# Patient Record
Sex: FEMALE | Race: WHITE | NOT HISPANIC OR LATINO | Employment: OTHER | URBAN - METROPOLITAN AREA
[De-identification: names, ages, dates, MRNs, and addresses within clinical notes are randomized per-mention and may not be internally consistent; named-entity substitution may affect disease eponyms.]

---

## 2017-01-03 ENCOUNTER — LAB (OUTPATIENT)
Dept: LAB | Facility: HOSPITAL | Age: 57
End: 2017-01-03

## 2017-01-03 ENCOUNTER — OFFICE VISIT (OUTPATIENT)
Dept: ONCOLOGY | Facility: CLINIC | Age: 57
End: 2017-01-03

## 2017-01-03 VITALS
SYSTOLIC BLOOD PRESSURE: 167 MMHG | HEIGHT: 60 IN | RESPIRATION RATE: 18 BRPM | TEMPERATURE: 98.9 F | BODY MASS INDEX: 47.71 KG/M2 | DIASTOLIC BLOOD PRESSURE: 57 MMHG | WEIGHT: 243 LBS | HEART RATE: 74 BPM

## 2017-01-03 DIAGNOSIS — C50.511 BREAST CANCER OF LOWER-OUTER QUADRANT OF RIGHT FEMALE BREAST (HCC): ICD-10-CM

## 2017-01-03 DIAGNOSIS — C50.511 BREAST CANCER OF LOWER-OUTER QUADRANT OF RIGHT FEMALE BREAST (HCC): Primary | ICD-10-CM

## 2017-01-03 LAB
ALBUMIN SERPL-MCNC: 4.3 G/DL (ref 3.2–4.8)
ALBUMIN/GLOB SERPL: 1.7 G/DL (ref 1.5–2.5)
ALP SERPL-CCNC: 101 U/L (ref 25–100)
ALT SERPL W P-5'-P-CCNC: 33 U/L (ref 7–40)
ANION GAP SERPL CALCULATED.3IONS-SCNC: 9 MMOL/L (ref 3–11)
AST SERPL-CCNC: 20 U/L (ref 0–33)
BILIRUB SERPL-MCNC: 0.4 MG/DL (ref 0.3–1.2)
BUN BLD-MCNC: 16 MG/DL (ref 9–23)
BUN/CREAT SERPL: 26.7 (ref 7–25)
CALCIUM SPEC-SCNC: 9.5 MG/DL (ref 8.7–10.4)
CHLORIDE SERPL-SCNC: 101 MMOL/L (ref 99–109)
CO2 SERPL-SCNC: 30 MMOL/L (ref 20–31)
CREAT BLD-MCNC: 0.6 MG/DL (ref 0.6–1.3)
ERYTHROCYTE [DISTWIDTH] IN BLOOD BY AUTOMATED COUNT: 14 % (ref 11.3–14.5)
GFR SERPL CREATININE-BSD FRML MDRD: 103 ML/MIN/1.73
GLOBULIN UR ELPH-MCNC: 2.6 GM/DL
GLUCOSE BLD-MCNC: 190 MG/DL (ref 70–100)
HCT VFR BLD AUTO: 39.2 % (ref 34.5–44)
HGB BLD-MCNC: 12.7 G/DL (ref 11.5–15.5)
LYMPHOCYTES # BLD AUTO: 1.1 10*3/MM3 (ref 0.6–4.8)
LYMPHOCYTES NFR BLD AUTO: 17.8 % (ref 24–44)
MCH RBC QN AUTO: 27.6 PG (ref 27–31)
MCHC RBC AUTO-ENTMCNC: 32.5 G/DL (ref 32–36)
MCV RBC AUTO: 84.7 FL (ref 80–99)
MONOCYTES # BLD AUTO: 0.2 10*3/MM3 (ref 0–1)
MONOCYTES NFR BLD AUTO: 3.4 % (ref 0–12)
NEUTROPHILS # BLD AUTO: 4.9 10*3/MM3 (ref 1.5–8.3)
NEUTROPHILS NFR BLD AUTO: 78.8 % (ref 41–71)
PLATELET # BLD AUTO: 308 10*3/MM3 (ref 150–450)
PMV BLD AUTO: 6.5 FL (ref 6–12)
POTASSIUM BLD-SCNC: 4.3 MMOL/L (ref 3.5–5.5)
PROT SERPL-MCNC: 6.9 G/DL (ref 5.7–8.2)
RBC # BLD AUTO: 4.62 10*6/MM3 (ref 3.89–5.14)
SODIUM BLD-SCNC: 140 MMOL/L (ref 132–146)
WBC NRBC COR # BLD: 6.2 10*3/MM3 (ref 3.5–10.8)

## 2017-01-03 PROCEDURE — 85025 COMPLETE CBC W/AUTO DIFF WBC: CPT

## 2017-01-03 PROCEDURE — 36415 COLL VENOUS BLD VENIPUNCTURE: CPT

## 2017-01-03 PROCEDURE — 80053 COMPREHEN METABOLIC PANEL: CPT | Performed by: INTERNAL MEDICINE

## 2017-01-03 PROCEDURE — 99214 OFFICE O/P EST MOD 30 MIN: CPT | Performed by: NURSE PRACTITIONER

## 2017-01-03 NOTE — MR AVS SNAPSHOT
Dana Rincon   1/3/2017 10:45 AM   Office Visit    Dept Phone:  [phone]   Encounter #:  [id number]    Provider:  Idalia Driscoll MD   Department:  [facility] GROUP HEMATOLOGY  AND ONCOLOGY                Your Full Care Plan              Your Updated Medication List          This list is accurate as of: 1/3/17 11:18 AM.  Always use your most recent med list.                AMBIEN 5 MG tablet   Generic drug:  zolpidem       anastrozole 1 MG tablet   Commonly known as:  ARIMIDEX   Take 1 tablet by mouth daily.       DULERA 100-5 MCG/ACT inhaler   Generic drug:  mometasone-formoterol       fexofenadine 30 MG tablet   Commonly known as:  ALLEGRA       montelukast 10 MG tablet   Commonly known as:  SINGULAIR   TAKE 1 TABLET BY MOUTH EVERY DAY AS DIRECTED       olmesartan-hydrochlorothiazide 40-12.5 MG per tablet   Commonly known as:  BENICAR HCT   TAKE 1 TABLET BY MOUTH DAILY               Instructions     None    Patient Instructions History      [employer] Signup     [facility] [employer] allows you to send messages to your doctor, view your test results, renew your prescriptions, schedule appointments, and more. To sign up, go to [email or web address] and click on the Sign Up Now link in the New User? box. Enter your [employer] Activation Code exactly as it appears below along with the last four digits of your Social Security Number and your Date of Birth () to complete the sign-up process. If you do not sign up before the expiration date, you must request a new code.    [employer] Activation Code: [email or web address]  Expires: 2017 11:18 AM    If you have questions, you can email [email or web address] or call [phone] to talk to our [employer] staff. Remember, [employer] is NOT to be used for urgent needs. For medical emergencies, dial 911.               Other Info from Your Visit           Your appointments     Date & Time Provider Appointment Department    Mar 20,  "2017  1:30 PM EDT [facility] M[facility]M 3 Mammo franck diag dig nga rt [facility] Breast [place] [address]    Same day results for Mammography and Ultrasound may extend the patient's time in the department from 1 - 3 or more hours. Bring outside films/reports to your appoint.  Area of concern must be marked on films. If patinet is breastfeeding, vie instructions. Do not apply any powders, perfumes, deodorants, lotions, oils, or body sprays prior to your appointment on the day of the exam. Arrive 20 min prior to your scheduled appointment time.    May 02, 2017  1:30 PM EDT Ever Becker MD FOLLOW UP [facility] HEMATOLOGY  AND ONCOLOGY    Jun 20, 2017  2:15 PM EDT Noreen Smith MD Follow Up [facility] INTERNAL MEDICINE AND ENDOCRINOLOGY [facility]    Arrive 15 minutes prior to appointment.        [facility] [address]  [place]  [address] [place] [address] [address]  [place] [id number]  [phone] [facility] HEMATOLOGY  AND ONCOLOGY  [address], [place] [address]  [facility] [address] [phone] [facility] INTERNAL MEDICINE AND ENDOCRINOLOGY [facility]  [address] [facility] [place] [address]  [facility] [address] [phone]              Vital Signs     Blood Pressure Pulse Temperature Respirations Height Weight    167/57 74 98.9 °F (37.2 °C) (Temporal Artery ) 18 60\" (152.4 cm) 243 lb (110 kg)    Last Menstrual Period Body Mass Index Smoking Status             (Approximate) 47.46 kg/m2 Never Smoker         Problems and Diagnoses Noted     Breast cancer of lower-outer quadrant of right female breast        "

## 2017-01-03 NOTE — PROGRESS NOTES
DATE OF VISIT: 1/3/2017    REASON FOR VISIT: Follow-up for invasive ductal carcinoma of the right breast,  [id number], estrogen receptor positive, HER-2/greg negative, low risk disease based  on Oncotype.      HISTORY OF PRESENT ILLNESS: The patient is a very pleasant 55-year-old female  who was in her usual state of health until her most recent mammogram done  03/2016. She had an abnormality in the right breast 10 o'clock position. The  patient had an ultrasound guided biopsy 03/10/2016 that revealed invasive  ductal carcinoma. The patient elected to proceed with lumpectomy on 05/03/2016  with Dr. Sales. Final pathology revealed 1.4 cm mass, tumor at the 7  o'clock position, ER positive more than 95%, VT positive more than 85%,  HER-2/greg 0+. The patient had clear surgical margins, Kimmie score 8 out of 9,  and 1 negative sentinel lymph node. The patient had no postoperative  complications. The patient had Oncotype testing. Her score came back at 16,  low risk disease group. Risk of recurrence 10% with tamoxifen alone. The  patient was started on Arimidex 1 mg p.o. daily 06/03/2016. The patient is  here today in scheduled follow-up visit.      SUBJECTIVE: The patient has been doing fairly well. She denied any headaches,  denied any night sweats, no diarrhea, no fever or chills. She is able to tolerate her Arimidex without any serious side effects. She has some mild hot flashes, but they are tolerable at this point. She had a fall in September with contusions to the chest wall and left breast. She had a mammogram done early in October that showed no evidence of recurrent disease.      REVIEW OF SYSTEMS: All the other 9 systems are reviewed by me and are negative  except what is mentioned in HPI.      PAST MEDICAL HISTORY/SOCIAL HISTORY/FAMILY HISTORY: Unchanged from my prior  documentation done 05/16/2016.      Current Outpatient Prescriptions:   •  anastrozole (ARIMIDEX) 1 MG chemo tablet, Take 1 tablet by mouth  "daily., Disp: 90 tablet, Rfl: 1  •  fexofenadine (ALLEGRA) 30 MG tablet, Take 30 mg by mouth daily., Disp: , Rfl:   •  mometasone-formoterol (DULERA) 100-5 MCG/ACT inhaler, Inhale daily., Disp: , Rfl:   •  montelukast (SINGULAIR) 10 MG tablet, TAKE 1 TABLET BY MOUTH EVERY DAY AS DIRECTED, Disp: 90 tablet, Rfl: 0  •  olmesartan-hydrochlorothiazide (BENICAR HCT) 40-12.5 MG per tablet, TAKE 1 TABLET BY MOUTH DAILY, Disp: 90 tablet, Rfl: 0  •  zolpidem (AMBIEN) 5 MG tablet, Take 5 mg by mouth at night as needed for sleep., Disp: , Rfl:     PHYSICAL EXAMINATION:   Visit Vitals   • /57   • Pulse 74   • Temp 98.9 °F (37.2 °C) (Temporal Artery )   • Resp 18   • Ht 60\" (152.4 cm)   • Wt 243 lb (110 kg)   • LMP  (Approximate)   • BMI 47.46 kg/m2     ECOG1  GENERAL: Age appropriate. No acute distress.   HEENT: Head atraumatic, normocephalic.   NECK: Supple. No JVD. No lymphadenopathy.   LUNGS: Clear to auscultation bilaterally. No wheezing. No rhonchi.   HEART: Regular rate and rhythm. S1, S2, no murmurs.   ABDOMEN: Soft, nontender, nondistended. Bowel sounds positive. No  hepatosplenomegaly.   EXTREMITIES: No clubbing, cyanosis, or edema.   SKIN: No rashes. No purpura.   NEUROLOGIC: Awake and oriented x3. Strength 5 out of 5 in all muscle groups.     Lab on 01/03/2017   Component Date Value Ref Range Status   • WBC 01/03/2017 6.20  3.50 - 10.80 10*3/mm3 Final   • RBC 01/03/2017 4.62  3.89 - 5.14 10*6/mm3 Final   • Hemoglobin 01/03/2017 12.7  11.5 - 15.5 g/dL Final   • Hematocrit 01/03/2017 39.2  34.5 - 44.0 % Final   • RDW 01/03/2017 14.0  11.3 - 14.5 % Final   • MCV 01/03/2017 84.7  80.0 - 99.0 fL Final   • MCH 01/03/2017 27.6  27.0 - 31.0 pg Final   • MCHC 01/03/2017 32.5  32.0 - 36.0 g/dL Final   • MPV 01/03/2017 6.5  6.0 - 12.0 fL Final   • Platelets 01/03/2017 308  150 - 450 10*3/mm3 Final   • Neutrophil % 01/03/2017 78.8* 41.0 - 71.0 % Final   • Lymphocyte % 01/03/2017 17.8* 24.0 - 44.0 % Final   • Monocyte % " "01/03/2017 3.4  0.0 - 12.0 % Final   • Neutrophils, Absolute 01/03/2017 4.90  1.50 - 8.30 10*3/mm3 Final   • Lymphocytes, Absolute 01/03/2017 1.10  0.60 - 4.80 10*3/mm3 Final   • Monocytes, Absolute 01/03/2017 0.20  0.00 - 1.00 10*3/mm3 Final      DUAL-ENERGY X-RAY ABSORPTIOMETRY (DXA)      INDICATION- 55-year-old female patient recently diagnosed with breast  cancer.      COMPARISON- None.       PROCEDURE- A DXA scan was performed using a Hologic densitometer.      The lumbar spine was evaluated as well as the left and right total  hip.       The T-score compares the patient's bone mineral density with the peak  bone mass of young normal patients. According to criteria established  by the World Health Organization, patients with T-scores between 1.0  and 2.5 standard deviations BELOW the mean are osteopenic (low bone  mass). Patients with T-scores EQUAL TO OR GREATER than 2.5 standard  deviations below the mean are osteoporotic.      The Z-score compares the patient bone mineral density with age and sex  matched peers. According to the International Society for Clinical  Densitometry's 2007 consensus conference- In women prior to menopause  and men less than age 50, Z-scores, not T-scores are preferred. A  Z-score of -2.0 or lower is defined as \"below the expected range for  age\" and a Z-score above -2.0 is \"within the expected range for age.\"   The WHO diagnostic criteria may be applied in women in the menopausal  transition. Osteoporosis cannot be diagnosed in men under age 50 on the  basis of BMD alone.      TECHNICAL QUALITY- The study is of good technical quality.      RESULTS-      Lumbar Spine- The BMD measured in the L1-L4 region is 1.041 g/cm2. The  average T-score is -0.1. The Z-score is 1.1.      Total Hip- The BMD measured at the left total proximal femur is 0.946  g/cm2. The T-score is 0.0. The Z-score is 0.7.      Femoral Neck- The BMD measured at the left femoral neck is 0.648 g/cm2.  The T-score is " -1.8. The Z-score is -0.7.      Total Hip- The BMD measured at the right total proximal femur is 0.929  g/cm2. The T-score is -0.1. The Z-score is 0.6.      Femoral Neck- The BMD measured at the right femoral neck is 0.708  g/cm2. The T-score is -1.3. The Z-score is -0.2.      IMPRESSION- The patient is considered osteopenic according to World  Health Organization criteria.    ASSESSMENT: The patient is a very pleasant 55-year-old female with stage I  invasive ductal carcinoma of the right breast.      PROBLEM LIST:   1. Stage I invasive ductal carcinoma of the right breast, [id number], tumor at  the 7 o'clock position, tumor size 1.4 cm, estrogen receptor and progesterone  receptor strongly positive, HER-2/greg negative zero by IHC. Low risk group  Oncotype testing, score of 16, 10% risk of recurrence with hormone treatment  alone.  2. Diagnosed on ultrasound guided biopsy 03/10/2016.   3. Status post lumpectomy with clear margins done by Dr. Sales  05/03/2016.   4. Started Arimidex 1 mg p.o. daily 06/03/2016.   5. Completed breast adjuvant radiation by Dr. Thompson.   6. Osteopenia.     PLAN:   1. I reviewed the mammogram results with the patient. I told her there is no evidence of recurrent disease. She will keep follow up imaging appointment for 03/20/2017.   2. I reviewed the blood work results. I reassured her her blood counts are normal. We will follow up on her CMP results and notify her of significant findings.   3. The patient will continue Arimidex 1 mg daily. She does not needs refills on this prescription today. We will plan to complete 5 years of Arimidex, which will be completed 06/03/2021.   4. The patient will continue calcium and vitamin D supplement daily. She will need repeat DEXA in 2 years, which will be due 06/07/2018.        Waleska Uribe, APRN  1/3/2017

## 2017-01-05 RX ORDER — MONTELUKAST SODIUM 10 MG/1
TABLET ORAL
Qty: 90 TABLET | Refills: 0 | Status: SHIPPED | OUTPATIENT
Start: 2017-01-05 | End: 2017-04-03 | Stop reason: SDUPTHER

## 2017-02-06 RX ORDER — OLMESARTAN MEDOXOMIL AND HYDROCHLOROTHIAZIDE 40/12.5 40; 12.5 MG/1; MG/1
TABLET ORAL
Qty: 90 TABLET | Refills: 0 | Status: SHIPPED | OUTPATIENT
Start: 2017-02-06 | End: 2017-05-15 | Stop reason: SDUPTHER

## 2017-03-20 ENCOUNTER — HOSPITAL ENCOUNTER (OUTPATIENT)
Dept: ULTRASOUND IMAGING | Facility: HOSPITAL | Age: 57
Discharge: HOME | End: 2017-03-20

## 2017-03-20 ENCOUNTER — TRANSCRIBE ORDERS (OUTPATIENT)
Dept: MAMMOGRAPHY | Facility: HOSPITAL | Age: 57
End: 2017-03-20

## 2017-03-20 ENCOUNTER — HOSPITAL ENCOUNTER (OUTPATIENT)
Dept: MAMMOGRAPHY | Facility: HOSPITAL | Age: 57
Discharge: HOME | End: 2017-03-20
Attending: SURGERY | Admitting: SURGERY

## 2017-03-20 DIAGNOSIS — Z85.3 HISTORY OF BREAST CANCER: ICD-10-CM

## 2017-03-20 DIAGNOSIS — R92.8 ABNORMAL MAMMOGRAM: Primary | ICD-10-CM

## 2017-03-20 PROCEDURE — G0204 DX MAMMO INCL CAD BI: HCPCS

## 2017-03-20 PROCEDURE — 76642 ULTRASOUND BREAST LIMITED: CPT

## 2017-03-20 PROCEDURE — 77062 BREAST TOMOSYNTHESIS BI: CPT | Performed by: RADIOLOGY

## 2017-03-20 PROCEDURE — 77066 DX MAMMO INCL CAD BI: CPT | Performed by: RADIOLOGY

## 2017-03-20 PROCEDURE — G0279 TOMOSYNTHESIS, MAMMO: HCPCS

## 2017-03-20 PROCEDURE — 76642 ULTRASOUND BREAST LIMITED: CPT | Performed by: RADIOLOGY

## 2017-04-03 RX ORDER — MONTELUKAST SODIUM 10 MG/1
TABLET ORAL
Qty: 90 TABLET | Refills: 1 | Status: SHIPPED | OUTPATIENT
Start: 2017-04-03 | End: 2017-08-31 | Stop reason: SDUPTHER

## 2017-05-02 ENCOUNTER — OFFICE VISIT (OUTPATIENT)
Dept: ONCOLOGY | Facility: CLINIC | Age: 57
End: 2017-05-02

## 2017-05-02 ENCOUNTER — LAB (OUTPATIENT)
Dept: LAB | Facility: HOSPITAL | Age: 57
End: 2017-05-02

## 2017-05-02 VITALS
TEMPERATURE: 98 F | HEIGHT: 60 IN | HEART RATE: 63 BPM | WEIGHT: 246 LBS | DIASTOLIC BLOOD PRESSURE: 62 MMHG | RESPIRATION RATE: 18 BRPM | SYSTOLIC BLOOD PRESSURE: 152 MMHG | BODY MASS INDEX: 48.29 KG/M2

## 2017-05-02 DIAGNOSIS — C50.511 BREAST CANCER OF LOWER-OUTER QUADRANT OF RIGHT FEMALE BREAST (HCC): Primary | ICD-10-CM

## 2017-05-02 DIAGNOSIS — C50.511 BREAST CANCER OF LOWER-OUTER QUADRANT OF RIGHT FEMALE BREAST (HCC): ICD-10-CM

## 2017-05-02 LAB
ALBUMIN SERPL-MCNC: 4.3 G/DL (ref 3.2–4.8)
ALBUMIN/GLOB SERPL: 1.4 G/DL (ref 1.5–2.5)
ALP SERPL-CCNC: 102 U/L (ref 25–100)
ALT SERPL W P-5'-P-CCNC: 26 U/L (ref 7–40)
ANION GAP SERPL CALCULATED.3IONS-SCNC: 10 MMOL/L (ref 3–11)
AST SERPL-CCNC: 19 U/L (ref 0–33)
BILIRUB SERPL-MCNC: 0.4 MG/DL (ref 0.3–1.2)
BUN BLD-MCNC: 14 MG/DL (ref 9–23)
BUN/CREAT SERPL: 20 (ref 7–25)
CALCIUM SPEC-SCNC: 10.2 MG/DL (ref 8.7–10.4)
CHLORIDE SERPL-SCNC: 103 MMOL/L (ref 99–109)
CO2 SERPL-SCNC: 28 MMOL/L (ref 20–31)
CREAT BLD-MCNC: 0.7 MG/DL (ref 0.6–1.3)
ERYTHROCYTE [DISTWIDTH] IN BLOOD BY AUTOMATED COUNT: 13.7 % (ref 11.3–14.5)
GFR SERPL CREATININE-BSD FRML MDRD: 87 ML/MIN/1.73
GLOBULIN UR ELPH-MCNC: 3 GM/DL
GLUCOSE BLD-MCNC: 122 MG/DL (ref 70–100)
HCT VFR BLD AUTO: 39.7 % (ref 34.5–44)
HGB BLD-MCNC: 12.8 G/DL (ref 11.5–15.5)
LYMPHOCYTES # BLD AUTO: 1.4 10*3/MM3 (ref 0.6–4.8)
LYMPHOCYTES NFR BLD AUTO: 17.2 % (ref 24–44)
MCH RBC QN AUTO: 27.2 PG (ref 27–31)
MCHC RBC AUTO-ENTMCNC: 32.3 G/DL (ref 32–36)
MCV RBC AUTO: 84.1 FL (ref 80–99)
MONOCYTES # BLD AUTO: 0.1 10*3/MM3 (ref 0–1)
MONOCYTES NFR BLD AUTO: 1 % (ref 0–12)
NEUTROPHILS # BLD AUTO: 6.8 10*3/MM3 (ref 1.5–8.3)
NEUTROPHILS NFR BLD AUTO: 81.8 % (ref 41–71)
PLATELET # BLD AUTO: 294 10*3/MM3 (ref 150–450)
PMV BLD AUTO: 6.6 FL (ref 6–12)
POTASSIUM BLD-SCNC: 4.3 MMOL/L (ref 3.5–5.5)
PROT SERPL-MCNC: 7.3 G/DL (ref 5.7–8.2)
RBC # BLD AUTO: 4.71 10*6/MM3 (ref 3.89–5.14)
SODIUM BLD-SCNC: 141 MMOL/L (ref 132–146)
WBC NRBC COR # BLD: 8.3 10*3/MM3 (ref 3.5–10.8)

## 2017-05-02 PROCEDURE — 36415 COLL VENOUS BLD VENIPUNCTURE: CPT

## 2017-05-02 PROCEDURE — 85025 COMPLETE CBC W/AUTO DIFF WBC: CPT

## 2017-05-02 PROCEDURE — 99214 OFFICE O/P EST MOD 30 MIN: CPT | Performed by: NURSE PRACTITIONER

## 2017-05-02 PROCEDURE — 80053 COMPREHEN METABOLIC PANEL: CPT | Performed by: NURSE PRACTITIONER

## 2017-05-15 RX ORDER — OLMESARTAN MEDOXOMIL AND HYDROCHLOROTHIAZIDE 40/12.5 40; 12.5 MG/1; MG/1
TABLET ORAL
Qty: 90 TABLET | Refills: 0 | Status: SHIPPED | OUTPATIENT
Start: 2017-05-15 | End: 2017-08-18 | Stop reason: SDUPTHER

## 2017-05-15 RX ORDER — ANASTROZOLE 1 MG/1
TABLET ORAL
Qty: 30 TABLET | Refills: 5 | Status: SHIPPED | OUTPATIENT
Start: 2017-05-15 | End: 2017-11-20 | Stop reason: SDUPTHER

## 2017-08-14 RX ORDER — OLMESARTAN MEDOXOMIL AND HYDROCHLOROTHIAZIDE 40/12.5 40; 12.5 MG/1; MG/1
TABLET ORAL
Qty: 90 TABLET | Refills: 0 | OUTPATIENT
Start: 2017-08-14

## 2017-08-16 RX ORDER — OLMESARTAN MEDOXOMIL AND HYDROCHLOROTHIAZIDE 40/12.5 40; 12.5 MG/1; MG/1
TABLET ORAL
Qty: 90 TABLET | Refills: 0 | Status: CANCELLED | OUTPATIENT
Start: 2017-08-16

## 2017-08-16 NOTE — TELEPHONE ENCOUNTER
PT CALLED TO SEE IF WE CAN CALL IN A REFILL IT WAS DENIED ALREADY.  SHE HAS AN APPT 1/4/2018 WE RESCHEDULED THIS APPT 3 TIMES ALREADY.  PT USES [facility] [address]   SHE WANTED A #120. THIS WILL KEEP HER HELD OVER TIL HER APPT

## 2017-08-18 ENCOUNTER — OFFICE VISIT (OUTPATIENT)
Dept: INTERNAL MEDICINE | Facility: CLINIC | Age: 57
End: 2017-08-18

## 2017-08-18 VITALS
BODY MASS INDEX: 48.63 KG/M2 | HEART RATE: 73 BPM | DIASTOLIC BLOOD PRESSURE: 70 MMHG | SYSTOLIC BLOOD PRESSURE: 120 MMHG | OXYGEN SATURATION: 98 % | WEIGHT: 249 LBS

## 2017-08-18 DIAGNOSIS — IMO0001 UNCONTROLLED TYPE 2 DIABETES MELLITUS WITHOUT COMPLICATION, WITHOUT LONG-TERM CURRENT USE OF INSULIN: Primary | ICD-10-CM

## 2017-08-18 DIAGNOSIS — J45.909 UNCOMPLICATED ASTHMA, UNSPECIFIED ASTHMA SEVERITY: ICD-10-CM

## 2017-08-18 DIAGNOSIS — Z01.89 ROUTINE LAB DRAW: ICD-10-CM

## 2017-08-18 DIAGNOSIS — I10 ESSENTIAL HYPERTENSION: ICD-10-CM

## 2017-08-18 LAB — HBA1C MFR BLD: 7.1 %

## 2017-08-18 PROCEDURE — 99214 OFFICE O/P EST MOD 30 MIN: CPT | Performed by: INTERNAL MEDICINE

## 2017-08-18 PROCEDURE — 83036 HEMOGLOBIN GLYCOSYLATED A1C: CPT | Performed by: INTERNAL MEDICINE

## 2017-08-18 RX ORDER — METFORMIN HYDROCHLORIDE 750 MG/1
750 TABLET, EXTENDED RELEASE ORAL
Qty: 30 TABLET | Refills: 5 | Status: SHIPPED | OUTPATIENT
Start: 2017-08-18 | End: 2018-02-08 | Stop reason: SDUPTHER

## 2017-08-18 RX ORDER — OLMESARTAN MEDOXOMIL AND HYDROCHLOROTHIAZIDE 40/12.5 40; 12.5 MG/1; MG/1
1 TABLET ORAL DAILY
Qty: 90 TABLET | Refills: 1 | Status: SHIPPED | OUTPATIENT
Start: 2017-08-18 | End: 2018-02-08 | Stop reason: SDUPTHER

## 2017-08-18 RX ORDER — MELATONIN 10 MG
CAPSULE ORAL
COMMUNITY

## 2017-08-18 NOTE — PROGRESS NOTES
Asthma and Hypertension  and diabetes    Subjective   Dana Rincon is a 56 y.o. female is here today for follow-up.    History of Present Illness   She is struggling with her weight.  She does note that she has diabetes on both sides of her family.  Thinks her breast cancer issues are making it difficult.  Here for f/u on BP and asthma, stable, denies, ha or dizziness.      Current Outpatient Prescriptions:   •  anastrozole (ARIMIDEX) 1 MG tablet, TAKE 1 TABLET BY MOUTH DAILY, Disp: 30 tablet, Rfl: 5  •  fexofenadine (ALLEGRA) 30 MG tablet, Take 30 mg by mouth daily., Disp: , Rfl:   •  Melatonin 10 MG capsule, Take  by mouth., Disp: , Rfl:   •  mometasone-formoterol (DULERA) 100-5 MCG/ACT inhaler, Inhale 2 puffs Daily., Disp: 3 inhaler, Rfl: 1  •  montelukast (SINGULAIR) 10 MG tablet, TAKE 1 TABLET BY MOUTH EVERY DAY AS DIRECTED, Disp: 90 tablet, Rfl: 1  •  olmesartan-hydrochlorothiazide (BENICAR HCT) 40-12.5 MG per tablet, Take 1 tablet by mouth Daily., Disp: 90 tablet, Rfl: 1  •  metFORMIN ER (GLUCOPHAGE XR) 750 MG 24 hr tablet, Take 1 tablet by mouth Daily With Breakfast., Disp: 30 tablet, Rfl: 5      The following portions of the patient's history were reviewed and updated as appropriate: allergies, current medications, past family history, past medical history, past social history, past surgical history and problem list.    Review of Systems   Constitutional: Positive for fatigue and unexpected weight change. Negative for chills and fever.   HENT: Negative for ear discharge, ear pain, sinus pressure and sore throat.    Respiratory: Negative for cough, chest tightness and shortness of breath.    Cardiovascular: Negative for chest pain, palpitations and leg swelling.   Gastrointestinal: Negative for diarrhea, nausea and vomiting.   Musculoskeletal: Negative for arthralgias, back pain and myalgias.   Neurological: Negative for dizziness, syncope and headaches.   Psychiatric/Behavioral: Negative for confusion and  sleep disturbance.       Objective   /70  Pulse 73  Wt 249 lb (113 kg)  LMP  (Approximate)  SpO2 98%  BMI 48.63 kg/m2  Physical Exam   Constitutional: She is oriented to person, place, and time. She appears well-developed and well-nourished.   HENT:   Head: Normocephalic and atraumatic.   Right Ear: External ear normal.   Left Ear: External ear normal.   Mouth/Throat: No oropharyngeal exudate.   Eyes: Conjunctivae are normal. Pupils are equal, round, and reactive to light.   Neck: Neck supple. No thyromegaly present.   Cardiovascular: Normal rate, regular rhythm and intact distal pulses.    Pulmonary/Chest: Effort normal and breath sounds normal.   Abdominal: Soft. Bowel sounds are normal. She exhibits no distension. There is no tenderness.   Musculoskeletal: She exhibits no edema.   Neurological: She is alert and oriented to person, place, and time. No cranial nerve deficit.   Skin: Skin is warm and dry.   Psychiatric: She has a normal mood and affect. Judgment normal.   Nursing note and vitals reviewed.        Results for orders placed or performed in visit on 05/02/17   Comprehensive Metabolic Panel   Result Value Ref Range    Glucose 122 (H) 70 - 100 mg/dL    BUN 14 9 - 23 mg/dL    Creatinine 0.70 0.60 - 1.30 mg/dL    Sodium 141 132 - 146 mmol/L    Potassium 4.3 3.5 - 5.5 mmol/L    Chloride 103 99 - 109 mmol/L    CO2 28.0 20.0 - 31.0 mmol/L    Calcium 10.2 8.7 - 10.4 mg/dL    Total Protein 7.3 5.7 - 8.2 g/dL    Albumin 4.30 3.20 - 4.80 g/dL    ALT (SGPT) 26 7 - 40 U/L    AST (SGOT) 19 0 - 33 U/L    Alkaline Phosphatase 102 (H) 25 - 100 U/L    Total Bilirubin 0.4 0.3 - 1.2 mg/dL    eGFR Non African Amer 87 >60 mL/min/1.73    Globulin 3.0 gm/dL    A/G Ratio 1.4 (L) 1.5 - 2.5 g/dL    BUN/Creatinine Ratio 20.0 7.0 - 25.0    Anion Gap 10.0 3.0 - 11.0 mmol/L   CBC Auto Differential   Result Value Ref Range    WBC 8.30 3.50 - 10.80 10*3/mm3    RBC 4.71 3.89 - 5.14 10*6/mm3    Hemoglobin 12.8 11.5 - 15.5 g/dL     Hematocrit 39.7 34.5 - 44.0 %    RDW 13.7 11.3 - 14.5 %    MCV 84.1 80.0 - 99.0 fL    MCH 27.2 27.0 - 31.0 pg    MCHC 32.3 32.0 - 36.0 g/dL    MPV 6.6 6.0 - 12.0 fL    Platelets 294 150 - 450 10*3/mm3    Neutrophil % 81.8 (H) 41.0 - 71.0 %    Lymphocyte % 17.2 (L) 24.0 - 44.0 %    Monocyte % 1.0 0.0 - 12.0 %    Neutrophils, Absolute 6.80 1.50 - 8.30 10*3/mm3    Lymphocytes, Absolute 1.40 0.60 - 4.80 10*3/mm3    Monocytes, Absolute 0.10 0.00 - 1.00 10*3/mm3             Assessment/Plan   Diagnoses and all orders for this visit:    Uncontrolled type 2 diabetes mellitus without complication, without long-term current use of insulin  Comments:  A1C is higher, start metformin, and if no response with weight, add/ switch to Victoza.  Orders:  -     metFORMIN ER (GLUCOPHAGE XR) 750 MG 24 hr tablet; Take 1 tablet by mouth Daily With Breakfast.    Essential hypertension  Comments:  stable, continue current regimen  Orders:  -     olmesartan-hydrochlorothiazide (BENICAR HCT) 40-12.5 MG per tablet; Take 1 tablet by mouth Daily.    Uncomplicated asthma, unspecified asthma severity  -     mometasone-formoterol (DULERA) 100-5 MCG/ACT inhaler; Inhale 2 puffs Daily.    Routine lab draw  -     Comprehensive Metabolic Panel; Future  -     Lipid Panel; Future  -     TSH; Future  -     Vitamin D 25 Hydroxy; Future    Other orders  -     Melatonin 10 MG capsule; Take  by mouth.      check labs with her draw when she goes to see Dr. Driscoll.           Return for Annual physical, Next scheduled follow up.

## 2017-08-31 RX ORDER — MONTELUKAST SODIUM 10 MG/1
TABLET ORAL
Qty: 90 TABLET | Refills: 0 | Status: SHIPPED | OUTPATIENT
Start: 2017-08-31 | End: 2017-12-12 | Stop reason: SDUPTHER

## 2017-09-20 ENCOUNTER — TRANSCRIBE ORDERS (OUTPATIENT)
Dept: MAMMOGRAPHY | Facility: HOSPITAL | Age: 57
End: 2017-09-20

## 2017-09-20 ENCOUNTER — HOSPITAL ENCOUNTER (OUTPATIENT)
Dept: MAMMOGRAPHY | Facility: HOSPITAL | Age: 57
Discharge: HOME | End: 2017-09-20
Attending: SURGERY | Admitting: SURGERY

## 2017-09-20 DIAGNOSIS — R92.8 ABNORMAL MAMMOGRAM: ICD-10-CM

## 2017-09-20 DIAGNOSIS — R92.8 ABNORMAL MAMMOGRAM: Primary | ICD-10-CM

## 2017-09-20 PROCEDURE — G0279 TOMOSYNTHESIS, MAMMO: HCPCS

## 2017-09-20 PROCEDURE — 77066 DX MAMMO INCL CAD BI: CPT | Performed by: RADIOLOGY

## 2017-09-20 PROCEDURE — G0204 DX MAMMO INCL CAD BI: HCPCS

## 2017-09-20 PROCEDURE — 77062 BREAST TOMOSYNTHESIS BI: CPT | Performed by: RADIOLOGY

## 2017-11-08 ENCOUNTER — OFFICE VISIT (OUTPATIENT)
Dept: ORTHOPEDIC SURGERY | Facility: CLINIC | Age: 57
End: 2017-11-08

## 2017-11-08 VITALS
DIASTOLIC BLOOD PRESSURE: 73 MMHG | HEART RATE: 67 BPM | SYSTOLIC BLOOD PRESSURE: 180 MMHG | HEIGHT: 61 IN | BODY MASS INDEX: 46.44 KG/M2 | WEIGHT: 246 LBS

## 2017-11-08 DIAGNOSIS — M25.561 ACUTE PAIN OF RIGHT KNEE: Primary | ICD-10-CM

## 2017-11-08 DIAGNOSIS — M76.899 QUADRICEPS TENDINITIS: ICD-10-CM

## 2017-11-08 PROCEDURE — 99204 OFFICE O/P NEW MOD 45 MIN: CPT | Performed by: ORTHOPAEDIC SURGERY

## 2017-11-08 NOTE — PROGRESS NOTES
[facility] [place] [facility] [place]    Subjective     Chief Complaint   Patient presents with   • Right Knee - Pain        HPI      Dana Rincon is a 57 y.o. female.  Patient has history of right knee pain pain is coming off one half weeks.  Is worse with walking and driving and better with rest.  She takes naproxen.  Pain is 7 out of 10.  She has some tenderness and pain in the quadriceps area in addition to the medial joint line.  It is severe at times.        Past Medical History:   Diagnosis Date   • Asthma    • Breast cancer 2016    right   • Breast injury     PT STATES SHE FELL AND BRUSIED HER LEFT BREAST   • Dry eyes    • Emphysema/COPD    • H/O colonoscopy 10/2015   • HBP (high blood pressure)    • Hot flashes    • Hx of radiation therapy 06/2016    right breast   • Itchy eyes    • Sinus problem    • Wears glasses       Past Surgical History:   Procedure Laterality Date   • BREAST BIOPSY Right 09/08/2015    stereo bx   • BREAST BIOPSY Right 03/10/2016    US bx   • BREAST LUMPECTOMY Right 05/03/2016    taking arimidex.   • DILATATION AND CURETTAGE  2005   • OTHER SURGICAL HISTORY  2016    lumpectomy   • TONSILLECTOMY  1965      Family History   Problem Relation Age of Onset   • Colon cancer Mother    • Diabetes Father    • Heart disease Father    • Breast cancer Paternal Aunt      pt states 50's   • Colon cancer Paternal Uncle    • Breast cancer Other 30   • Breast cancer Other 30   • BRCA 1/2 Neg Hx    • Endometrial cancer Neg Hx    • Ovarian cancer Neg Hx      Social History     Social History   • Marital status:      Spouse name: N/A   • Number of children: N/A   • Years of education: N/A     Occupational History   • Not on file.     Social History Main Topics   • Smoking status: Never Smoker   • Smokeless tobacco: Never Used   • Alcohol use Yes      Comment: 2-3/week for 20 years   • Drug use: No   • Sexual activity: Defer     Other Topics Concern   • Not on file     Social History Narrative  "     Current Outpatient Prescriptions on File Prior to Visit   Medication Sig Dispense Refill   • anastrozole (ARIMIDEX) 1 MG tablet TAKE 1 TABLET BY MOUTH DAILY 30 tablet 5   • fexofenadine (ALLEGRA) 30 MG tablet Take 30 mg by mouth daily.     • Melatonin 10 MG capsule Take  by mouth.     • metFORMIN ER (GLUCOPHAGE XR) 750 MG 24 hr tablet Take 1 tablet by mouth Daily With Breakfast. 30 tablet 5   • mometasone-formoterol (DULERA) 100-5 MCG/ACT inhaler Inhale 2 puffs Daily. 3 inhaler 1   • montelukast (SINGULAIR) 10 MG tablet TAKE 1 TABLET BY MOUTH EVERY DAY AS DIRECTED 90 tablet 0   • naproxen (NAPROSYN) 500 MG tablet Take 1 tablet by mouth 2 (Two) Times a Day As Needed for Mild Pain  for up to 5 days. 10 tablet 0   • olmesartan-hydrochlorothiazide (BENICAR HCT) 40-12.5 MG per tablet Take 1 tablet by mouth Daily. 90 tablet 1     No current facility-administered medications on file prior to visit.       Allergies   Allergen Reactions   • Meperidine Nausea And Vomiting        The following portions of the patient's history were reviewed and updated as appropriate: allergies, current medications, past family history, past medical history, past social history, past surgical history and problem list.    Review of Systems   Constitutional: Negative.    HENT: Negative.    Eyes: Positive for discharge and itching.   Respiratory: Positive for apnea.    Cardiovascular: Negative.    Gastrointestinal: Negative.    Endocrine: Negative.    Genitourinary: Negative.    Musculoskeletal: Positive for arthralgias and gait problem.   Skin: Negative.    Allergic/Immunologic: Negative.    Hematological: Negative.    Psychiatric/Behavioral: Negative.         Objective      Physical Exam  /73  Pulse 67  Ht 60.75\" (154.3 cm)  Wt 246 lb (112 kg)  BMI 46.86 kg/m2    Body mass index is 46.86 kg/(m^2).        GENERAL APPEARANCE: awake, alert & oriented x 3, in no acute distress and well developed, well nourished  PSYCH: normal mood " andaffect  LUNGS:  breathing nonlabored, no wheezing  EYES: sclera anicteric, pupils equal  CARDIOVASCULAR: palpable pulses dorsalis pedis, palpable posterior tibial bilaterally. Capillary refill less than 2 seconds  INTEGUMENTARY: skin intact, no clubbing, cyanosis  NEUROLOGIC:  Normal gait and balance            Ortho Exam  Peripheral Vascular:    Upper Extremity:   Inspection:  Left--no cyanotic nail beds Right--no cyanotic nail beds   Bilateral:  Pink nail beds with brisk capillary refill   Palpation:  Bilateral radial pulse normal    Musculoskeletal:  Global Assessment:  Overall assessment of Lower Extremity Muscle Strength and Tone:    Right quadriceps--5/5  Right hamstrings--5/5  Right tibialis anterior--5/5  Right gastroc soleus--5/5  Right EHL--5/5    Lower Extremity:  Knee/Patella:  No digital clubbing or cyanosis.    Examination of right knee reveals:  Normal deep tendon reflexes, coordination, strength, tone, sensation.  No known fractures or deformities.    Inspection and Palpation:      Right knee:  Tenderness:  Medial joint line and quadriceps tendon.  Effusion:  none  Crepitus:  none  Pulses:  2+  Ecchymosis:  None  Warmth:  None     ROM:  Right:  Extension:0    Flexion:135  Left:  Extension:0     Flexion:135    Instability:      Right:  Lachman Test:  Negative, Varus stress test negative, Valgus stress test negative   Anterior Drawer Test:  Negative, Posterior Drawer Test:  Negative    Deformities/Malalignments/Discrepancies:    Left:  none  Right:  none    Functional Testing:  Right:  Nakita's test:  Positive Patella grind test:  Negative  Q-angle:  Normal  Apprehension Sign:  Negative        Imaging/Studies  Imaging Results (last 24 hours)     ** No results found for the last 24 hours. **      I reviewed x-rays from urgent care and they are normal    Assessment/Plan      Dana was seen today for pain.    Diagnoses and all orders for this visit:    Acute pain of right knee  -     MRI Knee Right  Without Contrast; Future    Quadriceps tendinitis        She most likely has a medial meniscus tear and quadriceps tendinitis I will see her back after the MRI  She will continue naproxen  Medical Decision Making  Management Options : over-the-counter medicine  Data/Risk: radiology tests and independent visualization of imaging, lab tests, or EMG/NCV    Osman Decker MD  11/08/17  8:17 AM

## 2017-11-09 ENCOUNTER — HOSPITAL ENCOUNTER (OUTPATIENT)
Dept: MRI IMAGING | Facility: HOSPITAL | Age: 57
Discharge: HOME | End: 2017-11-09
Attending: ORTHOPAEDIC SURGERY | Admitting: ORTHOPAEDIC SURGERY

## 2017-11-09 DIAGNOSIS — M25.561 ACUTE PAIN OF RIGHT KNEE: ICD-10-CM

## 2017-11-09 PROCEDURE — 73721 MRI JNT OF LWR EXTRE W/O DYE: CPT

## 2017-11-17 ENCOUNTER — OFFICE VISIT (OUTPATIENT)
Dept: ORTHOPEDIC SURGERY | Facility: CLINIC | Age: 57
End: 2017-11-17

## 2017-11-17 VITALS
HEART RATE: 72 BPM | WEIGHT: 246 LBS | HEIGHT: 61 IN | DIASTOLIC BLOOD PRESSURE: 63 MMHG | BODY MASS INDEX: 46.44 KG/M2 | SYSTOLIC BLOOD PRESSURE: 159 MMHG

## 2017-11-17 DIAGNOSIS — S83.241D OTHER TEAR OF MEDIAL MENISCUS OF RIGHT KNEE AS CURRENT INJURY, SUBSEQUENT ENCOUNTER: Primary | ICD-10-CM

## 2017-11-17 DIAGNOSIS — M76.899 QUADRICEPS TENDINITIS: ICD-10-CM

## 2017-11-17 PROCEDURE — 99213 OFFICE O/P EST LOW 20 MIN: CPT | Performed by: ORTHOPAEDIC SURGERY

## 2017-11-17 NOTE — PROGRESS NOTES
[facility] Note    Subjective     Chief Complaint   Patient presents with   • Follow-up     1 week - MRI Rt knee        HPI      Dana Rincon is a 57 y.o. female.  Her right knee is actually feeling somewhat better.  Pain is 4-10.  His aching.  She takes anti-inflammatories.  This started about 3 weeks ago worse with walking and better with sitting.        Past Medical History:   Diagnosis Date   • Asthma    • Breast cancer 2016    right   • Breast injury     PT STATES SHE FELL AND BRUSIED HER LEFT BREAST   • Dry eyes    • Emphysema/COPD    • H/O colonoscopy 10/2015   • HBP (high blood pressure)    • Hot flashes    • Hx of radiation therapy 06/2016    right breast   • Itchy eyes    • Sinus problem    • Wears glasses       Past Surgical History:   Procedure Laterality Date   • BREAST BIOPSY Right 09/08/2015    stereo bx   • BREAST BIOPSY Right 03/10/2016    US bx   • BREAST LUMPECTOMY Right 05/03/2016    taking arimidex.   • DILATATION AND CURETTAGE  2005   • OTHER SURGICAL HISTORY  2016    lumpectomy   • TONSILLECTOMY  1965      Family History   Problem Relation Age of Onset   • Colon cancer Mother    • Diabetes Father    • Heart disease Father    • Breast cancer Paternal Aunt      pt states 50's   • Colon cancer Paternal Uncle    • Breast cancer Other 30   • Breast cancer Other 30   • BRCA 1/2 Neg Hx    • Endometrial cancer Neg Hx    • Ovarian cancer Neg Hx      Social History     Social History   • Marital status:      Spouse name: N/A   • Number of children: N/A   • Years of education: N/A     Occupational History   • Not on file.     Social History Main Topics   • Smoking status: Never Smoker   • Smokeless tobacco: Never Used   • Alcohol use Yes      Comment: 2-3/week for 20 years   • Drug use: No   • Sexual activity: Defer     Other Topics Concern   • Not on file     Social History Narrative      Current Outpatient Prescriptions on File Prior to Visit   Medication Sig Dispense  "Refill   • anastrozole (ARIMIDEX) 1 MG tablet TAKE 1 TABLET BY MOUTH DAILY 30 tablet 5   • fexofenadine (ALLEGRA) 30 MG tablet Take 30 mg by mouth daily.     • Melatonin 10 MG capsule Take  by mouth.     • metFORMIN ER (GLUCOPHAGE XR) 750 MG 24 hr tablet Take 1 tablet by mouth Daily With Breakfast. 30 tablet 5   • mometasone-formoterol (DULERA) 100-5 MCG/ACT inhaler Inhale 2 puffs Daily. 3 inhaler 1   • montelukast (SINGULAIR) 10 MG tablet TAKE 1 TABLET BY MOUTH EVERY DAY AS DIRECTED 90 tablet 0   • olmesartan-hydrochlorothiazide (BENICAR HCT) 40-12.5 MG per tablet Take 1 tablet by mouth Daily. 90 tablet 1     No current facility-administered medications on file prior to visit.       Allergies   Allergen Reactions   • Meperidine Nausea And Vomiting        The following portions of the patient's history were reviewed and updated as appropriate: allergies, current medications, past family history, past medical history, past social history, past surgical history and problem list.    Review of Systems   Musculoskeletal: Positive for arthralgias and joint swelling.        Objective      Physical Exam  /63  Pulse 72  Ht 60.75\" (154.3 cm)  Wt 246 lb (112 kg)  BMI 46.86 kg/m2    Body mass index is 46.86 kg/(m^2).        GENERAL APPEARANCE: awake, alert & oriented x 3, in no acute distress and well developed, well nourished  PSYCH: normal mood andaffect  LUNGS:  breathing nonlabored, no wheezing  EYES: sclera anicteric, pupils equal  CARDIOVASCULAR: palpable pulses dorsalis pedis, palpable posterior tibial bilaterally. Capillary refill less than 2 seconds  INTEGUMENTARY: skin intact, no clubbing, cyanosis  NEUROLOGIC:  Normal gait and balance            Peripheral Vascular:    Upper Extremity:   Inspection:  Left--no cyanotic nail beds Right--no cyanotic nail beds   Bilateral:  Pink nail beds with brisk capillary refill   Palpation:  Bilateral radial pulse normal    Musculoskeletal:  Global Assessment:  Overall " assessment of Lower Extremity Muscle Strength and Tone:    Right quadriceps--5/5  Right hamstrings--5/5  Right tibialis anterior--5/5  Right gastroc soleus--5/5  Right EHL--5/5    Lower Extremity:  Knee/Patella:  No digital clubbing or cyanosis.    Examination of right knee reveals:  Normal deep tendon reflexes, coordination, strength, tone, sensation.  No known fractures or deformities.    Inspection and Palpation:      Right knee:  Tenderness:  Quadriceps tendon and medial joint line  Effusion:  none  Crepitus:  none  Pulses:  2+  Ecchymosis:  None  Warmth:  None     ROM:  Right:  Extension:0    Flexion:135  Left:  Extension:0     Flexion:135    Instability:      Right:  Lachman Test:  Negative, Varus stress test negative, Valgus stress test negative   Anterior Drawer Test:  Negative, Posterior Drawer Test:  Negative    Deformities/Malalignments/Discrepancies:    Left:  none  Right:  none    Functional Testing:  Right:  Nakita's test:  Negative  Patella grind test:  Negative  Q-angle:  Normal  Apprehension Sign:  Negative        Imaging/Studies  Imaging Results (last 24 hours)     ** No results found for the last 24 hours. **        I reviewed the MRI which shows some medial compartment arthritic changes and possible small meniscus tear  Assessment/Plan      Dana was seen today for follow-up.    Diagnoses and all orders for this visit:    Other tear of medial meniscus of right knee as current injury, subsequent encounter  -     Ambulatory Referral to Physical Therapy    Quadriceps tendinitis  -     Ambulatory Referral to Physical Therapy  She will go to physical therapy and follow-up in 3 weeks    Medical Decision Making  Management Options : over-the-counter medicine and physical/occupational therapy  Data/Risk: radiology tests and independent visualization of imaging, lab tests, or EMG/NCV    Osman Decker MD  11/17/17  8:46 AM

## 2017-11-20 ENCOUNTER — LAB (OUTPATIENT)
Dept: LAB | Facility: HOSPITAL | Age: 57
End: 2017-11-20

## 2017-11-20 ENCOUNTER — OFFICE VISIT (OUTPATIENT)
Dept: ONCOLOGY | Facility: CLINIC | Age: 57
End: 2017-11-20

## 2017-11-20 VITALS
WEIGHT: 250 LBS | HEART RATE: 64 BPM | SYSTOLIC BLOOD PRESSURE: 144 MMHG | DIASTOLIC BLOOD PRESSURE: 69 MMHG | BODY MASS INDEX: 47.2 KG/M2 | HEIGHT: 61 IN | RESPIRATION RATE: 18 BRPM | TEMPERATURE: 97.3 F

## 2017-11-20 DIAGNOSIS — Z17.0 MALIGNANT NEOPLASM OF LOWER-OUTER QUADRANT OF RIGHT BREAST OF FEMALE, ESTROGEN RECEPTOR POSITIVE (HCC): Primary | ICD-10-CM

## 2017-11-20 DIAGNOSIS — C50.511 MALIGNANT NEOPLASM OF LOWER-OUTER QUADRANT OF RIGHT BREAST OF FEMALE, ESTROGEN RECEPTOR POSITIVE (HCC): Primary | ICD-10-CM

## 2017-11-20 DIAGNOSIS — Z01.89 ROUTINE LAB DRAW: ICD-10-CM

## 2017-11-20 DIAGNOSIS — C50.511 BREAST CANCER OF LOWER-OUTER QUADRANT OF RIGHT FEMALE BREAST (HCC): ICD-10-CM

## 2017-11-20 LAB
25(OH)D3 SERPL-MCNC: 85.6 NG/ML
ALBUMIN SERPL-MCNC: 4.4 G/DL (ref 3.2–4.8)
ALBUMIN/GLOB SERPL: 1.7 G/DL (ref 1.5–2.5)
ALP SERPL-CCNC: 101 U/L (ref 25–100)
ALT SERPL W P-5'-P-CCNC: 29 U/L (ref 7–40)
ANION GAP SERPL CALCULATED.3IONS-SCNC: 13 MMOL/L (ref 3–11)
ARTICHOKE IGE QN: 128 MG/DL (ref 0–130)
AST SERPL-CCNC: 18 U/L (ref 0–33)
BILIRUB SERPL-MCNC: 0.6 MG/DL (ref 0.3–1.2)
BUN BLD-MCNC: 20 MG/DL (ref 9–23)
BUN/CREAT SERPL: 28.6 (ref 7–25)
CALCIUM SPEC-SCNC: 10.1 MG/DL (ref 8.7–10.4)
CHLORIDE SERPL-SCNC: 97 MMOL/L (ref 99–109)
CHOLEST SERPL-MCNC: 165 MG/DL (ref 0–200)
CO2 SERPL-SCNC: 27 MMOL/L (ref 20–31)
CREAT BLD-MCNC: 0.7 MG/DL (ref 0.6–1.3)
ERYTHROCYTE [DISTWIDTH] IN BLOOD BY AUTOMATED COUNT: 14 % (ref 11.3–14.5)
GFR SERPL CREATININE-BSD FRML MDRD: 86 ML/MIN/1.73
GLOBULIN UR ELPH-MCNC: 2.6 GM/DL
GLUCOSE BLD-MCNC: 120 MG/DL (ref 70–100)
HCT VFR BLD AUTO: 42.7 % (ref 34.5–44)
HDLC SERPL-MCNC: 38 MG/DL (ref 40–60)
HGB BLD-MCNC: 13.4 G/DL (ref 11.5–15.5)
LYMPHOCYTES # BLD AUTO: 1.5 10*3/MM3 (ref 0.6–4.8)
LYMPHOCYTES NFR BLD AUTO: 21.7 % (ref 24–44)
MCH RBC QN AUTO: 26.9 PG (ref 27–31)
MCHC RBC AUTO-ENTMCNC: 31.5 G/DL (ref 32–36)
MCV RBC AUTO: 85.4 FL (ref 80–99)
MONOCYTES # BLD AUTO: 0.3 10*3/MM3 (ref 0–1)
MONOCYTES NFR BLD AUTO: 4.5 % (ref 0–12)
NEUTROPHILS # BLD AUTO: 5 10*3/MM3 (ref 1.5–8.3)
NEUTROPHILS NFR BLD AUTO: 73.8 % (ref 41–71)
PLATELET # BLD AUTO: 359 10*3/MM3 (ref 150–450)
PMV BLD AUTO: 6.1 FL (ref 6–12)
POTASSIUM BLD-SCNC: 4.5 MMOL/L (ref 3.5–5.5)
PROT SERPL-MCNC: 7 G/DL (ref 5.7–8.2)
RBC # BLD AUTO: 4.99 10*6/MM3 (ref 3.89–5.14)
SODIUM BLD-SCNC: 137 MMOL/L (ref 132–146)
TRIGL SERPL-MCNC: 102 MG/DL (ref 0–150)
TSH SERPL DL<=0.05 MIU/L-ACNC: 1.95 MIU/ML (ref 0.35–5.35)
WBC NRBC COR # BLD: 6.8 10*3/MM3 (ref 3.5–10.8)

## 2017-11-20 PROCEDURE — 80061 LIPID PANEL: CPT | Performed by: INTERNAL MEDICINE

## 2017-11-20 PROCEDURE — 80053 COMPREHEN METABOLIC PANEL: CPT | Performed by: NURSE PRACTITIONER

## 2017-11-20 PROCEDURE — 84443 ASSAY THYROID STIM HORMONE: CPT | Performed by: INTERNAL MEDICINE

## 2017-11-20 PROCEDURE — 36415 COLL VENOUS BLD VENIPUNCTURE: CPT

## 2017-11-20 PROCEDURE — 85025 COMPLETE CBC W/AUTO DIFF WBC: CPT

## 2017-11-20 PROCEDURE — 99214 OFFICE O/P EST MOD 30 MIN: CPT | Performed by: NURSE PRACTITIONER

## 2017-11-20 PROCEDURE — 82306 VITAMIN D 25 HYDROXY: CPT | Performed by: INTERNAL MEDICINE

## 2017-11-20 RX ORDER — ANASTROZOLE 1 MG/1
1 TABLET ORAL DAILY
Qty: 90 TABLET | Refills: 4 | Status: SHIPPED | OUTPATIENT
Start: 2017-11-20 | End: 2018-05-21 | Stop reason: SDUPTHER

## 2017-11-21 ENCOUNTER — HOSPITAL ENCOUNTER (OUTPATIENT)
Dept: PHYSICAL THERAPY | Facility: HOSPITAL | Age: 57
Setting detail: THERAPIES SERIES
Discharge: HOME | End: 2017-11-21
Attending: ORTHOPAEDIC SURGERY

## 2017-11-21 DIAGNOSIS — M25.561 ACUTE PAIN OF RIGHT KNEE: Primary | ICD-10-CM

## 2017-11-21 PROCEDURE — 97161 PT EVAL LOW COMPLEX 20 MIN: CPT

## 2017-11-21 NOTE — THERAPY EVALUATION
Outpatient Physical Therapy Ortho Initial Evaluation   [facility]     Patient Name: Dana Rincon  : 1960  MRN: [id number]  Today's Date: 2017      Visit Date: 2017    Patient Active Problem List   Diagnosis   • Breast cancer of lower-outer quadrant of right female breast   • Uncontrolled type 2 diabetes mellitus   • Asthma   • Hypertension   • Obstructive sleep apnea syndrome   • Vitamin D deficiency        Past Medical History:   Diagnosis Date   • Asthma    • Breast cancer 2016    right   • Breast injury     PT STATES SHE FELL AND BRUSIED HER LEFT BREAST   • Dry eyes    • Emphysema/COPD    • H/O colonoscopy 10/2015   • HBP (high blood pressure)    • Hot flashes    • Hx of radiation therapy 2016    right breast   • Itchy eyes    • Sinus problem    • Wears glasses         Past Surgical History:   Procedure Laterality Date   • BREAST BIOPSY Right 2015    stereo bx   • BREAST BIOPSY Right 03/10/2016    US bx   • BREAST LUMPECTOMY Right 2016    taking arimidex.   • DILATATION AND CURETTAGE     • OTHER SURGICAL HISTORY  2016    lumpectomy   • TONSILLECTOMY  1965       Visit Dx:     ICD-10-CM ICD-9-CM   1. Acute pain of right knee M25.561 719.46             Patient History       17 1300          History    Chief Complaint Difficulty Walking;Difficulty with daily activities;Pain;Muscle weakness;Muscle tenderness  -MM      Type of Pain Knee pain   right  -MM      Date Current Problem(s) Began 10/26/17  -MM      Brief Description of Current Complaint Client reports doing a lot of walking end of October and that is when she noticed problems with right knee pain. Initially she could not walk much at all. Symptoms have improved some, but she continues with moderate knee pain during weightbearing activity.  -MM      Patient/Caregiver Goals Relieve pain;Return to prior level of function;Improve mobility;Improve strength  -MM      Occupation/sports/leisure activities [employer]  [address]  -MM      What clinical tests have you had for this problem? MRI  -MM      Results of Clinical Tests medial compartment arthritic changes and possible small medial meniscus tear  -MM      Pain     Pain Location Knee   right  -MM      Pain at Present 3  -MM      Pain at Best 0  -MM      Pain at Worst 5  -MM      Pain Frequency Intermittent  -MM      Pain Description Aching;Sharp  -MM      Pain Comments Pain is dull or very mild when sitting. She notices sharp pain at times when walking.  -MM      Tolerance Time- Walking 1/2 mile  -MM      Difficulties with ADL's? walking, standing, stairs, transfers  -MM      Fall Risk Assessment    Any falls in the past year: No  -MM      Daily Activities    Primary Language English  -MM      Are you able to read Yes  -MM      Are you able to write Yes  -MM      How does patient learn best? Listening;Reading;Demonstration  -MM      Pt Participated in POC and Goals Yes  -MM        User Key  (r) = Recorded By, (t) = Taken By, (c) = Cosigned By    Initials Name Provider Type    MM Lukas Mccollum, PT Physical Therapist                PT Ortho       11/21/17 1300    Posture/Observations    Posture/Observations Comments Palpation: Moderate tenderness distal quad tendon, moderate tenderness medial joint line.  -MM    Knee Special Tests    Anterior drawer (ACL lesion) Negative  -MM    Lachman’s (ACL lesion) Negative  -MM    Posterior drawer (PCL lesion) Negative  -MM    Valgus stress (MCL lesion) Negative  -MM    Varus stress (LCL lesion) Negative  -MM    Thessaly test (meniscal lesion) Positive   Medial joint line pain with turn to the left  -MM    ROM (Range of Motion)    General ROM Detail Knee extension is within normal limits.  Bilateral knee flexion range of motion is 112°.  Client has mild pain with right knee in full extension and heel propped.  -MM    Right Hip    Hip Flexion Gross Movement (4+/5) good plus  -MM    Hip ABduction Gross Movement (5/5) normal  -MM    Hip  ADduction Gross Movement (4+/5) good plus  -MM    Right Knee    Knee Extension Gross Movement (4+/5) good plus   Some posterior knee pain  -MM    Knee Flexion Gross Movement (4+/5) good plus  -MM    Gait Assessment/Treatment    Gait, Comment Mild to moderate antalgic gait with decreased WB time on right LE  -MM      User Key  (r) = Recorded By, (t) = Taken By, (c) = Cosigned By    Initials Name Provider Type    GERARDO Mccollum, PT Physical Therapist                            Therapy Education       11/21/17 1300          Therapy Education    Education Details Provided and reviewed home program.  Exercises included: Partial squats with support, heel slides, long arc quads, short arc quads, adductor squeeze, straight leg raise flexion, straight leg raise abduction, straight leg raise extension, hamstring curls, hamstring stretch, quadriceps stretch.  -MM        User Key  (r) = Recorded By, (t) = Taken By, (c) = Cosigned By    Initials Name Provider Type    GERARDO Mccollum, PT Physical Therapist                PT OP Goals       11/21/17 1300       PT Short Term Goals    STG Date to Achieve 12/12/17  -MM     STG 1 Client will return to regular daily activity with knee pain 1/10 or better.  -MM     STG 1 Progress New  -MM     STG 2 Tenderness at right distal quad tendon will resolve.  -MM     STG 2 Progress New  -MM     STG 3 Client will be independent with home program to minimize pain and improve overall strength and function.  -MM     STG 3 Progress New  -MM     STG 4 Right hip strength will improve to 5/5 throughout.  -MM     STG 4 Progress New  -MM     Long Term Goals    LTG Date to Achieve 12/19/17  -MM     LTG 1 LEF S score will improve to 75% or better.  -MM     LTG 1 Progress New  -MM     LTG 2 Right knee strength will improve to 5/5 for flexion and extension without pain.  -MM     LTG 2 Progress New  -MM     LTG 3 Client will return to walking with normal gait pattern and without difficulty.  -MM      LTG 3 Progress New  -MM     Time Calculation    PT Goal Re-Cert Due Date 02/19/18  -MM       User Key  (r) = Recorded By, (t) = Taken By, (c) = Cosigned By    Initials Name Provider Type    GERARDO Mccollum, PT Physical Therapist                PT Assessment/Plan       11/21/17 1300       PT Assessment    Functional Limitations Impaired gait;Impaired locomotion;Limitation in home management;Limitations in community activities;Performance in leisure activities;Performance in self-care ADL  -MM     Impairments Gait;Pain;Muscle strength  -MM     Assessment Comments Client presents with evolving symptoms of low complexity.  Signs and symptoms are consistent with right distal quad strain and possible medial meniscus injury or medial joint line arthritis.  Skilled intervention is required to minimize pain and improve overall strength and function.  -MM     Please refer to paper survey for additional self-reported information Yes  -MM     Rehab Potential Good  -MM     Patient/caregiver participated in establishment of treatment plan and goals Yes  -MM     Patient would benefit from skilled therapy intervention Yes  -MM     PT Plan    PT Frequency 2x/week  -MM     Predicted Duration of Therapy Intervention (days/wks) 12 visits  -MM     Planned CPT's? PT EVAL LOW COMPLEXITY: 27949;PT THER PROC EA 15 MIN: 99694;PT MANUAL THERAPY EA 15 MIN: 86440;PT NEUROMUSC RE-EDUCATION EA 15 MIN: 75439;PT ELECTRICAL STIM UNATTEND: ;PT HOT/COLD PACK WC NONMCARE: 16060  -MM     PT Plan Comments Continue per plan of treatment with manual therapy and exercises.  -MM       User Key  (r) = Recorded By, (t) = Taken By, (c) = Cosigned By    Initials Name Provider Type    GERARDO Mccollum, PT Physical Therapist                                    Outcome Measures       11/21/17 1300          Lower Extremity Functional Index    Any of your usual work, housework or school activities 3  -MM      Your usual hobbies, recreational or sporting  activities 3  -MM      Getting into or out of the bath 2  -MM      Walking between rooms 3  -MM      Putting on your shoes or socks 3  -MM      Squatting 1  -MM      Lifting an object, like a bag of groceries from the floor 2  -MM      Performing light activities around your home 3  -MM      Performing heavy activities around your home 1  -MM      Getting into or out of a car 2  -MM      Walking 2 blocks 1  -MM      Walking a mile 1  -MM      Going up or down 10 stairs (about 1 flight of stairs) 1  -MM      Standing for 1 hour 2  -MM      Sitting for 1 hour 4  -MM      Running on even ground 0  -MM      Running on uneven ground 0  -MM      Making sharp turns while running fast 0  -MM      Hopping 0  -MM      Rolling over in bed 3  -MM      Total 35  -MM      Functional Assessment    Outcome Measure Options Lower Extremity Functional Scale (LEFS)   44%.  -MM        User Key  (r) = Recorded By, (t) = Taken By, (c) = Cosigned By    Initials Name Provider Type    MM Lukas Mccollum, PT Physical Therapist            Time Calculation:   Start Time: 1300     Therapy Charges for Today     Code Description Service Date Service Provider Modifiers Qty    99685459797  PT EVAL LOW COMPLEXITY 4 11/21/2017 Lukas Mccollum, PT GP 1          PT G-Codes  Outcome Measure Options: Lower Extremity Functional Scale (LEFS) (44%.)         Lukas Mccollum, PT  11/21/2017

## 2017-11-29 ENCOUNTER — HOSPITAL ENCOUNTER (OUTPATIENT)
Dept: PHYSICAL THERAPY | Facility: HOSPITAL | Age: 57
Setting detail: THERAPIES SERIES
Discharge: HOME | End: 2017-11-29
Attending: ORTHOPAEDIC SURGERY

## 2017-11-29 DIAGNOSIS — M25.561 ACUTE PAIN OF RIGHT KNEE: Primary | ICD-10-CM

## 2017-11-29 PROCEDURE — 97110 THERAPEUTIC EXERCISES: CPT

## 2017-11-29 PROCEDURE — 97140 MANUAL THERAPY 1/> REGIONS: CPT

## 2017-11-29 NOTE — THERAPY TREATMENT NOTE
Outpatient Physical Therapy Ortho Treatment Note  [facility]     Patient Name: Dana Rincon  : 1960  MRN: [id number]  Today's Date: 2017      Visit Date: 2017    Visit Dx:    ICD-10-CM ICD-9-CM   1. Acute pain of right knee M25.561 719.46       Patient Active Problem List   Diagnosis   • Breast cancer of lower-outer quadrant of right female breast   • Uncontrolled type 2 diabetes mellitus   • Asthma   • Hypertension   • Obstructive sleep apnea syndrome   • Vitamin D deficiency        Past Medical History:   Diagnosis Date   • Asthma    • Breast cancer 2016    right   • Breast injury     PT STATES SHE FELL AND BRUSIED HER LEFT BREAST   • Dry eyes    • Emphysema/COPD    • H/O colonoscopy 10/2015   • HBP (high blood pressure)    • Hot flashes    • Hx of radiation therapy 2016    right breast   • Itchy eyes    • Sinus problem    • Wears glasses         Past Surgical History:   Procedure Laterality Date   • BREAST BIOPSY Right 2015    stereo bx   • BREAST BIOPSY Right 03/10/2016    US bx   • BREAST LUMPECTOMY Right 2016    taking arimidex.   • DILATATION AND CURETTAGE     • OTHER SURGICAL HISTORY  2016    lumpectomy   • TONSILLECTOMY  1965                             PT Assessment/Plan       17 1115       PT Assessment    Assessment Comments Exercises were tolerated without complaints.  No complaints with ASTYM.  -MM     PT Plan    PT Plan Comments Continue per plan of treatment with manual therapy and exercises.  -MM       User Key  (r) = Recorded By, (t) = Taken By, (c) = Cosigned By    Initials Name Provider Type    MM Lukas Mccollum, PT Physical Therapist                    Exercises       17 1115          Subjective Comments    Subjective Comments Client reports mild pain at 1/10 at rest.  Client reports moderate pain at 5/10 while walking.  Client arrived 15 minutes late for treatment.  -MM      Exercise 1    Exercise Name 1 Included exercises in clinical  setting per flow sheet. Reviewed home program.  -MM      Cueing 1 Verbal  -MM      Time (Minutes) 1 15  -MM      Additional Comments ther ex  -MM        User Key  (r) = Recorded By, (t) = Taken By, (c) = Cosigned By    Initials Name Provider Type    GERARDO Mccollum, PT Physical Therapist                        Manual Rx (last 36 hours)      Manual Treatments       11/29/17 1115          Manual Rx 1    Manual Rx 1 Location Right knee  -MM      Manual Rx 1 Type Provided soft tissue mobilization using ASTYM technique for anterior and posterior right knee.  Moderate pressure was used with ASTYM.  -MM      Manual Rx 1 Duration 15  -MM        User Key  (r) = Recorded By, (t) = Taken By, (c) = Cosigned By    Initials Name Provider Type    GERARDO Mccollum, PT Physical Therapist                        Time Calculation:   Start Time: 1115  Total Timed Code Minutes- PT: 30 minute(s)    Therapy Charges for Today     Code Description Service Date Service Provider Modifiers Qty    76023107578  PT MANUAL THERAPY EA 15 MIN 11/29/2017 Lukas Mccollum, PT GP 1    [id number]  PT THER PROC EA 15 MIN 11/29/2017 Lukas Mccollum, PT GP 1                    Lukas Mccollum, PT  11/29/2017

## 2017-12-01 ENCOUNTER — HOSPITAL ENCOUNTER (OUTPATIENT)
Dept: PHYSICAL THERAPY | Facility: HOSPITAL | Age: 57
Setting detail: THERAPIES SERIES
Discharge: HOME | End: 2017-12-01
Attending: ORTHOPAEDIC SURGERY

## 2017-12-01 DIAGNOSIS — M25.561 ACUTE PAIN OF RIGHT KNEE: Primary | ICD-10-CM

## 2017-12-01 PROCEDURE — 97140 MANUAL THERAPY 1/> REGIONS: CPT

## 2017-12-01 PROCEDURE — 97110 THERAPEUTIC EXERCISES: CPT

## 2017-12-01 NOTE — THERAPY TREATMENT NOTE
Outpatient Physical Therapy Ortho Treatment Note  [facility]     Patient Name: Dana Rincon  : 1960  MRN: [id number]  Today's Date: 2017      Visit Date: 2017    Visit Dx:    ICD-10-CM ICD-9-CM   1. Acute pain of right knee M25.561 719.46       Patient Active Problem List   Diagnosis   • Breast cancer of lower-outer quadrant of right female breast   • Uncontrolled type 2 diabetes mellitus   • Asthma   • Hypertension   • Obstructive sleep apnea syndrome   • Vitamin D deficiency        Past Medical History:   Diagnosis Date   • Asthma    • Breast cancer 2016    right   • Breast injury     PT STATES SHE FELL AND BRUSIED HER LEFT BREAST   • Dry eyes    • Emphysema/COPD    • H/O colonoscopy 10/2015   • HBP (high blood pressure)    • Hot flashes    • Hx of radiation therapy 2016    right breast   • Itchy eyes    • Sinus problem    • Wears glasses         Past Surgical History:   Procedure Laterality Date   • BREAST BIOPSY Right 2015    stereo bx   • BREAST BIOPSY Right 03/10/2016    US bx   • BREAST LUMPECTOMY Right 2016    taking arimidex.   • DILATATION AND CURETTAGE     • OTHER SURGICAL HISTORY  2016    lumpectomy   • TONSILLECTOMY  1965                             PT Assessment/Plan       17 0845       PT Assessment    Assessment Comments Client had a little more pain today, but exercises were tolerated well in the clinic.  -MM     PT Plan    PT Plan Comments Continue per plan of treatment with ASTYM and exercises.  -MM       User Key  (r) = Recorded By, (t) = Taken By, (c) = Cosigned By    Initials Name Provider Type    MM Lukas Mccollum, PT Physical Therapist                    Exercises       17 0845          Subjective Comments    Subjective Comments Client reports moderate pain today 4/10.  -MM      Exercise 1    Exercise Name 1 Included exercises in clinical setting per flow sheet.  Updated exercises to include straight leg raise with small cuff weight,  recumbent bike, terminal knee extension with band, and [employer] squats.  -MM      Cueing 1 Verbal;Tactile  -MM      Time (Minutes) 1 30  -MM      Additional Comments Therapeutic exercise  -MM        User Key  (r) = Recorded By, (t) = Taken By, (c) = Cosigned By    Initials Name Provider Type    GERARDO Mccollum, PT Physical Therapist                Manual Rx (last 36 hours)      Manual Treatments       12/01/17 0845          Manual Rx 1    Manual Rx 1 Location Right knee  -MM      Manual Rx 1 Type Provided soft tissue mobilization using ASTYM technique for anterior and posterior right knee.  Moderate pressure was used with ASTYM.  -MM      Manual Rx 1 Duration 15  -MM        User Key  (r) = Recorded By, (t) = Taken By, (c) = Cosigned By    Initials Name Provider Type    GERARDO Mccollum PT Physical Therapist        Time Calculation:   Start Time: 0845  Total Timed Code Minutes- PT: 45 minute(s)    Therapy Charges for Today     Code Description Service Date Service Provider Modifiers Qty    [id number]  PT MANUAL THERAPY EA 15 MIN 12/1/2017 Lukas Mccollum, PT GP 1    [id number]  PT THER PROC EA 15 MIN 12/1/2017 Lukas Mccollum, PT GP 2                    Lukas Mccollum PT  12/1/2017

## 2017-12-05 ENCOUNTER — HOSPITAL ENCOUNTER (OUTPATIENT)
Dept: PHYSICAL THERAPY | Facility: HOSPITAL | Age: 57
Setting detail: THERAPIES SERIES
Discharge: HOME | End: 2017-12-05
Attending: ORTHOPAEDIC SURGERY

## 2017-12-05 DIAGNOSIS — M25.561 ACUTE PAIN OF RIGHT KNEE: Primary | ICD-10-CM

## 2017-12-05 PROCEDURE — 97140 MANUAL THERAPY 1/> REGIONS: CPT

## 2017-12-05 PROCEDURE — 97110 THERAPEUTIC EXERCISES: CPT

## 2017-12-05 NOTE — THERAPY TREATMENT NOTE
Outpatient Physical Therapy Ortho Treatment Note   [place]     Patient Name: Dana Rincon  : 1960  MRN: [id number]  Today's Date: 2017      Visit Date: 2017    Visit Dx:    ICD-10-CM ICD-9-CM   1. Acute pain of right knee M25.561 719.46       Patient Active Problem List   Diagnosis   • Breast cancer of lower-outer quadrant of right female breast   • Uncontrolled type 2 diabetes mellitus   • Asthma   • Hypertension   • Obstructive sleep apnea syndrome   • Vitamin D deficiency        Past Medical History:   Diagnosis Date   • Asthma    • Breast cancer 2016    right   • Breast injury     PT STATES SHE FELL AND BRUSIED HER LEFT BREAST   • Dry eyes    • Emphysema/COPD    • H/O colonoscopy 10/2015   • HBP (high blood pressure)    • Hot flashes    • Hx of radiation therapy 2016    right breast   • Itchy eyes    • Sinus problem    • Wears glasses         Past Surgical History:   Procedure Laterality Date   • BREAST BIOPSY Right 2015    stereo bx   • BREAST BIOPSY Right 03/10/2016    US bx   • BREAST LUMPECTOMY Right 2016    taking arimidex.   • DILATATION AND CURETTAGE  2005   • OTHER SURGICAL HISTORY  2016    lumpectomy   • TONSILLECTOMY  1965                             PT Assessment/Plan       17 1645       PT Assessment    Assessment Comments No complaints with exercises or manual therapy.  -MM     PT Plan    PT Plan Comments Continue per plan of treatment with exercises and manual therapy.  -MM       User Key  (r) = Recorded By, (t) = Taken By, (c) = Cosigned By    Initials Name Provider Type    MM Lukas Mccollum, PT Physical Therapist                    Exercises       17 1645          Subjective Comments    Subjective Comments Client reports mild pain at the time of treatment today.  She did report pain yesterday at 3/10.  -MM      Subjective Pain    Able to rate subjective pain? yes  -MM      Pre-Treatment Pain Level 1  -MM      Post-Treatment Pain Level 1   -MM      Exercise 1    Exercise Name 1 Included exercises in clinical setting per flow sheet.  Rest exercises to include reverse lunges in parallel bars.  -MM      Cueing 1 Verbal  -MM      Time (Minutes) 1 30  -MM      Additional Comments Therapeutic exercise  -MM        User Key  (r) = Recorded By, (t) = Taken By, (c) = Cosigned By    Initials Name Provider Type    GERARDO Mccollum PT Physical Therapist             Manual Rx (last 36 hours)      Manual Treatments       12/05/17 1645          Manual Rx 1    Manual Rx 1 Location Right knee  -MM      Manual Rx 1 Type Provided soft tissue mobilization using ASTYM technique for anterior and posterior right knee.  Moderate pressure was used with ASTYM.  -MM      Manual Rx 1 Duration 15  -MM        User Key  (r) = Recorded By, (t) = Taken By, (c) = Cosigned By    Initials Name Provider Type    GERARDO Mccollum PT Physical Therapist                        Time Calculation:   Start Time: 1645  Total Timed Code Minutes- PT: 45 minute(s)    Therapy Charges for Today     Code Description Service Date Service Provider Modifiers Qty    86501601171  PT MANUAL THERAPY EA 15 MIN 12/5/2017 Lukas Mccollum, PT GP 1    [id number]  PT THER PROC EA 15 MIN 12/5/2017 Lukas Mccollum, PT GP 2                    Lukas Mccollum PT  12/5/2017

## 2017-12-07 ENCOUNTER — HOSPITAL ENCOUNTER (OUTPATIENT)
Dept: PHYSICAL THERAPY | Facility: HOSPITAL | Age: 57
Setting detail: THERAPIES SERIES
Discharge: HOME | End: 2017-12-07
Attending: ORTHOPAEDIC SURGERY

## 2017-12-07 DIAGNOSIS — M25.561 ACUTE PAIN OF RIGHT KNEE: Primary | ICD-10-CM

## 2017-12-07 PROCEDURE — 97140 MANUAL THERAPY 1/> REGIONS: CPT

## 2017-12-07 PROCEDURE — 97110 THERAPEUTIC EXERCISES: CPT

## 2017-12-07 NOTE — THERAPY TREATMENT NOTE
Outpatient Physical Therapy Ortho Treatment Note  [facility]     Patient Name: Dana Rincon  : 1960  MRN: [id number]  Today's Date: 2017      Visit Date: 2017    Visit Dx:    ICD-10-CM ICD-9-CM   1. Acute pain of right knee M25.561 719.46       Patient Active Problem List   Diagnosis   • Breast cancer of lower-outer quadrant of right female breast   • Uncontrolled type 2 diabetes mellitus   • Asthma   • Hypertension   • Obstructive sleep apnea syndrome   • Vitamin D deficiency        Past Medical History:   Diagnosis Date   • Asthma    • Breast cancer 2016    right   • Breast injury     PT STATES SHE FELL AND BRUSIED HER LEFT BREAST   • Dry eyes    • Emphysema/COPD    • H/O colonoscopy 10/2015   • HBP (high blood pressure)    • Hot flashes    • Hx of radiation therapy 2016    right breast   • Itchy eyes    • Sinus problem    • Wears glasses         Past Surgical History:   Procedure Laterality Date   • BREAST BIOPSY Right 2015    stereo bx   • BREAST BIOPSY Right 03/10/2016    US bx   • BREAST LUMPECTOMY Right 2016    taking arimidex.   • DILATATION AND CURETTAGE     • OTHER SURGICAL HISTORY  2016    lumpectomy   • TONSILLECTOMY  1965               PT Assessment/Plan       17 1715       PT Assessment    Assessment Comments Client tolerated treatment without increased complaints of pain.  Client continues with some inflammation at the medial joint line.  Overall client is responding well to exercises and manual therapy.  -MM     PT Plan    PT Plan Comments Continue per plan of treatment with exercises and manual therapy.  -MM       User Key  (r) = Recorded By, (t) = Taken By, (c) = Cosigned By    Initials Name Provider Type    GERARDO Mccollum, PT Physical Therapist                    Exercises       17 0233          Subjective Comments    Subjective Comments Client reports overall good improvement with knee pain.  She reports mild knee pain today at 1/10.   Knee discomfort continues at medial joint line.  -MM      Subjective Pain    Able to rate subjective pain? yes  -MM      Pre-Treatment Pain Level 1  -MM      Post-Treatment Pain Level 1  -MM      Exercise 1    Exercise Name 1 Included exercises in clinical setting per flow sheet.  Progressed exercises to include hip flexion machine, hip abduction machine.  Also continued progression of reverse lunges with support from last visit.  -MM      Cueing 1 Verbal  -MM      Time (Minutes) 1 30  -MM      Additional Comments Therapeutic exercise  -MM        User Key  (r) = Recorded By, (t) = Taken By, (c) = Cosigned By    Initials Name Provider Type    GERARDO Mccollum, PT Physical Therapist             Manual Rx (last 36 hours)      Manual Treatments       12/07/17 1715          Manual Rx 1    Manual Rx 1 Location Right knee  -MM      Manual Rx 1 Type Provided soft tissue mobilization using ASTYM technique for anterior and posterior right knee.  Moderate pressure was used with ASTYM.  -MM      Manual Rx 1 Duration 15  -MM        User Key  (r) = Recorded By, (t) = Taken By, (c) = Cosigned By    Initials Name Provider Type    GERARDO Mccollum, XANDER Physical Therapist          Time Calculation:   Start Time: 1715  Total Timed Code Minutes- PT: 45 minute(s)    Therapy Charges for Today     Code Description Service Date Service Provider Modifiers Qty    74144551965 HC PT MANUAL THERAPY EA 15 MIN 12/7/2017 Lukas Mccollum, PT GP 1    28665185416 HC PT THER PROC EA 15 MIN 12/7/2017 Lukas Mccollum, PT GP 2                    Lukas Mccollum, PT  12/7/2017

## 2017-12-08 ENCOUNTER — OFFICE VISIT (OUTPATIENT)
Dept: ORTHOPEDIC SURGERY | Facility: CLINIC | Age: 57
End: 2017-12-08

## 2017-12-08 VITALS
HEIGHT: 61 IN | BODY MASS INDEX: 46.49 KG/M2 | SYSTOLIC BLOOD PRESSURE: 160 MMHG | DIASTOLIC BLOOD PRESSURE: 78 MMHG | HEART RATE: 82 BPM | WEIGHT: 246.25 LBS

## 2017-12-08 DIAGNOSIS — M25.561 ACUTE PAIN OF RIGHT KNEE: Primary | ICD-10-CM

## 2017-12-08 DIAGNOSIS — E66.01 MORBID OBESITY WITH BODY MASS INDEX (BMI) OF 45.0 TO 49.9 IN ADULT (HCC): ICD-10-CM

## 2017-12-08 PROCEDURE — 99213 OFFICE O/P EST LOW 20 MIN: CPT | Performed by: ORTHOPAEDIC SURGERY

## 2017-12-08 NOTE — PROGRESS NOTES
[facility] [place] [facility] Note    Subjective     Chief Complaint   Patient presents with   • Follow-up     3 week -Other tear of medial meniscus of right knee as current injury        HPI      Dana Rincon is a 57 y.o. female.  She believes that physical therapy is helping her.  She is going next-door valgus.  She's not had cortisone injections.  She takes Aleve.  Her pain is 1 out of 10 dull and aching.        Past Medical History:   Diagnosis Date   • Asthma    • Breast cancer 2016    right   • Breast injury     PT STATES SHE FELL AND BRUSIED HER LEFT BREAST   • Dry eyes    • Emphysema/COPD    • H/O colonoscopy 10/2015   • HBP (high blood pressure)    • Hot flashes    • Hx of radiation therapy 06/2016    right breast   • Itchy eyes    • Sinus problem    • Wears glasses       Past Surgical History:   Procedure Laterality Date   • BREAST BIOPSY Right 09/08/2015    stereo bx   • BREAST BIOPSY Right 03/10/2016    US bx   • BREAST LUMPECTOMY Right 05/03/2016    taking arimidex.   • DILATATION AND CURETTAGE  2005   • OTHER SURGICAL HISTORY  2016    lumpectomy   • TONSILLECTOMY  1965      Family History   Problem Relation Age of Onset   • Colon cancer Mother    • Diabetes Father    • Heart disease Father    • Breast cancer Paternal Aunt      pt states 50's   • Colon cancer Paternal Uncle    • Breast cancer Other 30   • Breast cancer Other 30   • BRCA 1/2 Neg Hx    • Endometrial cancer Neg Hx    • Ovarian cancer Neg Hx      Social History     Social History   • Marital status:      Spouse name: N/A   • Number of children: N/A   • Years of education: N/A     Occupational History   • Not on file.     Social History Main Topics   • Smoking status: Never Smoker   • Smokeless tobacco: Never Used   • Alcohol use Yes      Comment: 2-3/week for 20 years   • Drug use: No   • Sexual activity: Defer     Other Topics Concern   • Not on file     Social History Narrative      Current Outpatient Prescriptions on File  "Prior to Visit   Medication Sig Dispense Refill   • anastrozole (ARIMIDEX) 1 MG tablet Take 1 tablet by mouth Daily. 90 tablet 4   • fexofenadine (ALLEGRA) 30 MG tablet Take 30 mg by mouth daily.     • Melatonin 10 MG capsule Take  by mouth.     • metFORMIN ER (GLUCOPHAGE XR) 750 MG 24 hr tablet Take 1 tablet by mouth Daily With Breakfast. 30 tablet 5   • mometasone-formoterol (DULERA) 100-5 MCG/ACT inhaler Inhale 2 puffs Daily. 3 inhaler 1   • montelukast (SINGULAIR) 10 MG tablet TAKE 1 TABLET BY MOUTH EVERY DAY AS DIRECTED 90 tablet 0   • olmesartan-hydrochlorothiazide (BENICAR HCT) 40-12.5 MG per tablet Take 1 tablet by mouth Daily. 90 tablet 1     No current facility-administered medications on file prior to visit.       Allergies   Allergen Reactions   • Meperidine Nausea And Vomiting        The following portions of the patient's history were reviewed and updated as appropriate: allergies, current medications, past family history, past medical history, past social history, past surgical history and problem list.    Review of Systems   Constitutional: Negative.    HENT: Positive for sneezing.    Eyes: Negative.    Respiratory: Negative.    Cardiovascular: Negative.    Gastrointestinal: Negative.    Endocrine: Negative.    Genitourinary: Negative.    Musculoskeletal: Positive for arthralgias and gait problem.   Skin: Negative.    Allergic/Immunologic: Negative.    Hematological: Negative.    Psychiatric/Behavioral: Negative.         Objective      Physical Exam  Ht 154.9 cm (60.98\")  Wt 112 kg (246 lb 4.1 oz)  BMI 46.55 kg/m2    Body mass index is 46.55 kg/(m^2).        GENERAL APPEARANCE: awake, alert & oriented x 3, in no acute distress and well developed, well nourished  PSYCH: normal mood andaffect  LUNGS:  breathing nonlabored, no wheezing  EYES: sclera anicteric, pupils equal  CARDIOVASCULAR: palpable pulses dorsalis pedis, palpable posterior tibial bilaterally. Capillary refill less than 2 " seconds  INTEGUMENTARY: skin intact, no clubbing, cyanosis  NEUROLOGIC:  Normal gait and balance            Ortho Exam  Peripheral Vascular:    Upper Extremity:   Inspection:  Left--no cyanotic nail beds Right--no cyanotic nail beds   Bilateral:  Pink nail beds with brisk capillary refill   Palpation:  Bilateral radial pulse normal    Musculoskeletal:  Global Assessment:  Overall assessment of Lower Extremity Muscle Strength and Tone:    Right quadriceps--5/5  Right hamstrings--5/5  Right tibialis anterior--5/5  Right gastroc soleus--5/5  Right EHL--5/5    Lower Extremity:  Knee/Patella:  No digital clubbing or cyanosis.    Examination of right knee reveals:  Normal deep tendon reflexes, coordination, strength, tone, sensation.  No known fractures or deformities.    Inspection and Palpation:      Right knee:  Tenderness:  none  Effusion:  none  Crepitus:  none  Pulses:  2+  Ecchymosis:  None  Warmth:  None     ROM:  Right:  Extension:0    Flexion:135  Left:  Extension:0     Flexion:135    Instability:      Right:  Lachman Test:  Negative, Varus stress test negative, Valgus stress test negative   Anterior Drawer Test:  Negative, Posterior Drawer Test:  Negative    Deformities/Malalignments/Discrepancies:    Left:  none  Right:  none    Functional Testing:  Right:  Nakita's test:  Negative  Patella grind test:  Negative  Q-angle:  Normal  Apprehension Sign:  Negative        Imaging/Studies  Imaging Results (last 24 hours)     ** No results found for the last 24 hours. **        She's had x-rays and an MRI that shows a possible medial meniscus tear.  Assessment/Plan      Dana was seen today for follow-up.    Diagnoses and all orders for this visit:    Acute pain of right knee  -     Ambulatory Referral to Physical Therapy    Morbid obesity with body mass index (BMI) of 45.0 to 49.9 in adult      She will continue physical therapy and follow-up prn    Medical Decision Making  Management Options : over-the-counter  medicine and physical/occupational therapy      Osman Decker MD  12/08/17  8:29 AM

## 2017-12-12 ENCOUNTER — HOSPITAL ENCOUNTER (OUTPATIENT)
Dept: PHYSICAL THERAPY | Facility: HOSPITAL | Age: 57
Setting detail: THERAPIES SERIES
Discharge: HOME | End: 2017-12-12
Attending: ORTHOPAEDIC SURGERY

## 2017-12-12 DIAGNOSIS — M25.561 ACUTE PAIN OF RIGHT KNEE: Primary | ICD-10-CM

## 2017-12-12 PROCEDURE — 97140 MANUAL THERAPY 1/> REGIONS: CPT

## 2017-12-12 PROCEDURE — 97110 THERAPEUTIC EXERCISES: CPT

## 2017-12-12 PROCEDURE — 97010 HOT OR COLD PACKS THERAPY: CPT

## 2017-12-12 NOTE — THERAPY TREATMENT NOTE
Outpatient Physical Therapy Ortho Treatment Note   [place]     Patient Name: Dana Rincon  : 1960  MRN: [id number]  Today's Date: 2017      Visit Date: 2017    Visit Dx:    ICD-10-CM ICD-9-CM   1. Acute pain of right knee M25.561 719.46       Patient Active Problem List   Diagnosis   • Breast cancer of lower-outer quadrant of right female breast   • Uncontrolled type 2 diabetes mellitus   • Asthma   • Hypertension   • Obstructive sleep apnea syndrome   • Vitamin D deficiency        Past Medical History:   Diagnosis Date   • Asthma    • Breast cancer 2016    right   • Breast injury     PT STATES SHE FELL AND BRUSIED HER LEFT BREAST   • Dry eyes    • Emphysema/COPD    • H/O colonoscopy 10/2015   • HBP (high blood pressure)    • Hot flashes    • Hx of radiation therapy 2016    right breast   • Itchy eyes    • Sinus problem    • Wears glasses         Past Surgical History:   Procedure Laterality Date   • BREAST BIOPSY Right 2015    stereo bx   • BREAST BIOPSY Right 03/10/2016    US bx   • BREAST LUMPECTOMY Right 2016    taking arimidex.   • DILATATION AND CURETTAGE     • OTHER SURGICAL HISTORY  2016    lumpectomy   • TONSILLECTOMY  1965                             PT Assessment/Plan       17 1645       PT Assessment    Assessment Comments Ms. Rincon had a little more pain today than she has been experiencing. She reported some relief with ice today.  -MM     PT Plan    PT Plan Comments Continue per plan of treatment with exercises and manual therapy. Use cold packs as needed.  -MM       User Key  (r) = Recorded By, (t) = Taken By, (c) = Cosigned By    Initials Name Provider Type    MM Lukas Mccollum, PT Physical Therapist                Modalities       17 1645          Ice    Ice Applied Yes  -MM      Location right knee  -MM      Rx Minutes 10 mins  -MM      Ice S/P Rx Yes  -MM        User Key  (r) = Recorded By, (t) = Taken By, (c) = Cosigned By     Initials Name Provider Type    MM Lukas Mccollum, PT Physical Therapist                Exercises       12/12/17 1645          Subjective Comments    Subjective Comments Client reports a little more soreness and pain today. She feels that it is probably related to the weather.   -MM      Subjective Pain    Able to rate subjective pain? yes  -MM      Pre-Treatment Pain Level 2  -MM      Post-Treatment Pain Level 2  -MM      Exercise 1    Exercise Name 1 Continued exercises in clinical setting per flow sheet.   -MM      Cueing 1 Verbal  -MM      Time (Minutes) 1 20  -MM      Additional Comments ther ex  -MM        User Key  (r) = Recorded By, (t) = Taken By, (c) = Cosigned By    Initials Name Provider Type    MM Lukas CESAR Veda, PT Physical Therapist                        Manual Rx (last 36 hours)      Manual Treatments       12/12/17 1645          Manual Rx 1    Manual Rx 1 Location Right knee  -MM      Manual Rx 1 Type Provided soft tissue mobilization using ASTYM technique for anterior and posterior right knee.  Moderate pressure was used with ASTYM.  -MM      Manual Rx 1 Duration 15  -MM        User Key  (r) = Recorded By, (t) = Taken By, (c) = Cosigned By    Initials Name Provider Type    MM Lukas Mccollum, PT Physical Therapist                             Time Calculation:   Start Time: 1645  Total Timed Code Minutes- PT: 30 minute(s)    Therapy Charges for Today     Code Description Service Date Service Provider Modifiers Qty    82583861364 HC PT MANUAL THERAPY EA 15 MIN 12/12/2017 Lukas Mccollum, PT GP 1    90684966378 HC PT THER PROC EA 15 MIN 12/12/2017 Lukas Mccollum, PT GP 1    89246868842 HC PT HOT/COLD PACK WC NONMCARE 12/12/2017 Lukas Mccollum, PT GP 1                    Lukas Mccollum, PT  12/12/2017

## 2017-12-13 RX ORDER — MONTELUKAST SODIUM 10 MG/1
TABLET ORAL
Qty: 90 TABLET | Refills: 0 | Status: SHIPPED | OUTPATIENT
Start: 2017-12-13 | End: 2018-03-17 | Stop reason: SDUPTHER

## 2017-12-14 ENCOUNTER — HOSPITAL ENCOUNTER (OUTPATIENT)
Dept: PHYSICAL THERAPY | Facility: HOSPITAL | Age: 57
Setting detail: THERAPIES SERIES
Discharge: HOME | End: 2017-12-14
Attending: ORTHOPAEDIC SURGERY

## 2017-12-14 DIAGNOSIS — M25.561 ACUTE PAIN OF RIGHT KNEE: Primary | ICD-10-CM

## 2017-12-14 PROCEDURE — 97010 HOT OR COLD PACKS THERAPY: CPT

## 2017-12-14 PROCEDURE — 97110 THERAPEUTIC EXERCISES: CPT

## 2017-12-14 PROCEDURE — 97140 MANUAL THERAPY 1/> REGIONS: CPT

## 2017-12-14 NOTE — THERAPY TREATMENT NOTE
Outpatient Physical Therapy Ortho Treatment Note   [place]     Patient Name: Dana Rincon  : 1960  MRN: [id number]  Today's Date: 2017      Visit Date: 2017    Visit Dx:    ICD-10-CM ICD-9-CM   1. Acute pain of right knee M25.561 719.46       Patient Active Problem List   Diagnosis   • Breast cancer of lower-outer quadrant of right female breast   • Uncontrolled type 2 diabetes mellitus   • Asthma   • Hypertension   • Obstructive sleep apnea syndrome   • Vitamin D deficiency        Past Medical History:   Diagnosis Date   • Asthma    • Breast cancer 2016    right   • Breast injury     PT STATES SHE FELL AND BRUSIED HER LEFT BREAST   • Dry eyes    • Emphysema/COPD    • H/O colonoscopy 10/2015   • HBP (high blood pressure)    • Hot flashes    • Hx of radiation therapy 2016    right breast   • Itchy eyes    • Sinus problem    • Wears glasses         Past Surgical History:   Procedure Laterality Date   • BREAST BIOPSY Right 2015    stereo bx   • BREAST BIOPSY Right 03/10/2016    US bx   • BREAST LUMPECTOMY Right 2016    taking arimidex.   • DILATATION AND CURETTAGE  2005   • OTHER SURGICAL HISTORY  2016    lumpectomy   • TONSILLECTOMY  1965                                 Modalities       17 1718          Ice    Ice Applied Yes  -MM      Location right knee  -MM      Rx Minutes 10 mins  -MM      Ice S/P Rx Yes  -MM        User Key  (r) = Recorded By, (t) = Taken By, (c) = Cosigned By    Initials Name Provider Type    MM Lukas Mccollum, PT Physical Therapist                Exercises       17 1718          Subjective Comments    Subjective Comments Client reports mild pain today at 1/10. She has been working on exercises some today at work.  -MM      Subjective Pain    Able to rate subjective pain? yes  -MM      Pre-Treatment Pain Level 1  -MM      Post-Treatment Pain Level 1  -MM      Exercise 1    Exercise Name 1 Continued exercises in clinical setting per flow  sheet.  Updated exercises to include side stepping with band, diagonal step with band, hip extension machine.  Provided theraband for home use.  -MM      Cueing 1 Verbal  -MM      Time (Minutes) 1 20  -MM      Additional Comments Therapeutic exercise  -MM        User Key  (r) = Recorded By, (t) = Taken By, (c) = Cosigned By    Initials Name Provider Type    GERARDO Mccollum, PT Physical Therapist                        Manual Rx (last 36 hours)      Manual Treatments       12/14/17 1718          Manual Rx 1    Manual Rx 1 Location Right knee  -MM      Manual Rx 1 Type Provided soft tissue mobilization using ASTYM technique for anterior and posterior right knee.  Moderate pressure was used with ASTYM.  -MM      Manual Rx 1 Duration 10  -MM        User Key  (r) = Recorded By, (t) = Taken By, (c) = Cosigned By    Initials Name Provider Type    GERARDO Mccollum, PT Physical Therapist                             Time Calculation:   Start Time: 1718  Total Timed Code Minutes- PT: 30 minute(s)    Therapy Charges for Today     Code Description Service Date Service Provider Modifiers Qty    59626882598  PT MANUAL THERAPY EA 15 MIN 12/14/2017 Lukas Mccollum, PT GP 1    94387301708 HC PT THER PROC EA 15 MIN 12/14/2017 Lukas Mccollum, PT GP 1    80929405042  PT HOT/COLD PACK WC NONMCARE 12/14/2017 Lukas Mccollum, PT GP 1                    Lukas Mccollum, PT  12/14/2017

## 2017-12-19 ENCOUNTER — HOSPITAL ENCOUNTER (OUTPATIENT)
Dept: PHYSICAL THERAPY | Facility: HOSPITAL | Age: 57
Setting detail: THERAPIES SERIES
Discharge: HOME | End: 2017-12-19
Attending: ORTHOPAEDIC SURGERY

## 2017-12-19 DIAGNOSIS — M25.561 ACUTE PAIN OF RIGHT KNEE: Primary | ICD-10-CM

## 2017-12-19 PROCEDURE — 97140 MANUAL THERAPY 1/> REGIONS: CPT

## 2017-12-19 PROCEDURE — 97010 HOT OR COLD PACKS THERAPY: CPT

## 2017-12-19 PROCEDURE — 97110 THERAPEUTIC EXERCISES: CPT

## 2017-12-19 NOTE — THERAPY PROGRESS REPORT/RE-CERT
Outpatient Physical Therapy Ortho Re-Assessment   [facility]     Patient Name: Dana Rincon  : 1960  MRN: [id number]  Today's Date: 2017      Visit Date: 2017    Patient Active Problem List   Diagnosis   • Breast cancer of lower-outer quadrant of right female breast   • Uncontrolled type 2 diabetes mellitus   • Asthma   • Hypertension   • Obstructive sleep apnea syndrome   • Vitamin D deficiency        Past Medical History:   Diagnosis Date   • Asthma    • Breast cancer 2016    right   • Breast injury     PT STATES SHE FELL AND BRUSIED HER LEFT BREAST   • Dry eyes    • Emphysema/COPD    • H/O colonoscopy 10/2015   • HBP (high blood pressure)    • Hot flashes    • Hx of radiation therapy 2016    right breast   • Itchy eyes    • Sinus problem    • Wears glasses         Past Surgical History:   Procedure Laterality Date   • BREAST BIOPSY Right 2015    stereo bx   • BREAST BIOPSY Right 03/10/2016    US bx   • BREAST LUMPECTOMY Right 2016    taking arimidex.   • DILATATION AND CURETTAGE     • OTHER SURGICAL HISTORY  2016    lumpectomy   • TONSILLECTOMY  1965       Visit Dx:     ICD-10-CM ICD-9-CM   1. Acute pain of right knee M25.561 719.46                 PT Ortho       17 09    Posture/Observations    Posture/Observations Comments Palpation: marked tenderness medial right knee joint line.   -MM    Right Hip    Hip Flexion Gross Movement (4+/5) good plus  -MM    Hip ABduction Gross Movement (5/5) normal  -MM    Hip ADduction Gross Movement (5/5) normal  -MM    Right Knee    Knee Extension Gross Movement (4+/5) good plus   with pain  -MM    Knee Flexion Gross Movement (4+/5) good plus   with pain  -MM    Gait Assessment/Treatment    Gait, Comment mild antalgic gait  -MM      User Key  (r) = Recorded By, (t) = Taken By, (c) = Cosigned By    Initials Name Provider Type    GERARDO Mccollum, PT Physical Therapist                  PT OP Goals       17 09        PT Short Term Goals    STG Date to Achieve 12/12/17  -MM     STG 1 Client will return to regular daily activity with knee pain 1/10 or better.  -MM     STG 1 Progress Ongoing  -MM     STG 2 Tenderness at right distal quad tendon will resolve.  -MM     STG 2 Progress Met  -MM     STG 2 Progress Comments some tenderness at medial joint line  -MM     STG 3 Client will be independent with home program to minimize pain and improve overall strength and function.  -MM     STG 3 Progress Ongoing  -MM     STG 4 Right hip strength will improve to 5/5 throughout.  -MM     STG 4 Progress Ongoing;Progressing  -MM     Long Term Goals    LTG Date to Achieve 12/19/17  -MM     LTG 1 LEF S score will improve to 75% or better.  -MM     LTG 1 Progress Ongoing;Progressing  -MM     LTG 2 Right knee strength will improve to 5/5 for flexion and extension without pain.  -MM     LTG 2 Progress Ongoing  -MM     LTG 3 Client will return to walking with normal gait pattern and without difficulty.  -MM     LTG 3 Progress Ongoing  -MM     Time Calculation    PT Goal Re-Cert Due Date 02/19/18  -MM       User Key  (r) = Recorded By, (t) = Taken By, (c) = Cosigned By    Initials Name Provider Type    MM Lukas Mccollum, PT Physical Therapist                PT Assessment/Plan       12/19/17 0900       PT Assessment    Assessment Comments Client continues with knee pain that is worse after weightbearing activity. She had increased pain today that has been noticeable since walking up several stairs this past weekend. She has medial joint line tenderness. She notes that she is able to walk farther now. She still has some pain with MMT knee flexion and extension. LEFS score improved from 44% to 66% ability. Further skilled care is required to improve strength and minimize pain.  -MM     Please refer to paper survey for additional self-reported information Yes  -MM     Rehab Potential Good  -MM     Patient/caregiver participated in establishment of  treatment plan and goals Yes  -MM     Patient would benefit from skilled therapy intervention Yes  -MM     PT Plan    PT Frequency 2x/week  -MM     Predicted Duration of Therapy Intervention (days/wks) 8 visits  -MM     Planned CPT's? PT THER PROC EA 15 MIN: 71546;PT MANUAL THERAPY EA 15 MIN: 86098;PT HOT/COLD PACK WC NONMCARE: 53752  -MM     PT Plan Comments Continue per plan of treatment with ASTYM and exercises. Use cold packs as needed.   -MM       User Key  (r) = Recorded By, (t) = Taken By, (c) = Cosigned By    Initials Name Provider Type    GERARDO Mccollum, PT Physical Therapist                Modalities       12/19/17 0900          Ice    Ice Applied Yes  -MM      Location right knee  -MM      Rx Minutes 10 mins  -MM      Ice S/P Rx Yes  -MM        User Key  (r) = Recorded By, (t) = Taken By, (c) = Cosigned By    Initials Name Provider Type    GERARDO Mccollum, PT Physical Therapist              Exercises       12/19/17 0900          Subjective Comments    Subjective Comments Client reports some increased pain today. She went up a couple of flights of stairs this past weekend and had some pain afterward.  -MM      Subjective Pain    Able to rate subjective pain? yes  -MM      Pre-Treatment Pain Level 2  -MM      Post-Treatment Pain Level 1  -MM      Exercise 1    Exercise Name 1 Continued exercises in clinical setting per flow sheet.  -MM      Cueing 1 Verbal  -MM      Time (Minutes) 1 20  -MM      Additional Comments ther ex  -MM        User Key  (r) = Recorded By, (t) = Taken By, (c) = Cosigned By    Initials Name Provider Type    GERARDO Mccollum, PT Physical Therapist           Manual Rx (last 36 hours)      Manual Treatments       12/19/17 0900          Manual Rx 1    Manual Rx 1 Location Right knee  -MM      Manual Rx 1 Type Provided soft tissue mobilization using ASTYM technique for anterior and posterior right knee.  Moderate pressure was used with ASTYM.  -MM      Manual Rx 1 Duration 10   -MM        User Key  (r) = Recorded By, (t) = Taken By, (c) = Cosigned By    Initials Name Provider Type    MM Lukas Mccollum, PT Physical Therapist         Outcome Measure Options: Lower Extremity Functional Scale (LEFS) (66%)  Lower Extremity Functional Index  Any of your usual work, housework or school activities: A little bit of difficulty  Your usual hobbies, recreational or sporting activities: A little bit of difficulty  Getting into or out of the bath: A little bit of difficulty  Walking between rooms: No difficulty  Putting on your shoes or socks: No difficulty  Squatting: Moderate difficulty  Lifting an object, like a bag of groceries from the floor: A little bit of difficulty  Performing light activities around your home: A little bit of difficulty  Performing heavy activities around your home: Moderate difficulty  Getting into or out of a car: A little bit of difficulty  Walking 2 blocks: A little bit of difficulty  Walking a mile: Moderate difficulty  Going up or down 10 stairs (about 1 flight of stairs): A little bit of difficulty  Standing for 1 hour: A little bit of difficulty  Sitting for 1 hour: No difficulty  Running on even ground: Quite a bit of difficulty  Running on uneven ground: Quite a bit of difficulty  Making sharp turns while running fast: Quite a bit of difficulty  Hopping: Quite a bit of difficulty  Rolling over in bed: No difficulty  Total: 53      Time Calculation:   Start Time: 0900  Total Timed Code Minutes- PT: 30 minute(s)     Therapy Charges for Today     Code Description Service Date Service Provider Modifiers Qty    39143639861 HC PT THER PROC EA 15 MIN 12/19/2017 Lukas Mccollum, PT GP 1    44563113116 HC PT MANUAL THERAPY EA 15 MIN 12/19/2017 Lukas Mccollum, PT GP 1    [id number]  PT HOT/COLD PACK WC NONMCARE 12/19/2017 Lukas Mccollum, PT GP 1          PT G-Codes  Outcome Measure Options: Lower Extremity Functional Scale (LEFS) (66%)         Lukas L Veda,  PT  12/19/2017

## 2017-12-27 ENCOUNTER — HOSPITAL ENCOUNTER (OUTPATIENT)
Dept: PHYSICAL THERAPY | Facility: HOSPITAL | Age: 57
Setting detail: THERAPIES SERIES
Discharge: HOME | End: 2017-12-27
Attending: ORTHOPAEDIC SURGERY

## 2017-12-27 DIAGNOSIS — M25.561 ACUTE PAIN OF RIGHT KNEE: Primary | ICD-10-CM

## 2017-12-27 PROCEDURE — 97140 MANUAL THERAPY 1/> REGIONS: CPT

## 2017-12-27 PROCEDURE — 97110 THERAPEUTIC EXERCISES: CPT

## 2017-12-27 NOTE — THERAPY TREATMENT NOTE
Outpatient Physical Therapy Ortho Treatment Note  [facility]     Patient Name: Dana Rincon  : 1960  MRN: [id number]  Today's Date: 2017      Visit Date: 2017    Visit Dx:    ICD-10-CM ICD-9-CM   1. Acute pain of right knee M25.561 719.46       Patient Active Problem List   Diagnosis   • Breast cancer of lower-outer quadrant of right female breast   • Uncontrolled type 2 diabetes mellitus   • Asthma   • Hypertension   • Obstructive sleep apnea syndrome   • Vitamin D deficiency        Past Medical History:   Diagnosis Date   • Asthma    • Breast cancer 2016    right   • Breast injury     PT STATES SHE FELL AND BRUSIED HER LEFT BREAST   • Dry eyes    • Emphysema/COPD    • H/O colonoscopy 10/2015   • HBP (high blood pressure)    • Hot flashes    • Hx of radiation therapy 2016    right breast   • Itchy eyes    • Sinus problem    • Wears glasses         Past Surgical History:   Procedure Laterality Date   • BREAST BIOPSY Right 2015    stereo bx   • BREAST BIOPSY Right 03/10/2016    US bx   • BREAST LUMPECTOMY Right 2016    taking arimidex.   • DILATATION AND CURETTAGE     • OTHER SURGICAL HISTORY  2016    lumpectomy   • TONSILLECTOMY  1965             PT Assessment/Plan       17 1600       PT Assessment    Assessment Comments Client had increased pain today after walking a lot the past few days.  Exercises were modified and tolerated well.  She reported some relief with ASTYM.  -MM     PT Plan    PT Plan Comments Plan to continue for 4 more visits.  -MM       User Key  (r) = Recorded By, (t) = Taken By, (c) = Cosigned By    Initials Name Provider Type    MM Lukas Mccollum, PT Physical Therapist                Exercises       17 1600          Subjective Comments    Subjective Comments Client reports pain today at 4/10.  She's been on her feet a lot the past few days.  -MM      Subjective Pain    Able to rate subjective pain? yes  -MM      Pre-Treatment Pain  Level 4  -MM      Post-Treatment Pain Level 4  -MM      Exercise 1    Exercise Name 1 Included exercises in clinical setting per flow sheet.  Further modified exercises to minimize stress on the medial joint.  -MM      Cueing 1 Verbal  -MM      Time (Minutes) 1 20  -MM      Additional Comments Therapeutic exercise  -MM        User Key  (r) = Recorded By, (t) = Taken By, (c) = Cosigned By    Initials Name Provider Type    GERARDO Mccollum PT Physical Therapist             Manual Rx (last 36 hours)      Manual Treatments       12/27/17 1600          Manual Rx 1    Manual Rx 1 Location Right knee  -MM      Manual Rx 1 Type Provided soft tissue mobilization using ASTYM technique for anterior and posterior right knee.  Moderate pressure was used with ASTYM.  -MM      Manual Rx 1 Duration 10  -MM        User Key  (r) = Recorded By, (t) = Taken By, (c) = Cosigned By    Initials Name Provider Type    GERARDO Mccollum PT Physical Therapist        Time Calculation:   Start Time: 1600  Total Timed Code Minutes- PT: 30 minute(s)    Therapy Charges for Today     Code Description Service Date Service Provider Modifiers Qty    13902819390  PT THER PROC EA 15 MIN 12/27/2017 Lukas Mccollum, PT GP 1    [id number]  PT MANUAL THERAPY EA 15 MIN 12/27/2017 Lukas Mccollum, PT GP 1                    Lukas Mccollum PT  12/27/2017

## 2018-01-08 ENCOUNTER — HOSPITAL ENCOUNTER (OUTPATIENT)
Dept: PHYSICAL THERAPY | Facility: HOSPITAL | Age: 58
Setting detail: THERAPIES SERIES
Discharge: HOME | End: 2018-01-08
Attending: ORTHOPAEDIC SURGERY

## 2018-01-08 DIAGNOSIS — M25.561 ACUTE PAIN OF RIGHT KNEE: Primary | ICD-10-CM

## 2018-01-08 PROCEDURE — 97140 MANUAL THERAPY 1/> REGIONS: CPT

## 2018-01-08 PROCEDURE — 97110 THERAPEUTIC EXERCISES: CPT

## 2018-01-08 NOTE — THERAPY TREATMENT NOTE
Outpatient Physical Therapy Ortho Treatment Note   [place]     Patient Name: Dana Rincon  : 1960  MRN: [id number]  Today's Date: 2018      Visit Date: 2018    Visit Dx:    ICD-10-CM ICD-9-CM   1. Acute pain of right knee M25.561 719.46       Patient Active Problem List   Diagnosis   • Breast cancer of lower-outer quadrant of right female breast   • Uncontrolled type 2 diabetes mellitus   • Asthma   • Hypertension   • Obstructive sleep apnea syndrome   • Vitamin D deficiency        Past Medical History:   Diagnosis Date   • Asthma    • Breast cancer 2016    right   • Breast injury     PT STATES SHE FELL AND BRUSIED HER LEFT BREAST   • Dry eyes    • Emphysema/COPD    • H/O colonoscopy 10/2015   • HBP (high blood pressure)    • Hot flashes    • Hx of radiation therapy 2016    right breast   • Itchy eyes    • Sinus problem    • Wears glasses         Past Surgical History:   Procedure Laterality Date   • BREAST BIOPSY Right 2015    stereo bx   • BREAST BIOPSY Right 03/10/2016    US bx   • BREAST LUMPECTOMY Right 2016    taking arimidex.   • DILATATION AND CURETTAGE     • OTHER SURGICAL HISTORY  2016    lumpectomy   • TONSILLECTOMY  1965                             PT Assessment/Plan       18 1600       PT Assessment    Assessment Comments Some progress with pain. Exercises were tolerated better today. she still has medial knee tenderness.   -MM     PT Plan    PT Plan Comments Continue per plan of treatment for 2-3 more visits.  -MM       User Key  (r) = Recorded By, (t) = Taken By, (c) = Cosigned By    Initials Name Provider Type    MM Lukas Mccollum, PT Physical Therapist                    Exercises       18 3172          Subjective Comments    Subjective Comments Client reports that her knee has been doing better since she has rested it more.   -MM      Subjective Pain    Able to rate subjective pain? yes  -MM      Pre-Treatment Pain Level 1  -MM       Post-Treatment Pain Level 0  -MM      Exercise 1    Exercise Name 1 Included exercises in clinical setting per flow sheet.   -MM      Cueing 1 Verbal  -MM      Time (Minutes) 1 20  -MM      Additional Comments ther ex  -MM        User Key  (r) = Recorded By, (t) = Taken By, (c) = Cosigned By    Initials Name Provider Type    GERARDO Mccollum, PT Physical Therapist              Manual Rx (last 36 hours)      Manual Treatments       01/08/18 1600          Manual Rx 1    Manual Rx 1 Location Right knee  -MM      Manual Rx 1 Type Provided soft tissue mobilization using ASTYM technique for anterior and posterior right knee.  Moderate pressure was used with ASTYM.  -MM      Manual Rx 1 Duration 10  -MM        User Key  (r) = Recorded By, (t) = Taken By, (c) = Cosigned By    Initials Name Provider Type    GERARDO Mccollum, XANDER Physical Therapist        Time Calculation:   Start Time: 1557  Total Timed Code Minutes- PT: 30 minute(s)    Therapy Charges for Today     Code Description Service Date Service Provider Modifiers Qty    55417789890  PT THER PROC EA 15 MIN 1/8/2018 Lukas Mccollum, PT GP 1    [id number]  PT MANUAL THERAPY EA 15 MIN 1/8/2018 Lukas Mccollum, PT GP 1                    Lukas Mccollum PT  1/8/2018

## 2018-01-11 ENCOUNTER — HOSPITAL ENCOUNTER (OUTPATIENT)
Dept: PHYSICAL THERAPY | Facility: HOSPITAL | Age: 58
Setting detail: THERAPIES SERIES
Discharge: HOME | End: 2018-01-11
Attending: ORTHOPAEDIC SURGERY

## 2018-01-11 DIAGNOSIS — M25.561 ACUTE PAIN OF RIGHT KNEE: Primary | ICD-10-CM

## 2018-01-11 PROCEDURE — 97110 THERAPEUTIC EXERCISES: CPT

## 2018-01-11 PROCEDURE — 97140 MANUAL THERAPY 1/> REGIONS: CPT

## 2018-01-11 NOTE — THERAPY TREATMENT NOTE
Outpatient Physical Therapy Ortho Treatment Note   [facility]     Patient Name: Dana Rincon  : 1960  MRN: [id number]  Today's Date: 2018      Visit Date: 2018    Visit Dx:    ICD-10-CM ICD-9-CM   1. Acute pain of right knee M25.561 719.46       Patient Active Problem List   Diagnosis   • Breast cancer of lower-outer quadrant of right female breast   • Uncontrolled type 2 diabetes mellitus   • Asthma   • Hypertension   • Obstructive sleep apnea syndrome   • Vitamin D deficiency        Past Medical History:   Diagnosis Date   • Asthma    • Breast cancer 2016    right   • Breast injury     PT STATES SHE FELL AND BRUSIED HER LEFT BREAST   • Dry eyes    • Emphysema/COPD    • H/O colonoscopy 10/2015   • HBP (high blood pressure)    • Hot flashes    • Hx of radiation therapy 2016    right breast   • Itchy eyes    • Sinus problem    • Wears glasses         Past Surgical History:   Procedure Laterality Date   • BREAST BIOPSY Right 2015    stereo bx   • BREAST BIOPSY Right 03/10/2016    US bx   • BREAST LUMPECTOMY Right 2016    taking arimidex.   • DILATATION AND CURETTAGE     • OTHER SURGICAL HISTORY  2016    lumpectomy   • TONSILLECTOMY  1965               PT Assessment/Plan       18 1630       PT Assessment    Assessment Comments Medialknee pain improved, but she did have some tenderness just distal to patella.    -LF     PT Plan    PT Plan Comments follow-up next week  -LF       User Key  (r) = Recorded By, (t) = Taken By, (c) = Cosigned By    Initials Name Provider Type     Latoya Hartman, PT Physical Therapist                    Exercises       18 1630          Subjective Comments    Subjective Comments Knee is doing better.  Will notice it when she first gets up to walk,, but then it improves unless she walks too much  -      Subjective Pain    Able to rate subjective pain? yes  -LF      Pre-Treatment Pain Level 1  -LF      Post-Treatment Pain Level 0  -LF       Exercise 1    Exercise Name 1 Ther ex progressed from previous visit per flowsheet  -LF      Time (Minutes) 1 20  -LF        User Key  (r) = Recorded By, (t) = Taken By, (c) = Cosigned By    Initials Name Provider Type    REYMUNDO Hartman PT Physical Therapist                        Manual Rx (last 36 hours)      Manual Treatments       01/11/18 1630          Manual Rx 1    Manual Rx 1 Location Right knee  -LF      Manual Rx 1 Type Provided soft tissue mobilization using ASTYM technique for anterior right knee.  Moderate pressure was used with ASTYM..  She has a rash medial and lateral distal knee that she thinks may be from a topical cream she used.  she says she has very sensitive skin.  Avoided going directly over this area  -LF      Manual Rx 1 Duration 10  -LF        User Key  (r) = Recorded By, (t) = Taken By, (c) = Cosigned By    Initials Name Provider Type    REYMUNDO Hartman PT Physical Therapist            Time Calculation:   Start Time: 1630  Total Timed Code Minutes- PT: 30 minute(s)    Therapy Charges for Today     Code Description Service Date Service Provider Modifiers Qty    22243713250 HC PT MANUAL THERAPY EA 15 MIN 1/11/2018 Latoya Hartman, PT GP 1    [id number] HC PT THER PROC EA 15 MIN 1/11/2018 Latoya Hartman, PT GP 1                    Latoya Hartman, PT  1/11/2018

## 2018-01-15 ENCOUNTER — HOSPITAL ENCOUNTER (OUTPATIENT)
Dept: PHYSICAL THERAPY | Facility: HOSPITAL | Age: 58
Setting detail: THERAPIES SERIES
Discharge: HOME | End: 2018-01-15
Attending: ORTHOPAEDIC SURGERY

## 2018-01-15 DIAGNOSIS — M25.561 ACUTE PAIN OF RIGHT KNEE: Primary | ICD-10-CM

## 2018-01-15 PROCEDURE — 97110 THERAPEUTIC EXERCISES: CPT

## 2018-01-15 PROCEDURE — 97140 MANUAL THERAPY 1/> REGIONS: CPT

## 2018-01-15 NOTE — THERAPY DISCHARGE NOTE
Outpatient Physical Therapy Ortho Treatment Note/Discharge Summary   [facility]     Patient Name: Daan Rincon  : 1960  MRN: [id number]  Today's Date: 1/15/2018      Visit Date: 01/15/2018    Visit Dx:    ICD-10-CM ICD-9-CM   1. Acute pain of right knee M25.561 719.46       Patient Active Problem List   Diagnosis   • Breast cancer of lower-outer quadrant of right female breast   • Uncontrolled type 2 diabetes mellitus   • Asthma   • Hypertension   • Obstructive sleep apnea syndrome   • Vitamin D deficiency        Past Medical History:   Diagnosis Date   • Asthma    • Breast cancer 2016    right   • Breast injury     PT STATES SHE FELL AND BRUSIED HER LEFT BREAST   • Dry eyes    • Emphysema/COPD    • H/O colonoscopy 10/2015   • HBP (high blood pressure)    • Hot flashes    • Hx of radiation therapy 2016    right breast   • Itchy eyes    • Sinus problem    • Wears glasses         Past Surgical History:   Procedure Laterality Date   • BREAST BIOPSY Right 2015    stereo bx   • BREAST BIOPSY Right 03/10/2016    US bx   • BREAST LUMPECTOMY Right 2016    taking arimidex.   • DILATATION AND CURETTAGE     • OTHER SURGICAL HISTORY  2016    lumpectomy   • TONSILLECTOMY  1965             PT Ortho       01/15/18 1000    Posture/Observations    Posture/Observations Comments Mild tenderness medial anterior joint line of right knee  -MM    Right Hip    Hip Flexion Gross Movement (5/5) normal  -MM    Hip ABduction Gross Movement (5/5) normal  -MM    Hip ADduction Gross Movement (5/5) normal  -MM    Right Knee    Knee Extension Gross Movement (5/5) normal   Mild pain  -MM    Knee Flexion Gross Movement (5/5) normal  -MM      User Key  (r) = Recorded By, (t) = Taken By, (c) = Cosigned By    Initials Name Provider Type    GERARDO Mccollum, PT Physical Therapist                            PT Assessment/Plan       01/15/18 1000       PT Assessment    Assessment Comments Client completed the current  program.  Overall pain improved quite a bit.  At the time of treatment today she reports pain at 0-1/10.  She still notices some discomfort if she spends a lot of time walking or standing.  She has mild medial joint line tenderness of the right knee.  Hip and knee strength improved throughout.  She still has some discomfort with resisted knee extension.  LEF S score improved to 84% ability.  Home program was updated and reviewed.  She demonstrated very good understanding of exercises to continue on her own.  Prognosis at discharge is good.  -MM     PT Plan    PT Plan Comments Discharge due to completion of current program.  -MM       User Key  (r) = Recorded By, (t) = Taken By, (c) = Cosigned By    Initials Name Provider Type    GERARDO Mccollum, PT Physical Therapist                    Exercises       01/15/18 1000          Subjective Comments    Subjective Comments Client reports minimal pain today at 0-1/10. She reports that she is able to manage symptoms fairly well by modifying activity. She notices some increased discomfort with prolonged periods of weigthbearing activity.  -MM      Subjective Pain    Able to rate subjective pain? yes  -MM      Pre-Treatment Pain Level 1  -MM      Post-Treatment Pain Level 0  -MM      Exercise 1    Exercise Name 1 UPdated and reviewed home program.  Home program included: Sit to stand, straight leg raise flexion, straight leg raise abduction, standing leg curls, seated knee extension, three-way leg kicks with band, side step with band, diagonal step with band, bridging, hamstring stretch, and quadriceps stretch.  -MM      Cueing 1 Verbal  -MM      Time (Minutes) 1 20  -MM      Additional Comments ther ex  -MM        User Key  (r) = Recorded By, (t) = Taken By, (c) = Cosigned By    Initials Name Provider Type    GERARDO Mccollum, PT Physical Therapist             Manual Rx (last 36 hours)      Manual Treatments       01/15/18 1000          Manual Rx 1    Manual Rx 1  Location Right knee  -MM      Manual Rx 1 Type Provided soft tissue mobilization using ASTYM technique for anterior right knee.  Moderate pressure was used with ASTYM..  She has a rash medial and lateral distal knee that she thinks may be from a topical cream she used.  she says she has very sensitive skin.  Avoided going directly over this area  -MM      Manual Rx 1 Duration 10  -MM        User Key  (r) = Recorded By, (t) = Taken By, (c) = Cosigned By    Initials Name Provider Type    GERARDO Mccollum, PT Physical Therapist                PT OP Goals       01/15/18 1000       PT Short Term Goals    STG Date to Achieve 12/12/17  -MM     STG 1 Client will return to regular daily activity with knee pain 1/10 or better.  -MM     STG 1 Progress Met  -MM     STG 2 Tenderness at right distal quad tendon will resolve.  -MM     STG 2 Progress Met  -MM     STG 3 Client will be independent with home program to minimize pain and improve overall strength and function.  -MM     STG 3 Progress Met  -MM     STG 4 Right hip strength will improve to 5/5 throughout.  -MM     STG 4 Progress Met  -MM     Long Term Goals    LTG Date to Achieve 12/19/17  -MM     LTG 1 LEF S score will improve to 75% or better.  -MM     LTG 1 Progress Met  -MM     LTG 1 Progress Comments 84%  -MM     LTG 2 Right knee strength will improve to 5/5 for flexion and extension without pain.  -MM     LTG 2 Progress Met  -MM     LTG 3 Client will return to walking with normal gait pattern and without difficulty.  -MM     LTG 3 Progress Met  -MM       User Key  (r) = Recorded By, (t) = Taken By, (c) = Cosigned By    Initials Name Provider Type    GERARDO Mccollum, PT Physical Therapist               Outcome Measure Options: Lower Extremity Functional Scale (LEFS) (84%)  Lower Extremity Functional Index  Any of your usual work, housework or school activities: A little bit of difficulty  Your usual hobbies, recreational or sporting activities: A little bit of  difficulty  Getting into or out of the bath: A little bit of difficulty  Walking between rooms: No difficulty  Putting on your shoes or socks: No difficulty  Squatting: A little bit of difficulty  Lifting an object, like a bag of groceries from the floor: No difficulty  Performing light activities around your home: No difficulty  Performing heavy activities around your home: A little bit of difficulty  Getting into or out of a car: No difficulty  Walking 2 blocks: A little bit of difficulty  Walking a mile: A little bit of difficulty  Going up or down 10 stairs (about 1 flight of stairs): A little bit of difficulty  Standing for 1 hour: A little bit of difficulty  Sitting for 1 hour: No difficulty  Running on even ground: A little bit of difficulty  Running on uneven ground: A little bit of difficulty  Making sharp turns while running fast: A little bit of difficulty  Hopping: A little bit of difficulty  Rolling over in bed: No difficulty  Total: 67      Time Calculation:   Start Time: 1000  Total Timed Code Minutes- PT: 30 minute(s)    Therapy Charges for Today     Code Description Service Date Service Provider Modifiers Qty    33553274413 HC PT THER PROC EA 15 MIN 1/15/2018 Lukas Mccollum, PT GP 1    83147228555 HC PT MANUAL THERAPY EA 15 MIN 1/15/2018 Lukas Mccollum, PT GP 1          PT G-Codes  Outcome Measure Options: Lower Extremity Functional Scale (LEFS) (84%)     OP PT Discharge Summary  Date of Discharge: 01/15/18  Reason for Discharge: All goals achieved, Independent  Outcomes Achieved: Able to achieve all goals within established timeline  Discharge Destination: Home with home program      Lukas Mccollum, PT  1/15/2018

## 2018-01-29 NOTE — PROGRESS NOTES
DATE OF VISIT: 11/20/2017    REASON FOR VISIT: Follow-up for invasive ductal carcinoma of the right breast,  [id number], estrogen receptor positive, HER-2/greg negative, low risk disease based  on Oncotype.      HISTORY OF PRESENT ILLNESS: The patient is a very pleasant 55-year-old female  who was in her usual state of health until her most recent mammogram done  03/2016. She had an abnormality in the right breast 10 o'clock position. The  patient had an ultrasound guided biopsy 03/10/2016 that revealed invasive  ductal carcinoma. The patient elected to proceed with lumpectomy on 05/03/2016  with Dr. Sales. Final pathology revealed 1.4 cm mass, tumor at the 7  o'clock position, ER positive more than 95%, PA positive more than 85%,  HER-2/greg 0+. The patient had clear surgical margins, Kimmie score 8 out of 9,  and 1 negative sentinel lymph node. The patient had no postoperative  complications. The patient had Oncotype testing. Her score came back at 16,  low risk disease group. Risk of recurrence 10% with tamoxifen alone. The  patient was started on Arimidex 1 mg p.o. daily 06/03/2016. The patient is  here today in scheduled follow-up visit.       SUBJECTIVE: The patient has been doing fairly well. She denies any headaches,  denied any night sweats, no diarrhea, no fever or chills. She is able to tolerate her Arimidex without any serious side effects. She has recently had an injury to her right knee with orthopedic evaluation that revealed a partially torn meniscus. She is scheduled to begin physical therapy this week. This has impaired her gait some, but is improving with use of NSAIDs over the counter. She continues to take her calcium and vitamin D daily. Otherwise she has been healthy with no significant changes in health history.     PAST MEDICAL HISTORY/SOCIAL HISTORY/FAMILY HISTORY: Unchanged from my prior documentation done on 05/16/2016.     Review of Systems   Constitutional: Negative for activity  change, appetite change, chills, fatigue, fever and unexpected weight change.   HENT: Negative for hearing loss, mouth sores, nosebleeds, sore throat and trouble swallowing.    Eyes: Negative for visual disturbance.   Respiratory: Negative for cough, chest tightness, shortness of breath and wheezing.    Cardiovascular: Negative for chest pain, palpitations and leg swelling.   Gastrointestinal: Negative for abdominal distention, abdominal pain, blood in stool, constipation, diarrhea, nausea, rectal pain and vomiting.   Endocrine: Negative for cold intolerance and heat intolerance.        Hot flashes   Genitourinary: Negative for difficulty urinating, dysuria, frequency and urgency.   Musculoskeletal: Positive for arthralgias. Negative for back pain, gait problem, joint swelling and myalgias.        Right knee pain   Skin: Negative for rash.   Neurological: Negative for dizziness, tremors, syncope, weakness, light-headedness, numbness and headaches.   Hematological: Negative for adenopathy. Does not bruise/bleed easily.   Psychiatric/Behavioral: Positive for sleep disturbance. Negative for confusion and suicidal ideas. The patient is not nervous/anxious.          Current Outpatient Prescriptions:   •  anastrozole (ARIMIDEX) 1 MG tablet, TAKE 1 TABLET BY MOUTH DAILY, Disp: 30 tablet, Rfl: 5  •  fexofenadine (ALLEGRA) 30 MG tablet, Take 30 mg by mouth daily., Disp: , Rfl:   •  Melatonin 10 MG capsule, Take  by mouth., Disp: , Rfl:   •  metFORMIN ER (GLUCOPHAGE XR) 750 MG 24 hr tablet, Take 1 tablet by mouth Daily With Breakfast., Disp: 30 tablet, Rfl: 5  •  mometasone-formoterol (DULERA) 100-5 MCG/ACT inhaler, Inhale 2 puffs Daily., Disp: 3 inhaler, Rfl: 1  •  montelukast (SINGULAIR) 10 MG tablet, TAKE 1 TABLET BY MOUTH EVERY DAY AS DIRECTED, Disp: 90 tablet, Rfl: 0  •  olmesartan-hydrochlorothiazide (BENICAR HCT) 40-12.5 MG per tablet, Take 1 tablet by mouth Daily., Disp: 90 tablet, Rfl: 1    PHYSICAL EXAMINATION:   Ht  "61\" (154.9 cm)   ECOG Performance Status: 0 - Asymptomatic  General Appearance:  alert, cooperative, no apparent distress and appears stated age   Neurologic/Psychiatric: A&O x 3, gait steady, appropriate affect, strength 5/5 in all muscle groups   HEENT:  Normocephalic, without obvious abnormality, mucous membranes moist   Neck: Supple, symmetrical, trachea midline, no adenopathy;  No thyromegaly, masses, or tenderness   Lungs:   Clear to auscultation bilaterally; respirations regular, even, and unlabored bilaterally   Heart:  Regular rate and rhythm, no murmurs appreciated   Abdomen:   Soft, non-tender, non-distended and no organomegaly   Lymph nodes: No cervical, supraclavicular, inguinal or axillary adenopathy noted   Extremities: Normal, atraumatic; no clubbing, cyanosis, or edema    Skin: No rashes, ulcers, or suspicious lesions noted     No visits with results within 2 Week(s) from this visit.  Latest known visit with results is:    Office Visit on 08/18/2017   Component Date Value Ref Range Status   • Hemoglobin A1C 08/18/2017 7.1  % Final        Xr Knee 1 Or 2 View Right    Result Date: 11/5/2017  Narrative:  EXAMINATION: XR RIGHT KNEE, 1-2 VIEWS - 11/04/2017  INDICATION: M25.561-Pain in right knee.  COMPARISON: None.  FINDINGS: No acute fracture or osseous malalignment. Tricompartmental degenerative changes with joint space narrowing greatest in the lateral femoral tibial compartment. No significant suprapatellar joint effusion or radiopaque foreign body.         Impression: No acute osseous abnormality. Degenerative changes and joint space narrowing greatest in the lateral femorotibial compartment.  DICTATED:     11/04/2017 EDITED:         11/04/2017   This report was finalized on 11/5/2017 10:04 AM by Dr. Niraj Jacobson.      Mri Knee Right Without Contrast    Result Date: 11/10/2017  Narrative: EXAMINATION: MRI KNEE RIGHT WO CONTRAST - 11/09/2017  INDICATION: M25.561-Pain in right knee, medial meniscal " pain.  TECHNIQUE: Routine multilevel imaging is obtained of the right knee without the administration of gadolinium contrast.  COMPARISON: None.  FINDINGS: There is lateral tilt of the patella with respect to the trochlear groove. Small joint effusions identified. There is no abnormality seen within either the medial and lateral retinaculum. There is some irregularity identified lateral patellar facet cartilage. The medial patellar facet cartilage is thinned as well as the trochlear cartilage. There are some degenerative changes identified within the menisci bilaterally. There is increased signal seen in the posterior horn of the medial meniscus for which a small tear cannot be excluded. There is a small parameniscal cyst identified posteriorly to the medial meniscus. The anterior cruciate ligament and posterior cruciate ligament are normal in signal intensity. The medial collateral segment and lateral collateral ligament complex are unremarkable. There is some spurring identified of the femoral condyles bilaterally. Mild narrowing of the medial compartment. Tibial plateau is intact and unremarkable. Some peaking of the intercondylar tibial spine. Cartilage overlying the medial femoral condyle is thin. Cartilage overlying the tibial plateau is unremarkable. Normal signal intensity within the bony structures. No bone marrow edema or contusion. No evidence of occult fracture. The pes tendons are normal in signal intensity. No evidence of a Baker cyst. Normal signal intensity within the musculature.      Impression: 1. Moderate chondromalacia of the patellofemoral joint space. There is possible small tear seen of the posterior horn of the medial meniscus with small parameniscal cyst seen posteriorly. 2. Moderate degenerative changes identified within the medial compartment in the knee. No ligamentous injury identified. There is a small joint effusion identified.   DICTATED:     11/09/2017 EDITED:         11/09/2017    This report was finalized on 11/10/2017 9:18 AM by Dr. Mala Rocha MD.        ASSESSMENT: The patient is a very pleasant 56-year-old female with stage I  invasive ductal carcinoma of the right breast.    PROBLEM LIST:  1. Stage I invasive ductal carcinoma of the right breast, [id number], tumor at  the 7 o'clock position, tumor size 1.4 cm, estrogen receptor and progesterone  receptor strongly positive, HER-2/greg negative zero by IHC. Low risk group  Oncotype testing, score of 16, 10% risk of recurrence with hormone treatment  alone.  2. Diagnosed on ultrasound guided biopsy 03/10/2016.   3. Status post lumpectomy with clear margins done by Dr. Sales  05/03/2016.   4. Started Arimidex 1 mg p.o. daily 06/03/2016.   5. Completed breast adjuvant radiation by Dr. Thompson.   6. Osteopenia.    PLAN:  1. I reviewed the mammogram results from 9/20/2017 with the patient. I told her there is no evidence of recurrent disease, with new right breast benign appearing calcifications. She will need repeat right mammogram in 6 months which is scheduled for 3/21/2018.    2. I reviewed the blood work results. I reassured her her blood counts are normal. We will follow up on her CMP results and notify her of significant findings.   3. The patient will continue Arimidex 1 mg daily. She was given a refill on this prescription today. We will plan to complete 5 years of Arimidex, which will be completed 06/03/2021.   4. The patient will continue calcium and vitamin D supplement daily. She will need repeat DEXA in 2 years, which will be due 06/07/2018.  This will be ordered at her next visit.   5. She will continue Ambien at bedtime for insomnia.  6. She is scheduled to begin physical therapy through Dr. Decker for partially torn meniscus this week.     Waleska Uribe, APRN  11/20/2017   R hip ROM not tested above 90 degrees flexion/bilateral upper extremity ROM was WFL (within functional limits)/Left LE ROM was WFL (within functional limits)/deficits as listed below

## 2018-02-08 DIAGNOSIS — I10 ESSENTIAL HYPERTENSION: ICD-10-CM

## 2018-02-08 DIAGNOSIS — IMO0001 UNCONTROLLED TYPE 2 DIABETES MELLITUS WITHOUT COMPLICATION, WITHOUT LONG-TERM CURRENT USE OF INSULIN: ICD-10-CM

## 2018-02-08 RX ORDER — OLMESARTAN MEDOXOMIL AND HYDROCHLOROTHIAZIDE 40/12.5 40; 12.5 MG/1; MG/1
TABLET ORAL
Qty: 90 TABLET | Refills: 0 | Status: SHIPPED | OUTPATIENT
Start: 2018-02-08 | End: 2018-05-17 | Stop reason: SDUPTHER

## 2018-02-08 RX ORDER — METFORMIN HYDROCHLORIDE 750 MG/1
TABLET, EXTENDED RELEASE ORAL
Qty: 30 TABLET | Refills: 0 | Status: SHIPPED | OUTPATIENT
Start: 2018-02-08 | End: 2018-03-09 | Stop reason: SDUPTHER

## 2018-03-09 DIAGNOSIS — IMO0001 UNCONTROLLED TYPE 2 DIABETES MELLITUS WITHOUT COMPLICATION, WITHOUT LONG-TERM CURRENT USE OF INSULIN: ICD-10-CM

## 2018-03-09 RX ORDER — METFORMIN HYDROCHLORIDE 750 MG/1
TABLET, EXTENDED RELEASE ORAL
Qty: 30 TABLET | Refills: 0 | Status: SHIPPED | OUTPATIENT
Start: 2018-03-09 | End: 2018-04-08 | Stop reason: SDUPTHER

## 2018-03-10 RX ORDER — METFORMIN HYDROCHLORIDE 750 MG/1
TABLET, EXTENDED RELEASE ORAL
Qty: 90 TABLET | Refills: 0 | OUTPATIENT
Start: 2018-03-10

## 2018-03-18 RX ORDER — MONTELUKAST SODIUM 10 MG/1
TABLET ORAL
Qty: 90 TABLET | Refills: 0 | Status: SHIPPED | OUTPATIENT
Start: 2018-03-18 | End: 2018-06-17 | Stop reason: SDUPTHER

## 2018-03-21 ENCOUNTER — HOSPITAL ENCOUNTER (OUTPATIENT)
Dept: MAMMOGRAPHY | Facility: HOSPITAL | Age: 58
Discharge: HOME | End: 2018-03-21
Attending: SURGERY | Admitting: SURGERY

## 2018-03-21 ENCOUNTER — TRANSCRIBE ORDERS (OUTPATIENT)
Dept: MAMMOGRAPHY | Facility: HOSPITAL | Age: 58
End: 2018-03-21

## 2018-03-21 DIAGNOSIS — R92.8 ABNORMAL MAMMOGRAM: ICD-10-CM

## 2018-03-21 DIAGNOSIS — R92.8 ABNORMAL MAMMOGRAM: Primary | ICD-10-CM

## 2018-03-21 PROCEDURE — 77066 DX MAMMO INCL CAD BI: CPT

## 2018-03-21 PROCEDURE — 77066 DX MAMMO INCL CAD BI: CPT | Performed by: RADIOLOGY

## 2018-04-08 DIAGNOSIS — IMO0001 UNCONTROLLED TYPE 2 DIABETES MELLITUS WITHOUT COMPLICATION, WITHOUT LONG-TERM CURRENT USE OF INSULIN: ICD-10-CM

## 2018-04-09 RX ORDER — METFORMIN HYDROCHLORIDE 750 MG/1
TABLET, EXTENDED RELEASE ORAL
Qty: 30 TABLET | Refills: 2 | Status: SHIPPED | OUTPATIENT
Start: 2018-04-09 | End: 2018-06-26 | Stop reason: SDUPTHER

## 2018-04-09 NOTE — TELEPHONE ENCOUNTER
Please advise on Metformin ER  750 mg. Pt has not been seen in 6 mo and canceled appointment tomorrow.

## 2018-04-25 ENCOUNTER — TRANSCRIBE ORDERS (OUTPATIENT)
Dept: MAMMOGRAPHY | Facility: HOSPITAL | Age: 58
End: 2018-04-25

## 2018-04-25 ENCOUNTER — HOSPITAL ENCOUNTER (OUTPATIENT)
Dept: MAMMOGRAPHY | Facility: HOSPITAL | Age: 58
Discharge: HOME | End: 2018-04-25

## 2018-04-25 ENCOUNTER — HOSPITAL ENCOUNTER (OUTPATIENT)
Dept: MAMMOGRAPHY | Facility: HOSPITAL | Age: 58
Discharge: HOME | End: 2018-04-25
Admitting: RADIOLOGY

## 2018-04-25 DIAGNOSIS — R92.8 ABNORMAL MAMMOGRAM: Primary | ICD-10-CM

## 2018-04-25 DIAGNOSIS — R92.8 ABNORMAL MAMMOGRAM: ICD-10-CM

## 2018-04-25 PROCEDURE — 76098 X-RAY EXAM SURGICAL SPECIMEN: CPT

## 2018-04-25 PROCEDURE — 88305 TISSUE EXAM BY PATHOLOGIST: CPT | Performed by: SURGERY

## 2018-04-25 PROCEDURE — 19081 BX BREAST 1ST LESION STRTCTC: CPT | Performed by: RADIOLOGY

## 2018-04-25 PROCEDURE — 77065 DX MAMMO INCL CAD UNI: CPT | Performed by: RADIOLOGY

## 2018-04-25 PROCEDURE — A4648 IMPLANTABLE TISSUE MARKER: HCPCS

## 2018-04-25 RX ORDER — LIDOCAINE HYDROCHLORIDE AND EPINEPHRINE 10; 10 MG/ML; UG/ML
20 INJECTION, SOLUTION INFILTRATION; PERINEURAL ONCE
Status: DISCONTINUED | OUTPATIENT
Start: 2018-04-25 | End: 2019-12-23 | Stop reason: HOSPADM

## 2018-04-25 RX ORDER — LIDOCAINE HYDROCHLORIDE 10 MG/ML
5 INJECTION, SOLUTION INFILTRATION; PERINEURAL ONCE
Status: COMPLETED | OUTPATIENT
Start: 2018-04-25 | End: 2018-04-25

## 2018-04-25 RX ORDER — LIDOCAINE HYDROCHLORIDE 10 MG/ML
10 INJECTION, SOLUTION INFILTRATION; PERINEURAL ONCE
Status: DISCONTINUED | OUTPATIENT
Start: 2018-04-25 | End: 2019-12-23 | Stop reason: HOSPADM

## 2018-04-25 RX ORDER — LIDOCAINE HYDROCHLORIDE AND EPINEPHRINE 10; 10 MG/ML; UG/ML
20 INJECTION, SOLUTION INFILTRATION; PERINEURAL ONCE
Status: COMPLETED | OUTPATIENT
Start: 2018-04-25 | End: 2018-04-25

## 2018-04-25 RX ADMIN — LIDOCAINE HYDROCHLORIDE 5 ML: 10 INJECTION, SOLUTION INFILTRATION; PERINEURAL at 10:12

## 2018-04-25 RX ADMIN — LIDOCAINE HYDROCHLORIDE AND EPINEPHRINE 6 ML: 10; 10 INJECTION, SOLUTION INFILTRATION; PERINEURAL at 10:25

## 2018-04-26 ENCOUNTER — TELEPHONE (OUTPATIENT)
Dept: MAMMOGRAPHY | Facility: HOSPITAL | Age: 58
End: 2018-04-26

## 2018-04-26 LAB
CYTO UR: NORMAL
LAB AP CASE REPORT: NORMAL
LAB AP CLINICAL INFORMATION: NORMAL
LAB AP DIAGNOSIS COMMENT: NORMAL
Lab: NORMAL
PATH REPORT.FINAL DX SPEC: NORMAL
PATH REPORT.GROSS SPEC: NORMAL

## 2018-05-17 DIAGNOSIS — J45.909 UNCOMPLICATED ASTHMA: ICD-10-CM

## 2018-05-17 DIAGNOSIS — I10 ESSENTIAL HYPERTENSION: ICD-10-CM

## 2018-05-17 RX ORDER — OLMESARTAN MEDOXOMIL AND HYDROCHLOROTHIAZIDE 40/12.5 40; 12.5 MG/1; MG/1
TABLET ORAL
Qty: 30 TABLET | Refills: 1 | Status: SHIPPED | OUTPATIENT
Start: 2018-05-17 | End: 2018-06-26 | Stop reason: SDUPTHER

## 2018-05-18 RX ORDER — OLMESARTAN MEDOXOMIL AND HYDROCHLOROTHIAZIDE 40/12.5 40; 12.5 MG/1; MG/1
TABLET ORAL
Qty: 90 TABLET | Refills: 1 | OUTPATIENT
Start: 2018-05-18

## 2018-05-21 ENCOUNTER — OFFICE VISIT (OUTPATIENT)
Dept: ONCOLOGY | Facility: CLINIC | Age: 58
End: 2018-05-21

## 2018-05-21 ENCOUNTER — LAB (OUTPATIENT)
Dept: LAB | Facility: HOSPITAL | Age: 58
End: 2018-05-21

## 2018-05-21 VITALS
OXYGEN SATURATION: 95 % | WEIGHT: 249 LBS | DIASTOLIC BLOOD PRESSURE: 70 MMHG | BODY MASS INDEX: 47.07 KG/M2 | TEMPERATURE: 97.4 F | SYSTOLIC BLOOD PRESSURE: 153 MMHG | RESPIRATION RATE: 18 BRPM | HEART RATE: 58 BPM

## 2018-05-21 DIAGNOSIS — Z17.0 MALIGNANT NEOPLASM OF LOWER-OUTER QUADRANT OF RIGHT BREAST OF FEMALE, ESTROGEN RECEPTOR POSITIVE (HCC): Primary | ICD-10-CM

## 2018-05-21 DIAGNOSIS — C50.511 MALIGNANT NEOPLASM OF LOWER-OUTER QUADRANT OF RIGHT BREAST OF FEMALE, ESTROGEN RECEPTOR POSITIVE (HCC): ICD-10-CM

## 2018-05-21 DIAGNOSIS — C50.511 MALIGNANT NEOPLASM OF LOWER-OUTER QUADRANT OF RIGHT BREAST OF FEMALE, ESTROGEN RECEPTOR POSITIVE (HCC): Primary | ICD-10-CM

## 2018-05-21 DIAGNOSIS — Z17.0 MALIGNANT NEOPLASM OF LOWER-OUTER QUADRANT OF RIGHT BREAST OF FEMALE, ESTROGEN RECEPTOR POSITIVE (HCC): ICD-10-CM

## 2018-05-21 LAB
ALBUMIN SERPL-MCNC: 4.2 G/DL (ref 3.2–4.8)
ALBUMIN/GLOB SERPL: 1.6 G/DL (ref 1.5–2.5)
ALP SERPL-CCNC: 102 U/L (ref 25–100)
ALT SERPL W P-5'-P-CCNC: 22 U/L (ref 7–40)
ANION GAP SERPL CALCULATED.3IONS-SCNC: 10 MMOL/L (ref 3–11)
AST SERPL-CCNC: 13 U/L (ref 0–33)
BILIRUB SERPL-MCNC: 0.5 MG/DL (ref 0.3–1.2)
BUN BLD-MCNC: 16 MG/DL (ref 9–23)
BUN/CREAT SERPL: 22.9 (ref 7–25)
CALCIUM SPEC-SCNC: 9.7 MG/DL (ref 8.7–10.4)
CHLORIDE SERPL-SCNC: 102 MMOL/L (ref 99–109)
CO2 SERPL-SCNC: 26 MMOL/L (ref 20–31)
CREAT BLD-MCNC: 0.7 MG/DL (ref 0.6–1.3)
ERYTHROCYTE [DISTWIDTH] IN BLOOD BY AUTOMATED COUNT: 14.5 % (ref 11.3–14.5)
GFR SERPL CREATININE-BSD FRML MDRD: 86 ML/MIN/1.73
GLOBULIN UR ELPH-MCNC: 2.6 GM/DL
GLUCOSE BLD-MCNC: 117 MG/DL (ref 70–100)
HCT VFR BLD AUTO: 41.5 % (ref 34.5–44)
HGB BLD-MCNC: 12.5 G/DL (ref 11.5–15.5)
LYMPHOCYTES # BLD AUTO: 1.4 10*3/MM3 (ref 0.6–4.8)
LYMPHOCYTES NFR BLD AUTO: 23.3 % (ref 24–44)
MCH RBC QN AUTO: 25.2 PG (ref 27–31)
MCHC RBC AUTO-ENTMCNC: 30.2 G/DL (ref 32–36)
MCV RBC AUTO: 83.4 FL (ref 80–99)
MONOCYTES # BLD AUTO: 0.2 10*3/MM3 (ref 0–1)
MONOCYTES NFR BLD AUTO: 3.1 % (ref 0–12)
NEUTROPHILS # BLD AUTO: 4.5 10*3/MM3 (ref 1.5–8.3)
NEUTROPHILS NFR BLD AUTO: 73.6 % (ref 41–71)
PLATELET # BLD AUTO: 329 10*3/MM3 (ref 150–450)
PMV BLD AUTO: 6.2 FL (ref 6–12)
POTASSIUM BLD-SCNC: 4 MMOL/L (ref 3.5–5.5)
PROT SERPL-MCNC: 6.8 G/DL (ref 5.7–8.2)
RBC # BLD AUTO: 4.98 10*6/MM3 (ref 3.89–5.14)
SODIUM BLD-SCNC: 138 MMOL/L (ref 132–146)
WBC NRBC COR # BLD: 6.1 10*3/MM3 (ref 3.5–10.8)

## 2018-05-21 PROCEDURE — 99214 OFFICE O/P EST MOD 30 MIN: CPT | Performed by: INTERNAL MEDICINE

## 2018-05-21 PROCEDURE — 36415 COLL VENOUS BLD VENIPUNCTURE: CPT

## 2018-05-21 PROCEDURE — 80053 COMPREHEN METABOLIC PANEL: CPT

## 2018-05-21 PROCEDURE — 85025 COMPLETE CBC W/AUTO DIFF WBC: CPT

## 2018-05-21 RX ORDER — ANASTROZOLE 1 MG/1
1 TABLET ORAL DAILY
Qty: 90 TABLET | Refills: 4 | Status: SHIPPED | OUTPATIENT
Start: 2018-05-21 | End: 2019-07-22 | Stop reason: SDUPTHER

## 2018-05-21 NOTE — PROGRESS NOTES
DATE OF VISIT: 5/21/2018    REASON FOR VISIT: Follow-up for invasive ductal carcinoma of the right breast,  [id number], estrogen receptor positive, HER-2/greg negative, low risk disease based  on Oncotype.      HISTORY OF PRESENT ILLNESS: The patient is a very pleasant 55-year-old female  who was in her usual state of health until her most recent mammogram done  03/2016. She had an abnormality in the right breast 10 o'clock position. The  patient had an ultrasound guided biopsy 03/10/2016 that revealed invasive  ductal carcinoma. The patient elected to proceed with lumpectomy on 05/03/2016  with Dr. Sales. Final pathology revealed 1.4 cm mass, tumor at the 7  o'clock position, ER positive more than 95%, LA positive more than 85%,  HER-2/greg 0+. The patient had clear surgical margins, Kimmie score 8 out of 9,  and 1 negative sentinel lymph node. The patient had no postoperative  complications. The patient had Oncotype testing. Her score came back at 16,  low risk disease group. Risk of recurrence 10% with tamoxifen alone. The  patient was started on Arimidex 1 mg p.o. daily 06/03/2016. The patient is  here today in scheduled follow-up visit.      SUBJECTIVE: The patient is here today by herself.  She is requesting a refill on Arimidex since the last visit she had abnormal mammogram that required ultrasound guided biopsy that was done April 25, 2018 without any evidence of invasive or in situ malignancy.     REVIEW OF SYSTEMS: All the other 9 systems are reviewed by me and are negative  except what is mentioned in HPI.      PAST MEDICAL HISTORY/SOCIAL HISTORY/FAMILY HISTORY: Unchanged from my prior  documentation done 05/16/2016.      Current Outpatient Prescriptions:   •  anastrozole (ARIMIDEX) 1 MG tablet, Take 1 tablet by mouth Daily., Disp: 90 tablet, Rfl: 4  •  DULERA 100-5 MCG/ACT inhaler, INHALE 2 PUFF BY MOUTH EVERY DAY, Disp: 1 inhaler, Rfl: 1  •  fexofenadine (ALLEGRA) 30 MG tablet, Take 30 mg by mouth  daily., Disp: , Rfl:   •  Melatonin 10 MG capsule, Take  by mouth., Disp: , Rfl:   •  metFORMIN ER (GLUCOPHAGE-XR) 750 MG 24 hr tablet, TAKE 1 TABLET BY MOUTH DAILY, Disp: 30 tablet, Rfl: 2  •  montelukast (SINGULAIR) 10 MG tablet, TAKE 1 TABLET BY MOUTH EVERY DAY AS DIRECTED, Disp: 90 tablet, Rfl: 0  •  olmesartan-hydrochlorothiazide (BENICAR HCT) 40-12.5 MG per tablet, TAKE 1 TABLET BY MOUTH DAILY, Disp: 30 tablet, Rfl: 1    Current Facility-Administered Medications:   •  lidocaine (XYLOCAINE) 1 % injection 10 mL, 10 mL, Infiltration, Once, Beth Arevalo MD  •  lidocaine-EPINEPHrine (XYLOCAINE W/EPI) 1 %-1:900912 injection 20 mL, 20 mL, Infiltration, Once, Beth Arevalo MD    PHYSICAL EXAMINATION:   /70   Pulse 58   Temp 97.4 °F (36.3 °C) (Temporal Artery )   Resp 18   Wt 113 kg (249 lb)   SpO2 95%   BMI 47.07 kg/m²   ECOG1  GENERAL: Age appropriate. No acute distress.   HEENT: Head atraumatic, normocephalic.   NECK: Supple. No JVD. No lymphadenopathy.   LUNGS: Clear to auscultation bilaterally. No wheezing. No rhonchi.   HEART: Regular rate and rhythm. S1, S2, no murmurs.   ABDOMEN: Soft, nontender, nondistended. Bowel sounds positive. No  hepatosplenomegaly.   EXTREMITIES: No clubbing, cyanosis, or edema.   SKIN: No rashes. No purpura.   NEUROLOGIC: Awake and oriented x3. Strength 5 out of 5 in all muscle groups.     Lab on 05/21/2018   Component Date Value Ref Range Status   • WBC 05/21/2018 6.10  3.50 - 10.80 10*3/mm3 Final   • RBC 05/21/2018 4.98  3.89 - 5.14 10*6/mm3 Final   • Hemoglobin 05/21/2018 12.5  11.5 - 15.5 g/dL Final   • Hematocrit 05/21/2018 41.5  34.5 - 44.0 % Final   • RDW 05/21/2018 14.5  11.3 - 14.5 % Final   • MCV 05/21/2018 83.4  80.0 - 99.0 fL Final   • MCH 05/21/2018 25.2* 27.0 - 31.0 pg Final   • MCHC 05/21/2018 30.2* 32.0 - 36.0 g/dL Final   • MPV 05/21/2018 6.2  6.0 - 12.0 fL Final   • Platelets 05/21/2018 329  150 - 450 10*3/mm3 Final   • Neutrophil % 05/21/2018 73.6*  "41.0 - 71.0 % Final   • Lymphocyte % 05/21/2018 23.3* 24.0 - 44.0 % Final   • Monocyte % 05/21/2018 3.1  0.0 - 12.0 % Final   • Neutrophils, Absolute 05/21/2018 4.50  1.50 - 8.30 10*3/mm3 Final   • Lymphocytes, Absolute 05/21/2018 1.40  0.60 - 4.80 10*3/mm3 Final   • Monocytes, Absolute 05/21/2018 0.20  0.00 - 1.00 10*3/mm3 Final      DUAL-ENERGY X-RAY ABSORPTIOMETRY (DXA)      INDICATION- 55-year-old female patient recently diagnosed with breast  cancer.      COMPARISON- None.       PROCEDURE- A DXA scan was performed using a Hologic densitometer.      The lumbar spine was evaluated as well as the left and right total  hip.       The T-score compares the patient's bone mineral density with the peak  bone mass of young normal patients. According to criteria established  by the World Health Organization, patients with T-scores between 1.0  and 2.5 standard deviations BELOW the mean are osteopenic (low bone  mass). Patients with T-scores EQUAL TO OR GREATER than 2.5 standard  deviations below the mean are osteoporotic.      The Z-score compares the patient bone mineral density with age and sex  matched peers. According to the International Society for Clinical  Densitometry's 2007 consensus conference- In women prior to menopause  and men less than age 50, Z-scores, not T-scores are preferred. A  Z-score of -2.0 or lower is defined as \"below the expected range for  age\" and a Z-score above -2.0 is \"within the expected range for age.\"   The WHO diagnostic criteria may be applied in women in the menopausal  transition. Osteoporosis cannot be diagnosed in men under age 50 on the  basis of BMD alone.      TECHNICAL QUALITY- The study is of good technical quality.      RESULTS-      Lumbar Spine- The BMD measured in the L1-L4 region is 1.041 g/cm2. The  average T-score is -0.1. The Z-score is 1.1.      Total Hip- The BMD measured at the left total proximal femur is 0.946  g/cm2. The T-score is 0.0. The Z-score is " 0.7.      Femoral Neck- The BMD measured at the left femoral neck is 0.648 g/cm2.  The T-score is -1.8. The Z-score is -0.7.      Total Hip- The BMD measured at the right total proximal femur is 0.929  g/cm2. The T-score is -0.1. The Z-score is 0.6.      Femoral Neck- The BMD measured at the right femoral neck is 0.708  g/cm2. The T-score is -1.3. The Z-score is -0.2.      IMPRESSION- The patient is considered osteopenic according to World  Health Organization criteria.    ASSESSMENT: The patient is a very pleasant 55-year-old female with stage I  invasive ductal carcinoma of the right breast.      PROBLEM LIST:   1. Stage I invasive ductal carcinoma of the right breast, [id number], tumor at  the 7 o'clock position, tumor size 1.4 cm, estrogen receptor and progesterone  receptor strongly positive, HER-2/greg negative zero by IHC. Low risk group  Oncotype testing, score of 16, 10% risk of recurrence with hormone treatment  alone.  2. Diagnosed on ultrasound guided biopsy 03/10/2016.   3. Status post lumpectomy with clear margins done by Dr. Sales  05/03/2016.   4. Started Arimidex 1 mg p.o. daily 06/03/2016.   5. Completed breast adjuvant radiation by Dr. Thompson.   6. Osteopenia.  6. Right breast mammogram abnormality  7.  Obesity     PLAN:   1. I did go over the blood work results with the patient. I reassured her her blood counts are normal.  I would follow-up on the CMP.  2. The patient will continue Arimidex 1 mg p.o. daily.  I will refill it today.  3. The patient will continue calcium with vitamin D daily. I will repeat the DEXA in 2 years, this will be due on 06/07/2018.  I will ordered prior to return.  4. The patient will come back to see me in 6 months with repeat CBC and CMP.   5. The patient needs to have a follow up mammogram in October 2018.  6.  I reassured the patient abnormality of the right breast noted on mammogram done April 2018 failed to show any evidence of invasive or in situ malignancy.  7.   The patient was advised to exercise and lose weight.  Idalia Driscoll MD  5/21/2018

## 2018-06-08 ENCOUNTER — HOSPITAL ENCOUNTER (OUTPATIENT)
Dept: BONE DENSITY | Facility: HOSPITAL | Age: 58
Discharge: HOME | End: 2018-06-08
Attending: INTERNAL MEDICINE | Admitting: INTERNAL MEDICINE

## 2018-06-08 DIAGNOSIS — Z17.0 MALIGNANT NEOPLASM OF LOWER-OUTER QUADRANT OF RIGHT BREAST OF FEMALE, ESTROGEN RECEPTOR POSITIVE (HCC): ICD-10-CM

## 2018-06-08 DIAGNOSIS — C50.511 MALIGNANT NEOPLASM OF LOWER-OUTER QUADRANT OF RIGHT BREAST OF FEMALE, ESTROGEN RECEPTOR POSITIVE (HCC): ICD-10-CM

## 2018-06-08 PROCEDURE — 77080 DXA BONE DENSITY AXIAL: CPT

## 2018-06-17 RX ORDER — MONTELUKAST SODIUM 10 MG/1
TABLET ORAL
Qty: 90 TABLET | Refills: 0 | Status: SHIPPED | OUTPATIENT
Start: 2018-06-17 | End: 2018-09-13 | Stop reason: SDUPTHER

## 2018-06-26 ENCOUNTER — OFFICE VISIT (OUTPATIENT)
Dept: INTERNAL MEDICINE | Facility: CLINIC | Age: 58
End: 2018-06-26

## 2018-06-26 VITALS
HEART RATE: 68 BPM | WEIGHT: 246.4 LBS | SYSTOLIC BLOOD PRESSURE: 140 MMHG | DIASTOLIC BLOOD PRESSURE: 70 MMHG | BODY MASS INDEX: 46.58 KG/M2

## 2018-06-26 DIAGNOSIS — IMO0001 UNCONTROLLED TYPE 2 DIABETES MELLITUS WITHOUT COMPLICATION, WITHOUT LONG-TERM CURRENT USE OF INSULIN: ICD-10-CM

## 2018-06-26 DIAGNOSIS — I10 ESSENTIAL HYPERTENSION: ICD-10-CM

## 2018-06-26 DIAGNOSIS — Z00.00 ANNUAL PHYSICAL EXAM: Primary | ICD-10-CM

## 2018-06-26 LAB
25(OH)D3 SERPL-MCNC: 72.7 NG/ML
ALBUMIN SERPL-MCNC: 4.46 G/DL (ref 3.2–4.8)
ALBUMIN/GLOB SERPL: 1.7 G/DL (ref 1.5–2.5)
ALP SERPL-CCNC: 95 U/L (ref 25–100)
ALT SERPL W P-5'-P-CCNC: 27 U/L (ref 7–40)
ANION GAP SERPL CALCULATED.3IONS-SCNC: 10 MMOL/L (ref 3–11)
ARTICHOKE IGE QN: 147 MG/DL (ref 0–130)
AST SERPL-CCNC: 20 U/L (ref 0–33)
BILIRUB BLD-MCNC: NEGATIVE MG/DL
BILIRUB SERPL-MCNC: 0.4 MG/DL (ref 0.3–1.2)
BUN BLD-MCNC: 15 MG/DL (ref 9–23)
BUN/CREAT SERPL: 22.4 (ref 7–25)
CALCIUM SPEC-SCNC: 9.5 MG/DL (ref 8.7–10.4)
CHLORIDE SERPL-SCNC: 101 MMOL/L (ref 99–109)
CHOLEST SERPL-MCNC: 173 MG/DL (ref 0–200)
CLARITY, POC: CLEAR
CO2 SERPL-SCNC: 27 MMOL/L (ref 20–31)
COLOR UR: YELLOW
CREAT BLD-MCNC: 0.67 MG/DL (ref 0.6–1.3)
DEPRECATED RDW RBC AUTO: 41.9 FL (ref 37–54)
ERYTHROCYTE [DISTWIDTH] IN BLOOD BY AUTOMATED COUNT: 13.9 % (ref 11.3–14.5)
GFR SERPL CREATININE-BSD FRML MDRD: 91 ML/MIN/1.73
GLOBULIN UR ELPH-MCNC: 2.6 GM/DL
GLUCOSE BLD-MCNC: 95 MG/DL (ref 70–100)
GLUCOSE UR STRIP-MCNC: NEGATIVE MG/DL
HBA1C MFR BLD: 6.7 %
HCT VFR BLD AUTO: 40.7 % (ref 34.5–44)
HDLC SERPL-MCNC: 38 MG/DL (ref 40–60)
HGB BLD-MCNC: 13.2 G/DL (ref 11.5–15.5)
KETONES UR QL: NEGATIVE
LEUKOCYTE EST, POC: ABNORMAL
MCH RBC QN AUTO: 27.1 PG (ref 27–31)
MCHC RBC AUTO-ENTMCNC: 32.4 G/DL (ref 32–36)
MCV RBC AUTO: 83.6 FL (ref 80–99)
NITRITE UR-MCNC: NEGATIVE MG/ML
PH UR: 7 [PH] (ref 5–8)
PLATELET # BLD AUTO: 292 10*3/MM3 (ref 150–450)
PMV BLD AUTO: 9.5 FL (ref 6–12)
POC CREATININE URINE: 10
POC MICROALBUMIN URINE: 10
POTASSIUM BLD-SCNC: 4.3 MMOL/L (ref 3.5–5.5)
PROT SERPL-MCNC: 7.1 G/DL (ref 5.7–8.2)
PROT UR STRIP-MCNC: NEGATIVE MG/DL
RBC # BLD AUTO: 4.87 10*6/MM3 (ref 3.89–5.14)
RBC # UR STRIP: NEGATIVE /UL
SODIUM BLD-SCNC: 138 MMOL/L (ref 132–146)
SP GR UR: 1 (ref 1–1.03)
TRIGL SERPL-MCNC: 101 MG/DL (ref 0–150)
TSH SERPL DL<=0.05 MIU/L-ACNC: 1.42 MIU/ML (ref 0.35–5.35)
UROBILINOGEN UR QL: NORMAL
WBC NRBC COR # BLD: 7.76 10*3/MM3 (ref 3.5–10.8)

## 2018-06-26 PROCEDURE — 81003 URINALYSIS AUTO W/O SCOPE: CPT | Performed by: INTERNAL MEDICINE

## 2018-06-26 PROCEDURE — 84443 ASSAY THYROID STIM HORMONE: CPT | Performed by: INTERNAL MEDICINE

## 2018-06-26 PROCEDURE — 82306 VITAMIN D 25 HYDROXY: CPT | Performed by: INTERNAL MEDICINE

## 2018-06-26 PROCEDURE — 99396 PREV VISIT EST AGE 40-64: CPT | Performed by: INTERNAL MEDICINE

## 2018-06-26 PROCEDURE — 80053 COMPREHEN METABOLIC PANEL: CPT | Performed by: INTERNAL MEDICINE

## 2018-06-26 PROCEDURE — 85027 COMPLETE CBC AUTOMATED: CPT | Performed by: INTERNAL MEDICINE

## 2018-06-26 PROCEDURE — 80061 LIPID PANEL: CPT | Performed by: INTERNAL MEDICINE

## 2018-06-26 PROCEDURE — 82570 ASSAY OF URINE CREATININE: CPT | Performed by: INTERNAL MEDICINE

## 2018-06-26 PROCEDURE — 83036 HEMOGLOBIN GLYCOSYLATED A1C: CPT | Performed by: INTERNAL MEDICINE

## 2018-06-26 PROCEDURE — 82044 UR ALBUMIN SEMIQUANTITATIVE: CPT | Performed by: INTERNAL MEDICINE

## 2018-06-26 RX ORDER — OLMESARTAN MEDOXOMIL AND HYDROCHLOROTHIAZIDE 40/12.5 40; 12.5 MG/1; MG/1
1 TABLET ORAL DAILY
Qty: 90 TABLET | Refills: 1 | Status: SHIPPED | OUTPATIENT
Start: 2018-06-26 | End: 2019-01-17 | Stop reason: SDUPTHER

## 2018-06-26 RX ORDER — METFORMIN HYDROCHLORIDE 750 MG/1
750 TABLET, EXTENDED RELEASE ORAL DAILY
Qty: 90 TABLET | Refills: 1 | Status: SHIPPED | OUTPATIENT
Start: 2018-06-26 | End: 2019-01-17 | Stop reason: SDUPTHER

## 2018-06-26 NOTE — PROGRESS NOTES
Chief Complaint   Patient presents with   • Annual Exam     Has   GYN       History of Present Illness  HM, Adult Female:  The patient is being seen for a health maintenance evaluation. The last health maintenance visit was 1-2 year(s) ago.    Social History: Household members include spouse. She is . Work status: working full time. The patient has never smoked cigarettes. She reports occasional alcohol use.   General Health: The patient's health is described as good. She has regular dental visits. She denies vision problems. She denies hearing loss. Immunizations status: up to date.    Lifestyle:. She consumes a diverse and healthy diet. She has weight concerns. She does not exercise regularly. She does not use tobacco. She consumes alcohol.    Reproductive health: the patient is perimenopausal.   Screening: Cervical cancer screening includes a pap smear performed last month and Dr. Mccloud. Breast cancer screening includes a mammogram performed last month. Colorectal cancer screening includes a colonoscopy performed .   Metabolic screening includes lipid profile performed within the past year, glucose screening performed last year and thyroid function test performed last year.    S/p Dexa last month and has borderline osteoporosis.    Review of Systems   Constitutional: Negative.  Negative for chills and fever.   HENT: Negative for ear discharge, ear pain, sinus pressure and sore throat.    Eyes: Negative for pain and redness.   Respiratory: Negative for cough, chest tightness and shortness of breath.    Cardiovascular: Negative for chest pain, palpitations and leg swelling.   Gastrointestinal: Negative for diarrhea, nausea and vomiting.   Genitourinary: Negative for flank pain, hematuria and urgency.   Musculoskeletal: Negative for arthralgias, back pain and myalgias.   Neurological: Negative for dizziness, syncope and headaches.   Psychiatric/Behavioral: Negative for confusion and sleep disturbance.        Patient Active Problem List   Diagnosis   • Breast cancer of lower-outer quadrant of right female breast   • Uncontrolled type 2 diabetes mellitus   • Asthma   • Hypertension   • Obstructive sleep apnea syndrome   • Vitamin D deficiency       Social History     Social History   • Marital status:      Spouse name: N/A   • Number of children: N/A   • Years of education: N/A     Occupational History   • Not on file.     Social History Main Topics   • Smoking status: Never Smoker   • Smokeless tobacco: Never Used   • Alcohol use Yes      Comment: 2-3/week for 20 years   • Drug use: No   • Sexual activity: Defer     Other Topics Concern   • Not on file     Social History Narrative   • No narrative on file       Current Outpatient Prescriptions on File Prior to Visit   Medication Sig Dispense Refill   • anastrozole (ARIMIDEX) 1 MG tablet Take 1 tablet by mouth Daily. 90 tablet 4   • DULERA 100-5 MCG/ACT inhaler INHALE 2 PUFF BY MOUTH EVERY DAY 1 inhaler 1   • fexofenadine (ALLEGRA) 30 MG tablet Take 30 mg by mouth daily.     • Melatonin 10 MG capsule Take  by mouth.     • montelukast (SINGULAIR) 10 MG tablet TAKE 1 TABLET BY MOUTH EVERY DAY AS DIRECTED 90 tablet 0   • [DISCONTINUED] metFORMIN ER (GLUCOPHAGE-XR) 750 MG 24 hr tablet TAKE 1 TABLET BY MOUTH DAILY 30 tablet 2   • [DISCONTINUED] olmesartan-hydrochlorothiazide (BENICAR HCT) 40-12.5 MG per tablet TAKE 1 TABLET BY MOUTH DAILY 30 tablet 1     Current Facility-Administered Medications on File Prior to Visit   Medication Dose Route Frequency Provider Last Rate Last Dose   • lidocaine (XYLOCAINE) 1 % injection 10 mL  10 mL Infiltration Once Beth Arevalo MD       • lidocaine-EPINEPHrine (XYLOCAINE W/EPI) 1 %-1:761528 injection 20 mL  20 mL Infiltration Once Beth Arevalo MD           Allergies   Allergen Reactions   • Meperidine Nausea And Vomiting       /70   Pulse 68   Wt 112 kg (246 lb 6.4 oz)   BMI 46.58 kg/m²            The following  portions of the patient's history were reviewed and updated as appropriate: allergies, current medications, past family history, past medical history, past social history, past surgical history and problem list.    Physical Exam   Constitutional: She is oriented to person, place, and time. She appears well-developed and well-nourished.   HENT:   Head: Normocephalic and atraumatic.   Right Ear: External ear normal.   Left Ear: External ear normal.   Mouth/Throat: No oropharyngeal exudate.   Eyes: Conjunctivae are normal. Pupils are equal, round, and reactive to light.   Neck: Neck supple. No thyromegaly present.   Cardiovascular: Normal rate, regular rhythm and intact distal pulses.  Exam reveals no friction rub.    Pulmonary/Chest: Effort normal and breath sounds normal. She has no wheezes. She has no rales.   Abdominal: Soft. Bowel sounds are normal. She exhibits no distension and no mass. There is no tenderness. There is no rebound.   Musculoskeletal: She exhibits no edema.   Lymphadenopathy:     She has no cervical adenopathy.   Neurological: She is alert and oriented to person, place, and time. She displays normal reflexes. No cranial nerve deficit. She exhibits normal muscle tone.   Skin: Skin is warm and dry.   Psychiatric: She has a normal mood and affect. Her behavior is normal. Judgment and thought content normal.   Nursing note and vitals reviewed.      Results for orders placed or performed in visit on 06/26/18   CBC (No Diff)   Result Value Ref Range    WBC 7.76 3.50 - 10.80 10*3/mm3    RBC 4.87 3.89 - 5.14 10*6/mm3    Hemoglobin 13.2 11.5 - 15.5 g/dL    Hematocrit 40.7 34.5 - 44.0 %    MCV 83.6 80.0 - 99.0 fL    MCH 27.1 27.0 - 31.0 pg    MCHC 32.4 32.0 - 36.0 g/dL    RDW 13.9 11.3 - 14.5 %    RDW-SD 41.9 37.0 - 54.0 fl    MPV 9.5 6.0 - 12.0 fL    Platelets 292 150 - 450 10*3/mm3   Comprehensive Metabolic Panel   Result Value Ref Range    Glucose 95 70 - 100 mg/dL    BUN 15 9 - 23 mg/dL    Creatinine  0.67 0.60 - 1.30 mg/dL    Sodium 138 132 - 146 mmol/L    Potassium 4.3 3.5 - 5.5 mmol/L    Chloride 101 99 - 109 mmol/L    CO2 27.0 20.0 - 31.0 mmol/L    Calcium 9.5 8.7 - 10.4 mg/dL    Total Protein 7.1 5.7 - 8.2 g/dL    Albumin 4.46 3.20 - 4.80 g/dL    ALT (SGPT) 27 7 - 40 U/L    AST (SGOT) 20 0 - 33 U/L    Alkaline Phosphatase 95 25 - 100 U/L    Total Bilirubin 0.4 0.3 - 1.2 mg/dL    eGFR Non African Amer 91 >60 mL/min/1.73    Globulin 2.6 gm/dL    A/G Ratio 1.7 1.5 - 2.5 g/dL    BUN/Creatinine Ratio 22.4 7.0 - 25.0    Anion Gap 10.0 3.0 - 11.0 mmol/L   Lipid Panel   Result Value Ref Range    Total Cholesterol 173 0 - 200 mg/dL    Triglycerides 101 0 - 150 mg/dL    HDL Cholesterol 38 (L) 40 - 60 mg/dL    LDL Cholesterol  147 (H) 0 - 130 mg/dL   TSH   Result Value Ref Range    TSH 1.417 0.350 - 5.350 mIU/mL   Vitamin D 25 Hydroxy   Result Value Ref Range    25 Hydroxy, Vitamin D 72.7 ng/ml   POCT microalbumin   Result Value Ref Range    Microalbumin, Urine 10     Creatinine, Urine 10    POCT urinalysis dipstick, automated   Result Value Ref Range    Color Yellow Yellow, Straw, Dark Yellow, Venus    Clarity, UA Clear Clear    Specific Gravity  1.005 1.005 - 1.030    pH, Urine 7.0 5.0 - 8.0    Leukocytes 75 Lu/ul (A) Negative    Nitrite, UA Negative Negative    Protein, POC Negative Negative mg/dL    Glucose, UA Negative Negative, 1000 mg/dL (3+) mg/dL    Ketones, UA Negative Negative    Urobilinogen, UA Normal Normal    Bilirubin Negative Negative    Blood, UA Negative Negative   POC Glycosylated Hemoglobin (Hb A1C)   Result Value Ref Range    Hemoglobin A1C 6.7 %       Dana was seen today for annual exam.    Diagnoses and all orders for this visit:    Annual physical exam  -     CBC (No Diff)  -     Comprehensive Metabolic Panel  -     Lipid Panel  -     TSH  -     Vitamin D 25 Hydroxy    Uncontrolled type 2 diabetes mellitus without complication, without long-term current use of insulin  -     POCT  microalbumin  -     POCT urinalysis dipstick, automated  -     metFORMIN ER (GLUCOPHAGE-XR) 750 MG 24 hr tablet; Take 1 tablet by mouth Daily.  -     POC Glycosylated Hemoglobin (Hb A1C)    Uncontrolled type 2 diabetes mellitus without complication, without long-term current use of insulin  Comments:  A1C is higher, start metformin, and if no response with weight, add/ switch to Victoza.  Orders:  -     POCT microalbumin  -     POCT urinalysis dipstick, automated  -     metFORMIN ER (GLUCOPHAGE-XR) 750 MG 24 hr tablet; Take 1 tablet by mouth Daily.  -     POC Glycosylated Hemoglobin (Hb A1C)    Essential hypertension  Comments:  stable, continue current regimen  Orders:  -     olmesartan-hydrochlorothiazide (BENICAR HCT) 40-12.5 MG per tablet; Take 1 tablet by mouth Daily.          Health Maintenance   Topic Date Due   • DIABETIC EYE EXAM  07/06/2018 (Originally 5/10/2017)   • ZOSTER VACCINE (2 of 3) 06/03/2019 (Originally 1/18/2016)   • INFLUENZA VACCINE  08/01/2018   • COLONOSCOPY  12/09/2018   • HEMOGLOBIN A1C  12/26/2018   • DIABETIC FOOT EXAM  06/26/2019   • URINE MICROALBUMIN  06/26/2019   • ANNUAL PHYSICAL  06/27/2019   • MAMMOGRAM  03/21/2020   • PAP SMEAR  06/26/2021   • TDAP/TD VACCINES (2 - Td) 02/13/2024   • PNEUMOCOCCAL VACCINE (19-64 MEDIUM RISK)  Completed   • HEPATITIS C SCREENING  Completed       Discussion/Summary  Impression: health maintenance visit. Currently, she eats an adequate diet and has an adequate exercise regimen.   Cervical cancer screening:Pap smear is current.   Breast cancer screening: mammogram is current.   Colorectal cancer screening: colonoscopy is current.  Osteoporosis screening: Bone mineral density test is current .   Screening lab work includes hemoglobin, glucose, lipid profile, thyroid function testing, 25-hydroxyvitamin D and urinalysis.   Immunizations are needed, immunizations will be given as outlined in the orders     Return in about 6 months (around 12/26/2018) for  Next scheduled follow up.

## 2018-09-14 RX ORDER — MONTELUKAST SODIUM 10 MG/1
TABLET ORAL
Qty: 90 TABLET | Refills: 0 | Status: SHIPPED | OUTPATIENT
Start: 2018-09-14 | End: 2018-12-08 | Stop reason: SDUPTHER

## 2018-09-19 DIAGNOSIS — J45.909 UNCOMPLICATED ASTHMA: ICD-10-CM

## 2018-10-26 ENCOUNTER — HOSPITAL ENCOUNTER (OUTPATIENT)
Dept: MAMMOGRAPHY | Facility: HOSPITAL | Age: 58
Discharge: HOME | End: 2018-10-26
Attending: SURGERY | Admitting: SURGERY

## 2018-10-26 DIAGNOSIS — R92.8 ABNORMAL MAMMOGRAM: ICD-10-CM

## 2018-10-26 PROCEDURE — G0279 TOMOSYNTHESIS, MAMMO: HCPCS

## 2018-10-26 PROCEDURE — 77066 DX MAMMO INCL CAD BI: CPT

## 2018-10-26 PROCEDURE — 77062 BREAST TOMOSYNTHESIS BI: CPT | Performed by: RADIOLOGY

## 2018-10-26 PROCEDURE — 77066 DX MAMMO INCL CAD BI: CPT | Performed by: RADIOLOGY

## 2018-10-29 PROCEDURE — 87077 CULTURE AEROBIC IDENTIFY: CPT | Performed by: FAMILY MEDICINE

## 2018-10-29 PROCEDURE — 87186 SC STD MICRODIL/AGAR DIL: CPT | Performed by: FAMILY MEDICINE

## 2018-10-29 PROCEDURE — 87205 SMEAR GRAM STAIN: CPT | Performed by: FAMILY MEDICINE

## 2018-10-29 PROCEDURE — 87147 CULTURE TYPE IMMUNOLOGIC: CPT | Performed by: FAMILY MEDICINE

## 2018-10-29 PROCEDURE — 87070 CULTURE OTHR SPECIMN AEROBIC: CPT | Performed by: FAMILY MEDICINE

## 2018-11-02 ENCOUNTER — TELEPHONE (OUTPATIENT)
Dept: URGENT CARE | Facility: CLINIC | Age: 58
End: 2018-11-02

## 2018-11-23 DIAGNOSIS — J45.909 UNCOMPLICATED ASTHMA: ICD-10-CM

## 2018-12-08 RX ORDER — MONTELUKAST SODIUM 10 MG/1
TABLET ORAL
Qty: 90 TABLET | Refills: 0 | Status: SHIPPED | OUTPATIENT
Start: 2018-12-08 | End: 2019-03-05 | Stop reason: SDUPTHER

## 2018-12-13 DIAGNOSIS — Z17.0 MALIGNANT NEOPLASM OF LOWER-OUTER QUADRANT OF RIGHT BREAST OF FEMALE, ESTROGEN RECEPTOR POSITIVE (HCC): Primary | ICD-10-CM

## 2018-12-13 DIAGNOSIS — C50.511 MALIGNANT NEOPLASM OF LOWER-OUTER QUADRANT OF RIGHT BREAST OF FEMALE, ESTROGEN RECEPTOR POSITIVE (HCC): Primary | ICD-10-CM

## 2018-12-14 ENCOUNTER — OFFICE VISIT (OUTPATIENT)
Dept: ONCOLOGY | Facility: CLINIC | Age: 58
End: 2018-12-14

## 2018-12-14 ENCOUNTER — LAB (OUTPATIENT)
Dept: LAB | Facility: HOSPITAL | Age: 58
End: 2018-12-14

## 2018-12-14 VITALS
HEART RATE: 94 BPM | SYSTOLIC BLOOD PRESSURE: 152 MMHG | TEMPERATURE: 97.9 F | DIASTOLIC BLOOD PRESSURE: 64 MMHG | RESPIRATION RATE: 16 BRPM | HEIGHT: 60 IN | OXYGEN SATURATION: 94 % | WEIGHT: 250 LBS | BODY MASS INDEX: 49.08 KG/M2

## 2018-12-14 DIAGNOSIS — Z17.0 MALIGNANT NEOPLASM OF LOWER-OUTER QUADRANT OF RIGHT BREAST OF FEMALE, ESTROGEN RECEPTOR POSITIVE (HCC): Primary | ICD-10-CM

## 2018-12-14 DIAGNOSIS — Z17.0 MALIGNANT NEOPLASM OF LOWER-OUTER QUADRANT OF RIGHT BREAST OF FEMALE, ESTROGEN RECEPTOR POSITIVE (HCC): ICD-10-CM

## 2018-12-14 DIAGNOSIS — C50.511 MALIGNANT NEOPLASM OF LOWER-OUTER QUADRANT OF RIGHT BREAST OF FEMALE, ESTROGEN RECEPTOR POSITIVE (HCC): ICD-10-CM

## 2018-12-14 DIAGNOSIS — C50.511 MALIGNANT NEOPLASM OF LOWER-OUTER QUADRANT OF RIGHT BREAST OF FEMALE, ESTROGEN RECEPTOR POSITIVE (HCC): Primary | ICD-10-CM

## 2018-12-14 LAB
ALBUMIN SERPL-MCNC: 4.57 G/DL (ref 3.2–4.8)
ALBUMIN/GLOB SERPL: 2 G/DL (ref 1.5–2.5)
ALP SERPL-CCNC: 114 U/L (ref 25–100)
ALT SERPL W P-5'-P-CCNC: 28 U/L (ref 7–40)
ANION GAP SERPL CALCULATED.3IONS-SCNC: 9 MMOL/L (ref 3–11)
AST SERPL-CCNC: 17 U/L (ref 0–33)
BILIRUB SERPL-MCNC: 0.3 MG/DL (ref 0.3–1.2)
BUN BLD-MCNC: 20 MG/DL (ref 9–23)
BUN/CREAT SERPL: 26.3 (ref 7–25)
CALCIUM SPEC-SCNC: 9.2 MG/DL (ref 8.7–10.4)
CHLORIDE SERPL-SCNC: 99 MMOL/L (ref 99–109)
CO2 SERPL-SCNC: 26 MMOL/L (ref 20–31)
CREAT BLD-MCNC: 0.76 MG/DL (ref 0.6–1.3)
ERYTHROCYTE [DISTWIDTH] IN BLOOD BY AUTOMATED COUNT: 14.1 % (ref 11.3–14.5)
GFR SERPL CREATININE-BSD FRML MDRD: 78 ML/MIN/1.73
GLOBULIN UR ELPH-MCNC: 2.2 GM/DL
GLUCOSE BLD-MCNC: 159 MG/DL (ref 70–100)
HCT VFR BLD AUTO: 40.2 % (ref 34.5–44)
HGB BLD-MCNC: 13.1 G/DL (ref 11.5–15.5)
LYMPHOCYTES # BLD AUTO: 2.3 10*3/MM3 (ref 0.6–4.8)
LYMPHOCYTES NFR BLD AUTO: 23.5 % (ref 24–44)
MCH RBC QN AUTO: 27.4 PG (ref 27–31)
MCHC RBC AUTO-ENTMCNC: 32.6 G/DL (ref 32–36)
MCV RBC AUTO: 84.1 FL (ref 80–99)
MONOCYTES # BLD AUTO: 0.5 10*3/MM3 (ref 0–1)
MONOCYTES NFR BLD AUTO: 4.8 % (ref 0–12)
NEUTROPHILS # BLD AUTO: 7.2 10*3/MM3 (ref 1.5–8.3)
NEUTROPHILS NFR BLD AUTO: 71.7 % (ref 41–71)
PLATELET # BLD AUTO: 374 10*3/MM3 (ref 150–450)
PMV BLD AUTO: 6.3 FL (ref 6–12)
POTASSIUM BLD-SCNC: 4.6 MMOL/L (ref 3.5–5.5)
PROT SERPL-MCNC: 6.8 G/DL (ref 5.7–8.2)
RBC # BLD AUTO: 4.78 10*6/MM3 (ref 3.89–5.14)
SODIUM BLD-SCNC: 134 MMOL/L (ref 132–146)
WBC NRBC COR # BLD: 10 10*3/MM3 (ref 3.5–10.8)

## 2018-12-14 PROCEDURE — 85025 COMPLETE CBC W/AUTO DIFF WBC: CPT

## 2018-12-14 PROCEDURE — 80053 COMPREHEN METABOLIC PANEL: CPT

## 2018-12-14 PROCEDURE — 99214 OFFICE O/P EST MOD 30 MIN: CPT | Performed by: NURSE PRACTITIONER

## 2018-12-14 PROCEDURE — 36415 COLL VENOUS BLD VENIPUNCTURE: CPT

## 2018-12-14 NOTE — PROGRESS NOTES
DATE OF VISIT: 12/14/2018    REASON FOR VISIT: Follow-up for invasive ductal carcinoma of the right breast,  [id number], estrogen receptor positive, HER-2/greg negative, low risk disease based  on Oncotype.      HISTORY OF PRESENT ILLNESS: The patient is a very pleasant 58 y.o. female  who was in her usual state of health until her most recent mammogram done  03/2016. She had an abnormality in the right breast 10 o'clock position. The  patient had an ultrasound guided biopsy 03/10/2016 that revealed invasive  ductal carcinoma. The patient elected to proceed with lumpectomy on 05/03/2016  with Dr. Sales. Final pathology revealed 1.4 cm mass, tumor at the 7  o'clock position, ER positive more than 95%, HI positive more than 85%,  HER-2/greg 0+. The patient had clear surgical margins, Kimmie score 8 out of 9,  and 1 negative sentinel lymph node. The patient had no postoperative  complications. The patient had Oncotype testing. Her score came back at 16,  low risk disease group. Risk of recurrence 10% with tamoxifen alone. The  patient was started on Arimidex 1 mg p.o. daily 06/03/2016. The patient is  here today in scheduled follow-up visit.      SUBJECTIVE: The patient is here today by herself. She is feeling well today. She has had no changes in health history since her last visit. She is taking her Arimidex daily as directed. She requests a refill on this prescription today. She is also taking calcium and vitamin D supplement. She has noted some improvement in her hot flashes. They are less frequent and less intense. She is getting ready to go to [place] in March.      REVIEW OF SYSTEMS: All the other 9 systems are reviewed by me and are negative  except what is mentioned in HPI.      PAST MEDICAL HISTORY/SOCIAL HISTORY/FAMILY HISTORY: Unchanged from my prior  documentation done 05/16/2016.      Current Outpatient Medications:   •  anastrozole (ARIMIDEX) 1 MG tablet, Take 1 tablet by mouth Daily., Disp: 90 tablet,  "Rfl: 4  •  DULERA 100-5 MCG/ACT inhaler, INHALE 2 PUFFS BY MOUTH EVERY DAY, Disp: 13 g, Rfl: 0  •  fexofenadine (ALLEGRA) 30 MG tablet, Take 30 mg by mouth daily., Disp: , Rfl:   •  Melatonin 10 MG capsule, Take  by mouth., Disp: , Rfl:   •  metFORMIN ER (GLUCOPHAGE-XR) 750 MG 24 hr tablet, Take 1 tablet by mouth Daily., Disp: 90 tablet, Rfl: 1  •  montelukast (SINGULAIR) 10 MG tablet, TAKE 1 TABLET BY MOUTH EVERY DAY AS DIRECTED, Disp: 90 tablet, Rfl: 0  •  olmesartan-hydrochlorothiazide (BENICAR HCT) 40-12.5 MG per tablet, Take 1 tablet by mouth Daily., Disp: 90 tablet, Rfl: 1    Current Facility-Administered Medications:   •  lidocaine (XYLOCAINE) 1 % injection 10 mL, 10 mL, Infiltration, Once, Beth Arevalo MD  •  lidocaine-EPINEPHrine (XYLOCAINE W/EPI) 1 %-1:666316 injection 20 mL, 20 mL, Infiltration, Once, Beth Arevalo MD    PHYSICAL EXAMINATION:   /64   Pulse 94   Temp 97.9 °F (36.6 °C) (Temporal)   Resp 16   Ht 152 cm (59.84\")   Wt 113 kg (250 lb)   SpO2 94%   BMI 49.09 kg/m²   ECOG1  GENERAL: Age appropriate. No acute distress.   HEENT: Head atraumatic, normocephalic.   NECK: Supple. No JVD. No lymphadenopathy.   LUNGS: Clear to auscultation bilaterally. No wheezing. No rhonchi.   HEART: Regular rate and rhythm. S1, S2, no murmurs.   ABDOMEN: Soft, nontender, nondistended. Bowel sounds positive. No  hepatosplenomegaly.   EXTREMITIES: No clubbing, cyanosis, or edema.   SKIN: No rashes. No purpura.   NEUROLOGIC: Awake and oriented x3. Strength 5 out of 5 in all muscle groups.     Lab on 12/14/2018   Component Date Value Ref Range Status   • Glucose 12/14/2018 159* 70 - 100 mg/dL Final   • BUN 12/14/2018 20  9 - 23 mg/dL Final   • Creatinine 12/14/2018 0.76  0.60 - 1.30 mg/dL Final   • Sodium 12/14/2018 134  132 - 146 mmol/L Final   • Potassium 12/14/2018 4.6  3.5 - 5.5 mmol/L Final   • Chloride 12/14/2018 99  99 - 109 mmol/L Final   • CO2 12/14/2018 26.0  20.0 - 31.0 mmol/L Final   • " Calcium 12/14/2018 9.2  8.7 - 10.4 mg/dL Final   • Total Protein 12/14/2018 6.8  5.7 - 8.2 g/dL Final   • Albumin 12/14/2018 4.57  3.20 - 4.80 g/dL Final   • ALT (SGPT) 12/14/2018 28  7 - 40 U/L Final   • AST (SGOT) 12/14/2018 17  0 - 33 U/L Final   • Alkaline Phosphatase 12/14/2018 114* 25 - 100 U/L Final   • Total Bilirubin 12/14/2018 0.3  0.3 - 1.2 mg/dL Final   • eGFR Non African Amer 12/14/2018 78  >60 mL/min/1.73 Final   • Globulin 12/14/2018 2.2  gm/dL Final   • A/G Ratio 12/14/2018 2.0  1.5 - 2.5 g/dL Final   • BUN/Creatinine Ratio 12/14/2018 26.3* 7.0 - 25.0 Final   • Anion Gap 12/14/2018 9.0  3.0 - 11.0 mmol/L Final   • WBC 12/14/2018 10.00  3.50 - 10.80 10*3/mm3 Final   • RBC 12/14/2018 4.78  3.89 - 5.14 10*6/mm3 Final   • Hemoglobin 12/14/2018 13.1  11.5 - 15.5 g/dL Final   • Hematocrit 12/14/2018 40.2  34.5 - 44.0 % Final   • RDW 12/14/2018 14.1  11.3 - 14.5 % Final   • MCV 12/14/2018 84.1  80.0 - 99.0 fL Final   • MCH 12/14/2018 27.4  27.0 - 31.0 pg Final   • MCHC 12/14/2018 32.6  32.0 - 36.0 g/dL Final   • MPV 12/14/2018 6.3  6.0 - 12.0 fL Final   • Platelets 12/14/2018 374  150 - 450 10*3/mm3 Final   • Neutrophil % 12/14/2018 71.7* 41.0 - 71.0 % Final   • Lymphocyte % 12/14/2018 23.5* 24.0 - 44.0 % Final   • Monocyte % 12/14/2018 4.8  0.0 - 12.0 % Final   • Neutrophils, Absolute 12/14/2018 7.20  1.50 - 8.30 10*3/mm3 Final   • Lymphocytes, Absolute 12/14/2018 2.30  0.60 - 4.80 10*3/mm3 Final   • Monocytes, Absolute 12/14/2018 0.50  0.00 - 1.00 10*3/mm3 Final      DUAL-ENERGY X-RAY ABSORPTIOMETRY (DXA)      INDICATION- 55-year-old female patient recently diagnosed with breast  cancer.      COMPARISON- None.       PROCEDURE- A DXA scan was performed using a Hologic densitometer.      The lumbar spine was evaluated as well as the left and right total  hip.       The T-score compares the patient's bone mineral density with the peak  bone mass of young normal patients. According to criteria established  by  "the World Health Organization, patients with T-scores between 1.0  and 2.5 standard deviations BELOW the mean are osteopenic (low bone  mass). Patients with T-scores EQUAL TO OR GREATER than 2.5 standard  deviations below the mean are osteoporotic.      The Z-score compares the patient bone mineral density with age and sex  matched peers. According to the International Society for Clinical  Densitometry's 2007 consensus conference- In women prior to menopause  and men less than age 50, Z-scores, not T-scores are preferred. A  Z-score of -2.0 or lower is defined as \"below the expected range for  age\" and a Z-score above -2.0 is \"within the expected range for age.\"   The WHO diagnostic criteria may be applied in women in the menopausal  transition. Osteoporosis cannot be diagnosed in men under age 50 on the  basis of BMD alone.      TECHNICAL QUALITY- The study is of good technical quality.      RESULTS-      Lumbar Spine- The BMD measured in the L1-L4 region is 1.041 g/cm2. The  average T-score is -0.1. The Z-score is 1.1.      Total Hip- The BMD measured at the left total proximal femur is 0.946  g/cm2. The T-score is 0.0. The Z-score is 0.7.      Femoral Neck- The BMD measured at the left femoral neck is 0.648 g/cm2.  The T-score is -1.8. The Z-score is -0.7.      Total Hip- The BMD measured at the right total proximal femur is 0.929  g/cm2. The T-score is -0.1. The Z-score is 0.6.      Femoral Neck- The BMD measured at the right femoral neck is 0.708  g/cm2. The T-score is -1.3. The Z-score is -0.2.      IMPRESSION- The patient is considered osteopenic according to World  Health Organization criteria.    ASSESSMENT: The patient is a very pleasant 58 y.o. female with stage I  invasive ductal carcinoma of the right breast.      PROBLEM LIST:   1. Stage I invasive ductal carcinoma of the right breast, [id number], tumor at  the 7 o'clock position, tumor size 1.4 cm, estrogen receptor and progesterone  receptor strongly " positive, HER-2/greg negative zero by IHC. Low risk group  Oncotype testing, score of 16, 10% risk of recurrence with hormone treatment  alone.  2. Diagnosed on ultrasound guided biopsy 03/10/2016.   3. Status post lumpectomy with clear margins done by Dr. Sales  05/03/2016.   4. Started Arimidex 1 mg p.o. daily 06/03/2016.   5. Completed breast adjuvant radiation by Dr. Thompson.   6. Osteopenia.  6. Right breast mammogram abnormality  7.  Obesity     PLAN:   1. I did go over the blood work results with the patient. I reassured her her blood counts are normal.  I would follow-up on the CMP.  2. The patient will continue Arimidex 1 mg p.o. daily.  I gave her a refill on her prescription today.   3. I reviewed the bone density results from 6/2018. I told the patient she has had worsening bone density and is still considered osteopenic. Her T-score of her femoral neck is now -2.3. I discussed with her options for management including observation with continue calcium and vitamin D supplement and weight bearing exercise versus starting bisphosphonate therapy with Fosamax versus Prolia. I reviewed the dosing and potential side effects of these medications. She is going to consider her options and call back with a plan. In the interim she will continue calcium and vitamin D supplement daily.   4. I reviewed the mammogram results from 10/26/2018. I reassured her there were benign findings with no evidence of cancer recurrence. She will need one year follow up mammogram which will be due 10/27/2019.   5. We will see the patient back in 6 months with repeat labs including CBC and CMP.   6. I advised she wear compression stockings on the plane while traveling to [place].   7. I encouraged her to continue efforts towards increased exercise and weight loss.       Waleska Uribe, APRN  12/14/2018

## 2019-01-17 DIAGNOSIS — IMO0001 UNCONTROLLED TYPE 2 DIABETES MELLITUS WITHOUT COMPLICATION, WITHOUT LONG-TERM CURRENT USE OF INSULIN: ICD-10-CM

## 2019-01-17 DIAGNOSIS — I10 ESSENTIAL HYPERTENSION: ICD-10-CM

## 2019-01-17 RX ORDER — METFORMIN HYDROCHLORIDE 750 MG/1
750 TABLET, EXTENDED RELEASE ORAL DAILY
Qty: 90 TABLET | Refills: 0 | Status: SHIPPED | OUTPATIENT
Start: 2019-01-17 | End: 2019-03-05 | Stop reason: SDUPTHER

## 2019-01-17 RX ORDER — OLMESARTAN MEDOXOMIL AND HYDROCHLOROTHIAZIDE 40/12.5 40; 12.5 MG/1; MG/1
1 TABLET ORAL DAILY
Qty: 90 TABLET | Refills: 0 | Status: SHIPPED | OUTPATIENT
Start: 2019-01-17 | End: 2019-03-05 | Stop reason: SDUPTHER

## 2019-01-21 DIAGNOSIS — J45.909 UNCOMPLICATED ASTHMA: ICD-10-CM

## 2019-02-25 ENCOUNTER — TELEPHONE (OUTPATIENT)
Dept: INTERNAL MEDICINE | Facility: CLINIC | Age: 59
End: 2019-02-25

## 2019-02-28 LAB
25(OH)D3 SERPL-MCNC: 96.7 NG/ML
ALBUMIN SERPL-MCNC: 4.34 G/DL (ref 3.2–4.8)
ALBUMIN/GLOB SERPL: 1.8 G/DL (ref 1.5–2.5)
ALP SERPL-CCNC: 94 U/L (ref 25–100)
ALT SERPL W P-5'-P-CCNC: 28 U/L (ref 7–40)
ANION GAP SERPL CALCULATED.3IONS-SCNC: 9 MMOL/L (ref 3–11)
ARTICHOKE IGE QN: 112 MG/DL (ref 0–130)
AST SERPL-CCNC: 19 U/L (ref 0–33)
BILIRUB SERPL-MCNC: 0.6 MG/DL (ref 0.3–1.2)
BUN BLD-MCNC: 20 MG/DL (ref 9–23)
BUN/CREAT SERPL: 27 (ref 7–25)
CALCIUM SPEC-SCNC: 10 MG/DL (ref 8.7–10.4)
CHLORIDE SERPL-SCNC: 98 MMOL/L (ref 99–109)
CHOLEST SERPL-MCNC: 155 MG/DL (ref 0–200)
CO2 SERPL-SCNC: 28 MMOL/L (ref 20–31)
CREAT BLD-MCNC: 0.74 MG/DL (ref 0.6–1.3)
GFR SERPL CREATININE-BSD FRML MDRD: 81 ML/MIN/1.73
GLOBULIN UR ELPH-MCNC: 2.4 GM/DL
GLUCOSE BLD-MCNC: 116 MG/DL (ref 70–100)
HBA1C MFR BLD: 7 % (ref 4.8–5.6)
HDLC SERPL-MCNC: 32 MG/DL (ref 40–60)
POTASSIUM BLD-SCNC: 4.5 MMOL/L (ref 3.5–5.5)
PROT SERPL-MCNC: 6.7 G/DL (ref 5.7–8.2)
SODIUM BLD-SCNC: 135 MMOL/L (ref 132–146)
TRIGL SERPL-MCNC: 128 MG/DL (ref 0–150)
TSH SERPL DL<=0.05 MIU/L-ACNC: 2.18 MIU/ML (ref 0.35–5.35)

## 2019-02-28 PROCEDURE — 82306 VITAMIN D 25 HYDROXY: CPT | Performed by: INTERNAL MEDICINE

## 2019-02-28 PROCEDURE — 80053 COMPREHEN METABOLIC PANEL: CPT | Performed by: INTERNAL MEDICINE

## 2019-02-28 PROCEDURE — 83036 HEMOGLOBIN GLYCOSYLATED A1C: CPT | Performed by: INTERNAL MEDICINE

## 2019-02-28 PROCEDURE — 84443 ASSAY THYROID STIM HORMONE: CPT | Performed by: INTERNAL MEDICINE

## 2019-02-28 PROCEDURE — 80061 LIPID PANEL: CPT | Performed by: INTERNAL MEDICINE

## 2019-03-05 ENCOUNTER — OFFICE VISIT (OUTPATIENT)
Dept: INTERNAL MEDICINE | Facility: CLINIC | Age: 59
End: 2019-03-05

## 2019-03-05 VITALS
SYSTOLIC BLOOD PRESSURE: 124 MMHG | BODY MASS INDEX: 51.28 KG/M2 | HEIGHT: 59 IN | HEART RATE: 61 BPM | DIASTOLIC BLOOD PRESSURE: 62 MMHG | OXYGEN SATURATION: 95 % | WEIGHT: 254.4 LBS

## 2019-03-05 DIAGNOSIS — E55.9 VITAMIN D DEFICIENCY: ICD-10-CM

## 2019-03-05 DIAGNOSIS — E11.65 UNCONTROLLED TYPE 2 DIABETES MELLITUS WITH HYPERGLYCEMIA (HCC): Primary | ICD-10-CM

## 2019-03-05 DIAGNOSIS — E78.49 OTHER HYPERLIPIDEMIA: ICD-10-CM

## 2019-03-05 DIAGNOSIS — I10 ESSENTIAL HYPERTENSION: ICD-10-CM

## 2019-03-05 DIAGNOSIS — M85.859 OSTEOPENIA OF NECK OF FEMUR, UNSPECIFIED LATERALITY: ICD-10-CM

## 2019-03-05 PROCEDURE — 99214 OFFICE O/P EST MOD 30 MIN: CPT | Performed by: INTERNAL MEDICINE

## 2019-03-05 RX ORDER — MONTELUKAST SODIUM 10 MG/1
10 TABLET ORAL DAILY
Qty: 90 TABLET | Refills: 1 | Status: SHIPPED | OUTPATIENT
Start: 2019-03-05 | End: 2019-10-23 | Stop reason: SDUPTHER

## 2019-03-05 RX ORDER — OLMESARTAN MEDOXOMIL AND HYDROCHLOROTHIAZIDE 40/12.5 40; 12.5 MG/1; MG/1
1 TABLET ORAL DAILY
Qty: 90 TABLET | Refills: 1 | Status: SHIPPED | OUTPATIENT
Start: 2019-03-05 | End: 2019-10-10 | Stop reason: SDUPTHER

## 2019-03-05 RX ORDER — METFORMIN HYDROCHLORIDE 750 MG/1
750 TABLET, EXTENDED RELEASE ORAL DAILY
Qty: 90 TABLET | Refills: 1 | Status: SHIPPED | OUTPATIENT
Start: 2019-03-05 | End: 2019-10-10 | Stop reason: SDUPTHER

## 2019-03-05 NOTE — PROGRESS NOTES
Diabetes (with lab results); Hypertension; and Osteoporosis (would like to discuss possible medication)    Subjective   Dana Rincon is a 58 y.o. female is here today for follow-up.    History of Present Illness   S/p Dexa, and dxed with Osteoporosis, and adv. To start fosamax or Prolia. Asking for the pros and cons, also to get dental work , and asking if safe.  Here for f/u on DM2, htn, hlp.    Current Outpatient Medications:   •  anastrozole (ARIMIDEX) 1 MG tablet, Take 1 tablet by mouth Daily., Disp: 90 tablet, Rfl: 4  •  DULERA 100-5 MCG/ACT inhaler, INHALE 2 PUFFS BY MOUTH EVERY DAY, Disp: 13 g, Rfl: 0  •  fexofenadine (ALLEGRA) 30 MG tablet, Take 30 mg by mouth daily., Disp: , Rfl:   •  Melatonin 10 MG capsule, Take  by mouth., Disp: , Rfl:   •  metFORMIN ER (GLUCOPHAGE-XR) 750 MG 24 hr tablet, Take 1 tablet by mouth Daily., Disp: 90 tablet, Rfl: 1  •  montelukast (SINGULAIR) 10 MG tablet, Take 1 tablet by mouth Daily., Disp: 90 tablet, Rfl: 1  •  olmesartan-hydrochlorothiazide (BENICAR HCT) 40-12.5 MG per tablet, Take 1 tablet by mouth Daily., Disp: 90 tablet, Rfl: 1    Current Facility-Administered Medications:   •  lidocaine (XYLOCAINE) 1 % injection 10 mL, 10 mL, Infiltration, Once, Beth Arevalo MD  •  lidocaine-EPINEPHrine (XYLOCAINE W/EPI) 1 %-1:571288 injection 20 mL, 20 mL, Infiltration, Once, Beth Arevalo MD      The following portions of the patient's history were reviewed and updated as appropriate: allergies, current medications, past family history, past medical history, past social history, past surgical history and problem list.    Review of Systems   Constitutional: Negative.  Negative for chills and fever.   HENT: Negative for ear discharge, ear pain, sinus pressure and sore throat.    Respiratory: Negative for cough, chest tightness and shortness of breath.    Cardiovascular: Negative for chest pain, palpitations and leg swelling.   Gastrointestinal: Negative for diarrhea, nausea  "and vomiting.   Musculoskeletal: Positive for arthralgias. Negative for back pain and myalgias.   Neurological: Negative for dizziness, syncope and headaches.   Psychiatric/Behavioral: Negative for confusion and sleep disturbance.       Objective   /62   Pulse 61   Ht 149.9 cm (59\")   Wt 115 kg (254 lb 6.4 oz)   SpO2 95% Comment: cassandra  BMI 51.38 kg/m²   Physical Exam   Constitutional: She is oriented to person, place, and time. She appears well-developed and well-nourished.   HENT:   Head: Normocephalic and atraumatic.   Mouth/Throat: No oropharyngeal exudate.   Eyes: Conjunctivae are normal. Pupils are equal, round, and reactive to light.   Neck: Neck supple. No thyromegaly present.   Cardiovascular: Normal rate and regular rhythm.   Pulmonary/Chest: Effort normal and breath sounds normal.   Abdominal: Soft. Bowel sounds are normal. She exhibits no distension. There is no tenderness.   Musculoskeletal: She exhibits tenderness. She exhibits no edema.   Neurological: She is alert and oriented to person, place, and time. No cranial nerve deficit.   Psychiatric: She has a normal mood and affect. Judgment normal.   Nursing note and vitals reviewed.        Results for orders placed or performed in visit on 03/05/19   Comprehensive Metabolic Panel   Result Value Ref Range    Glucose 116 (H) 70 - 100 mg/dL    BUN 20 9 - 23 mg/dL    Creatinine 0.74 0.60 - 1.30 mg/dL    Sodium 135 132 - 146 mmol/L    Potassium 4.5 3.5 - 5.5 mmol/L    Chloride 98 (L) 99 - 109 mmol/L    CO2 28.0 20.0 - 31.0 mmol/L    Calcium 10.0 8.7 - 10.4 mg/dL    Total Protein 6.7 5.7 - 8.2 g/dL    Albumin 4.34 3.20 - 4.80 g/dL    ALT (SGPT) 28 7 - 40 U/L    AST (SGOT) 19 0 - 33 U/L    Alkaline Phosphatase 94 25 - 100 U/L    Total Bilirubin 0.6 0.3 - 1.2 mg/dL    eGFR Non African Amer 81 >60 mL/min/1.73    Globulin 2.4 gm/dL    A/G Ratio 1.8 1.5 - 2.5 g/dL    BUN/Creatinine Ratio 27.0 (H) 7.0 - 25.0    Anion Gap 9.0 3.0 - 11.0 mmol/L   Lipid Panel "   Result Value Ref Range    Total Cholesterol 155 0 - 200 mg/dL    Triglycerides 128 0 - 150 mg/dL    HDL Cholesterol 32 (L) 40 - 60 mg/dL    LDL Cholesterol  112 0 - 130 mg/dL   TSH   Result Value Ref Range    TSH 2.179 0.350 - 5.350 mIU/mL   Hemoglobin A1c   Result Value Ref Range    Hemoglobin A1C 7.00 (H) 4.80 - 5.60 %   Vitamin D 25 Hydroxy   Result Value Ref Range    25 Hydroxy, Vitamin D 96.7 ng/ml             Assessment/Plan   Diagnoses and all orders for this visit:    Uncontrolled type 2 diabetes mellitus with hyperglycemia (CMS/[facility])  Comments:  higher, counseled  Orders:  -     Hemoglobin A1c  -     metFORMIN ER (GLUCOPHAGE-XR) 750 MG 24 hr tablet; Take 1 tablet by mouth Daily.    Essential hypertension  -     Comprehensive Metabolic Panel  -     Lipid Panel  -     TSH  -     olmesartan-hydrochlorothiazide (BENICAR HCT) 40-12.5 MG per tablet; Take 1 tablet by mouth Daily.    Vitamin D deficiency  Comments:  On 72115 IU Vit D. Adv. to cut back to 7000 IU.  Orders:  -     Vitamin D 25 Hydroxy    Osteopenia of neck of femur, unspecified laterality  Comments:  Will discuss with Orthodontist re: going on a bisphosphonate or prolia. Adv. risk of Jaw necrosis is the same,    Other hyperlipidemia  Comments:  likely from arimidex. monitor and start statin if higher.    Other orders  -     montelukast (SINGULAIR) 10 MG tablet; Take 1 tablet by mouth Daily.                 Return in about 6 months (around 9/5/2019) for Annual.

## 2019-03-17 DIAGNOSIS — J45.909 UNCOMPLICATED ASTHMA: ICD-10-CM

## 2019-05-13 DIAGNOSIS — J45.909 UNCOMPLICATED ASTHMA: ICD-10-CM

## 2019-06-12 DIAGNOSIS — C50.511 MALIGNANT NEOPLASM OF LOWER-OUTER QUADRANT OF RIGHT BREAST OF FEMALE, ESTROGEN RECEPTOR POSITIVE (HCC): Primary | ICD-10-CM

## 2019-06-12 DIAGNOSIS — Z17.0 MALIGNANT NEOPLASM OF LOWER-OUTER QUADRANT OF RIGHT BREAST OF FEMALE, ESTROGEN RECEPTOR POSITIVE (HCC): Primary | ICD-10-CM

## 2019-06-14 ENCOUNTER — LAB (OUTPATIENT)
Dept: LAB | Facility: HOSPITAL | Age: 59
End: 2019-06-14

## 2019-06-14 ENCOUNTER — OFFICE VISIT (OUTPATIENT)
Dept: ONCOLOGY | Facility: CLINIC | Age: 59
End: 2019-06-14

## 2019-06-14 VITALS
HEART RATE: 77 BPM | OXYGEN SATURATION: 97 % | WEIGHT: 253.5 LBS | DIASTOLIC BLOOD PRESSURE: 97 MMHG | RESPIRATION RATE: 16 BRPM | HEIGHT: 59 IN | BODY MASS INDEX: 51.11 KG/M2 | SYSTOLIC BLOOD PRESSURE: 143 MMHG

## 2019-06-14 DIAGNOSIS — C50.511 MALIGNANT NEOPLASM OF LOWER-OUTER QUADRANT OF RIGHT BREAST OF FEMALE, ESTROGEN RECEPTOR POSITIVE (HCC): Primary | ICD-10-CM

## 2019-06-14 DIAGNOSIS — C50.511 MALIGNANT NEOPLASM OF LOWER-OUTER QUADRANT OF RIGHT BREAST OF FEMALE, ESTROGEN RECEPTOR POSITIVE (HCC): ICD-10-CM

## 2019-06-14 DIAGNOSIS — Z17.0 MALIGNANT NEOPLASM OF LOWER-OUTER QUADRANT OF RIGHT BREAST OF FEMALE, ESTROGEN RECEPTOR POSITIVE (HCC): Primary | ICD-10-CM

## 2019-06-14 DIAGNOSIS — M81.0 OSTEOPOROSIS, UNSPECIFIED OSTEOPOROSIS TYPE, UNSPECIFIED PATHOLOGICAL FRACTURE PRESENCE: ICD-10-CM

## 2019-06-14 DIAGNOSIS — Z17.0 MALIGNANT NEOPLASM OF LOWER-OUTER QUADRANT OF RIGHT BREAST OF FEMALE, ESTROGEN RECEPTOR POSITIVE (HCC): ICD-10-CM

## 2019-06-14 PROBLEM — M85.80 OSTEOPENIA: Status: ACTIVE | Noted: 2019-06-14

## 2019-06-14 LAB
ALBUMIN SERPL-MCNC: 4.3 G/DL (ref 3.5–5.2)
ALBUMIN/GLOB SERPL: 1.3 G/DL
ALP SERPL-CCNC: 98 U/L (ref 39–117)
ALT SERPL W P-5'-P-CCNC: 16 U/L (ref 1–33)
ANION GAP SERPL CALCULATED.3IONS-SCNC: 12 MMOL/L
AST SERPL-CCNC: 13 U/L (ref 1–32)
BILIRUB SERPL-MCNC: 0.4 MG/DL (ref 0.2–1.2)
BUN BLD-MCNC: 15 MG/DL (ref 6–20)
BUN/CREAT SERPL: 21.1 (ref 7–25)
CALCIUM SPEC-SCNC: 9.9 MG/DL (ref 8.6–10.5)
CHLORIDE SERPL-SCNC: 98 MMOL/L (ref 98–107)
CO2 SERPL-SCNC: 28 MMOL/L (ref 22–29)
CREAT BLD-MCNC: 0.71 MG/DL (ref 0.57–1)
ERYTHROCYTE [DISTWIDTH] IN BLOOD BY AUTOMATED COUNT: 14.8 % (ref 12.3–15.4)
GFR SERPL CREATININE-BSD FRML MDRD: 85 ML/MIN/1.73
GLOBULIN UR ELPH-MCNC: 3.3 GM/DL
GLUCOSE BLD-MCNC: 139 MG/DL (ref 65–99)
HCT VFR BLD AUTO: 39.4 % (ref 34–46.6)
HGB BLD-MCNC: 13.1 G/DL (ref 12–15.9)
LYMPHOCYTES # BLD AUTO: 1.6 10*3/MM3 (ref 0.7–3.1)
LYMPHOCYTES NFR BLD AUTO: 19.5 % (ref 19.6–45.3)
MCH RBC QN AUTO: 27.6 PG (ref 26.6–33)
MCHC RBC AUTO-ENTMCNC: 33.3 G/DL (ref 31.5–35.7)
MCV RBC AUTO: 82.8 FL (ref 79–97)
MONOCYTES # BLD AUTO: 0.3 10*3/MM3 (ref 0.1–0.9)
MONOCYTES NFR BLD AUTO: 4 % (ref 5–12)
NEUTROPHILS # BLD AUTO: 6.4 10*3/MM3 (ref 1.7–7)
NEUTROPHILS NFR BLD AUTO: 76.5 % (ref 42.7–76)
PLATELET # BLD AUTO: 364 10*3/MM3 (ref 140–450)
PMV BLD AUTO: 6.3 FL (ref 6–12)
POTASSIUM BLD-SCNC: 4.3 MMOL/L (ref 3.5–5.2)
PROT SERPL-MCNC: 7.6 G/DL (ref 6–8.5)
RBC # BLD AUTO: 4.76 10*6/MM3 (ref 3.77–5.28)
SODIUM BLD-SCNC: 138 MMOL/L (ref 136–145)
WBC NRBC COR # BLD: 8.4 10*3/MM3 (ref 3.4–10.8)

## 2019-06-14 PROCEDURE — 99214 OFFICE O/P EST MOD 30 MIN: CPT | Performed by: INTERNAL MEDICINE

## 2019-06-14 PROCEDURE — 85025 COMPLETE CBC W/AUTO DIFF WBC: CPT

## 2019-06-14 PROCEDURE — 36415 COLL VENOUS BLD VENIPUNCTURE: CPT

## 2019-06-14 PROCEDURE — 80053 COMPREHEN METABOLIC PANEL: CPT

## 2019-06-14 NOTE — PROGRESS NOTES
DATE OF VISIT: 6/14/2019    REASON FOR VISIT: Follow-up for invasive ductal carcinoma of the right breast,  [id number], estrogen receptor positive, HER-2/greg negative, low risk disease based  on Oncotype.      HISTORY OF PRESENT ILLNESS: The patient is a very pleasant 58 y.o. female  who was in her usual state of health until her most recent mammogram done  03/2016. She had an abnormality in the right breast 10 o'clock position. The  patient had an ultrasound guided biopsy 03/10/2016 that revealed invasive  ductal carcinoma. The patient elected to proceed with lumpectomy on 05/03/2016  with Dr. Sales. Final pathology revealed 1.4 cm mass, tumor at the 7  o'clock position, ER positive more than 95%, DC positive more than 85%,  HER-2/greg 0+. The patient had clear surgical margins, Kimmie score 8 out of 9,  and 1 negative sentinel lymph node. The patient had no postoperative  complications. The patient had Oncotype testing. Her score came back at 16,  low risk disease group. Risk of recurrence 10% with tamoxifen alone. The  patient was started on Arimidex 1 mg p.o. daily 06/03/2016. The patient is  here today in scheduled follow-up visit.      SUBJECTIVE: The patient is here today by herself.  She has been compliant with her treatment.  She is having dental work done and she would like to wait until its over before she starts Prolia.     REVIEW OF SYSTEMS: All the other 9 systems are reviewed by me and are negative  except what is mentioned in HPI.      PAST MEDICAL HISTORY/SOCIAL HISTORY/FAMILY HISTORY: Unchanged from my prior  documentation done 05/16/2016.      Current Outpatient Medications:   •  anastrozole (ARIMIDEX) 1 MG tablet, Take 1 tablet by mouth Daily., Disp: 90 tablet, Rfl: 4  •  DULERA 100-5 MCG/ACT inhaler, INHALE 2 PUFFS BY MOUTH EVERY DAY, Disp: 13 g, Rfl: 0  •  fexofenadine (ALLEGRA) 30 MG tablet, Take 30 mg by mouth daily., Disp: , Rfl:   •  Melatonin 10 MG capsule, Take  by mouth., Disp: , Rfl:  "  •  metFORMIN ER (GLUCOPHAGE-XR) 750 MG 24 hr tablet, Take 1 tablet by mouth Daily., Disp: 90 tablet, Rfl: 1  •  montelukast (SINGULAIR) 10 MG tablet, Take 1 tablet by mouth Daily., Disp: 90 tablet, Rfl: 1  •  olmesartan-hydrochlorothiazide (BENICAR HCT) 40-12.5 MG per tablet, Take 1 tablet by mouth Daily., Disp: 90 tablet, Rfl: 1    Current Facility-Administered Medications:   •  lidocaine (XYLOCAINE) 1 % injection 10 mL, 10 mL, Infiltration, Once, Beth Arevalo MD  •  lidocaine-EPINEPHrine (XYLOCAINE W/EPI) 1 %-1:069611 injection 20 mL, 20 mL, Infiltration, Once, Beth Arevalo MD    PHYSICAL EXAMINATION:   /97   Pulse 77   Resp 16   Ht 149.9 cm (59\")   Wt 115 kg (253 lb 8 oz)   SpO2 97%   BMI 51.20 kg/m²   ECOG1  GENERAL: Age appropriate. No acute distress.   HEENT: Head atraumatic, normocephalic.   NECK: Supple. No JVD. No lymphadenopathy.   LUNGS: Clear to auscultation bilaterally. No wheezing. No rhonchi.   HEART: Regular rate and rhythm. S1, S2, no murmurs.   ABDOMEN: Soft, nontender, nondistended. Bowel sounds positive. No  hepatosplenomegaly.   EXTREMITIES: No clubbing, cyanosis, or edema.   SKIN: No rashes. No purpura.   NEUROLOGIC: Awake and oriented x3. Strength 5 out of 5 in all muscle groups.     Lab on 06/14/2019   Component Date Value Ref Range Status   • WBC 06/14/2019 8.40  3.40 - 10.80 10*3/mm3 Final   • RBC 06/14/2019 4.76  3.77 - 5.28 10*6/mm3 Final   • Hemoglobin 06/14/2019 13.1  12.0 - 15.9 g/dL Final   • Hematocrit 06/14/2019 39.4  34.0 - 46.6 % Final   • RDW 06/14/2019 14.8  12.3 - 15.4 % Final   • MCV 06/14/2019 82.8  79.0 - 97.0 fL Final   • MCH 06/14/2019 27.6  26.6 - 33.0 pg Final   • MCHC 06/14/2019 33.3  31.5 - 35.7 g/dL Final   • MPV 06/14/2019 6.3  6.0 - 12.0 fL Final   • Platelets 06/14/2019 364  140 - 450 10*3/mm3 Final   • Neutrophil % 06/14/2019 76.5* 42.7 - 76.0 % Final   • Lymphocyte % 06/14/2019 19.5* 19.6 - 45.3 % Final   • Monocyte % 06/14/2019 4.0* 5.0 - " "12.0 % Final   • Neutrophils, Absolute 06/14/2019 6.40  1.70 - 7.00 10*3/mm3 Final   • Lymphocytes, Absolute 06/14/2019 1.60  0.70 - 3.10 10*3/mm3 Final   • Monocytes, Absolute 06/14/2019 0.30  0.10 - 0.90 10*3/mm3 Final      DUAL-ENERGY X-RAY ABSORPTIOMETRY (DXA)      INDICATION- 55-year-old female patient recently diagnosed with breast  cancer.      COMPARISON- None.       PROCEDURE- A DXA scan was performed using a Hologic densitometer.      The lumbar spine was evaluated as well as the left and right total  hip.       The T-score compares the patient's bone mineral density with the peak  bone mass of young normal patients. According to criteria established  by the World Health Organization, patients with T-scores between 1.0  and 2.5 standard deviations BELOW the mean are osteopenic (low bone  mass). Patients with T-scores EQUAL TO OR GREATER than 2.5 standard  deviations below the mean are osteoporotic.      The Z-score compares the patient bone mineral density with age and sex  matched peers. According to the International Society for Clinical  Densitometry's 2007 consensus conference- In women prior to menopause  and men less than age 50, Z-scores, not T-scores are preferred. A  Z-score of -2.0 or lower is defined as \"below the expected range for  age\" and a Z-score above -2.0 is \"within the expected range for age.\"   The WHO diagnostic criteria may be applied in women in the menopausal  transition. Osteoporosis cannot be diagnosed in men under age 50 on the  basis of BMD alone.      TECHNICAL QUALITY- The study is of good technical quality.      RESULTS-      Lumbar Spine- The BMD measured in the L1-L4 region is 1.041 g/cm2. The  average T-score is -0.1. The Z-score is 1.1.      Total Hip- The BMD measured at the left total proximal femur is 0.946  g/cm2. The T-score is 0.0. The Z-score is 0.7.      Femoral Neck- The BMD measured at the left femoral neck is 0.648 g/cm2.  The T-score is -1.8. The Z-score is " -0.7.      Total Hip- The BMD measured at the right total proximal femur is 0.929  g/cm2. The T-score is -0.1. The Z-score is 0.6.      Femoral Neck- The BMD measured at the right femoral neck is 0.708  g/cm2. The T-score is -1.3. The Z-score is -0.2.      IMPRESSION- The patient is considered osteopenic according to World  Health Organization criteria.    ASSESSMENT: The patient is a very pleasant 58 y.o. female with stage I  invasive ductal carcinoma of the right breast.      PROBLEM LIST:   1. Stage I invasive ductal carcinoma of the right breast, [id number]:  A. Tumor at the 7 o'clock position, tumor size 1.4 cm, estrogen receptor and progesterone  receptor strongly positive, HER-2/greg negative zero by IHC. Low risk group Oncotype testing, score of 16, 10% risk of recurrence with hormone treatment alone.  B. Diagnosed on ultrasound guided biopsy 03/10/2016.   C. Status post lumpectomy with clear margins done by Dr. Sales 05/03/2016.   D. Started Arimidex 1 mg p.o. daily 06/03/2016.   E. Completed breast adjuvant radiation by Dr. Thompson.   2.  Osteoporosis:  A.  DEXA scan done June 2018 revealed T score -2.3  3. Right breast mammogram abnormality  4.  Obesity     PLAN:   1. I did go over the blood work results with the patient. I reassured her her blood counts are normal.  I would follow-up on the [facility].  2. The patient will continue Arimidex 1 mg p.o. daily.  I gave her a refill on her prescription today.   3.  The patient be started on Prolia 60 mg subcu every 6-month December 2019.  She has evidence of osteoporosis in her bone density.  She is also on concurrent antiestrogen treatment with Arimidex.  We will continue calcium vitamin D daily.  We will repeat her bone density June 2020.  4. I reviewed the mammogram results from 10/26/2018. I reassured her there were benign findings with no evidence of cancer recurrence. She will need one year follow up mammogram which will be due 10/27/2019.   5. We will see the  patient back in 6 months with repeat labs including CBC and CMP.   6. I advised she wear compression stockings on the plane while traveling to [place].   7. I encouraged her to continue efforts towards increased exercise and weight loss.       Idalia Driscoll MD  6/14/2019

## 2019-07-13 DIAGNOSIS — J45.909 UNCOMPLICATED ASTHMA: ICD-10-CM

## 2019-07-22 RX ORDER — ANASTROZOLE 1 MG/1
1 TABLET ORAL DAILY
Qty: 90 TABLET | Refills: 3 | Status: SHIPPED | OUTPATIENT
Start: 2019-07-22 | End: 2020-07-10

## 2019-09-14 DIAGNOSIS — J45.909 UNCOMPLICATED ASTHMA: ICD-10-CM

## 2019-09-27 ENCOUNTER — TELEPHONE (OUTPATIENT)
Dept: INTERNAL MEDICINE | Facility: CLINIC | Age: 59
End: 2019-09-27

## 2019-09-27 ENCOUNTER — TRANSCRIBE ORDERS (OUTPATIENT)
Dept: ADMINISTRATIVE | Facility: HOSPITAL | Age: 59
End: 2019-09-27

## 2019-09-27 ENCOUNTER — TELEPHONE (OUTPATIENT)
Dept: ONCOLOGY | Facility: CLINIC | Age: 59
End: 2019-09-27

## 2019-09-27 DIAGNOSIS — Z17.0 MALIGNANT NEOPLASM OF LOWER-OUTER QUADRANT OF RIGHT BREAST OF FEMALE, ESTROGEN RECEPTOR POSITIVE (HCC): Primary | ICD-10-CM

## 2019-09-27 DIAGNOSIS — Z12.31 VISIT FOR SCREENING MAMMOGRAM: Primary | ICD-10-CM

## 2019-09-27 DIAGNOSIS — C50.511 MALIGNANT NEOPLASM OF LOWER-OUTER QUADRANT OF RIGHT BREAST OF FEMALE, ESTROGEN RECEPTOR POSITIVE (HCC): Primary | ICD-10-CM

## 2019-09-27 NOTE — TELEPHONE ENCOUNTER
Returned call to patient and left message that it is recommended from last mammogram that patient will need screening mammogram yearly.

## 2019-09-27 NOTE — TELEPHONE ENCOUNTER
Pt called, she needs to reschedule appt on 10/7/19 because she will be traveling a lot in October, Rescheduled to 11/6/19

## 2019-09-27 NOTE — TELEPHONE ENCOUNTER
----- Message from Madhaviandre Carrizales sent at 9/27/2019  8:38 AM EDT -----  Regarding: BADIN-DIAGNOSTIC MAMMOGRAM  Contact: [phone]  Patient called she said there is an order for a routine mammogram in, but she thinks she may need to do a diagnostic mammogram she is only 3 years out? If this needs to be diagnostic she will need a new order. Please call.

## 2019-10-10 DIAGNOSIS — E11.65 UNCONTROLLED TYPE 2 DIABETES MELLITUS WITH HYPERGLYCEMIA (HCC): ICD-10-CM

## 2019-10-10 DIAGNOSIS — I10 ESSENTIAL HYPERTENSION: ICD-10-CM

## 2019-10-10 RX ORDER — OLMESARTAN MEDOXOMIL AND HYDROCHLOROTHIAZIDE 40/12.5 40; 12.5 MG/1; MG/1
1 TABLET ORAL DAILY
Qty: 90 TABLET | Refills: 0 | Status: SHIPPED | OUTPATIENT
Start: 2019-10-10 | End: 2019-11-06 | Stop reason: SDUPTHER

## 2019-10-10 RX ORDER — METFORMIN HYDROCHLORIDE 750 MG/1
750 TABLET, EXTENDED RELEASE ORAL DAILY
Qty: 90 TABLET | Refills: 0 | Status: SHIPPED | OUTPATIENT
Start: 2019-10-10 | End: 2019-11-06 | Stop reason: SDUPTHER

## 2019-10-23 RX ORDER — MONTELUKAST SODIUM 10 MG/1
10 TABLET ORAL DAILY
Qty: 90 TABLET | Refills: 0 | Status: SHIPPED | OUTPATIENT
Start: 2019-10-23 | End: 2019-11-06 | Stop reason: SDUPTHER

## 2019-11-06 ENCOUNTER — OFFICE VISIT (OUTPATIENT)
Dept: INTERNAL MEDICINE | Facility: CLINIC | Age: 59
End: 2019-11-06

## 2019-11-06 VITALS
DIASTOLIC BLOOD PRESSURE: 60 MMHG | BODY MASS INDEX: 50.96 KG/M2 | HEART RATE: 60 BPM | WEIGHT: 252.8 LBS | OXYGEN SATURATION: 98 % | HEIGHT: 59 IN | SYSTOLIC BLOOD PRESSURE: 122 MMHG

## 2019-11-06 DIAGNOSIS — E11.65 UNCONTROLLED TYPE 2 DIABETES MELLITUS WITH HYPERGLYCEMIA (HCC): ICD-10-CM

## 2019-11-06 DIAGNOSIS — J45.909 UNCOMPLICATED ASTHMA: ICD-10-CM

## 2019-11-06 DIAGNOSIS — Z00.00 ROUTINE GENERAL MEDICAL EXAMINATION AT A HEALTH CARE FACILITY: Primary | ICD-10-CM

## 2019-11-06 DIAGNOSIS — I10 ESSENTIAL HYPERTENSION: ICD-10-CM

## 2019-11-06 DIAGNOSIS — E78.49 OTHER HYPERLIPIDEMIA: ICD-10-CM

## 2019-11-06 DIAGNOSIS — R60.0 PEDAL EDEMA: ICD-10-CM

## 2019-11-06 LAB
25(OH)D3 SERPL-MCNC: 70.2 NG/ML (ref 30–100)
ALBUMIN SERPL-MCNC: 4.4 G/DL (ref 3.5–5.2)
ALBUMIN/GLOB SERPL: 1.1 G/DL
ALP SERPL-CCNC: 96 U/L (ref 39–117)
ALT SERPL W P-5'-P-CCNC: 31 U/L (ref 1–33)
ANION GAP SERPL CALCULATED.3IONS-SCNC: 11.3 MMOL/L (ref 5–15)
AST SERPL-CCNC: 19 U/L (ref 1–32)
BILIRUB BLD-MCNC: NEGATIVE MG/DL
BILIRUB SERPL-MCNC: 0.4 MG/DL (ref 0.2–1.2)
BUN BLD-MCNC: 15 MG/DL (ref 6–20)
BUN/CREAT SERPL: 22.1 (ref 7–25)
CALCIUM SPEC-SCNC: 10.3 MG/DL (ref 8.6–10.5)
CHLORIDE SERPL-SCNC: 96 MMOL/L (ref 98–107)
CHOLEST SERPL-MCNC: 172 MG/DL (ref 0–200)
CLARITY, POC: CLEAR
CO2 SERPL-SCNC: 29.7 MMOL/L (ref 22–29)
COLOR UR: ABNORMAL
CREAT BLD-MCNC: 0.68 MG/DL (ref 0.57–1)
DEPRECATED RDW RBC AUTO: 40.8 FL (ref 37–54)
ERYTHROCYTE [DISTWIDTH] IN BLOOD BY AUTOMATED COUNT: 13.8 % (ref 12.3–15.4)
GFR SERPL CREATININE-BSD FRML MDRD: 89 ML/MIN/1.73
GLOBULIN UR ELPH-MCNC: 3.9 GM/DL
GLUCOSE BLD-MCNC: 120 MG/DL (ref 65–99)
GLUCOSE BLDC GLUCOMTR-MCNC: 131 MG/DL (ref 70–130)
GLUCOSE UR STRIP-MCNC: NEGATIVE MG/DL
HBA1C MFR BLD: 7.7 %
HCT VFR BLD AUTO: 43.2 % (ref 34–46.6)
HDLC SERPL-MCNC: 40 MG/DL (ref 40–60)
HGB BLD-MCNC: 14 G/DL (ref 12–15.9)
KETONES UR QL: NEGATIVE
LDLC SERPL CALC-MCNC: 105 MG/DL (ref 0–100)
LDLC/HDLC SERPL: 2.62 {RATIO}
LEUKOCYTE EST, POC: ABNORMAL
MCH RBC QN AUTO: 26.8 PG (ref 26.6–33)
MCHC RBC AUTO-ENTMCNC: 32.4 G/DL (ref 31.5–35.7)
MCV RBC AUTO: 82.6 FL (ref 79–97)
NITRITE UR-MCNC: NEGATIVE MG/ML
PH UR: 7 [PH] (ref 5–8)
PLATELET # BLD AUTO: 359 10*3/MM3 (ref 140–450)
PMV BLD AUTO: 9.1 FL (ref 6–12)
POC CREATININE URINE: 50
POC MICROALBUMIN URINE: 10
POTASSIUM BLD-SCNC: 4.4 MMOL/L (ref 3.5–5.2)
PROT SERPL-MCNC: 8.3 G/DL (ref 6–8.5)
PROT UR STRIP-MCNC: NEGATIVE MG/DL
RBC # BLD AUTO: 5.23 10*6/MM3 (ref 3.77–5.28)
RBC # UR STRIP: NEGATIVE /UL
SODIUM BLD-SCNC: 137 MMOL/L (ref 136–145)
SP GR UR: 1 (ref 1–1.03)
TRIGL SERPL-MCNC: 137 MG/DL (ref 0–150)
TSH SERPL DL<=0.05 MIU/L-ACNC: 1.54 UIU/ML (ref 0.27–4.2)
UROBILINOGEN UR QL: NORMAL
VIT B12 BLD-MCNC: 258 PG/ML (ref 211–946)
VLDLC SERPL-MCNC: 27.4 MG/DL (ref 5–40)
WBC NRBC COR # BLD: 9 10*3/MM3 (ref 3.4–10.8)

## 2019-11-06 PROCEDURE — 82947 ASSAY GLUCOSE BLOOD QUANT: CPT | Performed by: INTERNAL MEDICINE

## 2019-11-06 PROCEDURE — 90471 IMMUNIZATION ADMIN: CPT | Performed by: INTERNAL MEDICINE

## 2019-11-06 PROCEDURE — 82570 ASSAY OF URINE CREATININE: CPT | Performed by: INTERNAL MEDICINE

## 2019-11-06 PROCEDURE — 81003 URINALYSIS AUTO W/O SCOPE: CPT | Performed by: INTERNAL MEDICINE

## 2019-11-06 PROCEDURE — 82607 VITAMIN B-12: CPT | Performed by: INTERNAL MEDICINE

## 2019-11-06 PROCEDURE — 80053 COMPREHEN METABOLIC PANEL: CPT | Performed by: INTERNAL MEDICINE

## 2019-11-06 PROCEDURE — 82306 VITAMIN D 25 HYDROXY: CPT | Performed by: INTERNAL MEDICINE

## 2019-11-06 PROCEDURE — 90686 IIV4 VACC NO PRSV 0.5 ML IM: CPT | Performed by: INTERNAL MEDICINE

## 2019-11-06 PROCEDURE — 83036 HEMOGLOBIN GLYCOSYLATED A1C: CPT | Performed by: INTERNAL MEDICINE

## 2019-11-06 PROCEDURE — 99213 OFFICE O/P EST LOW 20 MIN: CPT | Performed by: INTERNAL MEDICINE

## 2019-11-06 PROCEDURE — 99396 PREV VISIT EST AGE 40-64: CPT | Performed by: INTERNAL MEDICINE

## 2019-11-06 PROCEDURE — 80061 LIPID PANEL: CPT | Performed by: INTERNAL MEDICINE

## 2019-11-06 PROCEDURE — 82044 UR ALBUMIN SEMIQUANTITATIVE: CPT | Performed by: INTERNAL MEDICINE

## 2019-11-06 PROCEDURE — 90632 HEPA VACCINE ADULT IM: CPT | Performed by: INTERNAL MEDICINE

## 2019-11-06 PROCEDURE — 90472 IMMUNIZATION ADMIN EACH ADD: CPT | Performed by: INTERNAL MEDICINE

## 2019-11-06 PROCEDURE — 85027 COMPLETE CBC AUTOMATED: CPT | Performed by: INTERNAL MEDICINE

## 2019-11-06 PROCEDURE — 84443 ASSAY THYROID STIM HORMONE: CPT | Performed by: INTERNAL MEDICINE

## 2019-11-06 RX ORDER — METFORMIN HYDROCHLORIDE 750 MG/1
1500 TABLET, EXTENDED RELEASE ORAL DAILY
Qty: 180 TABLET | Refills: 1 | Status: SHIPPED | OUTPATIENT
Start: 2019-11-06 | End: 2020-05-27 | Stop reason: SDUPTHER

## 2019-11-06 RX ORDER — FUROSEMIDE 20 MG/1
20 TABLET ORAL DAILY PRN
Qty: 30 TABLET | Refills: 3 | Status: SHIPPED | OUTPATIENT
Start: 2019-11-06 | End: 2020-03-13

## 2019-11-06 RX ORDER — POTASSIUM CHLORIDE 750 MG/1
10 TABLET, FILM COATED, EXTENDED RELEASE ORAL DAILY PRN
Qty: 30 TABLET | Refills: 3 | Status: SHIPPED | OUTPATIENT
Start: 2019-11-06 | End: 2020-03-13

## 2019-11-06 RX ORDER — METFORMIN HYDROCHLORIDE 750 MG/1
750 TABLET, EXTENDED RELEASE ORAL DAILY
Qty: 90 TABLET | Refills: 1 | Status: SHIPPED | OUTPATIENT
Start: 2019-11-06 | End: 2019-11-06 | Stop reason: SDUPTHER

## 2019-11-06 RX ORDER — MONTELUKAST SODIUM 10 MG/1
10 TABLET ORAL DAILY
Qty: 90 TABLET | Refills: 1 | Status: SHIPPED | OUTPATIENT
Start: 2019-11-06 | End: 2020-05-27 | Stop reason: SDUPTHER

## 2019-11-06 RX ORDER — PRAVASTATIN SODIUM 10 MG
10 TABLET ORAL DAILY
Qty: 30 TABLET | Refills: 5 | Status: SHIPPED | OUTPATIENT
Start: 2019-11-06 | End: 2020-05-06

## 2019-11-06 RX ORDER — OLMESARTAN MEDOXOMIL AND HYDROCHLOROTHIAZIDE 40/12.5 40; 12.5 MG/1; MG/1
1 TABLET ORAL DAILY
Qty: 90 TABLET | Refills: 1 | Status: SHIPPED | OUTPATIENT
Start: 2019-11-06 | End: 2020-05-27 | Stop reason: SDUPTHER

## 2019-11-06 NOTE — PROGRESS NOTES
Chief Complaint   Patient presents with   • Annual Exam     will start prolia next month   • Diabetes       History of Present Illness  HM, Adult Female: The patient is being seen for a health maintenance and gynecology evaluation. The last health maintenance visit was 1 year(s) ago.   Social History: She is . Work status:  She has never smoked. She reports never drinking alcohol. Denies any illicit drug use.  General Health: The patient's health is described as good. She has regular dental visits. She denies vision problems. She denies hearing loss. Immunizations status: up to date.   Lifestyle:. She consumes a diverse and healthy diet. She does not have any weight concerns. She exercises regularly. She denies tobacco. She denies alcohol use. She denies drug use.   Reproductive health:. She reports normal menses.   Screening: Cancer screening reviewed and current.   Metabolic screening reviewed and current.   Risk screening reviewed and current.       On the arimidex , and is followed by Dr. Driscoll, and is getting regular mammograms.  Also started on Prolia by him.    Review of Systems   Constitutional: Negative.  Negative for chills and fever.   HENT: Negative for ear discharge, ear pain, sinus pressure and sore throat.    Respiratory: Negative for cough, chest tightness and shortness of breath.    Cardiovascular: Negative for chest pain, palpitations and leg swelling.   Gastrointestinal: Negative for diarrhea, nausea and vomiting.   Musculoskeletal: Positive for arthralgias. Negative for back pain and myalgias.   Neurological: Negative for dizziness, syncope and headaches.   Psychiatric/Behavioral: Negative for confusion and sleep disturbance.       Patient Active Problem List   Diagnosis   • Breast cancer of lower-outer quadrant of right female breast (CMS/HCC)   • Uncontrolled type 2 diabetes mellitus (CMS/HCC)   • Asthma   • Hypertension   • Obstructive sleep apnea syndrome   • Vitamin D deficiency   •  "Other hyperlipidemia   • Malignant neoplasm of lower-outer quadrant of right breast of female, estrogen receptor positive (CMS/HCC)   • Osteopenia   • Osteoporosis       Social History     Socioeconomic History   • Marital status:      Spouse name: Not on file   • Number of children: Not on file   • Years of education: Not on file   • Highest education level: Not on file   Tobacco Use   • Smoking status: Never Smoker   • Smokeless tobacco: Never Used   Substance and Sexual Activity   • Alcohol use: Yes     Comment: 2-3/week for 20 years   • Drug use: No   • Sexual activity: Defer       Current Outpatient Medications on File Prior to Visit   Medication Sig Dispense Refill   • anastrozole (ARIMIDEX) 1 MG tablet Take 1 tablet by mouth Daily. 90 tablet 3   • fexofenadine (ALLEGRA) 30 MG tablet Take 30 mg by mouth daily.     • Melatonin 10 MG capsule Take  by mouth.       Current Facility-Administered Medications on File Prior to Visit   Medication Dose Route Frequency Provider Last Rate Last Dose   • lidocaine (XYLOCAINE) 1 % injection 10 mL  10 mL Infiltration Once Beth Arevalo MD       • lidocaine-EPINEPHrine (XYLOCAINE W/EPI) 1 %-1:[id number] injection 20 mL  20 mL Infiltration Once Beth Arevalo MD           Allergies   Allergen Reactions   • Meperidine Nausea And Vomiting       /60   Pulse 60   Ht 149.9 cm (59\")   Wt 115 kg (252 lb 12.8 oz)   SpO2 98% Comment: ra  BMI 51.06 kg/m²            The following portions of the patient's history were reviewed and updated as appropriate: allergies, current medications, past family history, past medical history, past social history, past surgical history and problem list.    Physical Exam   Constitutional: She is oriented to person, place, and time. She appears well-developed and well-nourished.   HENT:   Head: Normocephalic and atraumatic.   Right Ear: External ear normal.   Left Ear: External ear normal.   Mouth/Throat: No oropharyngeal exudate. "   Eyes: Conjunctivae are normal. Pupils are equal, round, and reactive to light.   Neck: Neck supple. No thyromegaly present.   Cardiovascular: Normal rate and regular rhythm.   Pulmonary/Chest: Effort normal and breath sounds normal.   Abdominal: Soft. Bowel sounds are normal. She exhibits no distension. There is no tenderness.   Musculoskeletal: She exhibits no edema.   Neurological: She is alert and oriented to person, place, and time. No cranial nerve deficit.   Skin: Skin is warm and dry.   Psychiatric: She has a normal mood and affect. Judgment normal.   Nursing note and vitals reviewed.      Results for orders placed or performed in visit on 11/06/19   CBC (No Diff)   Result Value Ref Range    WBC 9.00 3.40 - 10.80 10*3/mm3    RBC 5.23 3.77 - 5.28 10*6/mm3    Hemoglobin 14.0 12.0 - 15.9 g/dL    Hematocrit 43.2 34.0 - 46.6 %    MCV 82.6 79.0 - 97.0 fL    MCH 26.8 26.6 - 33.0 pg    MCHC 32.4 31.5 - 35.7 g/dL    RDW 13.8 12.3 - 15.4 %    RDW-SD 40.8 37.0 - 54.0 fl    MPV 9.1 6.0 - 12.0 fL    Platelets 359 140 - 450 10*3/mm3   Comprehensive Metabolic Panel   Result Value Ref Range    Glucose 120 (H) 65 - 99 mg/dL    BUN 15 6 - 20 mg/dL    Creatinine 0.68 0.57 - 1.00 mg/dL    Sodium 137 136 - 145 mmol/L    Potassium 4.4 3.5 - 5.2 mmol/L    Chloride 96 (L) 98 - 107 mmol/L    CO2 29.7 (H) 22.0 - 29.0 mmol/L    Calcium 10.3 8.6 - 10.5 mg/dL    Total Protein 8.3 6.0 - 8.5 g/dL    Albumin 4.40 3.50 - 5.20 g/dL    ALT (SGPT) 31 1 - 33 U/L    AST (SGOT) 19 1 - 32 U/L    Alkaline Phosphatase 96 39 - 117 U/L    Total Bilirubin 0.4 0.2 - 1.2 mg/dL    eGFR Non African Amer 89 >60 mL/min/1.73    Globulin 3.9 gm/dL    A/G Ratio 1.1 g/dL    BUN/Creatinine Ratio 22.1 7.0 - 25.0    Anion Gap 11.3 5.0 - 15.0 mmol/L   Lipid Panel   Result Value Ref Range    Total Cholesterol 172 0 - 200 mg/dL    Triglycerides 137 0 - 150 mg/dL    HDL Cholesterol 40 40 - 60 mg/dL    LDL Cholesterol  105 (H) 0 - 100 mg/dL    VLDL Cholesterol 27.4 5 -  40 mg/dL    LDL/HDL Ratio 2.62    TSH   Result Value Ref Range    TSH 1.540 0.270 - 4.200 uIU/mL   Vitamin D 25 Hydroxy   Result Value Ref Range    25 Hydroxy, Vitamin D 70.2 30.0 - 100.0 ng/ml   Vitamin B12   Result Value Ref Range    Vitamin B-12 258 211 - 946 pg/mL   POCT Glucose   Result Value Ref Range    Glucose 131 (A) 70 - 130 mg/dL   POC Glycosylated Hemoglobin (Hb A1C)   Result Value Ref Range    Hemoglobin A1C 7.7 %   POCT urinalysis dipstick, automated   Result Value Ref Range    Color Straw Yellow, Straw, Dark Yellow, Venus    Clarity, UA Clear Clear    Specific Gravity  1.005 1.005 - 1.030    pH, Urine 7.0 5.0 - 8.0    Leukocytes 75 Lu/ul (A) Negative    Nitrite, UA Negative Negative    Protein, POC Negative Negative mg/dL    Glucose, UA Negative Negative, 1000 mg/dL (3+) mg/dL    Ketones, UA Negative Negative    Urobilinogen, UA Normal Normal    Bilirubin Negative Negative    Blood, UA Negative Negative   POCT microalbumin   Result Value Ref Range    Microalbumin, Urine 10     Creatinine, Urine 50        Dana was seen today for annual exam and diabetes.    Diagnoses and all orders for this visit:    Routine general medical examination at a health care facility  -     POCT urinalysis dipstick, automated  -     Cancel: CBC (No Diff)  -     Cancel: Comprehensive Metabolic Panel  -     Cancel: Lipid Panel  -     Cancel: TSH  -     Cancel: Vitamin D 25 Hydroxy  -     CBC (No Diff)  -     Comprehensive Metabolic Panel  -     Lipid Panel  -     TSH  -     Vitamin D 25 Hydroxy  -     Vitamin B12    Uncomplicated asthma  -     mometasone-formoterol (DULERA) 100-5 MCG/ACT inhaler; Inhale 2 puffs Daily.    Uncontrolled type 2 diabetes mellitus with hyperglycemia (CMS/[facility])  Comments:  higher, increase metformin to 1500.  Orders:  -     Discontinue: metFORMIN ER (GLUCOPHAGE-XR) 750 MG 24 hr tablet; Take 1 tablet by mouth Daily.  -     POCT Glucose  -     POC Glycosylated Hemoglobin (Hb A1C)  -     POCT  microalbumin  -     metFORMIN ER (GLUCOPHAGE-XR) 750 MG 24 hr tablet; Take 2 tablets by mouth Daily.  -     Vitamin B12    Essential hypertension  -     olmesartan-hydrochlorothiazide (BENICAR HCT) 40-12.5 MG per tablet; Take 1 tablet by mouth Daily.    Other hyperlipidemia  -     pravastatin (PRAVACHOL) 10 MG tablet; Take 1 tablet by mouth Daily.    Pedal edema  -     furosemide (LASIX) 20 MG tablet; Take 1 tablet by mouth Daily As Needed (edema).  -     potassium chloride (KLOR-CON) 10 MEQ CR tablet; Take 1 tablet by mouth Daily As Needed (with lasix).    Other orders  -     montelukast (SINGULAIR) 10 MG tablet; Take 1 tablet by mouth Daily.  -     Flucelvax Quad=>4Years (8450-1280)  -     Hepatitis A Vaccine Adult IM          Health Maintenance   Topic Date Due   • COLONOSCOPY  12/09/2018   • ZOSTER VACCINE (2 of 3) 11/14/2020 (Originally 1/18/2016)   • DIABETIC EYE EXAM  03/10/2020   • HEMOGLOBIN A1C  05/06/2020   • DXA SCAN  06/08/2020   • MAMMOGRAM  10/26/2020   • DIABETIC FOOT EXAM  11/06/2020   • LIPID PANEL  11/06/2020   • URINE MICROALBUMIN  11/06/2020   • ANNUAL PHYSICAL  11/07/2020   • PAP SMEAR  06/26/2021   • TDAP/TD VACCINES (2 - Td) 02/13/2024   • PNEUMOCOCCAL VACCINE (19-64 MEDIUM RISK)  Completed   • HEPATITIS C SCREENING  Completed   • INFLUENZA VACCINE  Completed       Discussion/Summary  Impression: health maintenance visit. Currently, she eats an adequate diet and has an adequate exercise regimen.   Cervical cancer screening:Pap smear is current.   Breast cancer screening: mammogram is current.   Colorectal cancer screening: colonoscopy is current.  Osteoporosis screening: Bone mineral density test is utd .   Screening lab work includes hemoglobin, glucose, lipid profile, thyroid function testing, 25-hydroxyvitamin D and urinalysis.   Immunizations are needed, immunizations will be given as outlined in the orders   Advice and education were given regarding cardiovascular risk reduction, healthy  dietary habits, Seatbelt and helmet use and self skin examination.     Return in about 6 months (around 5/6/2020) for Next scheduled follow up.

## 2019-12-05 ENCOUNTER — HOSPITAL ENCOUNTER (OUTPATIENT)
Dept: MAMMOGRAPHY | Facility: HOSPITAL | Age: 59
Discharge: HOME | End: 2019-12-05
Admitting: INTERNAL MEDICINE

## 2019-12-05 DIAGNOSIS — Z17.0 MALIGNANT NEOPLASM OF LOWER-OUTER QUADRANT OF RIGHT BREAST OF FEMALE, ESTROGEN RECEPTOR POSITIVE (HCC): ICD-10-CM

## 2019-12-05 DIAGNOSIS — C50.511 MALIGNANT NEOPLASM OF LOWER-OUTER QUADRANT OF RIGHT BREAST OF FEMALE, ESTROGEN RECEPTOR POSITIVE (HCC): ICD-10-CM

## 2019-12-05 PROCEDURE — 77063 BREAST TOMOSYNTHESIS BI: CPT | Performed by: RADIOLOGY

## 2019-12-05 PROCEDURE — 77067 SCR MAMMO BI INCL CAD: CPT | Performed by: RADIOLOGY

## 2019-12-05 PROCEDURE — 77067 SCR MAMMO BI INCL CAD: CPT

## 2019-12-05 PROCEDURE — 77063 BREAST TOMOSYNTHESIS BI: CPT

## 2019-12-11 ENCOUNTER — HOSPITAL ENCOUNTER (OUTPATIENT)
Dept: MAMMOGRAPHY | Facility: HOSPITAL | Age: 59
Discharge: HOME | End: 2019-12-11

## 2019-12-11 DIAGNOSIS — Z12.31 VISIT FOR SCREENING MAMMOGRAM: ICD-10-CM

## 2019-12-26 ENCOUNTER — HOSPITAL ENCOUNTER (EMERGENCY)
Facility: HOSPITAL | Age: 59
Discharge: HOME | End: 2019-12-26
Attending: EMERGENCY MEDICINE | Admitting: EMERGENCY MEDICINE

## 2019-12-26 ENCOUNTER — APPOINTMENT (OUTPATIENT)
Dept: GENERAL RADIOLOGY | Facility: HOSPITAL | Age: 59
End: 2019-12-26

## 2019-12-26 VITALS
HEART RATE: 82 BPM | DIASTOLIC BLOOD PRESSURE: 49 MMHG | OXYGEN SATURATION: 93 % | BODY MASS INDEX: 50.4 KG/M2 | HEIGHT: 59 IN | WEIGHT: 250 LBS | SYSTOLIC BLOOD PRESSURE: 104 MMHG | RESPIRATION RATE: 18 BRPM | TEMPERATURE: 98.9 F

## 2019-12-26 DIAGNOSIS — J20.9 BRONCHITIS WITH BRONCHOSPASM: Primary | ICD-10-CM

## 2019-12-26 LAB
ALBUMIN SERPL-MCNC: 3.8 G/DL (ref 3.5–5.2)
ALBUMIN/GLOB SERPL: 1.2 G/DL
ALP SERPL-CCNC: 86 U/L (ref 39–117)
ALT SERPL W P-5'-P-CCNC: 29 U/L (ref 1–33)
ANION GAP SERPL CALCULATED.3IONS-SCNC: 13 MMOL/L (ref 5–15)
AST SERPL-CCNC: 26 U/L (ref 1–32)
BASOPHILS # BLD AUTO: 0.03 10*3/MM3 (ref 0–0.2)
BASOPHILS NFR BLD AUTO: 0.4 % (ref 0–1.5)
BILIRUB SERPL-MCNC: 0.4 MG/DL (ref 0.2–1.2)
BUN BLD-MCNC: 11 MG/DL (ref 6–20)
BUN/CREAT SERPL: 18.6 (ref 7–25)
CALCIUM SPEC-SCNC: 9.2 MG/DL (ref 8.6–10.5)
CHLORIDE SERPL-SCNC: 97 MMOL/L (ref 98–107)
CO2 SERPL-SCNC: 27 MMOL/L (ref 22–29)
CREAT BLD-MCNC: 0.59 MG/DL (ref 0.57–1)
DEPRECATED RDW RBC AUTO: 41.8 FL (ref 37–54)
EOSINOPHIL # BLD AUTO: 0.24 10*3/MM3 (ref 0–0.4)
EOSINOPHIL NFR BLD AUTO: 3.5 % (ref 0.3–6.2)
ERYTHROCYTE [DISTWIDTH] IN BLOOD BY AUTOMATED COUNT: 13.4 % (ref 12.3–15.4)
GFR SERPL CREATININE-BSD FRML MDRD: 104 ML/MIN/1.73
GLOBULIN UR ELPH-MCNC: 3.3 GM/DL
GLUCOSE BLD-MCNC: 149 MG/DL (ref 65–99)
HCT VFR BLD AUTO: 40.3 % (ref 34–46.6)
HGB BLD-MCNC: 13 G/DL (ref 12–15.9)
HOLD SPECIMEN: NORMAL
HOLD SPECIMEN: NORMAL
IMM GRANULOCYTES # BLD AUTO: 0.03 10*3/MM3 (ref 0–0.05)
IMM GRANULOCYTES NFR BLD AUTO: 0.4 % (ref 0–0.5)
LYMPHOCYTES # BLD AUTO: 1.24 10*3/MM3 (ref 0.7–3.1)
LYMPHOCYTES NFR BLD AUTO: 18.3 % (ref 19.6–45.3)
MCH RBC QN AUTO: 27.7 PG (ref 26.6–33)
MCHC RBC AUTO-ENTMCNC: 32.3 G/DL (ref 31.5–35.7)
MCV RBC AUTO: 85.9 FL (ref 79–97)
MONOCYTES # BLD AUTO: 0.57 10*3/MM3 (ref 0.1–0.9)
MONOCYTES NFR BLD AUTO: 8.4 % (ref 5–12)
NEUTROPHILS # BLD AUTO: 4.68 10*3/MM3 (ref 1.7–7)
NEUTROPHILS NFR BLD AUTO: 69 % (ref 42.7–76)
NRBC BLD AUTO-RTO: 0 /100 WBC (ref 0–0.2)
NT-PROBNP SERPL-MCNC: 24.6 PG/ML (ref 5–900)
PLATELET # BLD AUTO: 250 10*3/MM3 (ref 140–450)
PMV BLD AUTO: 8.5 FL (ref 6–12)
POTASSIUM BLD-SCNC: 3.9 MMOL/L (ref 3.5–5.2)
PROT SERPL-MCNC: 7.1 G/DL (ref 6–8.5)
RBC # BLD AUTO: 4.69 10*6/MM3 (ref 3.77–5.28)
SODIUM BLD-SCNC: 137 MMOL/L (ref 136–145)
TROPONIN T SERPL-MCNC: <0.01 NG/ML (ref 0–0.03)
WBC NRBC COR # BLD: 6.79 10*3/MM3 (ref 3.4–10.8)
WHOLE BLOOD HOLD SPECIMEN: NORMAL
WHOLE BLOOD HOLD SPECIMEN: NORMAL

## 2019-12-26 PROCEDURE — 94799 UNLISTED PULMONARY SVC/PX: CPT

## 2019-12-26 PROCEDURE — 93005 ELECTROCARDIOGRAM TRACING: CPT | Performed by: EMERGENCY MEDICINE

## 2019-12-26 PROCEDURE — 85025 COMPLETE CBC W/AUTO DIFF WBC: CPT | Performed by: EMERGENCY MEDICINE

## 2019-12-26 PROCEDURE — 84484 ASSAY OF TROPONIN QUANT: CPT | Performed by: EMERGENCY MEDICINE

## 2019-12-26 PROCEDURE — 80053 COMPREHEN METABOLIC PANEL: CPT | Performed by: EMERGENCY MEDICINE

## 2019-12-26 PROCEDURE — 71045 X-RAY EXAM CHEST 1 VIEW: CPT

## 2019-12-26 PROCEDURE — 99284 EMERGENCY DEPT VISIT MOD MDM: CPT

## 2019-12-26 PROCEDURE — 94640 AIRWAY INHALATION TREATMENT: CPT

## 2019-12-26 PROCEDURE — 83880 ASSAY OF NATRIURETIC PEPTIDE: CPT | Performed by: EMERGENCY MEDICINE

## 2019-12-26 RX ORDER — ALBUTEROL SULFATE 2.5 MG/3ML
2.5 SOLUTION RESPIRATORY (INHALATION) ONCE
Status: COMPLETED | OUTPATIENT
Start: 2019-12-26 | End: 2019-12-26

## 2019-12-26 RX ORDER — IPRATROPIUM BROMIDE AND ALBUTEROL SULFATE 2.5; .5 MG/3ML; MG/3ML
3 SOLUTION RESPIRATORY (INHALATION) ONCE
Status: COMPLETED | OUTPATIENT
Start: 2019-12-26 | End: 2019-12-26

## 2019-12-26 RX ORDER — ALBUTEROL SULFATE 90 UG/1
2 AEROSOL, METERED RESPIRATORY (INHALATION) EVERY 4 HOURS PRN
Qty: 1 INHALER | Refills: 0 | Status: SHIPPED | OUTPATIENT
Start: 2019-12-26 | End: 2020-05-27 | Stop reason: SDUPTHER

## 2019-12-26 RX ORDER — SODIUM CHLORIDE 0.9 % (FLUSH) 0.9 %
10 SYRINGE (ML) INJECTION AS NEEDED
Status: DISCONTINUED | OUTPATIENT
Start: 2019-12-26 | End: 2019-12-26 | Stop reason: HOSPADM

## 2019-12-26 RX ADMIN — IPRATROPIUM BROMIDE AND ALBUTEROL SULFATE 3 ML: 2.5; .5 SOLUTION RESPIRATORY (INHALATION) at 10:50

## 2019-12-26 RX ADMIN — ALBUTEROL SULFATE 2.5 MG: 2.5 SOLUTION RESPIRATORY (INHALATION) at 11:52

## 2020-01-22 ENCOUNTER — HOSPITAL ENCOUNTER (OUTPATIENT)
Dept: ONCOLOGY | Facility: HOSPITAL | Age: 60
Setting detail: INFUSION SERIES
Discharge: HOME | End: 2020-01-22

## 2020-01-22 ENCOUNTER — LAB (OUTPATIENT)
Dept: LAB | Facility: HOSPITAL | Age: 60
End: 2020-01-22

## 2020-01-22 ENCOUNTER — OFFICE VISIT (OUTPATIENT)
Dept: ONCOLOGY | Facility: CLINIC | Age: 60
End: 2020-01-22

## 2020-01-22 VITALS
HEIGHT: 59 IN | OXYGEN SATURATION: 95 % | RESPIRATION RATE: 16 BRPM | SYSTOLIC BLOOD PRESSURE: 134 MMHG | TEMPERATURE: 98.3 F | WEIGHT: 250 LBS | DIASTOLIC BLOOD PRESSURE: 67 MMHG | HEART RATE: 70 BPM | BODY MASS INDEX: 50.4 KG/M2

## 2020-01-22 DIAGNOSIS — Z17.0 MALIGNANT NEOPLASM OF LOWER-OUTER QUADRANT OF RIGHT BREAST OF FEMALE, ESTROGEN RECEPTOR POSITIVE (HCC): Primary | ICD-10-CM

## 2020-01-22 DIAGNOSIS — C50.511 MALIGNANT NEOPLASM OF LOWER-OUTER QUADRANT OF RIGHT BREAST OF FEMALE, ESTROGEN RECEPTOR POSITIVE (HCC): ICD-10-CM

## 2020-01-22 DIAGNOSIS — M81.0 OSTEOPOROSIS, UNSPECIFIED OSTEOPOROSIS TYPE, UNSPECIFIED PATHOLOGICAL FRACTURE PRESENCE: ICD-10-CM

## 2020-01-22 DIAGNOSIS — C50.511 MALIGNANT NEOPLASM OF LOWER-OUTER QUADRANT OF RIGHT BREAST OF FEMALE, ESTROGEN RECEPTOR POSITIVE (HCC): Primary | ICD-10-CM

## 2020-01-22 DIAGNOSIS — Z17.0 MALIGNANT NEOPLASM OF LOWER-OUTER QUADRANT OF RIGHT BREAST OF FEMALE, ESTROGEN RECEPTOR POSITIVE (HCC): ICD-10-CM

## 2020-01-22 LAB
ALBUMIN SERPL-MCNC: 4.2 G/DL (ref 3.5–5.2)
ALBUMIN/GLOB SERPL: 1.2 G/DL
ALP SERPL-CCNC: 98 U/L (ref 39–117)
ALT SERPL W P-5'-P-CCNC: 25 U/L (ref 1–33)
ANION GAP SERPL CALCULATED.3IONS-SCNC: 12 MMOL/L (ref 5–15)
AST SERPL-CCNC: 18 U/L (ref 1–32)
BILIRUB SERPL-MCNC: 0.4 MG/DL (ref 0.2–1.2)
BUN BLD-MCNC: 15 MG/DL (ref 6–20)
BUN/CREAT SERPL: 22.7 (ref 7–25)
CALCIUM SPEC-SCNC: 10.1 MG/DL (ref 8.6–10.5)
CHLORIDE SERPL-SCNC: 96 MMOL/L (ref 98–107)
CO2 SERPL-SCNC: 29 MMOL/L (ref 22–29)
CREAT BLD-MCNC: 0.66 MG/DL (ref 0.57–1)
ERYTHROCYTE [DISTWIDTH] IN BLOOD BY AUTOMATED COUNT: 14.9 % (ref 12.3–15.4)
GFR SERPL CREATININE-BSD FRML MDRD: 92 ML/MIN/1.73
GLOBULIN UR ELPH-MCNC: 3.6 GM/DL
GLUCOSE BLD-MCNC: 192 MG/DL (ref 65–99)
HCT VFR BLD AUTO: 41 % (ref 34–46.6)
HGB BLD-MCNC: 13.2 G/DL (ref 12–15.9)
LYMPHOCYTES # BLD AUTO: 1.5 10*3/MM3 (ref 0.7–3.1)
LYMPHOCYTES NFR BLD AUTO: 19 % (ref 19.6–45.3)
MAGNESIUM SERPL-MCNC: 1.9 MG/DL (ref 1.6–2.6)
MCH RBC QN AUTO: 27.9 PG (ref 26.6–33)
MCHC RBC AUTO-ENTMCNC: 32.2 G/DL (ref 31.5–35.7)
MCV RBC AUTO: 86.7 FL (ref 79–97)
MONOCYTES # BLD AUTO: 0.1 10*3/MM3 (ref 0.1–0.9)
MONOCYTES NFR BLD AUTO: 1.4 % (ref 5–12)
NEUTROPHILS # BLD AUTO: 6.4 10*3/MM3 (ref 1.7–7)
NEUTROPHILS NFR BLD AUTO: 79.6 % (ref 42.7–76)
PHOSPHATE SERPL-MCNC: 3.5 MG/DL (ref 2.5–4.5)
PLATELET # BLD AUTO: 331 10*3/MM3 (ref 140–450)
PMV BLD AUTO: 6.1 FL (ref 6–12)
POTASSIUM BLD-SCNC: 4.3 MMOL/L (ref 3.5–5.2)
PROT SERPL-MCNC: 7.8 G/DL (ref 6–8.5)
RBC # BLD AUTO: 4.73 10*6/MM3 (ref 3.77–5.28)
SODIUM BLD-SCNC: 137 MMOL/L (ref 136–145)
WBC NRBC COR # BLD: 8 10*3/MM3 (ref 3.4–10.8)

## 2020-01-22 PROCEDURE — 36415 COLL VENOUS BLD VENIPUNCTURE: CPT

## 2020-01-22 PROCEDURE — 80053 COMPREHEN METABOLIC PANEL: CPT

## 2020-01-22 PROCEDURE — 85025 COMPLETE CBC W/AUTO DIFF WBC: CPT

## 2020-01-22 PROCEDURE — 83735 ASSAY OF MAGNESIUM: CPT

## 2020-01-22 PROCEDURE — 99213 OFFICE O/P EST LOW 20 MIN: CPT | Performed by: NURSE PRACTITIONER

## 2020-01-22 PROCEDURE — 84100 ASSAY OF PHOSPHORUS: CPT

## 2020-01-22 PROCEDURE — 96372 THER/PROPH/DIAG INJ SC/IM: CPT

## 2020-01-22 PROCEDURE — 25010000002 DENOSUMAB 60 MG/ML SOLUTION PREFILLED SYRINGE: Performed by: INTERNAL MEDICINE

## 2020-01-22 RX ADMIN — DENOSUMAB 60 MG: 60 INJECTION SUBCUTANEOUS at 12:38

## 2020-01-22 NOTE — PROGRESS NOTES
DATE OF VISIT: 1/22/2020    REASON FOR VISIT: Follow-up for invasive ductal carcinoma of the right breast,  [id number], estrogen receptor positive, HER-2/greg negative, low risk disease based  on Oncotype.      HISTORY OF PRESENT ILLNESS: The patient is a very pleasant 59 y.o. female  who was in her usual state of health until her most recent mammogram done  03/2016. She had an abnormality in the right breast 10 o'clock position. The  patient had an ultrasound guided biopsy 03/10/2016 that revealed invasive  ductal carcinoma. The patient elected to proceed with lumpectomy on 05/03/2016  with Dr. Sales. Final pathology revealed 1.4 cm mass, tumor at the 7  o'clock position, ER positive more than 95%, DE positive more than 85%,  HER-2/greg 0+. The patient had clear surgical margins, Kimmie score 8 out of 9,  and 1 negative sentinel lymph node. The patient had no postoperative  complications. The patient had Oncotype testing. Her score came back at 16,  low risk disease group. Risk of recurrence 10% with tamoxifen alone. The  patient was started on Arimidex 1 mg p.o. daily 06/03/2016. The patient is  here today in scheduled follow-up visit.      SUBJECTIVE: The patient is here today by herself.  She has been compliant with her treatment. She is tolerating anastrozole well.      REVIEW OF SYSTEMS: All the other 9 systems are reviewed by me and are negative  except what is mentioned in HPI.      PAST MEDICAL HISTORY/SOCIAL HISTORY/FAMILY HISTORY: Unchanged from prior  documentation done 05/16/2016.      Current Outpatient Medications:   •  albuterol sulfate  (90 Base) MCG/ACT inhaler, Inhale 2 puffs Every 4 (Four) Hours As Needed for Wheezing., Disp: 1 inhaler, Rfl: 0  •  anastrozole (ARIMIDEX) 1 MG tablet, Take 1 tablet by mouth Daily., Disp: 90 tablet, Rfl: 3  •  fexofenadine (ALLEGRA) 30 MG tablet, Take 30 mg by mouth daily., Disp: , Rfl:   •  furosemide (LASIX) 20 MG tablet, Take 1 tablet by mouth Daily As  "Needed (edema)., Disp: 30 tablet, Rfl: 3  •  Melatonin 10 MG capsule, Take  by mouth., Disp: , Rfl:   •  metFORMIN ER (GLUCOPHAGE-XR) 750 MG 24 hr tablet, Take 2 tablets by mouth Daily., Disp: 180 tablet, Rfl: 1  •  mometasone-formoterol (DULERA) 100-5 MCG/ACT inhaler, Inhale 2 puffs Daily., Disp: 13 g, Rfl: 5  •  montelukast (SINGULAIR) 10 MG tablet, Take 1 tablet by mouth Daily., Disp: 90 tablet, Rfl: 1  •  olmesartan-hydrochlorothiazide (BENICAR HCT) 40-12.5 MG per tablet, Take 1 tablet by mouth Daily., Disp: 90 tablet, Rfl: 1  •  potassium chloride (KLOR-CON) 10 MEQ CR tablet, Take 1 tablet by mouth Daily As Needed (with lasix)., Disp: 30 tablet, Rfl: 3  •  pravastatin (PRAVACHOL) 10 MG tablet, Take 1 tablet by mouth Daily., Disp: 30 tablet, Rfl: 5    PHYSICAL EXAMINATION:   /67   Pulse 70   Temp 98.3 °F (36.8 °C) (Oral)   Resp 16   Ht 149.9 cm (59.02\")   Wt 113 kg (250 lb)   SpO2 95%   BMI 50.47 kg/m²   ECOG1  GENERAL: Age appropriate. No acute distress.   HEENT: Head atraumatic, normocephalic.   NECK: Supple. No JVD. No lymphadenopathy.   LUNGS: Clear to auscultation bilaterally. No wheezing. No rhonchi.   HEART: Regular rate and rhythm. S1, S2, no murmurs.   ABDOMEN: Soft, nontender, nondistended. Bowel sounds positive. No  hepatosplenomegaly.   EXTREMITIES: No clubbing, cyanosis, or edema.   SKIN: No rashes. No purpura.   NEUROLOGIC: Awake and oriented x3. Strength 5 out of 5 in all muscle groups.     Lab on 01/22/2020   Component Date Value Ref Range Status   • WBC 01/22/2020 8.00  3.40 - 10.80 10*3/mm3 Final   • RBC 01/22/2020 4.73  3.77 - 5.28 10*6/mm3 Final   • Hemoglobin 01/22/2020 13.2  12.0 - 15.9 g/dL Final   • Hematocrit 01/22/2020 41.0  34.0 - 46.6 % Final   • RDW 01/22/2020 14.9  12.3 - 15.4 % Final   • MCV 01/22/2020 86.7  79.0 - 97.0 fL Final   • MCH 01/22/2020 27.9  26.6 - 33.0 pg Final   • MCHC 01/22/2020 32.2  31.5 - 35.7 g/dL Final   • MPV 01/22/2020 6.1  6.0 - 12.0 fL Final   • " "Platelets 01/22/2020 331  140 - 450 10*3/mm3 Final   • Neutrophil % 01/22/2020 79.6* 42.7 - 76.0 % Final   • Lymphocyte % 01/22/2020 19.0* 19.6 - 45.3 % Final   • Monocyte % 01/22/2020 1.4* 5.0 - 12.0 % Final   • Neutrophils, Absolute 01/22/2020 6.40  1.70 - 7.00 10*3/mm3 Final   • Lymphocytes, Absolute 01/22/2020 1.50  0.70 - 3.10 10*3/mm3 Final   • Monocytes, Absolute 01/22/2020 0.10  0.10 - 0.90 10*3/mm3 Final      DUAL-ENERGY X-RAY ABSORPTIOMETRY (DXA)      INDICATION- 55-year-old female patient recently diagnosed with breast  cancer.      COMPARISON- None.       PROCEDURE- A DXA scan was performed using a Hologic densitometer.      The lumbar spine was evaluated as well as the left and right total  hip.       The T-score compares the patient's bone mineral density with the peak  bone mass of young normal patients. According to criteria established  by the World Health Organization, patients with T-scores between 1.0  and 2.5 standard deviations BELOW the mean are osteopenic (low bone  mass). Patients with T-scores EQUAL TO OR GREATER than 2.5 standard  deviations below the mean are osteoporotic.      The Z-score compares the patient bone mineral density with age and sex  matched peers. According to the International Society for Clinical  Densitometry's 2007 consensus conference- In women prior to menopause  and men less than age 50, Z-scores, not T-scores are preferred. A  Z-score of -2.0 or lower is defined as \"below the expected range for  age\" and a Z-score above -2.0 is \"within the expected range for age.\"   The WHO diagnostic criteria may be applied in women in the menopausal  transition. Osteoporosis cannot be diagnosed in men under age 50 on the  basis of BMD alone.      TECHNICAL QUALITY- The study is of good technical quality.      RESULTS-      Lumbar Spine- The BMD measured in the L1-L4 region is 1.041 g/cm2. The  average T-score is -0.1. The Z-score is 1.1.      Total Hip- The BMD measured at the " left total proximal femur is 0.946  g/cm2. The T-score is 0.0. The Z-score is 0.7.      Femoral Neck- The BMD measured at the left femoral neck is 0.648 g/cm2.  The T-score is -1.8. The Z-score is -0.7.      Total Hip- The BMD measured at the right total proximal femur is 0.929  g/cm2. The T-score is -0.1. The Z-score is 0.6.      Femoral Neck- The BMD measured at the right femoral neck is 0.708  g/cm2. The T-score is -1.3. The Z-score is -0.2.      IMPRESSION- The patient is considered osteopenic according to World  Health Organization criteria.    ASSESSMENT: The patient is a very pleasant 59 y.o. female with stage I  invasive ductal carcinoma of the right breast.      PROBLEM LIST:   1. Stage I invasive ductal carcinoma of the right breast, [id number]:  A. Tumor at the 7 o'clock position, tumor size 1.4 cm, estrogen receptor and progesterone  receptor strongly positive, HER-2/greg negative zero by IHC. Low risk group Oncotype testing, score of 16, 10% risk of recurrence with hormone treatment alone.  B. Diagnosed on ultrasound guided biopsy 03/10/2016.   C. Status post lumpectomy with clear margins done by Dr. Sales 05/03/2016.   D. Started Arimidex 1 mg p.o. daily 06/03/2016.   E. Completed breast adjuvant radiation by Dr. Thompson.   2.  Osteoporosis:  A.  DEXA scan done June 2018 revealed T score -2.3  B. Starting Prolia 1/22/2020.   3. Right breast mammogram abnormality  4.  Obesity     PLAN:   1. Reviewed lab results with the patient. I reassured her her blood counts are normal.    2. The patient will continue Arimidex 1 mg p.o. daily.    3.  The patient will be started on Prolia 60 mg subcu every 6-month starting today.  She has evidence of osteoporosis in her bone density.  She is also on concurrent antiestrogen treatment with Arimidex.  We will continue calcium vitamin D daily.  We will repeat her bone density June 2020. Order placed today.   4. I reviewed the mammogram results from 12/11/2019. I reassured  her there were benign findings with no evidence of cancer recurrence. She will need one year follow up mammogram which will be due 12/12/2020.    5. We will see the patient back in 6 months with repeat labs including CBC and CMP.   6. I encouraged her to continue efforts towards increased exercise and weight loss.       Poonam Kimble, APRN    1/22/2020

## 2020-03-13 DIAGNOSIS — R60.0 PEDAL EDEMA: ICD-10-CM

## 2020-03-13 RX ORDER — FUROSEMIDE 20 MG/1
TABLET ORAL
Qty: 30 TABLET | Refills: 3 | Status: SHIPPED | OUTPATIENT
Start: 2020-03-13 | End: 2020-05-27 | Stop reason: SDUPTHER

## 2020-03-13 RX ORDER — POTASSIUM CHLORIDE 750 MG/1
TABLET, FILM COATED, EXTENDED RELEASE ORAL
Qty: 30 TABLET | Refills: 3 | Status: SHIPPED | OUTPATIENT
Start: 2020-03-13 | End: 2020-05-27 | Stop reason: SDUPTHER

## 2020-05-06 DIAGNOSIS — E78.49 OTHER HYPERLIPIDEMIA: ICD-10-CM

## 2020-05-06 RX ORDER — PRAVASTATIN SODIUM 10 MG
10 TABLET ORAL DAILY
Qty: 90 TABLET | Refills: 0 | Status: SHIPPED | OUTPATIENT
Start: 2020-05-06 | End: 2020-05-27 | Stop reason: SDUPTHER

## 2020-05-06 RX ORDER — PRAVASTATIN SODIUM 10 MG
10 TABLET ORAL DAILY
Qty: 30 TABLET | Refills: 1 | Status: SHIPPED | OUTPATIENT
Start: 2020-05-06 | End: 2020-05-06

## 2020-05-27 ENCOUNTER — APPOINTMENT (OUTPATIENT)
Dept: LAB | Facility: HOSPITAL | Age: 60
End: 2020-05-27

## 2020-05-27 ENCOUNTER — OFFICE VISIT (OUTPATIENT)
Dept: INTERNAL MEDICINE | Facility: CLINIC | Age: 60
End: 2020-05-27

## 2020-05-27 VITALS
BODY MASS INDEX: 50.48 KG/M2 | SYSTOLIC BLOOD PRESSURE: 118 MMHG | WEIGHT: 250.4 LBS | DIASTOLIC BLOOD PRESSURE: 60 MMHG | HEIGHT: 59 IN | OXYGEN SATURATION: 96 % | HEART RATE: 65 BPM

## 2020-05-27 DIAGNOSIS — E78.49 OTHER HYPERLIPIDEMIA: ICD-10-CM

## 2020-05-27 DIAGNOSIS — R60.0 PEDAL EDEMA: ICD-10-CM

## 2020-05-27 DIAGNOSIS — I10 ESSENTIAL HYPERTENSION: ICD-10-CM

## 2020-05-27 DIAGNOSIS — E11.65 UNCONTROLLED TYPE 2 DIABETES MELLITUS WITH HYPERGLYCEMIA (HCC): Primary | ICD-10-CM

## 2020-05-27 DIAGNOSIS — J30.89 NON-SEASONAL ALLERGIC RHINITIS DUE TO OTHER ALLERGIC TRIGGER: ICD-10-CM

## 2020-05-27 LAB — HBA1C MFR BLD: 8 %

## 2020-05-27 PROCEDURE — 99214 OFFICE O/P EST MOD 30 MIN: CPT | Performed by: INTERNAL MEDICINE

## 2020-05-27 PROCEDURE — 80053 COMPREHEN METABOLIC PANEL: CPT | Performed by: INTERNAL MEDICINE

## 2020-05-27 PROCEDURE — 83036 HEMOGLOBIN GLYCOSYLATED A1C: CPT | Performed by: INTERNAL MEDICINE

## 2020-05-27 PROCEDURE — 80061 LIPID PANEL: CPT | Performed by: INTERNAL MEDICINE

## 2020-05-27 RX ORDER — FUROSEMIDE 20 MG/1
20 TABLET ORAL DAILY PRN
Qty: 30 TABLET | Refills: 3 | Status: SHIPPED | OUTPATIENT
Start: 2020-05-27 | End: 2020-09-18

## 2020-05-27 RX ORDER — PRAVASTATIN SODIUM 10 MG
10 TABLET ORAL DAILY
Qty: 90 TABLET | Refills: 1 | Status: SHIPPED | OUTPATIENT
Start: 2020-05-27 | End: 2020-11-10 | Stop reason: SDUPTHER

## 2020-05-27 RX ORDER — MONTELUKAST SODIUM 10 MG/1
10 TABLET ORAL DAILY
Qty: 90 TABLET | Refills: 1 | Status: SHIPPED | OUTPATIENT
Start: 2020-05-27 | End: 2020-11-10 | Stop reason: SDUPTHER

## 2020-05-27 RX ORDER — METFORMIN HYDROCHLORIDE 750 MG/1
1500 TABLET, EXTENDED RELEASE ORAL DAILY
Qty: 180 TABLET | Refills: 1 | Status: SHIPPED | OUTPATIENT
Start: 2020-05-27 | End: 2020-11-10 | Stop reason: SDUPTHER

## 2020-05-27 RX ORDER — POTASSIUM CHLORIDE 750 MG/1
10 TABLET, FILM COATED, EXTENDED RELEASE ORAL DAILY PRN
Qty: 30 TABLET | Refills: 3 | Status: SHIPPED | OUTPATIENT
Start: 2020-05-27 | End: 2020-07-13

## 2020-05-27 RX ORDER — OLMESARTAN MEDOXOMIL AND HYDROCHLOROTHIAZIDE 40/12.5 40; 12.5 MG/1; MG/1
1 TABLET ORAL DAILY
Qty: 90 TABLET | Refills: 1 | Status: SHIPPED | OUTPATIENT
Start: 2020-05-27 | End: 2020-11-09 | Stop reason: SDUPTHER

## 2020-05-27 RX ORDER — ALBUTEROL SULFATE 90 UG/1
2 AEROSOL, METERED RESPIRATORY (INHALATION) EVERY 4 HOURS PRN
Qty: 1 INHALER | Refills: 1 | Status: SHIPPED | OUTPATIENT
Start: 2020-05-27 | End: 2020-11-10 | Stop reason: SDUPTHER

## 2020-05-27 NOTE — PROGRESS NOTES
Hypertension and Diabetes    Subjective   Dana Rincon is a 59 y.o. female is here today for follow-up.    History of Present Illness   Here for a follow up on her DM2, HTN, allergies.  Would like a refill on her albuterol inhaler.  Retired Jan 20, and has been mostly home bound.  Denies any medication side effects.      Current Outpatient Medications:   •  albuterol sulfate  (90 Base) MCG/ACT inhaler, Inhale 2 puffs Every 4 (Four) Hours As Needed for Wheezing., Disp: 1 inhaler, Rfl: 1  •  anastrozole (ARIMIDEX) 1 MG tablet, Take 1 tablet by mouth Daily., Disp: 90 tablet, Rfl: 3  •  fexofenadine (ALLEGRA) 30 MG tablet, Take 30 mg by mouth daily., Disp: , Rfl:   •  furosemide (LASIX) 20 MG tablet, Take 1 tablet by mouth Daily As Needed (as needed). swelling, Disp: 30 tablet, Rfl: 3  •  Melatonin 10 MG capsule, Take  by mouth., Disp: , Rfl:   •  metFORMIN ER (GLUCOPHAGE-XR) 750 MG 24 hr tablet, Take 2 tablets by mouth Daily., Disp: 180 tablet, Rfl: 1  •  mometasone-formoterol (DULERA) 100-5 MCG/ACT inhaler, Inhale 2 puffs Daily., Disp: 13 g, Rfl: 5  •  montelukast (SINGULAIR) 10 MG tablet, Take 1 tablet by mouth Daily., Disp: 90 tablet, Rfl: 1  •  olmesartan-hydrochlorothiazide (BENICAR HCT) 40-12.5 MG per tablet, Take 1 tablet by mouth Daily., Disp: 90 tablet, Rfl: 1  •  potassium chloride (K-DUR) 10 MEQ CR tablet, Take 1 tablet by mouth Daily As Needed (as needed)., Disp: 30 tablet, Rfl: 3  •  pravastatin (PRAVACHOL) 10 MG tablet, Take 1 tablet by mouth Daily., Disp: 90 tablet, Rfl: 1  •  SITagliptin (Januvia) 50 MG tablet, Take 1 tablet by mouth Daily., Disp: 30 tablet, Rfl: 5      The following portions of the patient's history were reviewed and updated as appropriate: allergies, current medications, past family history, past medical history, past social history, past surgical history and problem list.    Review of Systems   Constitutional: Negative.  Negative for chills and fever.   HENT: Negative for  "ear discharge, ear pain, sinus pressure and sore throat.    Respiratory: Negative for cough, chest tightness and shortness of breath.    Cardiovascular: Negative for chest pain, palpitations and leg swelling.   Gastrointestinal: Negative for diarrhea, nausea and vomiting.   Musculoskeletal: Negative for arthralgias, back pain and myalgias.   Neurological: Negative for dizziness, syncope and headaches.   Psychiatric/Behavioral: Negative for confusion and sleep disturbance.       Objective   /60   Pulse 65   Ht 149.9 cm (59\")   Wt 114 kg (250 lb 6.4 oz)   SpO2 96% Comment: ra  BMI 50.57 kg/m²   Physical Exam   Constitutional: She is oriented to person, place, and time. She appears well-developed and well-nourished.   HENT:   Head: Normocephalic and atraumatic.   Mouth/Throat: No oropharyngeal exudate.   Eyes: Pupils are equal, round, and reactive to light. Conjunctivae are normal.   Neck: Neck supple. No thyromegaly present.   Cardiovascular: Normal rate and regular rhythm.   Pulmonary/Chest: Effort normal and breath sounds normal.   Abdominal: Soft. Bowel sounds are normal. She exhibits no distension. There is no tenderness.   Musculoskeletal: She exhibits no edema.   Neurological: She is alert and oriented to person, place, and time. No cranial nerve deficit.   Skin: Skin is warm and dry.   Psychiatric: She has a normal mood and affect. Judgment normal.   Nursing note and vitals reviewed.        Results for orders placed or performed in visit on 05/27/20   Comprehensive Metabolic Panel   Result Value Ref Range    Glucose 110 (H) 65 - 99 mg/dL    BUN 16 6 - 20 mg/dL    Creatinine 0.69 0.57 - 1.00 mg/dL    Sodium 136 136 - 145 mmol/L    Potassium 4.7 3.5 - 5.2 mmol/L    Chloride 97 (L) 98 - 107 mmol/L    CO2 27.5 22.0 - 29.0 mmol/L    Calcium 10.3 8.6 - 10.5 mg/dL    Total Protein 7.5 6.0 - 8.5 g/dL    Albumin 4.60 3.50 - 5.20 g/dL    ALT (SGPT) 26 1 - 33 U/L    AST (SGOT) 25 1 - 32 U/L    Alkaline " Phosphatase 74 39 - 117 U/L    Total Bilirubin 0.5 0.2 - 1.2 mg/dL    eGFR Non African Amer 87 >60 mL/min/1.73    Globulin 2.9 gm/dL    A/G Ratio 1.6 g/dL    BUN/Creatinine Ratio 23.2 7.0 - 25.0    Anion Gap 11.5 5.0 - 15.0 mmol/L   Lipid Panel   Result Value Ref Range    Total Cholesterol 135 0 - 200 mg/dL    Triglycerides 81 0 - 150 mg/dL    HDL Cholesterol 36 (L) 40 - 60 mg/dL    LDL Cholesterol  83 0 - 100 mg/dL    VLDL Cholesterol 16.2 5 - 40 mg/dL    LDL/HDL Ratio 2.30    POC Glycosylated Hemoglobin (Hb A1C)   Result Value Ref Range    Hemoglobin A1C 8.0 %             Assessment/Plan   Diagnoses and all orders for this visit:    Uncontrolled type 2 diabetes mellitus with hyperglycemia (CMS/Formerly [facility])  Comments:  higher, counseled to increase exercise. add Januvia. has fam h/o bladder cancer.  Orders:  -     metFORMIN ER (GLUCOPHAGE-XR) 750 MG 24 hr tablet; Take 2 tablets by mouth Daily.  -     POC Glycosylated Hemoglobin (Hb A1C)  -     SITagliptin (Januvia) 50 MG tablet; Take 1 tablet by mouth Daily.    Pedal edema  -     furosemide (LASIX) 20 MG tablet; Take 1 tablet by mouth Daily As Needed (as needed). swelling  -     potassium chloride (K-DUR) 10 MEQ CR tablet; Take 1 tablet by mouth Daily As Needed (as needed).    Essential hypertension  -     olmesartan-hydrochlorothiazide (BENICAR HCT) 40-12.5 MG per tablet; Take 1 tablet by mouth Daily.    Other hyperlipidemia  -     pravastatin (PRAVACHOL) 10 MG tablet; Take 1 tablet by mouth Daily.  -     Comprehensive Metabolic Panel  -     Lipid Panel    Non-seasonal allergic rhinitis due to other allergic trigger  -     montelukast (SINGULAIR) 10 MG tablet; Take 1 tablet by mouth Daily.  -     albuterol sulfate  (90 Base) MCG/ACT inhaler; Inhale 2 puffs Every 4 (Four) Hours As Needed for Wheezing.                 Return in about 6 months (around 11/27/2020) for Annual.  Answers for HPI/ROS submitted by the patient on 5/20/2020   What is the primary reason for  your visit?: Physical

## 2020-05-28 LAB
ALBUMIN SERPL-MCNC: 4.6 G/DL (ref 3.5–5.2)
ALBUMIN/GLOB SERPL: 1.6 G/DL
ALP SERPL-CCNC: 74 U/L (ref 39–117)
ALT SERPL W P-5'-P-CCNC: 26 U/L (ref 1–33)
ANION GAP SERPL CALCULATED.3IONS-SCNC: 11.5 MMOL/L (ref 5–15)
AST SERPL-CCNC: 25 U/L (ref 1–32)
BILIRUB SERPL-MCNC: 0.5 MG/DL (ref 0.2–1.2)
BUN BLD-MCNC: 16 MG/DL (ref 6–20)
BUN/CREAT SERPL: 23.2 (ref 7–25)
CALCIUM SPEC-SCNC: 10.3 MG/DL (ref 8.6–10.5)
CHLORIDE SERPL-SCNC: 97 MMOL/L (ref 98–107)
CHOLEST SERPL-MCNC: 135 MG/DL (ref 0–200)
CO2 SERPL-SCNC: 27.5 MMOL/L (ref 22–29)
CREAT BLD-MCNC: 0.69 MG/DL (ref 0.57–1)
GFR SERPL CREATININE-BSD FRML MDRD: 87 ML/MIN/1.73
GLOBULIN UR ELPH-MCNC: 2.9 GM/DL
GLUCOSE BLD-MCNC: 110 MG/DL (ref 65–99)
HDLC SERPL-MCNC: 36 MG/DL (ref 40–60)
LDLC SERPL CALC-MCNC: 83 MG/DL (ref 0–100)
LDLC/HDLC SERPL: 2.3 {RATIO}
POTASSIUM BLD-SCNC: 4.7 MMOL/L (ref 3.5–5.2)
PROT SERPL-MCNC: 7.5 G/DL (ref 6–8.5)
SODIUM BLD-SCNC: 136 MMOL/L (ref 136–145)
TRIGL SERPL-MCNC: 81 MG/DL (ref 0–150)
VLDLC SERPL-MCNC: 16.2 MG/DL (ref 5–40)

## 2020-06-15 ENCOUNTER — HOSPITAL ENCOUNTER (OUTPATIENT)
Dept: BONE DENSITY | Facility: HOSPITAL | Age: 60
Discharge: HOME | End: 2020-06-15
Admitting: NURSE PRACTITIONER

## 2020-06-15 ENCOUNTER — TELEPHONE (OUTPATIENT)
Dept: ONCOLOGY | Facility: CLINIC | Age: 60
End: 2020-06-15

## 2020-06-15 DIAGNOSIS — C50.511 MALIGNANT NEOPLASM OF LOWER-OUTER QUADRANT OF RIGHT BREAST OF FEMALE, ESTROGEN RECEPTOR POSITIVE (HCC): ICD-10-CM

## 2020-06-15 DIAGNOSIS — Z17.0 MALIGNANT NEOPLASM OF LOWER-OUTER QUADRANT OF RIGHT BREAST OF FEMALE, ESTROGEN RECEPTOR POSITIVE (HCC): ICD-10-CM

## 2020-06-15 DIAGNOSIS — M81.0 OSTEOPOROSIS, UNSPECIFIED OSTEOPOROSIS TYPE, UNSPECIFIED PATHOLOGICAL FRACTURE PRESENCE: ICD-10-CM

## 2020-06-15 PROCEDURE — 77080 DXA BONE DENSITY AXIAL: CPT

## 2020-06-15 NOTE — TELEPHONE ENCOUNTER
Returned call to patient after discussing with Waleska CHIU. Informing patient on vm to follow up with PCP and have them assess site underarm. Informing her on vm to let pcp assess and if he thought it was more that we can send in order.

## 2020-06-15 NOTE — TELEPHONE ENCOUNTER
Patient called she has found a cyst under her left arm it's red and a little sore. She wants Dr. Driscoll to know about this should she be seen or setup for a diagnostic mammogram? Please call.

## 2020-07-10 RX ORDER — ANASTROZOLE 1 MG/1
1 TABLET ORAL DAILY
Qty: 90 TABLET | Refills: 3 | Status: SHIPPED | OUTPATIENT
Start: 2020-07-10 | End: 2021-07-06

## 2020-07-12 DIAGNOSIS — R60.0 PEDAL EDEMA: ICD-10-CM

## 2020-07-13 RX ORDER — POTASSIUM CHLORIDE 750 MG/1
TABLET, FILM COATED, EXTENDED RELEASE ORAL
Qty: 30 TABLET | Refills: 3 | Status: SHIPPED | OUTPATIENT
Start: 2020-07-13 | End: 2020-11-04

## 2020-07-28 ENCOUNTER — HOSPITAL ENCOUNTER (OUTPATIENT)
Dept: ONCOLOGY | Facility: HOSPITAL | Age: 60
Setting detail: INFUSION SERIES
Discharge: HOME | End: 2020-07-28

## 2020-07-28 ENCOUNTER — OFFICE VISIT (OUTPATIENT)
Dept: ONCOLOGY | Facility: CLINIC | Age: 60
End: 2020-07-28

## 2020-07-28 ENCOUNTER — LAB (OUTPATIENT)
Dept: LAB | Facility: HOSPITAL | Age: 60
End: 2020-07-28

## 2020-07-28 VITALS
HEIGHT: 59 IN | HEART RATE: 74 BPM | TEMPERATURE: 98.2 F | SYSTOLIC BLOOD PRESSURE: 156 MMHG | WEIGHT: 247 LBS | RESPIRATION RATE: 16 BRPM | DIASTOLIC BLOOD PRESSURE: 70 MMHG | BODY MASS INDEX: 49.8 KG/M2 | OXYGEN SATURATION: 93 %

## 2020-07-28 DIAGNOSIS — M81.0 OSTEOPOROSIS, UNSPECIFIED OSTEOPOROSIS TYPE, UNSPECIFIED PATHOLOGICAL FRACTURE PRESENCE: ICD-10-CM

## 2020-07-28 DIAGNOSIS — Z17.0 MALIGNANT NEOPLASM OF LOWER-OUTER QUADRANT OF RIGHT BREAST OF FEMALE, ESTROGEN RECEPTOR POSITIVE (HCC): ICD-10-CM

## 2020-07-28 DIAGNOSIS — C50.511 MALIGNANT NEOPLASM OF LOWER-OUTER QUADRANT OF RIGHT BREAST OF FEMALE, ESTROGEN RECEPTOR POSITIVE (HCC): Primary | ICD-10-CM

## 2020-07-28 DIAGNOSIS — Z17.0 MALIGNANT NEOPLASM OF LOWER-OUTER QUADRANT OF RIGHT BREAST OF FEMALE, ESTROGEN RECEPTOR POSITIVE (HCC): Primary | ICD-10-CM

## 2020-07-28 DIAGNOSIS — C50.511 MALIGNANT NEOPLASM OF LOWER-OUTER QUADRANT OF RIGHT BREAST OF FEMALE, ESTROGEN RECEPTOR POSITIVE (HCC): ICD-10-CM

## 2020-07-28 LAB
ALBUMIN SERPL-MCNC: 4.3 G/DL (ref 3.5–5.2)
ALBUMIN/GLOB SERPL: 1.4 G/DL
ALP SERPL-CCNC: 88 U/L (ref 39–117)
ALT SERPL W P-5'-P-CCNC: 24 U/L (ref 1–33)
ANION GAP SERPL CALCULATED.3IONS-SCNC: 11 MMOL/L (ref 5–15)
AST SERPL-CCNC: 17 U/L (ref 1–32)
BILIRUB SERPL-MCNC: 0.4 MG/DL (ref 0–1.2)
BUN SERPL-MCNC: 14 MG/DL (ref 6–20)
BUN/CREAT SERPL: 20.3 (ref 7–25)
CALCIUM SPEC-SCNC: 10 MG/DL (ref 8.6–10.5)
CHLORIDE SERPL-SCNC: 98 MMOL/L (ref 98–107)
CO2 SERPL-SCNC: 26 MMOL/L (ref 22–29)
CREAT SERPL-MCNC: 0.69 MG/DL (ref 0.57–1)
ERYTHROCYTE [DISTWIDTH] IN BLOOD BY AUTOMATED COUNT: 15 % (ref 12.3–15.4)
GFR SERPL CREATININE-BSD FRML MDRD: 87 ML/MIN/1.73
GLOBULIN UR ELPH-MCNC: 3.1 GM/DL
GLUCOSE SERPL-MCNC: 153 MG/DL (ref 65–99)
HCT VFR BLD AUTO: 45.1 % (ref 34–46.6)
HGB BLD-MCNC: 13.9 G/DL (ref 12–15.9)
LYMPHOCYTES # BLD AUTO: 1.6 10*3/MM3 (ref 0.7–3.1)
LYMPHOCYTES NFR BLD AUTO: 22.9 % (ref 19.6–45.3)
MAGNESIUM SERPL-MCNC: 2.6 MG/DL (ref 1.6–2.6)
MCH RBC QN AUTO: 26.8 PG (ref 26.6–33)
MCHC RBC AUTO-ENTMCNC: 30.8 G/DL (ref 31.5–35.7)
MCV RBC AUTO: 87 FL (ref 79–97)
MONOCYTES # BLD AUTO: 0.3 10*3/MM3 (ref 0.1–0.9)
MONOCYTES NFR BLD AUTO: 4 % (ref 5–12)
NEUTROPHILS NFR BLD AUTO: 5.3 10*3/MM3 (ref 1.7–7)
NEUTROPHILS NFR BLD AUTO: 73.1 % (ref 42.7–76)
PHOSPHATE SERPL-MCNC: 3.4 MG/DL (ref 2.5–4.5)
PLATELET # BLD AUTO: 352 10*3/MM3 (ref 140–450)
PMV BLD AUTO: 6.2 FL (ref 6–12)
POTASSIUM SERPL-SCNC: 4.1 MMOL/L (ref 3.5–5.2)
PROT SERPL-MCNC: 7.4 G/DL (ref 6–8.5)
RBC # BLD AUTO: 5.19 10*6/MM3 (ref 3.77–5.28)
SODIUM SERPL-SCNC: 135 MMOL/L (ref 136–145)
WBC # BLD AUTO: 7.2 10*3/MM3 (ref 3.4–10.8)

## 2020-07-28 PROCEDURE — 83735 ASSAY OF MAGNESIUM: CPT

## 2020-07-28 PROCEDURE — 25010000002 DENOSUMAB 60 MG/ML SOLUTION PREFILLED SYRINGE: Performed by: NURSE PRACTITIONER

## 2020-07-28 PROCEDURE — 96372 THER/PROPH/DIAG INJ SC/IM: CPT

## 2020-07-28 PROCEDURE — 80053 COMPREHEN METABOLIC PANEL: CPT

## 2020-07-28 PROCEDURE — 99214 OFFICE O/P EST MOD 30 MIN: CPT | Performed by: INTERNAL MEDICINE

## 2020-07-28 PROCEDURE — 85025 COMPLETE CBC W/AUTO DIFF WBC: CPT

## 2020-07-28 PROCEDURE — 36415 COLL VENOUS BLD VENIPUNCTURE: CPT

## 2020-07-28 PROCEDURE — 84100 ASSAY OF PHOSPHORUS: CPT

## 2020-07-28 RX ORDER — POTASSIUM CHLORIDE 750 MG/1
TABLET, EXTENDED RELEASE ORAL
COMMUNITY
Start: 2020-05-27 | End: 2020-09-18

## 2020-07-28 RX ADMIN — DENOSUMAB 60 MG: 60 INJECTION SUBCUTANEOUS at 12:23

## 2020-07-28 NOTE — PROGRESS NOTES
DATE OF VISIT: 7/28/2020    REASON FOR VISIT: Follow-up for invasive ductal carcinoma of the right breast,  [id number], estrogen receptor positive, HER-2/greg negative, low risk disease based  on Oncotype.      HISTORY OF PRESENT ILLNESS: The patient is a very pleasant 59 y.o. female  who was in her usual state of health until her most recent mammogram done  03/2016. She had an abnormality in the right breast 10 o'clock position. The  patient had an ultrasound guided biopsy 03/10/2016 that revealed invasive  ductal carcinoma. The patient elected to proceed with lumpectomy on 05/03/2016  with Dr. Sales. Final pathology revealed 1.4 cm mass, tumor at the 7  o'clock position, ER positive more than 95%, NM positive more than 85%,  HER-2/greg 0+. The patient had clear surgical margins, Kimmie score 8 out of 9,  and 1 negative sentinel lymph node. The patient had no postoperative  complications. The patient had Oncotype testing. Her score came back at 16,  low risk disease group. Risk of recurrence 10% with tamoxifen alone. The  patient was started on Arimidex 1 mg p.o. daily 06/03/2016. The patient is  here today in scheduled follow-up visit.      SUBJECTIVE: The patient is here today by herself.  All in all she has been doing fairly well she denied any fever chills night sweats.  She has been compliant with her treatment.  She is anxious by the bone density result.     REVIEW OF SYSTEMS: All the other 9 systems are reviewed by me and are negative  except what is mentioned in HPI.      PAST MEDICAL HISTORY/SOCIAL HISTORY/FAMILY HISTORY: Unchanged from prior  documentation done 05/16/2016.      Current Outpatient Medications:   •  albuterol sulfate  (90 Base) MCG/ACT inhaler, Inhale 2 puffs Every 4 (Four) Hours As Needed for Wheezing., Disp: 1 inhaler, Rfl: 1  •  anastrozole (ARIMIDEX) 1 MG tablet, TAKE 1 TABLET BY MOUTH DAILY, Disp: 90 tablet, Rfl: 3  •  fexofenadine (ALLEGRA) 30 MG tablet, Take 30 mg by mouth  "daily., Disp: , Rfl:   •  furosemide (LASIX) 20 MG tablet, Take 1 tablet by mouth Daily As Needed (as needed). swelling, Disp: 30 tablet, Rfl: 3  •  Melatonin 10 MG capsule, Take  by mouth., Disp: , Rfl:   •  metFORMIN ER (GLUCOPHAGE-XR) 750 MG 24 hr tablet, Take 2 tablets by mouth Daily., Disp: 180 tablet, Rfl: 1  •  mometasone-formoterol (DULERA) 100-5 MCG/ACT inhaler, Inhale 2 puffs Daily., Disp: 13 g, Rfl: 5  •  montelukast (SINGULAIR) 10 MG tablet, Take 1 tablet by mouth Daily., Disp: 90 tablet, Rfl: 1  •  olmesartan-hydrochlorothiazide (BENICAR HCT) 40-12.5 MG per tablet, Take 1 tablet by mouth Daily., Disp: 90 tablet, Rfl: 1  •  potassium chloride (K-DUR) 10 MEQ CR tablet, TAKE 1 TABLET BY MOUTH DAILY AS NEEDED WITH LASIX, Disp: 30 tablet, Rfl: 3  •  potassium chloride (K-DUR,KLOR-CON) 10 MEQ CR tablet, TK 1 T PO D PRN, Disp: , Rfl:   •  pravastatin (PRAVACHOL) 10 MG tablet, Take 1 tablet by mouth Daily., Disp: 90 tablet, Rfl: 1  •  SITagliptin (Januvia) 50 MG tablet, Take 1 tablet by mouth Daily., Disp: 30 tablet, Rfl: 5    PHYSICAL EXAMINATION:   /70   Pulse 74   Temp 98.2 °F (36.8 °C) (Temporal)   Resp 16   Ht 149.9 cm (59.02\")   Wt 112 kg (247 lb)   SpO2 93%   BMI 49.86 kg/m²   ECOG1  GENERAL: Age appropriate. No acute distress.   HEENT: Head atraumatic, normocephalic.   NECK: Supple. No JVD. No lymphadenopathy.   LUNGS: Clear to auscultation bilaterally. No wheezing. No rhonchi.   HEART: Regular rate and rhythm. S1, S2, no murmurs.   ABDOMEN: Soft, nontender, nondistended. Bowel sounds positive. No  hepatosplenomegaly.   EXTREMITIES: No clubbing, cyanosis, or edema.   SKIN: No rashes. No purpura.   NEUROLOGIC: Awake and oriented x3. Strength 5 out of 5 in all muscle groups.     Lab on 07/28/2020   Component Date Value Ref Range Status   • WBC 07/28/2020 7.20  3.40 - 10.80 10*3/mm3 Final   • RBC 07/28/2020 5.19  3.77 - 5.28 10*6/mm3 Final   • Hemoglobin 07/28/2020 13.9  12.0 - 15.9 g/dL Final " "  • Hematocrit 07/28/2020 45.1  34.0 - 46.6 % Final   • RDW 07/28/2020 15.0  12.3 - 15.4 % Final   • MCV 07/28/2020 87.0  79.0 - 97.0 fL Final   • MCH 07/28/2020 26.8  26.6 - 33.0 pg Final   • MCHC 07/28/2020 30.8* 31.5 - 35.7 g/dL Final   • MPV 07/28/2020 6.2  6.0 - 12.0 fL Final   • Platelets 07/28/2020 352  140 - 450 10*3/mm3 Final   • Neutrophil % 07/28/2020 73.1  42.7 - 76.0 % Final   • Lymphocyte % 07/28/2020 22.9  19.6 - 45.3 % Final   • Monocyte % 07/28/2020 4.0* 5.0 - 12.0 % Final   • Neutrophils, Absolute 07/28/2020 5.30  1.70 - 7.00 10*3/mm3 Final   • Lymphocytes, Absolute 07/28/2020 1.60  0.70 - 3.10 10*3/mm3 Final   • Monocytes, Absolute 07/28/2020 0.30  0.10 - 0.90 10*3/mm3 Final      DUAL-ENERGY X-RAY ABSORPTIOMETRY (DXA)     INDICATION:  Monitoring treatment, history of glucocorticoids, cancer,  asthma or emphysema     COMPARISON: Previous bone mineral density exam performed on 06/08/2018     PROCEDURE: A DXA scan was performed using a Hologic densitometer.     The lumbar spine L1-L4 was evaluated as well as left total hip.     The T-score compares the patient's bone mineral density with the peak  bone mass of young normal patients.  According to criteria established  by the World Health Organization, patients with T-scores  between 1.0  and 2.5 standard deviations BELOW the mean are osteopenic (low bone  mass).  Patients with T-scores EQUAL TO OR GREATER than 2.5 standard  deviations below the mean are osteoporotic.     The Z-score compares the patient bone mineral density with age and sex  matched peers.  According to the International Society for Clinical  Densitometry's 2007 consensus conference:  In women prior to menopause  and men less than age 50, Z-scores, not T-scores are preferred.  A  Z-score of -2.0 or lower is defined as \"below the expected range for  age\" and a Z-score above -2.0 is \"within the expected range for age.\"   The WHO diagnostic criteria may be applied in women in the " menopausal  transition.  Osteoporosis cannot be diagnosed in men under age 50 on the  basis of BMD alone.     TECHNICAL QUALITY:  The study is of good technical quality.     RESULTS:     Lumbar Spine:  The BMD measured in the L1-L4 region is 0.975 g/cm2.  The  average T-score is -0.7.  The Z-score is 0.7.     Total Hip:  The BMD measured at the left total proximal femur is 0.982  g/cm2.  The T-score is 0.3.  The Z-score is 1.3.     Femoral Neck:  The BMD measured at the left femoral neck is 0.633 g/cm2.   The T-score is -1.9.  The Z-score is -0.7.     IMPRESSION:  Osteopenia of the left femoral neck.       ASSESSMENT: The patient is a very pleasant 59 y.o. female with stage I  invasive ductal carcinoma of the right breast.      PROBLEM LIST:   1. Stage I invasive ductal carcinoma of the right breast, [id number]:  A. Tumor at the 7 o'clock position, tumor size 1.4 cm, estrogen receptor and progesterone  receptor strongly positive, HER-2/greg negative zero by IHC. Low risk group Oncotype testing, score of 16, 10% risk of recurrence with hormone treatment alone.  B. Diagnosed on ultrasound guided biopsy 03/10/2016.   C. Status post lumpectomy with clear margins done by Dr. Sales 05/03/2016.   D. Started Arimidex 1 mg p.o. daily 06/03/2016.   E. Completed breast adjuvant radiation by Dr. Thompson.   2.  Osteoporosis:  A.  DEXA scan done June 2018 revealed T score -2.3  B. Starting Prolia 1/22/2020.   3. Right breast mammogram abnormality  4.  Obesity     PLAN:   1. Reviewed lab results with the patient. I reassured her blood counts are normal.    2. The patient will continue Arimidex 1 mg p.o. daily.    3.  I will continue calcium with vitamin D daily.  I will also continue Prolia 60 mg subcu every 6-month.  I did go over the bone density results with the patient I explained that she currently have osteopenia.  We will do 2-year follow-up study which would be due June 2022.  4. I reviewed the mammogram results from  12/11/2019. I reassured her there were benign findings with no evidence of cancer recurrence. She will need one year follow up mammogram which will be due 12/12/2020.    5. We will see the patient back in 6 months with repeat labs including CBC and CMP.   6. I encouraged her to continue efforts towards increased exercise and weight loss.       Idalia Driscoll MD    7/28/2020

## 2020-09-18 DIAGNOSIS — R60.0 PEDAL EDEMA: ICD-10-CM

## 2020-09-18 RX ORDER — FUROSEMIDE 20 MG/1
TABLET ORAL
Qty: 30 TABLET | Refills: 2 | Status: SHIPPED | OUTPATIENT
Start: 2020-09-18 | End: 2020-11-10 | Stop reason: SDUPTHER

## 2020-09-18 RX ORDER — POTASSIUM CHLORIDE 750 MG/1
TABLET, EXTENDED RELEASE ORAL
Qty: 30 TABLET | Refills: 2 | Status: SHIPPED | OUTPATIENT
Start: 2020-09-18 | End: 2020-11-10 | Stop reason: SDUPTHER

## 2020-10-21 ENCOUNTER — TRANSCRIBE ORDERS (OUTPATIENT)
Dept: ADMINISTRATIVE | Facility: HOSPITAL | Age: 60
End: 2020-10-21

## 2020-10-21 DIAGNOSIS — Z12.31 VISIT FOR SCREENING MAMMOGRAM: Primary | ICD-10-CM

## 2020-11-04 DIAGNOSIS — R60.0 PEDAL EDEMA: ICD-10-CM

## 2020-11-04 RX ORDER — POTASSIUM CHLORIDE 750 MG/1
TABLET, FILM COATED, EXTENDED RELEASE ORAL
Qty: 30 TABLET | Refills: 0 | Status: SHIPPED | OUTPATIENT
Start: 2020-11-04 | End: 2021-05-25 | Stop reason: SDUPTHER

## 2020-11-06 DIAGNOSIS — J45.909 UNCOMPLICATED ASTHMA: ICD-10-CM

## 2020-11-06 NOTE — TELEPHONE ENCOUNTER
Caller: [id number] - [place], [place] - [address] AT [facility] [address] - [phone] [facility] [phone] FX    Relationship: Pharmacy    Best call back number: [phone]    Medication needed:   Requested Prescriptions     Pending Prescriptions Disp Refills   • mometasone-formoterol (Dulera) 100-5 MCG/ACT inhaler 13 g 5     Sig: Inhale 2 puffs Daily.       When do you need the refill by: ASAP    What details did the patient provide when requesting the medication:     Does the patient have less than a 3 day supply:  [] Yes  [x] No    What is the patient's preferred pharmacy: [id number] - [place], [place] - [address] [place] [facility] [place] [address] - [phone]  - [phone] FX

## 2020-11-09 DIAGNOSIS — I10 ESSENTIAL HYPERTENSION: ICD-10-CM

## 2020-11-09 RX ORDER — OLMESARTAN MEDOXOMIL AND HYDROCHLOROTHIAZIDE 40/12.5 40; 12.5 MG/1; MG/1
1 TABLET ORAL DAILY
Qty: 90 TABLET | Refills: 0 | Status: SHIPPED | OUTPATIENT
Start: 2020-11-09 | End: 2020-11-10

## 2020-11-10 ENCOUNTER — LAB (OUTPATIENT)
Dept: LAB | Facility: HOSPITAL | Age: 60
End: 2020-11-10

## 2020-11-10 ENCOUNTER — OFFICE VISIT (OUTPATIENT)
Dept: INTERNAL MEDICINE | Facility: CLINIC | Age: 60
End: 2020-11-10

## 2020-11-10 VITALS
HEIGHT: 59 IN | WEIGHT: 246.8 LBS | SYSTOLIC BLOOD PRESSURE: 132 MMHG | HEART RATE: 62 BPM | BODY MASS INDEX: 49.76 KG/M2 | DIASTOLIC BLOOD PRESSURE: 70 MMHG | OXYGEN SATURATION: 95 % | TEMPERATURE: 97 F

## 2020-11-10 DIAGNOSIS — I10 ESSENTIAL HYPERTENSION: ICD-10-CM

## 2020-11-10 DIAGNOSIS — Z00.00 ROUTINE MEDICAL EXAM: Primary | ICD-10-CM

## 2020-11-10 DIAGNOSIS — D72.829 LEUKOCYTOSIS, UNSPECIFIED TYPE: ICD-10-CM

## 2020-11-10 DIAGNOSIS — R60.0 PEDAL EDEMA: ICD-10-CM

## 2020-11-10 DIAGNOSIS — J30.89 NON-SEASONAL ALLERGIC RHINITIS DUE TO OTHER ALLERGIC TRIGGER: ICD-10-CM

## 2020-11-10 DIAGNOSIS — Z00.00 ROUTINE MEDICAL EXAM: ICD-10-CM

## 2020-11-10 DIAGNOSIS — E78.49 OTHER HYPERLIPIDEMIA: ICD-10-CM

## 2020-11-10 DIAGNOSIS — E11.65 UNCONTROLLED TYPE 2 DIABETES MELLITUS WITH HYPERGLYCEMIA (HCC): ICD-10-CM

## 2020-11-10 LAB
25(OH)D3 SERPL-MCNC: 62.3 NG/ML (ref 30–100)
ALBUMIN SERPL-MCNC: 4.4 G/DL (ref 3.5–5.2)
ALBUMIN/GLOB SERPL: 1.4 G/DL
ALP SERPL-CCNC: 85 U/L (ref 39–117)
ALT SERPL W P-5'-P-CCNC: 32 U/L (ref 1–33)
ANION GAP SERPL CALCULATED.3IONS-SCNC: 12.5 MMOL/L (ref 5–15)
AST SERPL-CCNC: 25 U/L (ref 1–32)
BILIRUB BLD-MCNC: NEGATIVE MG/DL
BILIRUB SERPL-MCNC: 0.4 MG/DL (ref 0–1.2)
BUN SERPL-MCNC: 17 MG/DL (ref 8–23)
BUN/CREAT SERPL: 23.9 (ref 7–25)
CALCIUM SPEC-SCNC: 9.7 MG/DL (ref 8.6–10.5)
CHLORIDE SERPL-SCNC: 96 MMOL/L (ref 98–107)
CHOLEST SERPL-MCNC: 140 MG/DL (ref 0–200)
CLARITY, POC: CLEAR
CO2 SERPL-SCNC: 26.5 MMOL/L (ref 22–29)
COLOR UR: YELLOW
CREAT SERPL-MCNC: 0.71 MG/DL (ref 0.57–1)
DEPRECATED RDW RBC AUTO: 38.6 FL (ref 37–54)
ERYTHROCYTE [DISTWIDTH] IN BLOOD BY AUTOMATED COUNT: 12.8 % (ref 12.3–15.4)
GFR SERPL CREATININE-BSD FRML MDRD: 84 ML/MIN/1.73
GLOBULIN UR ELPH-MCNC: 3.2 GM/DL
GLUCOSE SERPL-MCNC: 111 MG/DL (ref 65–99)
GLUCOSE UR STRIP-MCNC: NEGATIVE MG/DL
HBA1C MFR BLD: 8 %
HCT VFR BLD AUTO: 41.3 % (ref 34–46.6)
HDLC SERPL-MCNC: 41 MG/DL (ref 40–60)
HGB BLD-MCNC: 13.6 G/DL (ref 12–15.9)
KETONES UR QL: NEGATIVE
LDLC SERPL CALC-MCNC: 86 MG/DL (ref 0–100)
LDLC/HDLC SERPL: 2.1 {RATIO}
LEUKOCYTE EST, POC: ABNORMAL
MCH RBC QN AUTO: 27.3 PG (ref 26.6–33)
MCHC RBC AUTO-ENTMCNC: 32.9 G/DL (ref 31.5–35.7)
MCV RBC AUTO: 82.9 FL (ref 79–97)
NITRITE UR-MCNC: NEGATIVE MG/ML
PH UR: 6 [PH] (ref 5–8)
PLATELET # BLD AUTO: 258 10*3/MM3 (ref 140–450)
PMV BLD AUTO: 9.1 FL (ref 6–12)
POC CREATININE URINE: 50
POC MICROALBUMIN URINE: 10
POTASSIUM SERPL-SCNC: 4.4 MMOL/L (ref 3.5–5.2)
PROT SERPL-MCNC: 7.6 G/DL (ref 6–8.5)
PROT UR STRIP-MCNC: NEGATIVE MG/DL
RBC # BLD AUTO: 4.98 10*6/MM3 (ref 3.77–5.28)
RBC # UR STRIP: NEGATIVE /UL
SODIUM SERPL-SCNC: 135 MMOL/L (ref 136–145)
SP GR UR: 1.01 (ref 1–1.03)
TRIGL SERPL-MCNC: 65 MG/DL (ref 0–150)
TSH SERPL DL<=0.05 MIU/L-ACNC: 2.21 UIU/ML (ref 0.27–4.2)
UROBILINOGEN UR QL: NORMAL
VIT B12 BLD-MCNC: 260 PG/ML (ref 211–946)
VLDLC SERPL-MCNC: 13 MG/DL (ref 5–40)
WBC # BLD AUTO: 7.69 10*3/MM3 (ref 3.4–10.8)

## 2020-11-10 PROCEDURE — 87086 URINE CULTURE/COLONY COUNT: CPT

## 2020-11-10 PROCEDURE — 99213 OFFICE O/P EST LOW 20 MIN: CPT | Performed by: INTERNAL MEDICINE

## 2020-11-10 PROCEDURE — 82306 VITAMIN D 25 HYDROXY: CPT

## 2020-11-10 PROCEDURE — 80061 LIPID PANEL: CPT

## 2020-11-10 PROCEDURE — 82044 UR ALBUMIN SEMIQUANTITATIVE: CPT | Performed by: INTERNAL MEDICINE

## 2020-11-10 PROCEDURE — 81003 URINALYSIS AUTO W/O SCOPE: CPT | Performed by: INTERNAL MEDICINE

## 2020-11-10 PROCEDURE — 80053 COMPREHEN METABOLIC PANEL: CPT

## 2020-11-10 PROCEDURE — 99396 PREV VISIT EST AGE 40-64: CPT | Performed by: INTERNAL MEDICINE

## 2020-11-10 PROCEDURE — 85027 COMPLETE CBC AUTOMATED: CPT

## 2020-11-10 PROCEDURE — 84443 ASSAY THYROID STIM HORMONE: CPT

## 2020-11-10 PROCEDURE — 83036 HEMOGLOBIN GLYCOSYLATED A1C: CPT | Performed by: INTERNAL MEDICINE

## 2020-11-10 PROCEDURE — 82607 VITAMIN B-12: CPT

## 2020-11-10 RX ORDER — FUROSEMIDE 20 MG/1
20 TABLET ORAL DAILY PRN
Qty: 90 TABLET | Refills: 1 | Status: SHIPPED | OUTPATIENT
Start: 2020-11-10 | End: 2021-05-12

## 2020-11-10 RX ORDER — ALBUTEROL SULFATE 90 UG/1
2 AEROSOL, METERED RESPIRATORY (INHALATION) EVERY 4 HOURS PRN
Qty: 18 G | Refills: 5 | Status: SHIPPED | OUTPATIENT
Start: 2020-11-10 | End: 2022-01-31 | Stop reason: SDUPTHER

## 2020-11-10 RX ORDER — PRAVASTATIN SODIUM 10 MG
10 TABLET ORAL DAILY
Qty: 90 TABLET | Refills: 1 | Status: SHIPPED | OUTPATIENT
Start: 2020-11-10 | End: 2021-05-25 | Stop reason: SDUPTHER

## 2020-11-10 RX ORDER — ORAL SEMAGLUTIDE 14 MG/1
1 TABLET ORAL DAILY
Qty: 30 TABLET | Refills: 5 | Status: SHIPPED | OUTPATIENT
Start: 2020-11-10 | End: 2021-05-12

## 2020-11-10 RX ORDER — MONTELUKAST SODIUM 10 MG/1
10 TABLET ORAL DAILY
Qty: 90 TABLET | Refills: 1 | Status: SHIPPED | OUTPATIENT
Start: 2020-11-10 | End: 2021-05-25 | Stop reason: SDUPTHER

## 2020-11-10 RX ORDER — METFORMIN HYDROCHLORIDE 750 MG/1
1500 TABLET, EXTENDED RELEASE ORAL DAILY
Qty: 180 TABLET | Refills: 1 | Status: SHIPPED | OUTPATIENT
Start: 2020-11-10 | End: 2020-11-10

## 2020-11-10 RX ORDER — OLMESARTAN MEDOXOMIL AND HYDROCHLOROTHIAZIDE 40/12.5 40; 12.5 MG/1; MG/1
1 TABLET ORAL DAILY
Qty: 60 TABLET | Refills: 0 | Status: SHIPPED | OUTPATIENT
Start: 2020-11-10 | End: 2021-04-07 | Stop reason: SDUPTHER

## 2020-11-10 RX ORDER — POTASSIUM CHLORIDE 750 MG/1
10 TABLET, EXTENDED RELEASE ORAL DAILY PRN
Qty: 90 TABLET | Refills: 1 | Status: SHIPPED | OUTPATIENT
Start: 2020-11-10 | End: 2021-05-12

## 2020-11-10 RX ORDER — METFORMIN HYDROCHLORIDE 750 MG/1
750 TABLET, EXTENDED RELEASE ORAL DAILY
Qty: 90 TABLET | Refills: 1 | Status: SHIPPED | OUTPATIENT
Start: 2020-11-10 | End: 2021-05-25 | Stop reason: SDUPTHER

## 2020-11-10 NOTE — PROGRESS NOTES
Chief Complaint   Patient presents with   • Annual Exam   • Hypertension     has been out of benicar for 3 days, pharmacy willnot fill.       History of Present Illness  HM, Adult Female:  The patient is being seen for a health maintenance evaluation. The last health maintenance visit was 1 year(s) ago. Gynecology- Dr. Gordillo.  Social History: She is . Work status:retired from [employer].  She has never smoked. She reports never drinking alcohol. Denies any illicit drug use.  General Health: The patient's health is described as good. She has regular dental visits. She denies vision problems. She denies hearing loss. Immunizations status: up to date.   Lifestyle:. She consumes a diverse and healthy diet. She does not have any weight concerns. She exercises regularly. She denies tobacco. She denies alcohol use. She denies drug use.   Reproductive health:. She is postmenopausal.   Screening: Cancer screening reviewed and current.   Metabolic screening reviewed and current.   Risk screening reviewed and current.   On the arimidex , and is followed by Dr. Driscoll, and is getting regular mammograms. Scheduled next month.  Also started on Prolia by him.  Colonoscopy - 12/2- Dr. Duong    Reports she misplaced 2 bottles of her benicar hctz, and now doesn't have any left.  Neighbor had the covid and spent 40 days on the vent.    Review of Systems   Constitutional: Negative for chills and fatigue.   HENT: Negative for congestion, ear pain and sore throat.    Eyes: Negative for pain, redness and visual disturbance.   Respiratory: Negative for cough and shortness of breath.    Cardiovascular: Negative for chest pain, palpitations and leg swelling.   Gastrointestinal: Negative for abdominal pain, diarrhea and nausea.   Endocrine: Negative for cold intolerance and heat intolerance.   Genitourinary: Negative for flank pain and urgency.   Musculoskeletal: Negative for arthralgias and gait problem.   Skin: Negative for pallor and  "rash.   Neurological: Negative for dizziness, weakness and headaches.   Psychiatric/Behavioral: Negative for dysphoric mood and sleep disturbance. The patient is not nervous/anxious.        Patient Active Problem List   Diagnosis   • Breast cancer of lower-outer quadrant of right female breast (CMS/HCC)   • Uncontrolled type 2 diabetes mellitus (CMS/HCC)   • Asthma   • Hypertension   • Obstructive sleep apnea syndrome   • Vitamin D deficiency   • Other hyperlipidemia   • Malignant neoplasm of lower-outer quadrant of right breast of female, estrogen receptor positive (CMS/HCC)   • Osteopenia   • Osteoporosis       Social History     Socioeconomic History   • Marital status:      Spouse name: Not on file   • Number of children: Not on file   • Years of education: Not on file   • Highest education level: Not on file   Tobacco Use   • Smoking status: Never Smoker   • Smokeless tobacco: Never Used   Substance and Sexual Activity   • Alcohol use: Yes     Alcohol/week: 3.0 standard drinks     Types: 3 Glasses of wine per week     Comment: 2-3/week for 20 years   • Drug use: No   • Sexual activity: Defer       Current Outpatient Medications on File Prior to Visit   Medication Sig Dispense Refill   • anastrozole (ARIMIDEX) 1 MG tablet TAKE 1 TABLET BY MOUTH DAILY 90 tablet 3   • fexofenadine (ALLEGRA) 30 MG tablet Take 30 mg by mouth daily.     • Melatonin 10 MG capsule Take  by mouth.     • mometasone-formoterol (Dulera) 100-5 MCG/ACT inhaler Inhale 2 puffs Daily. 13 g 5   • potassium chloride 10 MEQ CR tablet TAKE 1 TABLET BY MOUTH EVERY DAY AS NEEDED. TAKE WITH LASIX 30 tablet 0     No current facility-administered medications on file prior to visit.        Allergies   Allergen Reactions   • Meperidine Nausea And Vomiting       /70   Pulse 62   Temp 97 °F (36.1 °C)   Ht 149.9 cm (59\")   Wt 112 kg (246 lb 12.8 oz)   SpO2 95% Comment: ra  BMI 49.85 kg/m²            The following portions of the patient's " history were reviewed and updated as appropriate: allergies, current medications, past family history, past medical history, past social history, past surgical history and problem list.    Physical Exam  Vitals signs and nursing note reviewed.   Constitutional:       Appearance: Normal appearance. She is well-developed and normal weight.   HENT:      Head: Normocephalic and atraumatic.      Right Ear: Tympanic membrane and external ear normal.      Left Ear: Tympanic membrane and external ear normal.      Mouth/Throat:      Pharynx: No oropharyngeal exudate.   Eyes:      General: No scleral icterus.     Conjunctiva/sclera: Conjunctivae normal.      Pupils: Pupils are equal, round, and reactive to light.   Neck:      Musculoskeletal: Neck supple.      Thyroid: No thyromegaly.      Vascular: No carotid bruit.   Cardiovascular:      Rate and Rhythm: Normal rate and regular rhythm.      Pulses: Normal pulses.      Heart sounds: Normal heart sounds. No murmur. No friction rub. No gallop.    Pulmonary:      Effort: Pulmonary effort is normal.      Breath sounds: Normal breath sounds. No rales.   Abdominal:      General: Bowel sounds are normal. There is no distension.      Palpations: Abdomen is soft.      Tenderness: There is no abdominal tenderness. There is no guarding.      Hernia: No hernia is present.   Musculoskeletal: Normal range of motion.         General: No tenderness.      Right lower leg: No edema.      Left lower leg: No edema.   Skin:     General: Skin is warm and dry.   Neurological:      Mental Status: She is alert and oriented to person, place, and time.      Cranial Nerves: No cranial nerve deficit.      Sensory: No sensory deficit.      Coordination: Coordination normal.      Deep Tendon Reflexes: Reflexes normal.   Psychiatric:         Mood and Affect: Mood normal.         Behavior: Behavior normal.         Judgment: Judgment normal.         Results for orders placed or performed in visit on 11/10/20    POCT urinalysis dipstick, automated    Specimen: Urine   Result Value Ref Range    Color Yellow Yellow, Straw, Dark Yellow, Venus    Clarity, UA Clear Clear    Specific Gravity  1.010 1.005 - 1.030    pH, Urine 6.0 5.0 - 8.0    Leukocytes Small (1+) (A) Negative    Nitrite, UA Negative Negative    Protein, POC Negative Negative mg/dL    Glucose, UA Negative Negative, 1000 mg/dL (3+) mg/dL    Ketones, UA Negative Negative    Urobilinogen, UA Normal Normal    Bilirubin Negative Negative    Blood, UA Negative Negative   POC Glycosylated Hemoglobin (Hb A1C)    Specimen: Blood   Result Value Ref Range    Hemoglobin A1C 8.0 %   POCT microalbumin    Specimen: Urine   Result Value Ref Range    Microalbumin, Urine 10     Creatinine, Urine 50        Diagnoses and all orders for this visit:    1. Routine medical exam (Primary)  -     POCT urinalysis dipstick, automated  -     CBC (No Diff); Future  -     Comprehensive Metabolic Panel; Future  -     Lipid Panel; Future  -     TSH; Future  -     Vitamin D 25 Hydroxy; Future  -     Vitamin B12; Future    2. Non-seasonal allergic rhinitis due to other allergic trigger  -     albuterol sulfate  (90 Base) MCG/ACT inhaler; Inhale 2 puffs Every 4 (Four) Hours As Needed for Wheezing.  Dispense: 18 g; Refill: 5  -     montelukast (SINGULAIR) 10 MG tablet; Take 1 tablet by mouth Daily.  Dispense: 90 tablet; Refill: 1    3. Pedal edema  -     furosemide (LASIX) 20 MG tablet; Take 1 tablet by mouth Daily As Needed (as needed). swelling  Dispense: 90 tablet; Refill: 1    4. Uncontrolled type 2 diabetes mellitus with hyperglycemia (CMS/[facility])  Comments:  higher, counseled to increase exercise. add Januvia. has fam h/o bladder cancer.  Orders:  -     Discontinue: metFORMIN ER (GLUCOPHAGE-XR) 750 MG 24 hr tablet; Take 2 tablets by mouth Daily.  Dispense: 180 tablet; Refill: 1  -     Discontinue: SITagliptin (Januvia) 50 MG tablet; Take 1 tablet by mouth Daily.  Dispense: 90 tablet;  Refill: 1  -     POC Glycosylated Hemoglobin (Hb A1C)  -     POCT microalbumin  -     metFORMIN ER (GLUCOPHAGE-XR) 750 MG 24 hr tablet; Take 1 tablet by mouth Daily.  Dispense: 90 tablet; Refill: 1  -     Semaglutide (Rybelsus) 14 MG tablet; Take 1 tablet by mouth Daily. Replaces januvia  Dispense: 30 tablet; Refill: 5    5. Other hyperlipidemia  -     pravastatin (PRAVACHOL) 10 MG tablet; Take 1 tablet by mouth Daily.  Dispense: 90 tablet; Refill: 1  -     Comprehensive Metabolic Panel; Future  -     Lipid Panel; Future    6. Essential hypertension  -     olmesartan-hydrochlorothiazide (BENICAR HCT) 40-12.5 MG per tablet; Take 1 tablet by mouth Daily.  Dispense: 60 tablet; Refill: 0    7. Leukocytosis, unspecified type  -     Urine Culture - Urine, Urine, Clean Catch; Future    Other orders  -     potassium chloride (K-DUR,KLOR-CON) 10 MEQ CR tablet; Take 1 tablet by mouth Daily As Needed (with lasix).  Dispense: 90 tablet; Refill: 1    sugars persistently high, trial of rybelsus. Sample with coupon given.  rx for benicar to be filled at [facility] using [facility]- $22 for 60 pills.    Health Maintenance   Topic Date Due   • DIABETIC EYE EXAM  03/10/2020   • DIABETIC FOOT EXAM  11/06/2020   • ZOSTER VACCINE (2 of 3) 11/14/2020 (Originally 1/18/2016)   • COLONOSCOPY  12/03/2020 (Originally 12/9/2018)   • MAMMOGRAM  12/11/2020   • HEMOGLOBIN A1C  05/10/2021   • PAP SMEAR  06/26/2021   • LIPID PANEL  11/10/2021   • URINE MICROALBUMIN  11/10/2021   • ANNUAL PHYSICAL  11/11/2021   • DXA SCAN  06/15/2022   • TDAP/TD VACCINES (2 - Td) 02/13/2024   • HEPATITIS C SCREENING  Completed   • Pneumococcal Vaccine 0-64  Completed   • INFLUENZA VACCINE  Completed       Discussion/Summary  Impression: health maintenance visit. Currently, she eats an adequate diet and has an adequate exercise regimen.   Cervical cancer screening:Pap smear is current.   Breast cancer screening: mammogram is current.   Colorectal cancer screening:  colonoscopy is due- scheduled for 12/3 by Dr. Duong.  Osteoporosis screening: Bone mineral density test is utd, on Prolia .   Screening lab work includes hemoglobin, glucose, lipid profile, thyroid function testing, 25-hydroxyvitamin D and urinalysis.   Immunizations are needed, immunizations will be given as outlined in the orders.   Advice and education were given regarding cardiovascular risk reduction, healthy dietary habits, Seatbelt and helmet use and self skin examination.     Return in about 3 months (around 2/10/2021) for Next scheduled follow up and physical 1 year.

## 2020-11-11 LAB — BACTERIA SPEC AEROBE CULT: NO GROWTH

## 2020-12-17 ENCOUNTER — HOSPITAL ENCOUNTER (OUTPATIENT)
Dept: MAMMOGRAPHY | Facility: HOSPITAL | Age: 60
Discharge: HOME | End: 2020-12-17
Admitting: INTERNAL MEDICINE

## 2020-12-17 DIAGNOSIS — Z12.31 VISIT FOR SCREENING MAMMOGRAM: ICD-10-CM

## 2020-12-17 PROCEDURE — 77063 BREAST TOMOSYNTHESIS BI: CPT

## 2020-12-17 PROCEDURE — 77063 BREAST TOMOSYNTHESIS BI: CPT | Performed by: RADIOLOGY

## 2020-12-17 PROCEDURE — 77067 SCR MAMMO BI INCL CAD: CPT

## 2020-12-17 PROCEDURE — 77067 SCR MAMMO BI INCL CAD: CPT | Performed by: RADIOLOGY

## 2021-01-19 DIAGNOSIS — Z17.0 MALIGNANT NEOPLASM OF LOWER-OUTER QUADRANT OF RIGHT BREAST OF FEMALE, ESTROGEN RECEPTOR POSITIVE (HCC): Primary | ICD-10-CM

## 2021-01-19 DIAGNOSIS — C50.511 MALIGNANT NEOPLASM OF LOWER-OUTER QUADRANT OF RIGHT BREAST OF FEMALE, ESTROGEN RECEPTOR POSITIVE (HCC): Primary | ICD-10-CM

## 2021-01-20 ENCOUNTER — OFFICE VISIT (OUTPATIENT)
Dept: ONCOLOGY | Facility: CLINIC | Age: 61
End: 2021-01-20

## 2021-01-20 ENCOUNTER — LAB (OUTPATIENT)
Dept: LAB | Facility: HOSPITAL | Age: 61
End: 2021-01-20

## 2021-01-20 ENCOUNTER — HOSPITAL ENCOUNTER (OUTPATIENT)
Dept: ONCOLOGY | Facility: HOSPITAL | Age: 61
Setting detail: INFUSION SERIES
Discharge: HOME | End: 2021-01-20

## 2021-01-20 VITALS
TEMPERATURE: 98.2 F | RESPIRATION RATE: 16 BRPM | WEIGHT: 246 LBS | HEART RATE: 71 BPM | DIASTOLIC BLOOD PRESSURE: 74 MMHG | OXYGEN SATURATION: 92 % | SYSTOLIC BLOOD PRESSURE: 166 MMHG | HEIGHT: 59 IN | BODY MASS INDEX: 49.59 KG/M2

## 2021-01-20 DIAGNOSIS — M81.0 OSTEOPOROSIS, UNSPECIFIED OSTEOPOROSIS TYPE, UNSPECIFIED PATHOLOGICAL FRACTURE PRESENCE: ICD-10-CM

## 2021-01-20 DIAGNOSIS — Z17.0 MALIGNANT NEOPLASM OF LOWER-OUTER QUADRANT OF RIGHT BREAST OF FEMALE, ESTROGEN RECEPTOR POSITIVE (HCC): ICD-10-CM

## 2021-01-20 DIAGNOSIS — C50.511 MALIGNANT NEOPLASM OF LOWER-OUTER QUADRANT OF RIGHT BREAST OF FEMALE, ESTROGEN RECEPTOR POSITIVE (HCC): Primary | ICD-10-CM

## 2021-01-20 DIAGNOSIS — C50.511 MALIGNANT NEOPLASM OF LOWER-OUTER QUADRANT OF RIGHT BREAST OF FEMALE, ESTROGEN RECEPTOR POSITIVE (HCC): ICD-10-CM

## 2021-01-20 DIAGNOSIS — Z17.0 MALIGNANT NEOPLASM OF LOWER-OUTER QUADRANT OF RIGHT BREAST OF FEMALE, ESTROGEN RECEPTOR POSITIVE (HCC): Primary | ICD-10-CM

## 2021-01-20 LAB
BASOPHILS # BLD AUTO: 0.03 10*3/MM3 (ref 0–0.2)
BASOPHILS NFR BLD AUTO: 0.4 % (ref 0–1.5)
DEPRECATED RDW RBC AUTO: 42.5 FL (ref 37–54)
EOSINOPHIL # BLD AUTO: 0.1 10*3/MM3 (ref 0–0.4)
EOSINOPHIL NFR BLD AUTO: 1.4 % (ref 0.3–6.2)
ERYTHROCYTE [DISTWIDTH] IN BLOOD BY AUTOMATED COUNT: 13.7 % (ref 12.3–15.4)
HCT VFR BLD AUTO: 43.2 % (ref 34–46.6)
HGB BLD-MCNC: 13.3 G/DL (ref 12–15.9)
IMM GRANULOCYTES # BLD AUTO: 0.02 10*3/MM3 (ref 0–0.05)
IMM GRANULOCYTES NFR BLD AUTO: 0.3 % (ref 0–0.5)
LYMPHOCYTES # BLD AUTO: 1.4 10*3/MM3 (ref 0.7–3.1)
LYMPHOCYTES NFR BLD AUTO: 19 % (ref 19.6–45.3)
MCH RBC QN AUTO: 26.1 PG (ref 26.6–33)
MCHC RBC AUTO-ENTMCNC: 30.8 G/DL (ref 31.5–35.7)
MCV RBC AUTO: 84.9 FL (ref 79–97)
MONOCYTES # BLD AUTO: 0.51 10*3/MM3 (ref 0.1–0.9)
MONOCYTES NFR BLD AUTO: 6.9 % (ref 5–12)
NEUTROPHILS NFR BLD AUTO: 5.3 10*3/MM3 (ref 1.7–7)
NEUTROPHILS NFR BLD AUTO: 72 % (ref 42.7–76)
NRBC BLD AUTO-RTO: 0 /100 WBC (ref 0–0.2)
PLAT MORPH BLD: NORMAL
PLATELET # BLD AUTO: 238 10*3/MM3 (ref 140–450)
PMV BLD AUTO: 10.3 FL (ref 6–12)
RBC # BLD AUTO: 5.09 10*6/MM3 (ref 3.77–5.28)
RBC MORPH BLD: NORMAL
WBC # BLD AUTO: 7.36 10*3/MM3 (ref 3.4–10.8)
WBC MORPH BLD: NORMAL

## 2021-01-20 PROCEDURE — 36415 COLL VENOUS BLD VENIPUNCTURE: CPT

## 2021-01-20 PROCEDURE — 85007 BL SMEAR W/DIFF WBC COUNT: CPT

## 2021-01-20 PROCEDURE — 85025 COMPLETE CBC W/AUTO DIFF WBC: CPT

## 2021-01-20 PROCEDURE — 99214 OFFICE O/P EST MOD 30 MIN: CPT | Performed by: NURSE PRACTITIONER

## 2021-01-20 PROCEDURE — 25010000002 DENOSUMAB 60 MG/ML SOLUTION PREFILLED SYRINGE: Performed by: NURSE PRACTITIONER

## 2021-01-20 PROCEDURE — 96372 THER/PROPH/DIAG INJ SC/IM: CPT

## 2021-01-20 RX ADMIN — DENOSUMAB 60 MG: 60 INJECTION SUBCUTANEOUS at 12:12

## 2021-01-20 NOTE — PROGRESS NOTES
DATE OF VISIT: 1/20/2021    REASON FOR VISIT: Follow-up for invasive ductal carcinoma of the right breast,  H2bB3P1, estrogen receptor positive, HER-2/greg negative, low risk disease based  on Oncotype.      HISTORY OF PRESENT ILLNESS: The patient is a very pleasant 60 y.o. female  who was in her usual state of health until her most recent mammogram done  03/2016. She had an abnormality in the right breast 10 o'clock position. The  patient had an ultrasound guided biopsy 03/10/2016 that revealed invasive  ductal carcinoma. The patient elected to proceed with lumpectomy on 05/03/2016  with Dr. Sales. Final pathology revealed 1.4 cm mass, tumor at the 7  o'clock position, ER positive more than 95%, HI positive more than 85%,  HER-2/greg 0+. The patient had clear surgical margins, Kimmie score 8 out of 9,  and 1 negative sentinel lymph node. The patient had no postoperative  complications. The patient had Oncotype testing. Her score came back at 16,  low risk disease group. Risk of recurrence 10% with tamoxifen alone. The  patient was started on Arimidex 1 mg p.o. daily 06/03/2016. The patient is  here today in scheduled follow-up visit.      SUBJECTIVE: The patient is here today by herself.  She is tolerating her Arimidex without any significant side effects.  No new issues or concerns      REVIEW OF SYSTEMS: All the other 9 systems are reviewed by me and are negative  except what is mentioned in HPI.      PAST MEDICAL HISTORY/SOCIAL HISTORY/FAMILY HISTORY: Unchanged from prior  documentation done 8/2020.      Current Outpatient Medications:   •  albuterol sulfate  (90 Base) MCG/ACT inhaler, Inhale 2 puffs Every 4 (Four) Hours As Needed for Wheezing., Disp: 18 g, Rfl: 5  •  anastrozole (ARIMIDEX) 1 MG tablet, TAKE 1 TABLET BY MOUTH DAILY, Disp: 90 tablet, Rfl: 3  •  fexofenadine (ALLEGRA) 30 MG tablet, Take 30 mg by mouth daily., Disp: , Rfl:   •  furosemide (LASIX) 20 MG tablet, Take 1 tablet by mouth  "Daily As Needed (as needed). swelling, Disp: 90 tablet, Rfl: 1  •  Melatonin 10 MG capsule, Take  by mouth., Disp: , Rfl:   •  metFORMIN ER (GLUCOPHAGE-XR) 750 MG 24 hr tablet, Take 1 tablet by mouth Daily., Disp: 90 tablet, Rfl: 1  •  mometasone-formoterol (Dulera) 100-5 MCG/ACT inhaler, Inhale 2 puffs Daily., Disp: 13 g, Rfl: 5  •  montelukast (SINGULAIR) 10 MG tablet, Take 1 tablet by mouth Daily., Disp: 90 tablet, Rfl: 1  •  olmesartan-hydrochlorothiazide (BENICAR HCT) 40-12.5 MG per tablet, Take 1 tablet by mouth Daily., Disp: 60 tablet, Rfl: 0  •  potassium chloride (K-DUR,KLOR-CON) 10 MEQ CR tablet, Take 1 tablet by mouth Daily As Needed (with lasix)., Disp: 90 tablet, Rfl: 1  •  potassium chloride 10 MEQ CR tablet, TAKE 1 TABLET BY MOUTH EVERY DAY AS NEEDED. TAKE WITH LASIX, Disp: 30 tablet, Rfl: 0  •  pravastatin (PRAVACHOL) 10 MG tablet, Take 1 tablet by mouth Daily., Disp: 90 tablet, Rfl: 1  •  Semaglutide (Rybelsus) 14 MG tablet, Take 1 tablet by mouth Daily. Replaces januvia, Disp: 30 tablet, Rfl: 5  No current facility-administered medications for this visit.     PHYSICAL EXAMINATION:   /74   Pulse 71   Temp 98.2 °F (36.8 °C) (Temporal)   Resp 16   Ht 149.9 cm (59.02\")   Wt 112 kg (246 lb)   SpO2 92%   BMI 49.66 kg/m²   ECOG1  GENERAL: Age appropriate. No acute distress. Obese.  HEENT: Head atraumatic, normocephalic.   NECK: Supple. No JVD. No lymphadenopathy.   LUNGS: Clear to auscultation bilaterally. No wheezing. No rhonchi.   HEART: Regular rate and rhythm. S1, S2, no murmurs.   ABDOMEN: Soft, nontender, nondistended. Bowel sounds positive. No  hepatosplenomegaly.   EXTREMITIES: No clubbing, cyanosis, or edema.   SKIN: No rashes. No purpura.   NEUROLOGIC: Awake and oriented x3. Strength 5 out of 5 in all muscle groups.     No visits with results within 2 Week(s) from this visit.   Latest known visit with results is:   Lab on 11/10/2020   Component Date Value Ref Range Status   • WBC " 11/10/2020 7.69  3.40 - 10.80 10*3/mm3 Final   • RBC 11/10/2020 4.98  3.77 - 5.28 10*6/mm3 Final   • Hemoglobin 11/10/2020 13.6  12.0 - 15.9 g/dL Final   • Hematocrit 11/10/2020 41.3  34.0 - 46.6 % Final   • MCV 11/10/2020 82.9  79.0 - 97.0 fL Final   • MCH 11/10/2020 27.3  26.6 - 33.0 pg Final   • MCHC 11/10/2020 32.9  31.5 - 35.7 g/dL Final   • RDW 11/10/2020 12.8  12.3 - 15.4 % Final   • RDW-SD 11/10/2020 38.6  37.0 - 54.0 fl Final   • MPV 11/10/2020 9.1  6.0 - 12.0 fL Final   • Platelets 11/10/2020 258  140 - 450 10*3/mm3 Final   • Glucose 11/10/2020 111* 65 - 99 mg/dL Final   • BUN 11/10/2020 17  8 - 23 mg/dL Final   • Creatinine 11/10/2020 0.71  0.57 - 1.00 mg/dL Final   • Sodium 11/10/2020 135* 136 - 145 mmol/L Final   • Potassium 11/10/2020 4.4  3.5 - 5.2 mmol/L Final   • Chloride 11/10/2020 96* 98 - 107 mmol/L Final   • CO2 11/10/2020 26.5  22.0 - 29.0 mmol/L Final   • Calcium 11/10/2020 9.7  8.6 - 10.5 mg/dL Final   • Total Protein 11/10/2020 7.6  6.0 - 8.5 g/dL Final   • Albumin 11/10/2020 4.40  3.50 - 5.20 g/dL Final   • ALT (SGPT) 11/10/2020 32  1 - 33 U/L Final   • AST (SGOT) 11/10/2020 25  1 - 32 U/L Final   • Alkaline Phosphatase 11/10/2020 85  39 - 117 U/L Final   • Total Bilirubin 11/10/2020 0.4  0.0 - 1.2 mg/dL Final   • eGFR Non African Amer 11/10/2020 84  >60 mL/min/1.73 Final   • Globulin 11/10/2020 3.2  gm/dL Final   • A/G Ratio 11/10/2020 1.4  g/dL Final   • BUN/Creatinine Ratio 11/10/2020 23.9  7.0 - 25.0 Final   • Anion Gap 11/10/2020 12.5  5.0 - 15.0 mmol/L Final   • Total Cholesterol 11/10/2020 140  0 - 200 mg/dL Final   • Triglycerides 11/10/2020 65  0 - 150 mg/dL Final   • HDL Cholesterol 11/10/2020 41  40 - 60 mg/dL Final   • LDL Cholesterol  11/10/2020 86  0 - 100 mg/dL Final   • VLDL Cholesterol 11/10/2020 13  5 - 40 mg/dL Final   • LDL/HDL Ratio 11/10/2020 2.10   Final   • TSH 11/10/2020 2.210  0.270 - 4.200 uIU/mL Final   • 25 Hydroxy, Vitamin D 11/10/2020 62.3  30.0 - 100.0 ng/ml  "Final   • Vitamin B-12 11/10/2020 260  211 - 946 pg/mL Final   • Urine Culture 11/10/2020 No growth   Final      DUAL-ENERGY X-RAY ABSORPTIOMETRY (DXA)     INDICATION:  Monitoring treatment, history of glucocorticoids, cancer,  asthma or emphysema     COMPARISON: Previous bone mineral density exam performed on 06/08/2018     PROCEDURE: A DXA scan was performed using a Hologic densitometer.     The lumbar spine L1-L4 was evaluated as well as left total hip.     The T-score compares the patient's bone mineral density with the peak  bone mass of young normal patients.  According to criteria established  by the World Health Organization, patients with T-scores  between 1.0  and 2.5 standard deviations BELOW the mean are osteopenic (low bone  mass).  Patients with T-scores EQUAL TO OR GREATER than 2.5 standard  deviations below the mean are osteoporotic.     The Z-score compares the patient bone mineral density with age and sex  matched peers.  According to the International Society for Clinical  Densitometry's 2007 consensus conference:  In women prior to menopause  and men less than age 50, Z-scores, not T-scores are preferred.  A  Z-score of -2.0 or lower is defined as \"below the expected range for  age\" and a Z-score above -2.0 is \"within the expected range for age.\"   The WHO diagnostic criteria may be applied in women in the menopausal  transition.  Osteoporosis cannot be diagnosed in men under age 50 on the  basis of BMD alone.     TECHNICAL QUALITY:  The study is of good technical quality.     RESULTS:     Lumbar Spine:  The BMD measured in the L1-L4 region is 0.975 g/cm2.  The  average T-score is -0.7.  The Z-score is 0.7.     Total Hip:  The BMD measured at the left total proximal femur is 0.982  g/cm2.  The T-score is 0.3.  The Z-score is 1.3.     Femoral Neck:  The BMD measured at the left femoral neck is 0.633 g/cm2.   The T-score is -1.9.  The Z-score is -0.7.     IMPRESSION:  Osteopenia of the left femoral " neck.       ASSESSMENT: The patient is a very pleasant 60 y.o. female with stage I  invasive ductal carcinoma of the right breast.      PROBLEM LIST:   1. Stage I invasive ductal carcinoma of the right breast, [id number]:  A. Tumor at the 7 o'clock position, tumor size 1.4 cm, estrogen receptor and progesterone  receptor strongly positive, HER-2/greg negative zero by IHC. Low risk group Oncotype testing, score of 16, 10% risk of recurrence with hormone treatment alone.  B. Diagnosed on ultrasound guided biopsy 03/10/2016.   C. Status post lumpectomy with clear margins done by Dr. Sales 05/03/2016.   D. Started Arimidex 1 mg p.o. daily 06/03/2016.   E. Completed breast adjuvant radiation by Dr. Thompson.   2.  Osteoporosis:  A.  DEXA scan done June 2018 revealed T score -2.3  B. Starting Prolia 1/22/2020.   3. Right breast mammogram abnormality  4.  Obesity     PLAN:   1. I will review labs today and notify patient of any significant findings.    2. The patient will continue Arimidex 1 mg p.o. daily.    3.  I will continue calcium with vitamin D daily.  I will also continue Prolia 60 mg subcu every 6-month.  I did review bone density scan from 6/2020,  I explained that she currently has osteopenia.  We will do 2-year follow-up study which would be due June 2022.  4. I reviewed the mammogram results from 12/17/2020 with patient. I reassured her there were benign findings with no evidence of cancer recurrence. She will need one year follow up mammogram which will be due 12/2021    5. We will see the patient back in 6 months with repeat labs including CBC and CMP.   6. I encouraged her to continue efforts towards increased exercise and weight loss.       Nuvia Savage, APRN    1/20/2021

## 2021-02-10 ENCOUNTER — OFFICE VISIT (OUTPATIENT)
Dept: INTERNAL MEDICINE | Facility: CLINIC | Age: 61
End: 2021-02-10

## 2021-02-10 VITALS
DIASTOLIC BLOOD PRESSURE: 74 MMHG | BODY MASS INDEX: 48.79 KG/M2 | HEART RATE: 50 BPM | OXYGEN SATURATION: 96 % | TEMPERATURE: 96.8 F | SYSTOLIC BLOOD PRESSURE: 136 MMHG | HEIGHT: 59 IN | WEIGHT: 242 LBS

## 2021-02-10 DIAGNOSIS — I10 ESSENTIAL HYPERTENSION: ICD-10-CM

## 2021-02-10 DIAGNOSIS — E53.8 B12 DEFICIENCY: ICD-10-CM

## 2021-02-10 DIAGNOSIS — E11.65 UNCONTROLLED TYPE 2 DIABETES MELLITUS WITH HYPERGLYCEMIA (HCC): Primary | ICD-10-CM

## 2021-02-10 LAB — HBA1C MFR BLD: 7.4 %

## 2021-02-10 PROCEDURE — 99213 OFFICE O/P EST LOW 20 MIN: CPT | Performed by: INTERNAL MEDICINE

## 2021-02-10 PROCEDURE — 83036 HEMOGLOBIN GLYCOSYLATED A1C: CPT | Performed by: INTERNAL MEDICINE

## 2021-02-10 PROCEDURE — 96372 THER/PROPH/DIAG INJ SC/IM: CPT | Performed by: INTERNAL MEDICINE

## 2021-02-10 RX ORDER — CYANOCOBALAMIN 1000 UG/ML
1000 INJECTION, SOLUTION INTRAMUSCULAR; SUBCUTANEOUS
Status: DISCONTINUED | OUTPATIENT
Start: 2021-02-10 | End: 2022-02-05

## 2021-02-10 RX ADMIN — CYANOCOBALAMIN 1000 MCG: 1000 INJECTION, SOLUTION INTRAMUSCULAR; SUBCUTANEOUS at 12:07

## 2021-02-10 NOTE — PROGRESS NOTES
Diabetes    Subjective   Dana Rincon is a 60 y.o. female is here today for follow-up.    History of Present Illness   Here for a follow up on her Dm2, htn . Had a little bit of stomach upset(43e, has cut back on diet coke.  Has tried to sign up for the covid vaccine.    Current Outpatient Medications:   •  albuterol sulfate  (90 Base) MCG/ACT inhaler, Inhale 2 puffs Every 4 (Four) Hours As Needed for Wheezing., Disp: 18 g, Rfl: 5  •  anastrozole (ARIMIDEX) 1 MG tablet, TAKE 1 TABLET BY MOUTH DAILY, Disp: 90 tablet, Rfl: 3  •  fexofenadine (ALLEGRA) 30 MG tablet, Take 30 mg by mouth daily., Disp: , Rfl:   •  furosemide (LASIX) 20 MG tablet, Take 1 tablet by mouth Daily As Needed (as needed). swelling, Disp: 90 tablet, Rfl: 1  •  Melatonin 10 MG capsule, Take  by mouth., Disp: , Rfl:   •  metFORMIN ER (GLUCOPHAGE-XR) 750 MG 24 hr tablet, Take 1 tablet by mouth Daily., Disp: 90 tablet, Rfl: 1  •  mometasone-formoterol (Dulera) 100-5 MCG/ACT inhaler, Inhale 2 puffs Daily., Disp: 13 g, Rfl: 5  •  montelukast (SINGULAIR) 10 MG tablet, Take 1 tablet by mouth Daily., Disp: 90 tablet, Rfl: 1  •  olmesartan-hydrochlorothiazide (BENICAR HCT) 40-12.5 MG per tablet, Take 1 tablet by mouth Daily., Disp: 60 tablet, Rfl: 0  •  potassium chloride (K-DUR,KLOR-CON) 10 MEQ CR tablet, Take 1 tablet by mouth Daily As Needed (with lasix)., Disp: 90 tablet, Rfl: 1  •  potassium chloride 10 MEQ CR tablet, TAKE 1 TABLET BY MOUTH EVERY DAY AS NEEDED. TAKE WITH LASIX, Disp: 30 tablet, Rfl: 0  •  pravastatin (PRAVACHOL) 10 MG tablet, Take 1 tablet by mouth Daily., Disp: 90 tablet, Rfl: 1  •  Semaglutide (Rybelsus) 14 MG tablet, Take 1 tablet by mouth Daily. Replaces januvia, Disp: 30 tablet, Rfl: 5    Current Facility-Administered Medications:   •  cyanocobalamin injection 1,000 mcg, 1,000 mcg, Intramuscular, Q28 Days, Noreen Smith MD, 1,000 mcg at 02/10/21 1207      The following portions of the patient's history were reviewed  "and updated as appropriate: allergies, current medications, past family history, past medical history, past social history, past surgical history and problem list.    Review of Systems   Constitutional: Negative.  Negative for chills and fever.   HENT: Negative for ear discharge, ear pain, sinus pressure and sore throat.    Respiratory: Negative for cough, chest tightness and shortness of breath.    Cardiovascular: Negative for chest pain, palpitations and leg swelling.   Gastrointestinal: Negative for diarrhea, nausea and vomiting.   Musculoskeletal: Negative for arthralgias, back pain and myalgias.   Neurological: Negative for dizziness, syncope and headaches.   Psychiatric/Behavioral: Negative for confusion and sleep disturbance.       Objective   /74   Pulse 50   Temp 96.8 °F (36 °C)   Ht 149.9 cm (59.02\")   Wt 110 kg (242 lb)   SpO2 96% Comment: ra  BMI 48.84 kg/m²   Physical Exam  Vitals signs and nursing note reviewed.   Constitutional:       Appearance: She is well-developed.   HENT:      Head: Normocephalic and atraumatic.      Right Ear: External ear normal.      Left Ear: External ear normal.      Mouth/Throat:      Pharynx: No oropharyngeal exudate.   Eyes:      Conjunctiva/sclera: Conjunctivae normal.      Pupils: Pupils are equal, round, and reactive to light.   Neck:      Musculoskeletal: Neck supple.      Thyroid: No thyromegaly.   Cardiovascular:      Rate and Rhythm: Normal rate and regular rhythm.   Pulmonary:      Effort: Pulmonary effort is normal.      Breath sounds: Normal breath sounds.   Abdominal:      General: Bowel sounds are normal. There is no distension.      Palpations: Abdomen is soft.      Tenderness: There is no abdominal tenderness.   Skin:     General: Skin is warm and dry.   Neurological:      Mental Status: She is alert and oriented to person, place, and time.      Cranial Nerves: No cranial nerve deficit.   Psychiatric:         Judgment: Judgment normal. "           Results for orders placed or performed in visit on 02/10/21   POC Glycosylated Hemoglobin (Hb A1C)    Specimen: Blood   Result Value Ref Range    Hemoglobin A1C 7.4 %             Assessment/Plan   Diagnoses and all orders for this visit:    Uncontrolled type 2 diabetes mellitus with hyperglycemia (CMS/[facility])  -     POC Glycosylated Hemoglobin (Hb A1C)    Essential hypertension  Comments:  BP better on recheck. contionue to monitor.    B12 deficiency  -     cyanocobalamin injection 1,000 mcg      Commended on the improved sugars. conitnue current regimen.           Return in about 3 months (around 5/10/2021) for Next scheduled follow up- fasting labs.

## 2021-03-17 ENCOUNTER — CLINICAL SUPPORT (OUTPATIENT)
Dept: INTERNAL MEDICINE | Facility: CLINIC | Age: 61
End: 2021-03-17

## 2021-03-17 DIAGNOSIS — E53.8 B12 DEFICIENCY: ICD-10-CM

## 2021-03-17 PROCEDURE — 96372 THER/PROPH/DIAG INJ SC/IM: CPT | Performed by: INTERNAL MEDICINE

## 2021-03-17 RX ADMIN — CYANOCOBALAMIN 1000 MCG: 1000 INJECTION, SOLUTION INTRAMUSCULAR; SUBCUTANEOUS at 14:15

## 2021-04-07 DIAGNOSIS — I10 ESSENTIAL HYPERTENSION: ICD-10-CM

## 2021-04-07 NOTE — TELEPHONE ENCOUNTER
Last Office Visit: 2/10/21  Next Office Visit: 5/11/21    Labs completed in past 6 months? yes  Labs completed in past year? yes    Last Refill Date:11/10/20  Quantity:60  Refills:0    Pharmacy:     Please review pended refill request for any changes needed on refills or quantities. Thank you!

## 2021-04-08 RX ORDER — OLMESARTAN MEDOXOMIL AND HYDROCHLOROTHIAZIDE 40/12.5 40; 12.5 MG/1; MG/1
1 TABLET ORAL DAILY
Qty: 90 TABLET | Refills: 1 | Status: SHIPPED | OUTPATIENT
Start: 2021-04-08 | End: 2021-10-04

## 2021-04-08 RX ORDER — OLMESARTAN MEDOXOMIL AND HYDROCHLOROTHIAZIDE 40/12.5 40; 12.5 MG/1; MG/1
1 TABLET ORAL DAILY
Qty: 90 TABLET | Refills: 1 | Status: SHIPPED | OUTPATIENT
Start: 2021-04-08 | End: 2021-04-08 | Stop reason: SDUPTHER

## 2021-04-22 ENCOUNTER — CLINICAL SUPPORT (OUTPATIENT)
Dept: INTERNAL MEDICINE | Facility: CLINIC | Age: 61
End: 2021-04-22

## 2021-04-22 DIAGNOSIS — E53.8 B12 DEFICIENCY: ICD-10-CM

## 2021-04-22 PROCEDURE — 96372 THER/PROPH/DIAG INJ SC/IM: CPT | Performed by: INTERNAL MEDICINE

## 2021-04-22 RX ADMIN — CYANOCOBALAMIN 1000 MCG: 1000 INJECTION, SOLUTION INTRAMUSCULAR; SUBCUTANEOUS at 13:43

## 2021-05-11 DIAGNOSIS — R60.0 PEDAL EDEMA: ICD-10-CM

## 2021-05-11 DIAGNOSIS — E11.65 UNCONTROLLED TYPE 2 DIABETES MELLITUS WITH HYPERGLYCEMIA (HCC): ICD-10-CM

## 2021-05-11 NOTE — TELEPHONE ENCOUNTER
Last Office Visit: 2/10/21  Next Office Visit: 5/25/21    Labs completed in past 6 months? yes  Labs completed in past year? yes    Last Refill Date: 11/10/20  Quantity:90/30  Refills:1/5    Pharmacy:     Please review pended refill request for any changes needed on refills or quantities. Thank you!

## 2021-05-12 RX ORDER — POTASSIUM CHLORIDE 750 MG/1
TABLET, EXTENDED RELEASE ORAL
Qty: 90 TABLET | Refills: 1 | Status: SHIPPED | OUTPATIENT
Start: 2021-05-12 | End: 2021-06-06

## 2021-05-12 RX ORDER — FUROSEMIDE 20 MG/1
TABLET ORAL
Qty: 90 TABLET | Refills: 1 | Status: SHIPPED | OUTPATIENT
Start: 2021-05-12 | End: 2021-06-06

## 2021-05-12 RX ORDER — ORAL SEMAGLUTIDE 14 MG/1
1 TABLET ORAL DAILY
Qty: 30 TABLET | Refills: 5 | Status: SHIPPED | OUTPATIENT
Start: 2021-05-12

## 2021-05-25 ENCOUNTER — LAB (OUTPATIENT)
Dept: LAB | Facility: HOSPITAL | Age: 61
End: 2021-05-25

## 2021-05-25 ENCOUNTER — OFFICE VISIT (OUTPATIENT)
Dept: INTERNAL MEDICINE | Facility: CLINIC | Age: 61
End: 2021-05-25

## 2021-05-25 VITALS
DIASTOLIC BLOOD PRESSURE: 84 MMHG | HEART RATE: 36 BPM | HEIGHT: 59 IN | BODY MASS INDEX: 48.99 KG/M2 | WEIGHT: 243 LBS | TEMPERATURE: 97.1 F | SYSTOLIC BLOOD PRESSURE: 142 MMHG | OXYGEN SATURATION: 97 %

## 2021-05-25 DIAGNOSIS — E11.65 UNCONTROLLED TYPE 2 DIABETES MELLITUS WITH HYPERGLYCEMIA (HCC): Primary | ICD-10-CM

## 2021-05-25 DIAGNOSIS — E78.49 OTHER HYPERLIPIDEMIA: ICD-10-CM

## 2021-05-25 DIAGNOSIS — J45.909 UNCOMPLICATED ASTHMA: ICD-10-CM

## 2021-05-25 DIAGNOSIS — J30.89 NON-SEASONAL ALLERGIC RHINITIS DUE TO OTHER ALLERGIC TRIGGER: ICD-10-CM

## 2021-05-25 DIAGNOSIS — E11.65 UNCONTROLLED TYPE 2 DIABETES MELLITUS WITH HYPERGLYCEMIA (HCC): ICD-10-CM

## 2021-05-25 DIAGNOSIS — R00.1 BRADYCARDIA: ICD-10-CM

## 2021-05-25 DIAGNOSIS — E78.5 DYSLIPIDEMIA: ICD-10-CM

## 2021-05-25 LAB — HBA1C MFR BLD: 7.5 %

## 2021-05-25 PROCEDURE — 96372 THER/PROPH/DIAG INJ SC/IM: CPT | Performed by: INTERNAL MEDICINE

## 2021-05-25 PROCEDURE — 99214 OFFICE O/P EST MOD 30 MIN: CPT | Performed by: INTERNAL MEDICINE

## 2021-05-25 PROCEDURE — 80053 COMPREHEN METABOLIC PANEL: CPT

## 2021-05-25 PROCEDURE — 83036 HEMOGLOBIN GLYCOSYLATED A1C: CPT | Performed by: INTERNAL MEDICINE

## 2021-05-25 PROCEDURE — 93000 ELECTROCARDIOGRAM COMPLETE: CPT | Performed by: INTERNAL MEDICINE

## 2021-05-25 PROCEDURE — 80061 LIPID PANEL: CPT

## 2021-05-25 RX ORDER — MONTELUKAST SODIUM 10 MG/1
10 TABLET ORAL DAILY
Qty: 90 TABLET | Refills: 1 | Status: SHIPPED | OUTPATIENT
Start: 2021-05-25 | End: 2022-01-31 | Stop reason: SDUPTHER

## 2021-05-25 RX ORDER — METFORMIN HYDROCHLORIDE 750 MG/1
750 TABLET, EXTENDED RELEASE ORAL DAILY
Qty: 90 TABLET | Refills: 1 | Status: SHIPPED | OUTPATIENT
Start: 2021-05-25 | End: 2022-01-31 | Stop reason: SDUPTHER

## 2021-05-25 RX ORDER — GLIMEPIRIDE 2 MG/1
2 TABLET ORAL
Qty: 30 TABLET | Refills: 5 | Status: SHIPPED | OUTPATIENT
Start: 2021-05-25 | End: 2021-11-03

## 2021-05-25 RX ORDER — PRAVASTATIN SODIUM 10 MG
10 TABLET ORAL DAILY
Qty: 90 TABLET | Refills: 1 | Status: SHIPPED | OUTPATIENT
Start: 2021-05-25 | End: 2021-12-03

## 2021-05-25 RX ADMIN — CYANOCOBALAMIN 1000 MCG: 1000 INJECTION, SOLUTION INTRAMUSCULAR; SUBCUTANEOUS at 14:22

## 2021-05-26 LAB
ALBUMIN SERPL-MCNC: 4.3 G/DL (ref 3.5–5.2)
ALBUMIN/GLOB SERPL: 1.3 G/DL
ALP SERPL-CCNC: 81 U/L (ref 39–117)
ALT SERPL W P-5'-P-CCNC: 25 U/L (ref 1–33)
ANION GAP SERPL CALCULATED.3IONS-SCNC: 11.3 MMOL/L (ref 5–15)
AST SERPL-CCNC: 21 U/L (ref 1–32)
BILIRUB SERPL-MCNC: 0.5 MG/DL (ref 0–1.2)
BUN SERPL-MCNC: 16 MG/DL (ref 8–23)
BUN/CREAT SERPL: 24.2 (ref 7–25)
CALCIUM SPEC-SCNC: 10.4 MG/DL (ref 8.6–10.5)
CHLORIDE SERPL-SCNC: 98 MMOL/L (ref 98–107)
CHOLEST SERPL-MCNC: 145 MG/DL (ref 0–200)
CO2 SERPL-SCNC: 27.7 MMOL/L (ref 22–29)
CREAT SERPL-MCNC: 0.66 MG/DL (ref 0.57–1)
GFR SERPL CREATININE-BSD FRML MDRD: 91 ML/MIN/1.73
GLOBULIN UR ELPH-MCNC: 3.4 GM/DL
GLUCOSE SERPL-MCNC: 110 MG/DL (ref 65–99)
HDLC SERPL-MCNC: 40 MG/DL (ref 40–60)
LDLC SERPL CALC-MCNC: 89 MG/DL (ref 0–100)
LDLC/HDLC SERPL: 2.23 {RATIO}
POTASSIUM SERPL-SCNC: 4.5 MMOL/L (ref 3.5–5.2)
PROT SERPL-MCNC: 7.7 G/DL (ref 6–8.5)
SODIUM SERPL-SCNC: 137 MMOL/L (ref 136–145)
TRIGL SERPL-MCNC: 80 MG/DL (ref 0–150)
VLDLC SERPL-MCNC: 16 MG/DL (ref 5–40)

## 2021-06-06 DIAGNOSIS — R60.0 PEDAL EDEMA: ICD-10-CM

## 2021-06-06 RX ORDER — FUROSEMIDE 20 MG/1
TABLET ORAL
Qty: 90 TABLET | Refills: 0 | Status: SHIPPED | OUTPATIENT
Start: 2021-06-06 | End: 2021-09-04

## 2021-06-06 RX ORDER — POTASSIUM CHLORIDE 750 MG/1
TABLET, EXTENDED RELEASE ORAL
Qty: 90 TABLET | Refills: 0 | Status: SHIPPED | OUTPATIENT
Start: 2021-06-06 | End: 2021-09-04

## 2021-06-30 ENCOUNTER — CLINICAL SUPPORT (OUTPATIENT)
Dept: INTERNAL MEDICINE | Facility: CLINIC | Age: 61
End: 2021-06-30

## 2021-06-30 DIAGNOSIS — E53.8 B12 DEFICIENCY: ICD-10-CM

## 2021-06-30 PROCEDURE — 96372 THER/PROPH/DIAG INJ SC/IM: CPT | Performed by: INTERNAL MEDICINE

## 2021-06-30 RX ADMIN — CYANOCOBALAMIN 1000 MCG: 1000 INJECTION, SOLUTION INTRAMUSCULAR; SUBCUTANEOUS at 13:21

## 2021-07-06 RX ORDER — ANASTROZOLE 1 MG/1
1 TABLET ORAL DAILY
Qty: 90 TABLET | Refills: 3 | Status: SHIPPED | OUTPATIENT
Start: 2021-07-06 | End: 2022-01-31

## 2021-07-19 ENCOUNTER — TELEPHONE (OUTPATIENT)
Dept: INTERNAL MEDICINE | Facility: CLINIC | Age: 61
End: 2021-07-19

## 2021-07-19 ENCOUNTER — TELEPHONE (OUTPATIENT)
Dept: ONCOLOGY | Facility: CLINIC | Age: 61
End: 2021-07-19

## 2021-07-19 NOTE — TELEPHONE ENCOUNTER
Caller: ARVIN    Relationship to patient: PATIENT    Best call back number: [phone]     Type of visit: LABS, FOLLOW UP AND INJECTION     Requested date: 08/10 OR AFTER, PREFER MORNING     If rescheduling, when is the original appointment: 07/23

## 2021-07-19 NOTE — TELEPHONE ENCOUNTER
Patient returning phone regarding test results.  Would like a call back.  PT stated whomever called did not leave a name.

## 2021-08-16 DIAGNOSIS — C50.511 MALIGNANT NEOPLASM OF LOWER-OUTER QUADRANT OF RIGHT BREAST OF FEMALE, ESTROGEN RECEPTOR POSITIVE (HCC): Primary | ICD-10-CM

## 2021-08-16 DIAGNOSIS — Z17.0 MALIGNANT NEOPLASM OF LOWER-OUTER QUADRANT OF RIGHT BREAST OF FEMALE, ESTROGEN RECEPTOR POSITIVE (HCC): Primary | ICD-10-CM

## 2021-08-18 ENCOUNTER — CLINICAL SUPPORT (OUTPATIENT)
Dept: INTERNAL MEDICINE | Facility: CLINIC | Age: 61
End: 2021-08-18

## 2021-08-18 DIAGNOSIS — E53.8 B12 DEFICIENCY: ICD-10-CM

## 2021-08-18 PROCEDURE — 96372 THER/PROPH/DIAG INJ SC/IM: CPT | Performed by: INTERNAL MEDICINE

## 2021-08-18 RX ADMIN — CYANOCOBALAMIN 1000 MCG: 1000 INJECTION, SOLUTION INTRAMUSCULAR; SUBCUTANEOUS at 15:12

## 2021-08-18 NOTE — PROGRESS NOTES
Immunization  Immunization performed in  by Elda Rangel. Patient tolerated the procedure well without complications.  08/18/21   Elda Rangel

## 2021-08-24 ENCOUNTER — LAB REQUISITION (OUTPATIENT)
Dept: LAB | Facility: HOSPITAL | Age: 61
End: 2021-08-24

## 2021-08-24 ENCOUNTER — LAB (OUTPATIENT)
Dept: LAB | Facility: HOSPITAL | Age: 61
End: 2021-08-24

## 2021-08-24 ENCOUNTER — HOSPITAL ENCOUNTER (OUTPATIENT)
Dept: ONCOLOGY | Facility: HOSPITAL | Age: 61
Setting detail: INFUSION SERIES
Discharge: HOME | End: 2021-08-24

## 2021-08-24 ENCOUNTER — OFFICE VISIT (OUTPATIENT)
Dept: ONCOLOGY | Facility: CLINIC | Age: 61
End: 2021-08-24

## 2021-08-24 VITALS
HEIGHT: 59 IN | BODY MASS INDEX: 50 KG/M2 | WEIGHT: 248 LBS | HEART RATE: 65 BPM | DIASTOLIC BLOOD PRESSURE: 65 MMHG | OXYGEN SATURATION: 92 % | SYSTOLIC BLOOD PRESSURE: 158 MMHG | RESPIRATION RATE: 18 BRPM | TEMPERATURE: 97.1 F

## 2021-08-24 DIAGNOSIS — M81.0 OSTEOPOROSIS, UNSPECIFIED OSTEOPOROSIS TYPE, UNSPECIFIED PATHOLOGICAL FRACTURE PRESENCE: ICD-10-CM

## 2021-08-24 DIAGNOSIS — Z17.0 MALIGNANT NEOPLASM OF LOWER-OUTER QUADRANT OF RIGHT BREAST OF FEMALE, ESTROGEN RECEPTOR POSITIVE (HCC): Primary | ICD-10-CM

## 2021-08-24 DIAGNOSIS — C50.511 MALIGNANT NEOPLASM OF LOWER-OUTER QUADRANT OF RIGHT FEMALE BREAST (HCC): ICD-10-CM

## 2021-08-24 DIAGNOSIS — Z17.0 MALIGNANT NEOPLASM OF LOWER-OUTER QUADRANT OF RIGHT BREAST OF FEMALE, ESTROGEN RECEPTOR POSITIVE (HCC): ICD-10-CM

## 2021-08-24 DIAGNOSIS — C50.511 MALIGNANT NEOPLASM OF LOWER-OUTER QUADRANT OF RIGHT BREAST OF FEMALE, ESTROGEN RECEPTOR POSITIVE (HCC): Primary | ICD-10-CM

## 2021-08-24 DIAGNOSIS — C50.511 MALIGNANT NEOPLASM OF LOWER-OUTER QUADRANT OF RIGHT BREAST OF FEMALE, ESTROGEN RECEPTOR POSITIVE (HCC): ICD-10-CM

## 2021-08-24 LAB
ALBUMIN SERPL-MCNC: 4.1 G/DL (ref 3.5–5.2)
ALBUMIN/GLOB SERPL: 1.4 G/DL
ALP SERPL-CCNC: 104 U/L (ref 39–117)
ALT SERPL W P-5'-P-CCNC: 38 U/L (ref 1–33)
ANION GAP SERPL CALCULATED.3IONS-SCNC: 13 MMOL/L (ref 5–15)
AST SERPL-CCNC: 35 U/L (ref 1–32)
BILIRUB SERPL-MCNC: 0.4 MG/DL (ref 0–1.2)
BUN SERPL-MCNC: 14 MG/DL (ref 8–23)
BUN/CREAT SERPL: 21.9 (ref 7–25)
CALCIUM SPEC-SCNC: 10.5 MG/DL (ref 8.6–10.5)
CHLORIDE SERPL-SCNC: 97 MMOL/L (ref 98–107)
CO2 SERPL-SCNC: 25 MMOL/L (ref 22–29)
CREAT SERPL-MCNC: 0.64 MG/DL (ref 0.57–1)
ERYTHROCYTE [DISTWIDTH] IN BLOOD BY AUTOMATED COUNT: 14.1 % (ref 12.3–15.4)
GFR SERPL CREATININE-BSD FRML MDRD: 95 ML/MIN/1.73
GLOBULIN UR ELPH-MCNC: 3 GM/DL
GLUCOSE SERPL-MCNC: 228 MG/DL (ref 65–99)
HCT VFR BLD AUTO: 40.4 % (ref 34–46.6)
HGB BLD-MCNC: 12.9 G/DL (ref 12–15.9)
LYMPHOCYTES # BLD AUTO: 1.5 10*3/MM3 (ref 0.7–3.1)
LYMPHOCYTES NFR BLD AUTO: 22.6 % (ref 19.6–45.3)
MAGNESIUM SERPL-MCNC: 1.9 MG/DL (ref 1.6–2.4)
MCH RBC QN AUTO: 27.1 PG (ref 26.6–33)
MCHC RBC AUTO-ENTMCNC: 32.1 G/DL (ref 31.5–35.7)
MCV RBC AUTO: 84.6 FL (ref 79–97)
MONOCYTES # BLD AUTO: 0.5 10*3/MM3 (ref 0.1–0.9)
MONOCYTES NFR BLD AUTO: 8.2 % (ref 5–12)
NEUTROPHILS NFR BLD AUTO: 4.6 10*3/MM3 (ref 1.7–7)
NEUTROPHILS NFR BLD AUTO: 69.2 % (ref 42.7–76)
PHOSPHATE SERPL-MCNC: 3.9 MG/DL (ref 2.5–4.5)
PLATELET # BLD AUTO: 304 10*3/MM3 (ref 140–450)
PMV BLD AUTO: 6 FL (ref 6–12)
POTASSIUM SERPL-SCNC: 4.3 MMOL/L (ref 3.5–5.2)
PROT SERPL-MCNC: 7.1 G/DL (ref 6–8.5)
RBC # BLD AUTO: 4.78 10*6/MM3 (ref 3.77–5.28)
SODIUM SERPL-SCNC: 135 MMOL/L (ref 136–145)
WBC # BLD AUTO: 6.7 10*3/MM3 (ref 3.4–10.8)

## 2021-08-24 PROCEDURE — 80053 COMPREHEN METABOLIC PANEL: CPT

## 2021-08-24 PROCEDURE — 83735 ASSAY OF MAGNESIUM: CPT | Performed by: INTERNAL MEDICINE

## 2021-08-24 PROCEDURE — 99214 OFFICE O/P EST MOD 30 MIN: CPT | Performed by: INTERNAL MEDICINE

## 2021-08-24 PROCEDURE — 96372 THER/PROPH/DIAG INJ SC/IM: CPT

## 2021-08-24 PROCEDURE — 25010000002 DENOSUMAB 60 MG/ML SOLUTION PREFILLED SYRINGE: Performed by: INTERNAL MEDICINE

## 2021-08-24 PROCEDURE — 85025 COMPLETE CBC W/AUTO DIFF WBC: CPT

## 2021-08-24 PROCEDURE — 84100 ASSAY OF PHOSPHORUS: CPT | Performed by: INTERNAL MEDICINE

## 2021-08-24 PROCEDURE — 36415 COLL VENOUS BLD VENIPUNCTURE: CPT

## 2021-08-24 RX ADMIN — DENOSUMAB 60 MG: 60 INJECTION SUBCUTANEOUS at 11:33

## 2021-08-24 NOTE — PROGRESS NOTES
DATE OF VISIT: 8/24/2021    REASON FOR VISIT: Follow-up for invasive ductal carcinoma of the right breast,  [id number], estrogen receptor positive, HER-2/greg negative, low risk disease based  on Oncotype.      HISTORY OF PRESENT ILLNESS: The patient is a very pleasant 60 y.o. female  who was in her usual state of health until her most recent mammogram done  03/2016. She had an abnormality in the right breast 10 o'clock position. The  patient had an ultrasound guided biopsy 03/10/2016 that revealed invasive  ductal carcinoma. The patient elected to proceed with lumpectomy on 05/03/2016  with Dr. Sales. Final pathology revealed 1.4 cm mass, tumor at the 7  o'clock position, ER positive more than 95%, GA positive more than 85%,  HER-2/greg 0+. The patient had clear surgical margins, Kimmie score 8 out of 9,  and 1 negative sentinel lymph node. The patient had no postoperative  complications. The patient had Oncotype testing. Her score came back at 16,  low risk disease group. Risk of recurrence 10% with tamoxifen alone. The  patient was started on Arimidex 1 mg p.o. daily 06/03/2016. The patient is  here today in scheduled follow-up visit.      SUBJECTIVE: The patient is here today by herself.  She has been compliant with her treatment.      REVIEW OF SYSTEMS: All the other 9 systems are reviewed by me and are negative  except what is mentioned in HPI.      PAST MEDICAL HISTORY/SOCIAL HISTORY/FAMILY HISTORY: Unchanged from prior  documentation done 8/2020.      Current Outpatient Medications:   •  albuterol sulfate  (90 Base) MCG/ACT inhaler, Inhale 2 puffs Every 4 (Four) Hours As Needed for Wheezing., Disp: 18 g, Rfl: 5  •  anastrozole (ARIMIDEX) 1 MG tablet, TAKE 1 TABLET BY MOUTH DAILY, Disp: 90 tablet, Rfl: 3  •  fexofenadine (ALLEGRA) 30 MG tablet, Take 30 mg by mouth daily., Disp: , Rfl:   •  furosemide (LASIX) 20 MG tablet, TAKE 1 TABLET BY MOUTH DAILY AS NEEDED FOR SWELLING, Disp: 90 tablet, Rfl: 0  •   "glimepiride (AMARYL) 2 MG tablet, Take 1 tablet by mouth Daily With Breakfast., Disp: 30 tablet, Rfl: 5  •  Melatonin 10 MG capsule, Take  by mouth., Disp: , Rfl:   •  metFORMIN ER (GLUCOPHAGE-XR) 750 MG 24 hr tablet, Take 1 tablet by mouth Daily., Disp: 90 tablet, Rfl: 1  •  mometasone-formoterol (Dulera) 100-5 MCG/ACT inhaler, Inhale 2 puffs Daily., Disp: 13 g, Rfl: 5  •  montelukast (SINGULAIR) 10 MG tablet, Take 1 tablet by mouth Daily., Disp: 90 tablet, Rfl: 1  •  olmesartan-hydrochlorothiazide (BENICAR HCT) 40-12.5 MG per tablet, Take 1 tablet by mouth Daily., Disp: 90 tablet, Rfl: 1  •  potassium chloride (K-DUR,KLOR-CON) 10 MEQ CR tablet, TAKE 1 TABLET BY MOUTH DAILY AS NEEDED, Disp: 90 tablet, Rfl: 0  •  pravastatin (PRAVACHOL) 10 MG tablet, Take 1 tablet by mouth Daily., Disp: 90 tablet, Rfl: 1  •  Rybelsus 14 MG tablet, TAKE 1 TABLET BY MOUTH DAILY. REPLACES JANUVIA., Disp: 30 tablet, Rfl: 5    Current Facility-Administered Medications:   •  cyanocobalamin injection 1,000 mcg, 1,000 mcg, Intramuscular, Q28 Days, Noreen Smith MD, 1,000 mcg at 08/18/21 1512    PHYSICAL EXAMINATION:   /65   Pulse 65   Temp 97.1 °F (36.2 °C) (Temporal)   Resp 18   Ht 149.9 cm (59.02\")   Wt 112 kg (248 lb)   SpO2 92%   BMI 50.06 kg/m²   ECOG1  GENERAL: Age appropriate. No acute distress. Obese.  HEENT: Head atraumatic, normocephalic.   NECK: Supple. No JVD. No lymphadenopathy.   LUNGS: Clear to auscultation bilaterally. No wheezing. No rhonchi.   HEART: Regular rate and rhythm. S1, S2, no murmurs.   ABDOMEN: Soft, nontender, nondistended. Bowel sounds positive. No  hepatosplenomegaly.   EXTREMITIES: No clubbing, cyanosis, or edema.   SKIN: No rashes. No purpura.   NEUROLOGIC: Awake and oriented x3. Strength 5 out of 5 in all muscle groups.     Lab on 08/24/2021   Component Date Value Ref Range Status   • WBC 08/24/2021 6.70  3.40 - 10.80 10*3/mm3 Final   • RBC 08/24/2021 4.78  3.77 - 5.28 10*6/mm3 Final   • " "Hemoglobin 08/24/2021 12.9  12.0 - 15.9 g/dL Final   • Hematocrit 08/24/2021 40.4  34.0 - 46.6 % Final   • RDW 08/24/2021 14.1  12.3 - 15.4 % Final   • MCV 08/24/2021 84.6  79.0 - 97.0 fL Final   • MCH 08/24/2021 27.1  26.6 - 33.0 pg Final   • MCHC 08/24/2021 32.1  31.5 - 35.7 g/dL Final   • MPV 08/24/2021 6.0  6.0 - 12.0 fL Final   • Platelets 08/24/2021 304  140 - 450 10*3/mm3 Final   • Neutrophil % 08/24/2021 69.2  42.7 - 76.0 % Final   • Lymphocyte % 08/24/2021 22.6  19.6 - 45.3 % Final   • Monocyte % 08/24/2021 8.2  5.0 - 12.0 % Final   • Neutrophils, Absolute 08/24/2021 4.60  1.70 - 7.00 10*3/mm3 Final   • Lymphocytes, Absolute 08/24/2021 1.50  0.70 - 3.10 10*3/mm3 Final   • Monocytes, Absolute 08/24/2021 0.50  0.10 - 0.90 10*3/mm3 Final      DUAL-ENERGY X-RAY ABSORPTIOMETRY (DXA)     INDICATION:  Monitoring treatment, history of glucocorticoids, cancer,  asthma or emphysema     COMPARISON: Previous bone mineral density exam performed on 06/08/2018     PROCEDURE: A DXA scan was performed using a Hologic densitometer.     The lumbar spine L1-L4 was evaluated as well as left total hip.     The T-score compares the patient's bone mineral density with the peak  bone mass of young normal patients.  According to criteria established  by the World Health Organization, patients with T-scores  between 1.0  and 2.5 standard deviations BELOW the mean are osteopenic (low bone  mass).  Patients with T-scores EQUAL TO OR GREATER than 2.5 standard  deviations below the mean are osteoporotic.     The Z-score compares the patient bone mineral density with age and sex  matched peers.  According to the International Society for Clinical  Densitometry's 2007 consensus conference:  In women prior to menopause  and men less than age 50, Z-scores, not T-scores are preferred.  A  Z-score of -2.0 or lower is defined as \"below the expected range for  age\" and a Z-score above -2.0 is \"within the expected range for age.\"   The WHO " diagnostic criteria may be applied in women in the menopausal  transition.  Osteoporosis cannot be diagnosed in men under age 50 on the  basis of BMD alone.     TECHNICAL QUALITY:  The study is of good technical quality.     RESULTS:     Lumbar Spine:  The BMD measured in the L1-L4 region is 0.975 g/cm2.  The  average T-score is -0.7.  The Z-score is 0.7.     Total Hip:  The BMD measured at the left total proximal femur is 0.982  g/cm2.  The T-score is 0.3.  The Z-score is 1.3.     Femoral Neck:  The BMD measured at the left femoral neck is 0.633 g/cm2.   The T-score is -1.9.  The Z-score is -0.7.     IMPRESSION:  Osteopenia of the left femoral neck.       ASSESSMENT: The patient is a very pleasant 60 y.o. female with stage I  invasive ductal carcinoma of the right breast.      PROBLEM LIST:   1. Stage I invasive ductal carcinoma of the right breast, [id number]:  A. Tumor at the 7 o'clock position, tumor size 1.4 cm, estrogen receptor and progesterone  receptor strongly positive, HER-2/greg negative zero by IHC. Low risk group Oncotype testing, score of 16, 10% risk of recurrence with hormone treatment alone.  B. Diagnosed on ultrasound guided biopsy 03/10/2016.   C. Status post lumpectomy with clear margins done by Dr. Sales 05/03/2016.   D. Started Arimidex 1 mg p.o. daily 06/03/2016.   E. Completed breast adjuvant radiation by Dr. Thompson.   2.  Osteoporosis:  A.  DEXA scan done June 2018 revealed T score -2.3  B. Starting Prolia 1/22/2020.   3. Right breast mammogram abnormality  4.  Obesity     PLAN:   1. I did go over the CBC results from today reassured the patient is normal.  I will follow up on the CMP results and notify patient of any significant findings.    2. The patient will continue Arimidex 1 mg p.o. daily.  The patient might be interested in extended hormone treatment.  I will check breast cancer index and will decide by next visit.  3.  I will continue calcium with vitamin D daily.  I will also  continue Prolia 60 mg subcu every 6-month.  She will be treated today.  I did review bone density scan from 6/2020,  I explained that she currently has osteopenia.  We will do 2-year follow-up study which would be due June 2022.  4. I reviewed the mammogram results from 12/17/2020 with patient. I reassured her there were benign findings with no evidence of cancer recurrence. She will need one year follow up mammogram which will be due 12/2021    5. We will see the patient back in 6 months with repeat labs including CBC and CMP.   6. I encouraged her to continue efforts towards increased exercise and weight loss.       Idalia Driscoll MD    8/24/2021

## 2021-09-04 DIAGNOSIS — R60.0 PEDAL EDEMA: ICD-10-CM

## 2021-09-04 RX ORDER — POTASSIUM CHLORIDE 750 MG/1
TABLET, EXTENDED RELEASE ORAL
Qty: 90 TABLET | Refills: 0 | Status: SHIPPED | OUTPATIENT
Start: 2021-09-04 | End: 2021-12-03

## 2021-09-04 RX ORDER — FUROSEMIDE 20 MG/1
TABLET ORAL
Qty: 90 TABLET | Refills: 0 | Status: SHIPPED | OUTPATIENT
Start: 2021-09-04 | End: 2021-12-03

## 2021-09-04 NOTE — TELEPHONE ENCOUNTER
LOV: 5/25/21  Please advise if refills are appropriate  Both filled on 6/6/21 for 90 days and no refills

## 2021-09-22 LAB
LAB AP CASE REPORT: NORMAL
PATH REPORT.FINAL DX SPEC: NORMAL

## 2021-10-01 ENCOUNTER — CLINICAL SUPPORT (OUTPATIENT)
Dept: INTERNAL MEDICINE | Facility: CLINIC | Age: 61
End: 2021-10-01

## 2021-10-01 DIAGNOSIS — E53.8 B12 DEFICIENCY: ICD-10-CM

## 2021-10-01 PROCEDURE — 96372 THER/PROPH/DIAG INJ SC/IM: CPT | Performed by: INTERNAL MEDICINE

## 2021-10-01 RX ADMIN — CYANOCOBALAMIN 1000 MCG: 1000 INJECTION, SOLUTION INTRAMUSCULAR; SUBCUTANEOUS at 14:28

## 2021-10-01 NOTE — PROGRESS NOTES
Immunization  Immunization performed in by Elda Rangel. Patient tolerated the procedure well without complications.  10/01/21   Elda Rangel

## 2021-10-04 DIAGNOSIS — I10 ESSENTIAL HYPERTENSION: ICD-10-CM

## 2021-10-04 RX ORDER — OLMESARTAN MEDOXOMIL AND HYDROCHLOROTHIAZIDE 40/12.5 40; 12.5 MG/1; MG/1
1 TABLET ORAL DAILY
Qty: 90 TABLET | Refills: 0 | Status: SHIPPED | OUTPATIENT
Start: 2021-10-04 | End: 2022-01-31 | Stop reason: SDUPTHER

## 2021-11-03 DIAGNOSIS — E11.65 UNCONTROLLED TYPE 2 DIABETES MELLITUS WITH HYPERGLYCEMIA (HCC): ICD-10-CM

## 2021-11-03 RX ORDER — GLIMEPIRIDE 2 MG/1
2 TABLET ORAL
Qty: 90 TABLET | Refills: 0 | Status: SHIPPED | OUTPATIENT
Start: 2021-11-03 | End: 2022-01-31 | Stop reason: SDUPTHER

## 2021-11-03 RX ORDER — GLIMEPIRIDE 2 MG/1
2 TABLET ORAL
Qty: 30 TABLET | Refills: 0 | Status: SHIPPED | OUTPATIENT
Start: 2021-11-03 | End: 2021-11-03

## 2021-11-10 ENCOUNTER — TRANSCRIBE ORDERS (OUTPATIENT)
Dept: ADMINISTRATIVE | Facility: HOSPITAL | Age: 61
End: 2021-11-10

## 2021-11-10 DIAGNOSIS — Z12.31 VISIT FOR SCREENING MAMMOGRAM: Primary | ICD-10-CM

## 2021-12-03 DIAGNOSIS — R60.0 PEDAL EDEMA: ICD-10-CM

## 2021-12-03 DIAGNOSIS — E78.49 OTHER HYPERLIPIDEMIA: ICD-10-CM

## 2021-12-03 RX ORDER — PRAVASTATIN SODIUM 10 MG
10 TABLET ORAL DAILY
Qty: 90 TABLET | Refills: 1 | Status: SHIPPED | OUTPATIENT
Start: 2021-12-03 | End: 2022-06-01

## 2021-12-03 RX ORDER — POTASSIUM CHLORIDE 750 MG/1
TABLET, EXTENDED RELEASE ORAL
Qty: 90 TABLET | Refills: 1 | Status: SHIPPED | OUTPATIENT
Start: 2021-12-03 | End: 2022-06-01

## 2021-12-03 RX ORDER — FUROSEMIDE 20 MG/1
TABLET ORAL
Qty: 90 TABLET | Refills: 1 | Status: SHIPPED | OUTPATIENT
Start: 2021-12-03 | End: 2022-06-01

## 2021-12-21 ENCOUNTER — HOSPITAL ENCOUNTER (OUTPATIENT)
Dept: MAMMOGRAPHY | Facility: HOSPITAL | Age: 61
Discharge: HOME | End: 2021-12-21
Admitting: INTERNAL MEDICINE

## 2021-12-21 DIAGNOSIS — Z12.31 VISIT FOR SCREENING MAMMOGRAM: ICD-10-CM

## 2021-12-21 PROCEDURE — 77067 SCR MAMMO BI INCL CAD: CPT

## 2021-12-21 PROCEDURE — 77063 BREAST TOMOSYNTHESIS BI: CPT | Performed by: RADIOLOGY

## 2021-12-21 PROCEDURE — 77067 SCR MAMMO BI INCL CAD: CPT | Performed by: RADIOLOGY

## 2021-12-21 PROCEDURE — 77063 BREAST TOMOSYNTHESIS BI: CPT

## 2022-01-31 ENCOUNTER — OFFICE VISIT (OUTPATIENT)
Dept: INTERNAL MEDICINE | Facility: CLINIC | Age: 62
End: 2022-01-31

## 2022-01-31 ENCOUNTER — LAB (OUTPATIENT)
Dept: LAB | Facility: HOSPITAL | Age: 62
End: 2022-01-31

## 2022-01-31 VITALS
DIASTOLIC BLOOD PRESSURE: 70 MMHG | HEART RATE: 54 BPM | BODY MASS INDEX: 49.55 KG/M2 | SYSTOLIC BLOOD PRESSURE: 128 MMHG | OXYGEN SATURATION: 97 % | HEIGHT: 59 IN | WEIGHT: 245.8 LBS

## 2022-01-31 DIAGNOSIS — I49.3 PVC'S (PREMATURE VENTRICULAR CONTRACTIONS): ICD-10-CM

## 2022-01-31 DIAGNOSIS — E55.9 VITAMIN D DEFICIENCY: ICD-10-CM

## 2022-01-31 DIAGNOSIS — Z00.00 ROUTINE GENERAL MEDICAL EXAMINATION AT A HEALTH CARE FACILITY: Primary | ICD-10-CM

## 2022-01-31 DIAGNOSIS — E11.65 UNCONTROLLED TYPE 2 DIABETES MELLITUS WITH HYPERGLYCEMIA: ICD-10-CM

## 2022-01-31 DIAGNOSIS — J30.89 NON-SEASONAL ALLERGIC RHINITIS DUE TO OTHER ALLERGIC TRIGGER: ICD-10-CM

## 2022-01-31 DIAGNOSIS — Z00.00 ROUTINE GENERAL MEDICAL EXAMINATION AT A HEALTH CARE FACILITY: ICD-10-CM

## 2022-01-31 DIAGNOSIS — I10 ESSENTIAL HYPERTENSION: ICD-10-CM

## 2022-01-31 DIAGNOSIS — J45.909 UNCOMPLICATED ASTHMA: ICD-10-CM

## 2022-01-31 LAB
25(OH)D3 SERPL-MCNC: 70.4 NG/ML
ALBUMIN SERPL-MCNC: 4.5 G/DL (ref 3.5–5.2)
ALBUMIN/GLOB SERPL: 1.5 G/DL
ALP SERPL-CCNC: 86 U/L (ref 39–117)
ALT SERPL W P-5'-P-CCNC: 16 U/L (ref 1–33)
ANION GAP SERPL CALCULATED.3IONS-SCNC: 12.8 MMOL/L (ref 5–15)
AST SERPL-CCNC: 16 U/L (ref 1–32)
BILIRUB BLD-MCNC: NEGATIVE MG/DL
BILIRUB SERPL-MCNC: 0.4 MG/DL (ref 0–1.2)
BUN SERPL-MCNC: 28 MG/DL (ref 8–23)
BUN/CREAT SERPL: 32.2 (ref 7–25)
CALCIUM SPEC-SCNC: 10.6 MG/DL (ref 8.6–10.5)
CHLORIDE SERPL-SCNC: 99 MMOL/L (ref 98–107)
CHOLEST SERPL-MCNC: 121 MG/DL (ref 0–200)
CLARITY, POC: CLEAR
CO2 SERPL-SCNC: 24.2 MMOL/L (ref 22–29)
COLOR UR: NORMAL
CREAT SERPL-MCNC: 0.87 MG/DL (ref 0.57–1)
DEPRECATED RDW RBC AUTO: 39.5 FL (ref 37–54)
ERYTHROCYTE [DISTWIDTH] IN BLOOD BY AUTOMATED COUNT: 13.4 % (ref 12.3–15.4)
EXPIRATION DATE: ABNORMAL
EXPIRATION DATE: NORMAL
GFR SERPL CREATININE-BSD FRML MDRD: 66 ML/MIN/1.73
GLOBULIN UR ELPH-MCNC: 3.1 GM/DL
GLUCOSE SERPL-MCNC: 73 MG/DL (ref 65–99)
GLUCOSE UR STRIP-MCNC: NEGATIVE MG/DL
HBA1C MFR BLD: 7.1 %
HCT VFR BLD AUTO: 44.4 % (ref 34–46.6)
HDLC SERPL-MCNC: 38 MG/DL (ref 40–60)
HGB BLD-MCNC: 14.6 G/DL (ref 12–15.9)
KETONES UR QL: NEGATIVE
LDLC SERPL CALC-MCNC: 68 MG/DL (ref 0–100)
LDLC/HDLC SERPL: 1.81 {RATIO}
LEUKOCYTE EST, POC: NEGATIVE
Lab: ABNORMAL
Lab: NORMAL
MCH RBC QN AUTO: 27.1 PG (ref 26.6–33)
MCHC RBC AUTO-ENTMCNC: 32.9 G/DL (ref 31.5–35.7)
MCV RBC AUTO: 82.5 FL (ref 79–97)
NITRITE UR-MCNC: NEGATIVE MG/ML
PH UR: 6 [PH] (ref 5–8)
PLATELET # BLD AUTO: 300 10*3/MM3 (ref 140–450)
PMV BLD AUTO: 9.7 FL (ref 6–12)
POC CREATININE URINE: 10
POC MICROALBUMIN URINE: 10
POTASSIUM SERPL-SCNC: 4.7 MMOL/L (ref 3.5–5.2)
PROT SERPL-MCNC: 7.6 G/DL (ref 6–8.5)
PROT UR STRIP-MCNC: NEGATIVE MG/DL
RBC # BLD AUTO: 5.38 10*6/MM3 (ref 3.77–5.28)
RBC # UR STRIP: NEGATIVE /UL
SODIUM SERPL-SCNC: 136 MMOL/L (ref 136–145)
SP GR UR: 1.01 (ref 1–1.03)
TRIGL SERPL-MCNC: 72 MG/DL (ref 0–150)
TSH SERPL DL<=0.05 MIU/L-ACNC: 2.74 UIU/ML (ref 0.27–4.2)
UROBILINOGEN UR QL: NORMAL
VIT B12 BLD-MCNC: 294 PG/ML (ref 211–946)
VLDLC SERPL-MCNC: 15 MG/DL (ref 5–40)
WBC NRBC COR # BLD: 8.06 10*3/MM3 (ref 3.4–10.8)

## 2022-01-31 PROCEDURE — 36415 COLL VENOUS BLD VENIPUNCTURE: CPT

## 2022-01-31 PROCEDURE — 82044 UR ALBUMIN SEMIQUANTITATIVE: CPT | Performed by: INTERNAL MEDICINE

## 2022-01-31 PROCEDURE — 82607 VITAMIN B-12: CPT

## 2022-01-31 PROCEDURE — 83036 HEMOGLOBIN GLYCOSYLATED A1C: CPT | Performed by: INTERNAL MEDICINE

## 2022-01-31 PROCEDURE — 81003 URINALYSIS AUTO W/O SCOPE: CPT | Performed by: INTERNAL MEDICINE

## 2022-01-31 PROCEDURE — 80050 GENERAL HEALTH PANEL: CPT

## 2022-01-31 PROCEDURE — 82306 VITAMIN D 25 HYDROXY: CPT

## 2022-01-31 PROCEDURE — 99396 PREV VISIT EST AGE 40-64: CPT | Performed by: INTERNAL MEDICINE

## 2022-01-31 PROCEDURE — 80061 LIPID PANEL: CPT

## 2022-01-31 PROCEDURE — 93000 ELECTROCARDIOGRAM COMPLETE: CPT | Performed by: INTERNAL MEDICINE

## 2022-01-31 RX ORDER — GLIMEPIRIDE 2 MG/1
2 TABLET ORAL
Qty: 90 TABLET | Refills: 1 | Status: SHIPPED | OUTPATIENT
Start: 2022-01-31

## 2022-01-31 RX ORDER — ALBUTEROL SULFATE 90 UG/1
2 AEROSOL, METERED RESPIRATORY (INHALATION) EVERY 4 HOURS PRN
Qty: 18 G | Refills: 5 | Status: SHIPPED | OUTPATIENT
Start: 2022-01-31

## 2022-01-31 RX ORDER — MONTELUKAST SODIUM 10 MG/1
10 TABLET ORAL DAILY
Qty: 90 TABLET | Refills: 1 | Status: SHIPPED | OUTPATIENT
Start: 2022-01-31 | End: 2022-09-29

## 2022-01-31 RX ORDER — ORAL SEMAGLUTIDE 14 MG/1
1 TABLET ORAL DAILY
Qty: 30 TABLET | Refills: 5 | Status: CANCELLED | OUTPATIENT
Start: 2022-01-31

## 2022-01-31 RX ORDER — METFORMIN HYDROCHLORIDE 750 MG/1
750 TABLET, EXTENDED RELEASE ORAL DAILY
Qty: 90 TABLET | Refills: 1 | Status: SHIPPED | OUTPATIENT
Start: 2022-01-31 | End: 2022-09-29

## 2022-01-31 RX ORDER — OLMESARTAN MEDOXOMIL AND HYDROCHLOROTHIAZIDE 40/12.5 40; 12.5 MG/1; MG/1
1 TABLET ORAL DAILY
Qty: 90 TABLET | Refills: 1 | Status: SHIPPED | OUTPATIENT
Start: 2022-01-31 | End: 2022-09-29

## 2022-02-01 DIAGNOSIS — E11.65 UNCONTROLLED TYPE 2 DIABETES MELLITUS WITH HYPERGLYCEMIA: ICD-10-CM

## 2022-02-01 RX ORDER — GLIMEPIRIDE 2 MG/1
2 TABLET ORAL
Qty: 90 TABLET | Refills: 1 | OUTPATIENT
Start: 2022-02-01

## 2022-02-05 RX ORDER — VITAMIN B COMPLEX
1 TABLET ORAL DAILY
Qty: 90 EACH | Refills: 3 | Status: SHIPPED | OUTPATIENT
Start: 2022-02-05

## 2022-02-10 ENCOUNTER — TELEPHONE (OUTPATIENT)
Dept: ONCOLOGY | Facility: CLINIC | Age: 62
End: 2022-02-10

## 2022-02-10 NOTE — TELEPHONE ENCOUNTER
LVM for patient, letting her know she is rescheduled to 3/11/22 at 10:45am to call back if that does not work for her.

## 2022-02-10 NOTE — TELEPHONE ENCOUNTER
Caller: Dana Rincon    Relationship to patient: Self    Best call back number: [phone]    Type of visit: FOLLOW UP AND INJECTION    Requested date: ANY 2-3 WEEKS AFTER BUT BEFORE 03/25    If rescheduling, when is the original appointment: 02/25    Additional notes: PLEASE CALL ONCE R/S.

## 2022-03-11 ENCOUNTER — OFFICE VISIT (OUTPATIENT)
Dept: ONCOLOGY | Facility: CLINIC | Age: 62
End: 2022-03-11

## 2022-03-11 ENCOUNTER — LAB (OUTPATIENT)
Dept: LAB | Facility: HOSPITAL | Age: 62
End: 2022-03-11

## 2022-03-11 ENCOUNTER — HOSPITAL ENCOUNTER (OUTPATIENT)
Dept: ONCOLOGY | Facility: HOSPITAL | Age: 62
Setting detail: INFUSION SERIES
Discharge: HOME | End: 2022-03-11

## 2022-03-11 VITALS
HEART RATE: 68 BPM | OXYGEN SATURATION: 96 % | RESPIRATION RATE: 16 BRPM | TEMPERATURE: 97.8 F | DIASTOLIC BLOOD PRESSURE: 81 MMHG | BODY MASS INDEX: 50.96 KG/M2 | WEIGHT: 252.8 LBS | HEIGHT: 59 IN | SYSTOLIC BLOOD PRESSURE: 167 MMHG

## 2022-03-11 DIAGNOSIS — C50.511 MALIGNANT NEOPLASM OF LOWER-OUTER QUADRANT OF RIGHT BREAST OF FEMALE, ESTROGEN RECEPTOR POSITIVE: Primary | ICD-10-CM

## 2022-03-11 DIAGNOSIS — Z17.0 MALIGNANT NEOPLASM OF LOWER-OUTER QUADRANT OF RIGHT BREAST OF FEMALE, ESTROGEN RECEPTOR POSITIVE: Primary | ICD-10-CM

## 2022-03-11 DIAGNOSIS — M81.0 OSTEOPOROSIS, UNSPECIFIED OSTEOPOROSIS TYPE, UNSPECIFIED PATHOLOGICAL FRACTURE PRESENCE: ICD-10-CM

## 2022-03-11 DIAGNOSIS — C50.511 MALIGNANT NEOPLASM OF LOWER-OUTER QUADRANT OF RIGHT BREAST OF FEMALE, ESTROGEN RECEPTOR POSITIVE: ICD-10-CM

## 2022-03-11 DIAGNOSIS — M85.80 OSTEOPENIA, UNSPECIFIED LOCATION: ICD-10-CM

## 2022-03-11 DIAGNOSIS — Z17.0 MALIGNANT NEOPLASM OF LOWER-OUTER QUADRANT OF RIGHT BREAST OF FEMALE, ESTROGEN RECEPTOR POSITIVE: ICD-10-CM

## 2022-03-11 LAB
ALBUMIN SERPL-MCNC: 3.9 G/DL (ref 3.5–5.2)
ALBUMIN/GLOB SERPL: 1.2 G/DL
ALP SERPL-CCNC: 118 U/L (ref 39–117)
ALT SERPL W P-5'-P-CCNC: 21 U/L (ref 1–33)
ANION GAP SERPL CALCULATED.3IONS-SCNC: 14 MMOL/L (ref 5–15)
AST SERPL-CCNC: 18 U/L (ref 1–32)
BILIRUB SERPL-MCNC: 0.2 MG/DL (ref 0–1.2)
BUN SERPL-MCNC: 16 MG/DL (ref 8–23)
BUN/CREAT SERPL: 21.1 (ref 7–25)
CALCIUM SPEC-SCNC: 9.2 MG/DL (ref 8.6–10.5)
CHLORIDE SERPL-SCNC: 97 MMOL/L (ref 98–107)
CO2 SERPL-SCNC: 23 MMOL/L (ref 22–29)
CREAT SERPL-MCNC: 0.76 MG/DL (ref 0.57–1)
EGFRCR SERPLBLD CKD-EPI 2021: 89.3 ML/MIN/1.73
ERYTHROCYTE [DISTWIDTH] IN BLOOD BY AUTOMATED COUNT: 15 % (ref 12.3–15.4)
GLOBULIN UR ELPH-MCNC: 3.2 GM/DL
GLUCOSE SERPL-MCNC: 191 MG/DL (ref 65–99)
HCT VFR BLD AUTO: 41.7 % (ref 34–46.6)
HGB BLD-MCNC: 13.6 G/DL (ref 12–15.9)
LYMPHOCYTES # BLD AUTO: 1.5 10*3/MM3 (ref 0.7–3.1)
LYMPHOCYTES NFR BLD AUTO: 18.8 % (ref 19.6–45.3)
MAGNESIUM SERPL-MCNC: 1.9 MG/DL (ref 1.6–2.4)
MCH RBC QN AUTO: 26.9 PG (ref 26.6–33)
MCHC RBC AUTO-ENTMCNC: 32.6 G/DL (ref 31.5–35.7)
MCV RBC AUTO: 82.6 FL (ref 79–97)
MONOCYTES # BLD AUTO: 0.2 10*3/MM3 (ref 0.1–0.9)
MONOCYTES NFR BLD AUTO: 2.4 % (ref 5–12)
NEUTROPHILS NFR BLD AUTO: 6.4 10*3/MM3 (ref 1.7–7)
NEUTROPHILS NFR BLD AUTO: 78.8 % (ref 42.7–76)
PHOSPHATE SERPL-MCNC: 3.3 MG/DL (ref 2.5–4.5)
PLATELET # BLD AUTO: 306 10*3/MM3 (ref 140–450)
PMV BLD AUTO: 6.2 FL (ref 6–12)
POTASSIUM SERPL-SCNC: 4.4 MMOL/L (ref 3.5–5.2)
PROT SERPL-MCNC: 7.1 G/DL (ref 6–8.5)
RBC # BLD AUTO: 5.04 10*6/MM3 (ref 3.77–5.28)
SODIUM SERPL-SCNC: 134 MMOL/L (ref 136–145)
WBC NRBC COR # BLD: 8.1 10*3/MM3 (ref 3.4–10.8)

## 2022-03-11 PROCEDURE — 96372 THER/PROPH/DIAG INJ SC/IM: CPT

## 2022-03-11 PROCEDURE — 25010000002 DENOSUMAB 60 MG/ML SOLUTION PREFILLED SYRINGE: Performed by: INTERNAL MEDICINE

## 2022-03-11 PROCEDURE — 36415 COLL VENOUS BLD VENIPUNCTURE: CPT

## 2022-03-11 PROCEDURE — 83735 ASSAY OF MAGNESIUM: CPT

## 2022-03-11 PROCEDURE — 99214 OFFICE O/P EST MOD 30 MIN: CPT | Performed by: INTERNAL MEDICINE

## 2022-03-11 PROCEDURE — 85025 COMPLETE CBC W/AUTO DIFF WBC: CPT

## 2022-03-11 PROCEDURE — 80053 COMPREHEN METABOLIC PANEL: CPT

## 2022-03-11 PROCEDURE — 84100 ASSAY OF PHOSPHORUS: CPT

## 2022-03-11 RX ADMIN — DENOSUMAB 60 MG: 60 INJECTION SUBCUTANEOUS at 11:36

## 2022-03-11 NOTE — PROGRESS NOTES
DATE OF VISIT: 3/11/2022    REASON FOR VISIT: Followup for right breast cancer     PROBLEM LIST:  1. Stage I invasive ductal carcinoma of the right breast, [id number]:  A. Tumor at the 7 o'clock position, tumor size 1.4 cm, estrogen receptor and progesterone  receptor strongly positive, HER-2/greg negative zero by IHC. Low risk group Oncotype testing, score of 16, 10% risk of recurrence with hormone treatment alone.  B. Diagnosed on ultrasound guided biopsy 03/10/2016.   C. Status post lumpectomy with clear margins done by Dr. Sales 05/03/2016.   D. Started Arimidex 1 mg p.o. daily 06/03/2016.   E. Completed breast adjuvant radiation by Dr. Thompson.   2.  Osteoporosis:  A.  DEXA scan done June 2018 revealed T score -2.3  B. Starting Prolia 1/22/2020.   3. Right breast mammogram abnormality  4.  Obesity    HISTORY OF PRESENT ILLNESS: The patient is a very pleasant 61 y.o. female  with past medical history significant for right breast cancer diagnosed March 2016.  Patient status post lumpectomy followed by adjuvant radiation hormone treatment. The  patient is here today for scheduled follow-up visit.    SUBJECTIVE: The patient has been doing fairly well. she was able to tolerate  her treatment without any serious side effects. she denied any fever or  chills, no night sweats, denied any headaches    Past History:  Medical History: has a past medical history of Allergic (Demerol), Asthma, Breast cancer (HCC) (2016), Breast injury, Diabetes mellitus (HCC), Dry eyes, Emphysema/COPD (HCC), H/O colonoscopy (10/2015), HBP (high blood pressure), Hot flashes, radiation therapy (06/2016), Itchy eyes, Sinus problem, and Wears glasses.   Surgical History: has a past surgical history that includes Tonsillectomy (1965); Dilation and curettage of uterus (2005); Other surgical history (2016); Breast lumpectomy (Right, 05/03/2016); Breast biopsy (Right, 09/08/2015); Breast biopsy (Right, 03/10/2016); Breast biopsy (Right, 03/2018);  Adenoidectomy (1965); Tonsillectomy (1965); and Colonoscopy (2016).   Family History: family history includes Breast cancer in her paternal aunt; Breast cancer (age of onset: 30) in some other family members; Cancer in her mother; Colon cancer in her mother and paternal uncle; Diabetes in her father; Heart disease in her father.   Social History: reports that she has never smoked. She has never used smokeless tobacco. She reports current alcohol use of about 3.0 standard drinks of alcohol per week. She reports that she does not use drugs.    (Not in a hospital admission)     Allergies: Meperidine     Review of Systems      Current Outpatient Medications:   •  albuterol sulfate  (90 Base) MCG/ACT inhaler, Inhale 2 puffs Every 4 (Four) Hours As Needed for Wheezing., Disp: 18 g, Rfl: 5  •  Cyanocobalamin 2500 MCG sublingual tablet, Place 2,500 mcg under the tongue Daily., Disp: 90 each, Rfl: 3  •  empagliflozin (Jardiance) 25 MG tablet tablet, Take 1 tablet by mouth Daily. For DMII, replaces Rybelsus, Disp: 90 tablet, Rfl: 1  •  fexofenadine (ALLEGRA) 30 MG tablet, Take 30 mg by mouth daily., Disp: , Rfl:   •  furosemide (LASIX) 20 MG tablet, TAKE 1 TABLET BY MOUTH DAILY AS NEEDED FOR SWELLING, Disp: 90 tablet, Rfl: 1  •  glimepiride (AMARYL) 2 MG tablet, Take 1 tablet by mouth Daily With Breakfast., Disp: 90 tablet, Rfl: 1  •  Melatonin 10 MG capsule, Take  by mouth., Disp: , Rfl:   •  metFORMIN ER (GLUCOPHAGE-XR) 750 MG 24 hr tablet, Take 1 tablet by mouth Daily., Disp: 90 tablet, Rfl: 1  •  mometasone-formoterol (Dulera) 100-5 MCG/ACT inhaler, Inhale 2 puffs Daily., Disp: 13 g, Rfl: 5  •  montelukast (SINGULAIR) 10 MG tablet, Take 1 tablet by mouth Daily., Disp: 90 tablet, Rfl: 1  •  olmesartan-hydrochlorothiazide (BENICAR HCT) 40-12.5 MG per tablet, Take 1 tablet by mouth Daily., Disp: 90 tablet, Rfl: 1  •  potassium chloride (K-DUR,KLOR-CON) 10 MEQ CR tablet, TAKE 1 TABLET BY MOUTH DAILY AS NEEDED, Disp: 90  "tablet, Rfl: 1  •  pravastatin (PRAVACHOL) 10 MG tablet, TAKE 1 TABLET BY MOUTH DAILY, Disp: 90 tablet, Rfl: 1  •  Rybelsus 14 MG tablet, TAKE 1 TABLET BY MOUTH DAILY. REPLACES DAKOTAH., Disp: 30 tablet, Rfl: 5    PHYSICAL EXAMINATION:   /81 Comment: just recenly took BP medication  Pulse 68   Temp 97.8 °F (36.6 °C) (Infrared)   Resp 16   Ht 149.9 cm (59.02\")   Wt 115 kg (252 lb 12.8 oz)   SpO2 96%   BMI 51.03 kg/m²    Pain Score    03/11/22 1045   PainSc: 0-No pain        ECOG score: 0            ECOG Performance Status: 1 - Symptomatic but completely ambulatory  General Appearance:  alert, cooperative, no apparent distress and appears stated age   Neurologic/Psychiatric: A&O x 3, gait steady, appropriate affect, strength 5/5 in all muscle groups   HEENT:  Normocephalic, without obvious abnormality, mucous membranes moist   Neck: Supple, symmetrical, trachea midline, no adenopathy;  No thyromegaly, masses, or tenderness   Lungs:   Clear to auscultation bilaterally; respirations regular, even, and unlabored bilaterally   Heart:  Regular rate and rhythm, no murmurs appreciated   Abdomen:   Soft, non-tender, non-distended and no organomegaly   Lymph nodes: No cervical, supraclavicular, inguinal or axillary adenopathy noted   Extremities: Normal, atraumatic; no clubbing, cyanosis, or edema    Skin: No rashes, ulcers, or suspicious lesions noted     Lab on 03/11/2022   Component Date Value Ref Range Status   • WBC 03/11/2022 8.10  3.40 - 10.80 10*3/mm3 Final   • RBC 03/11/2022 5.04  3.77 - 5.28 10*6/mm3 Final   • Hemoglobin 03/11/2022 13.6  12.0 - 15.9 g/dL Final   • Hematocrit 03/11/2022 41.7  34.0 - 46.6 % Final   • RDW 03/11/2022 15.0  12.3 - 15.4 % Final   • MCV 03/11/2022 82.6  79.0 - 97.0 fL Final   • MCH 03/11/2022 26.9  26.6 - 33.0 pg Final   • MCHC 03/11/2022 32.6  31.5 - 35.7 g/dL Final   • MPV 03/11/2022 6.2  6.0 - 12.0 fL Final   • Platelets 03/11/2022 306  140 - 450 10*3/mm3 Final   • " Neutrophil % 03/11/2022 78.8 (A) 42.7 - 76.0 % Final   • Lymphocyte % 03/11/2022 18.8 (A) 19.6 - 45.3 % Final   • Monocyte % 03/11/2022 2.4 (A) 5.0 - 12.0 % Final   • Neutrophils, Absolute 03/11/2022 6.40  1.70 - 7.00 10*3/mm3 Final   • Lymphocytes, Absolute 03/11/2022 1.50  0.70 - 3.10 10*3/mm3 Final   • Monocytes, Absolute 03/11/2022 0.20  0.10 - 0.90 10*3/mm3 Final        No results found.    ASSESSMENT: The patient is a very pleasant 61 y.o. female  with right breast cancer      PLAN:    1.  Right breast cancer:  A.  The patient has completed 5 years of adjuvant anastrozole on March 2022.  B.  Breast cancer index revealed low risk disease with no added benefit of extended hormone treatment.  C.  She will follow-up with me once a year after her annual mammogram.  D.  Next mammogram will be due December 2022.    2.  Osteoporosis:  A.  I will continue calcium vitamin D daily.  B.  I will continue Prolia 60 minutes subcu every 6 months.  Should be treated today.  C.  She will have bone density prior to return.    3.  Obesity:  A.  Patient was advised to exercise and lose weight.      FOLLOW UP: December 2022.    Idalia Driscoll MD  3/11/2022

## 2022-06-01 DIAGNOSIS — E78.49 OTHER HYPERLIPIDEMIA: ICD-10-CM

## 2022-06-01 DIAGNOSIS — R60.0 PEDAL EDEMA: ICD-10-CM

## 2022-06-01 RX ORDER — PRAVASTATIN SODIUM 10 MG
10 TABLET ORAL DAILY
Qty: 90 TABLET | Refills: 0 | Status: SHIPPED | OUTPATIENT
Start: 2022-06-01 | End: 2022-08-30

## 2022-06-01 RX ORDER — FUROSEMIDE 20 MG/1
TABLET ORAL
Qty: 90 TABLET | Refills: 0 | Status: SHIPPED | OUTPATIENT
Start: 2022-06-01 | End: 2022-08-30

## 2022-06-01 RX ORDER — POTASSIUM CHLORIDE 750 MG/1
TABLET, EXTENDED RELEASE ORAL
Qty: 90 TABLET | Refills: 0 | Status: SHIPPED | OUTPATIENT
Start: 2022-06-01 | End: 2022-08-30

## 2022-06-20 ENCOUNTER — HOSPITAL ENCOUNTER (OUTPATIENT)
Dept: BONE DENSITY | Facility: HOSPITAL | Age: 62
Discharge: HOME | End: 2022-06-20
Admitting: INTERNAL MEDICINE

## 2022-06-20 DIAGNOSIS — M85.80 OSTEOPENIA, UNSPECIFIED LOCATION: ICD-10-CM

## 2022-06-20 DIAGNOSIS — Z17.0 MALIGNANT NEOPLASM OF LOWER-OUTER QUADRANT OF RIGHT BREAST OF FEMALE, ESTROGEN RECEPTOR POSITIVE: ICD-10-CM

## 2022-06-20 DIAGNOSIS — C50.511 MALIGNANT NEOPLASM OF LOWER-OUTER QUADRANT OF RIGHT BREAST OF FEMALE, ESTROGEN RECEPTOR POSITIVE: ICD-10-CM

## 2022-06-20 PROCEDURE — 77080 DXA BONE DENSITY AXIAL: CPT

## 2022-07-31 DIAGNOSIS — E11.65 UNCONTROLLED TYPE 2 DIABETES MELLITUS WITH HYPERGLYCEMIA: ICD-10-CM

## 2022-08-01 RX ORDER — GLIMEPIRIDE 2 MG/1
2 TABLET ORAL
Qty: 90 TABLET | Refills: 1 | OUTPATIENT
Start: 2022-08-01

## 2022-08-02 ENCOUNTER — TELEPHONE (OUTPATIENT)
Dept: ONCOLOGY | Facility: CLINIC | Age: 62
End: 2022-08-02

## 2022-08-02 NOTE — TELEPHONE ENCOUNTER
Caller: Dana Rincon    Relationship: Self    Best call back number: [phone]    What is the best time to reach you: ANYTIME    Who are you requesting to speak with (clinical staff, provider,  specific staff member): SCHEDULING     What was the call regarding: PATIENT RETURNING CALL TO RESCHEDULE 12/30 APPT. DUE TO PATO PAGE BEING OUT OF THE OFFICE.    TRIED TO W/T TO OFFICE BUT WAS UNSUCCESSFUL     Do you require a callback: YES

## 2022-08-30 DIAGNOSIS — R60.0 PEDAL EDEMA: ICD-10-CM

## 2022-08-30 DIAGNOSIS — E78.49 OTHER HYPERLIPIDEMIA: ICD-10-CM

## 2022-08-30 RX ORDER — PRAVASTATIN SODIUM 10 MG
10 TABLET ORAL DAILY
Qty: 90 TABLET | Refills: 0 | Status: SHIPPED | OUTPATIENT
Start: 2022-08-30

## 2022-08-30 RX ORDER — FUROSEMIDE 20 MG/1
TABLET ORAL
Qty: 90 TABLET | Refills: 0 | Status: SHIPPED | OUTPATIENT
Start: 2022-08-30

## 2022-08-30 RX ORDER — POTASSIUM CHLORIDE 750 MG/1
TABLET, EXTENDED RELEASE ORAL
Qty: 90 TABLET | Refills: 0 | Status: SHIPPED | OUTPATIENT
Start: 2022-08-30

## 2022-09-29 DIAGNOSIS — I10 ESSENTIAL HYPERTENSION: ICD-10-CM

## 2022-09-29 DIAGNOSIS — E11.65 UNCONTROLLED TYPE 2 DIABETES MELLITUS WITH HYPERGLYCEMIA: ICD-10-CM

## 2022-09-29 DIAGNOSIS — J30.89 NON-SEASONAL ALLERGIC RHINITIS DUE TO OTHER ALLERGIC TRIGGER: ICD-10-CM

## 2022-09-29 RX ORDER — OLMESARTAN MEDOXOMIL AND HYDROCHLOROTHIAZIDE 40/12.5 40; 12.5 MG/1; MG/1
1 TABLET ORAL DAILY
Qty: 90 TABLET | Refills: 0 | Status: SHIPPED | OUTPATIENT
Start: 2022-09-29

## 2022-09-29 RX ORDER — METFORMIN HYDROCHLORIDE 750 MG/1
750 TABLET, EXTENDED RELEASE ORAL DAILY
Qty: 90 TABLET | Refills: 0 | Status: SHIPPED | OUTPATIENT
Start: 2022-09-29

## 2022-09-29 RX ORDER — MONTELUKAST SODIUM 10 MG/1
10 TABLET ORAL DAILY
Qty: 90 TABLET | Refills: 0 | Status: SHIPPED | OUTPATIENT
Start: 2022-09-29 | End: 2023-03-14 | Stop reason: SDUPTHER

## 2022-10-02 DIAGNOSIS — E11.65 UNCONTROLLED TYPE 2 DIABETES MELLITUS WITH HYPERGLYCEMIA: ICD-10-CM

## 2022-10-03 RX ORDER — EMPAGLIFLOZIN 25 MG/1
TABLET, FILM COATED ORAL
Qty: 90 TABLET | Refills: 1 | OUTPATIENT
Start: 2022-10-03

## 2022-11-28 DIAGNOSIS — E78.49 OTHER HYPERLIPIDEMIA: ICD-10-CM

## 2022-11-28 DIAGNOSIS — R60.0 PEDAL EDEMA: ICD-10-CM

## 2022-11-28 RX ORDER — PRAVASTATIN SODIUM 10 MG
10 TABLET ORAL DAILY
Qty: 90 TABLET | Refills: 0 | OUTPATIENT
Start: 2022-11-28

## 2022-11-28 RX ORDER — POTASSIUM CHLORIDE 750 MG/1
TABLET, EXTENDED RELEASE ORAL
Qty: 90 TABLET | Refills: 0 | OUTPATIENT
Start: 2022-11-28

## 2022-11-28 RX ORDER — FUROSEMIDE 20 MG/1
TABLET ORAL
Qty: 90 TABLET | Refills: 0 | OUTPATIENT
Start: 2022-11-28

## 2022-12-22 ENCOUNTER — APPOINTMENT (OUTPATIENT)
Dept: MAMMOGRAPHY | Facility: HOSPITAL | Age: 62
End: 2022-12-22

## 2022-12-27 ENCOUNTER — HOSPITAL ENCOUNTER (OUTPATIENT)
Dept: MAMMOGRAPHY | Facility: HOSPITAL | Age: 62
Discharge: HOME | End: 2022-12-27
Admitting: INTERNAL MEDICINE

## 2022-12-27 DIAGNOSIS — C50.511 MALIGNANT NEOPLASM OF LOWER-OUTER QUADRANT OF RIGHT BREAST OF FEMALE, ESTROGEN RECEPTOR POSITIVE: ICD-10-CM

## 2022-12-27 DIAGNOSIS — Z17.0 MALIGNANT NEOPLASM OF LOWER-OUTER QUADRANT OF RIGHT BREAST OF FEMALE, ESTROGEN RECEPTOR POSITIVE: ICD-10-CM

## 2022-12-27 PROCEDURE — 77067 SCR MAMMO BI INCL CAD: CPT

## 2022-12-27 PROCEDURE — 77063 BREAST TOMOSYNTHESIS BI: CPT | Performed by: RADIOLOGY

## 2022-12-27 PROCEDURE — 77067 SCR MAMMO BI INCL CAD: CPT | Performed by: RADIOLOGY

## 2022-12-27 PROCEDURE — 77063 BREAST TOMOSYNTHESIS BI: CPT

## 2022-12-28 DIAGNOSIS — J30.89 NON-SEASONAL ALLERGIC RHINITIS DUE TO OTHER ALLERGIC TRIGGER: ICD-10-CM

## 2022-12-28 DIAGNOSIS — E11.65 UNCONTROLLED TYPE 2 DIABETES MELLITUS WITH HYPERGLYCEMIA: ICD-10-CM

## 2022-12-28 DIAGNOSIS — I10 ESSENTIAL HYPERTENSION: ICD-10-CM

## 2022-12-28 RX ORDER — MONTELUKAST SODIUM 10 MG/1
10 TABLET ORAL DAILY
Qty: 90 TABLET | Refills: 0 | OUTPATIENT
Start: 2022-12-28

## 2022-12-28 RX ORDER — METFORMIN HYDROCHLORIDE 750 MG/1
750 TABLET, EXTENDED RELEASE ORAL DAILY
Qty: 90 TABLET | Refills: 0 | OUTPATIENT
Start: 2022-12-28

## 2022-12-28 RX ORDER — OLMESARTAN MEDOXOMIL AND HYDROCHLOROTHIAZIDE 40/12.5 40; 12.5 MG/1; MG/1
1 TABLET ORAL DAILY
Qty: 90 TABLET | Refills: 0 | OUTPATIENT
Start: 2022-12-28

## 2022-12-29 DIAGNOSIS — M81.0 OSTEOPOROSIS, UNSPECIFIED OSTEOPOROSIS TYPE, UNSPECIFIED PATHOLOGICAL FRACTURE PRESENCE: ICD-10-CM

## 2022-12-29 DIAGNOSIS — C50.511 MALIGNANT NEOPLASM OF LOWER-OUTER QUADRANT OF RIGHT BREAST OF FEMALE, ESTROGEN RECEPTOR POSITIVE: Primary | ICD-10-CM

## 2022-12-29 DIAGNOSIS — Z17.0 MALIGNANT NEOPLASM OF LOWER-OUTER QUADRANT OF RIGHT BREAST OF FEMALE, ESTROGEN RECEPTOR POSITIVE: Primary | ICD-10-CM

## 2023-01-09 ENCOUNTER — HOSPITAL ENCOUNTER (OUTPATIENT)
Dept: ONCOLOGY | Facility: HOSPITAL | Age: 63
Setting detail: INFUSION SERIES
Discharge: HOME | End: 2023-01-09
Payer: COMMERCIAL

## 2023-01-09 ENCOUNTER — OFFICE VISIT (OUTPATIENT)
Dept: ONCOLOGY | Facility: CLINIC | Age: 63
End: 2023-01-09
Payer: COMMERCIAL

## 2023-01-09 ENCOUNTER — LAB (OUTPATIENT)
Dept: LAB | Facility: HOSPITAL | Age: 63
End: 2023-01-09
Payer: COMMERCIAL

## 2023-01-09 VITALS
WEIGHT: 263 LBS | HEART RATE: 73 BPM | HEIGHT: 59 IN | SYSTOLIC BLOOD PRESSURE: 186 MMHG | BODY MASS INDEX: 53.02 KG/M2 | RESPIRATION RATE: 20 BRPM | DIASTOLIC BLOOD PRESSURE: 79 MMHG | TEMPERATURE: 97.1 F | OXYGEN SATURATION: 92 %

## 2023-01-09 DIAGNOSIS — C50.511 MALIGNANT NEOPLASM OF LOWER-OUTER QUADRANT OF RIGHT BREAST OF FEMALE, ESTROGEN RECEPTOR POSITIVE: Primary | ICD-10-CM

## 2023-01-09 DIAGNOSIS — C50.511 MALIGNANT NEOPLASM OF LOWER-OUTER QUADRANT OF RIGHT BREAST OF FEMALE, ESTROGEN RECEPTOR POSITIVE: ICD-10-CM

## 2023-01-09 DIAGNOSIS — M81.0 OSTEOPOROSIS, UNSPECIFIED OSTEOPOROSIS TYPE, UNSPECIFIED PATHOLOGICAL FRACTURE PRESENCE: ICD-10-CM

## 2023-01-09 DIAGNOSIS — Z17.0 MALIGNANT NEOPLASM OF LOWER-OUTER QUADRANT OF RIGHT BREAST OF FEMALE, ESTROGEN RECEPTOR POSITIVE: Primary | ICD-10-CM

## 2023-01-09 DIAGNOSIS — Z17.0 MALIGNANT NEOPLASM OF LOWER-OUTER QUADRANT OF RIGHT BREAST OF FEMALE, ESTROGEN RECEPTOR POSITIVE: ICD-10-CM

## 2023-01-09 LAB
ALBUMIN SERPL-MCNC: 3.8 G/DL (ref 3.5–5.2)
ALBUMIN/GLOB SERPL: 1.2 G/DL
ALP SERPL-CCNC: 115 U/L (ref 39–117)
ALT SERPL W P-5'-P-CCNC: 19 U/L (ref 1–33)
ANION GAP SERPL CALCULATED.3IONS-SCNC: 9 MMOL/L (ref 5–15)
AST SERPL-CCNC: 17 U/L (ref 1–32)
BASOPHILS # BLD AUTO: 0.02 10*3/MM3 (ref 0–0.2)
BASOPHILS NFR BLD AUTO: 0.3 % (ref 0–1.5)
BILIRUB SERPL-MCNC: 0.3 MG/DL (ref 0–1.2)
BUN SERPL-MCNC: 12 MG/DL (ref 8–23)
BUN/CREAT SERPL: 21.1 (ref 7–25)
CALCIUM SPEC-SCNC: 9.1 MG/DL (ref 8.6–10.5)
CHLORIDE SERPL-SCNC: 98 MMOL/L (ref 98–107)
CO2 SERPL-SCNC: 28 MMOL/L (ref 22–29)
CREAT SERPL-MCNC: 0.57 MG/DL (ref 0.57–1)
DEPRECATED RDW RBC AUTO: 43.3 FL (ref 37–54)
EGFRCR SERPLBLD CKD-EPI 2021: 102.9 ML/MIN/1.73
EOSINOPHIL # BLD AUTO: 0.12 10*3/MM3 (ref 0–0.4)
EOSINOPHIL NFR BLD AUTO: 1.7 % (ref 0.3–6.2)
ERYTHROCYTE [DISTWIDTH] IN BLOOD BY AUTOMATED COUNT: 13.3 % (ref 12.3–15.4)
GLOBULIN UR ELPH-MCNC: 3.3 GM/DL
GLUCOSE SERPL-MCNC: 187 MG/DL (ref 65–99)
HCT VFR BLD AUTO: 42 % (ref 34–46.6)
HGB BLD-MCNC: 13.2 G/DL (ref 12–15.9)
IMM GRANULOCYTES # BLD AUTO: 0.01 10*3/MM3 (ref 0–0.05)
IMM GRANULOCYTES NFR BLD AUTO: 0.1 % (ref 0–0.5)
LYMPHOCYTES # BLD AUTO: 1.19 10*3/MM3 (ref 0.7–3.1)
LYMPHOCYTES NFR BLD AUTO: 17.2 % (ref 19.6–45.3)
MAGNESIUM SERPL-MCNC: 1.9 MG/DL (ref 1.6–2.4)
MCH RBC QN AUTO: 27.6 PG (ref 26.6–33)
MCHC RBC AUTO-ENTMCNC: 31.4 G/DL (ref 31.5–35.7)
MCV RBC AUTO: 87.9 FL (ref 79–97)
MONOCYTES # BLD AUTO: 0.51 10*3/MM3 (ref 0.1–0.9)
MONOCYTES NFR BLD AUTO: 7.4 % (ref 5–12)
NEUTROPHILS NFR BLD AUTO: 5.08 10*3/MM3 (ref 1.7–7)
NEUTROPHILS NFR BLD AUTO: 73.3 % (ref 42.7–76)
PHOSPHATE SERPL-MCNC: 2.9 MG/DL (ref 2.5–4.5)
PLATELET # BLD AUTO: 202 10*3/MM3 (ref 140–450)
PMV BLD AUTO: 8.4 FL (ref 6–12)
POTASSIUM SERPL-SCNC: 3.9 MMOL/L (ref 3.5–5.2)
PROT SERPL-MCNC: 7.1 G/DL (ref 6–8.5)
RBC # BLD AUTO: 4.78 10*6/MM3 (ref 3.77–5.28)
SODIUM SERPL-SCNC: 135 MMOL/L (ref 136–145)
WBC NRBC COR # BLD: 6.93 10*3/MM3 (ref 3.4–10.8)

## 2023-01-09 PROCEDURE — 84100 ASSAY OF PHOSPHORUS: CPT

## 2023-01-09 PROCEDURE — 99214 OFFICE O/P EST MOD 30 MIN: CPT | Performed by: INTERNAL MEDICINE

## 2023-01-09 PROCEDURE — 36415 COLL VENOUS BLD VENIPUNCTURE: CPT

## 2023-01-09 PROCEDURE — 83735 ASSAY OF MAGNESIUM: CPT

## 2023-01-09 PROCEDURE — 85025 COMPLETE CBC W/AUTO DIFF WBC: CPT

## 2023-01-09 PROCEDURE — 80053 COMPREHEN METABOLIC PANEL: CPT

## 2023-01-09 PROCEDURE — 96372 THER/PROPH/DIAG INJ SC/IM: CPT

## 2023-01-09 PROCEDURE — 25010000002 DENOSUMAB 60 MG/ML SOLUTION PREFILLED SYRINGE: Performed by: NURSE PRACTITIONER

## 2023-01-09 RX ADMIN — DENOSUMAB 60 MG: 60 INJECTION SUBCUTANEOUS at 14:12

## 2023-01-09 NOTE — PROGRESS NOTES
DATE OF VISIT: 1/9/2023    REASON FOR VISIT: Followup for right breast cancer     PROBLEM LIST:  1. Stage I invasive ductal carcinoma of the right breast, [id number]:  A. Tumor at the 7 o'clock position, tumor size 1.4 cm, estrogen receptor and progesterone  receptor strongly positive, HER-2/greg negative zero by IHC. Low risk group Oncotype testing, score of 16, 10% risk of recurrence with hormone treatment alone.  B. Diagnosed on ultrasound guided biopsy 03/10/2016.   C. Status post lumpectomy with clear margins done by Dr. Sales 05/03/2016.   D. Started Arimidex 1 mg p.o. daily 06/03/2016.   E. Completed breast adjuvant radiation by Dr. Thompson.   2.  Osteoporosis:  A.  DEXA scan done June 2018 revealed T score -2.3  B. Starting Prolia 1/22/2020.   3. Right breast mammogram abnormality  4.  Obesity    HISTORY OF PRESENT ILLNESS: The patient is a very pleasant 62 y.o. female  with past medical history significant for right breast cancer diagnosed March 2016.  Patient status post lumpectomy followed by adjuvant radiation hormone treatment. The  patient is here today for scheduled follow-up visit.    SUBJECTIVE: Dana is here today by herself.  She has been compliant with her calcium.  Denies any fever chills night sweats.  Denies any weight loss.    Past History:  Medical History: has a past medical history of Allergic (Demerol), Asthma, Breast cancer (HCC) (2016), Breast injury, Diabetes mellitus (HCC), Dry eyes, Emphysema/COPD (HCC), H/O colonoscopy (10/2015), HBP (high blood pressure), Hot flashes, radiation therapy (06/2016), Itchy eyes, Sinus problem, and Wears glasses.   Surgical History: has a past surgical history that includes Tonsillectomy (1965); Dilation and curettage of uterus (2005); Other surgical history (2016); Breast lumpectomy (Right, 05/03/2016); Breast biopsy (Right, 09/08/2015); Breast biopsy (Right, 03/10/2016); Breast biopsy (Right, 03/2018); Adenoidectomy (1965); Tonsillectomy (1965); and  Colonoscopy (2016).   Family History: family history includes Breast cancer in her paternal aunt; Breast cancer (age of onset: 30) in some other family members; Cancer in her mother; Colon cancer in her mother and paternal uncle; Diabetes in her father; Heart disease in her father.   Social History: reports that she has never smoked. She has never used smokeless tobacco. She reports current alcohol use of about 3.0 standard drinks per week. She reports that she does not use drugs.    (Not in a hospital admission)     Allergies: Meperidine     Review of Systems   Constitutional: Positive for fatigue.   Respiratory: Negative.    Endocrine: Positive for heat intolerance.   Musculoskeletal: Positive for arthralgias.         Current Outpatient Medications:   •  albuterol sulfate  (90 Base) MCG/ACT inhaler, Inhale 2 puffs Every 4 (Four) Hours As Needed for Wheezing., Disp: 18 g, Rfl: 5  •  Cyanocobalamin 2500 MCG sublingual tablet, Place 2,500 mcg under the tongue Daily., Disp: 90 each, Rfl: 3  •  empagliflozin (Jardiance) 25 MG tablet tablet, Take 1 tablet by mouth Daily. For DMII, replaces Rybelsus, Disp: 90 tablet, Rfl: 1  •  fexofenadine (ALLEGRA) 30 MG tablet, Take 30 mg by mouth daily., Disp: , Rfl:   •  furosemide (LASIX) 20 MG tablet, TAKE 1 TABLET BY MOUTH DAILY AS NEEDED FOR SWELLING, Disp: 90 tablet, Rfl: 0  •  glimepiride (AMARYL) 2 MG tablet, Take 1 tablet by mouth Daily With Breakfast., Disp: 90 tablet, Rfl: 1  •  Melatonin 10 MG capsule, Take  by mouth., Disp: , Rfl:   •  metFORMIN ER (GLUCOPHAGE-XR) 750 MG 24 hr tablet, Take 1 tablet by mouth Daily., Disp: 90 tablet, Rfl: 0  •  mometasone-formoterol (Dulera) 100-5 MCG/ACT inhaler, Inhale 2 puffs Daily., Disp: 13 g, Rfl: 5  •  montelukast (SINGULAIR) 10 MG tablet, TAKE 1 TABLET BY MOUTH DAILY, Disp: 90 tablet, Rfl: 0  •  olmesartan-hydrochlorothiazide (BENICAR HCT) 40-12.5 MG per tablet, TAKE 1 TABLET BY MOUTH DAILY, Disp: 90 tablet, Rfl: 0  •   potassium chloride (K-DUR,KLOR-CON) 10 MEQ CR tablet, TAKE 1 TABLET BY MOUTH DAILY AS NEEDED, Disp: 90 tablet, Rfl: 0  •  pravastatin (PRAVACHOL) 10 MG tablet, TAKE 1 TABLET BY MOUTH DAILY, Disp: 90 tablet, Rfl: 0  •  Rybelsus 14 MG tablet, TAKE 1 TABLET BY MOUTH DAILY. REPLACES LUISUVIA., Disp: 30 tablet, Rfl: 5    PHYSICAL EXAMINATION:   BP (!) 186/79   Pulse 73   Temp 97.1 °F (36.2 °C) (Infrared)   Resp 20   Ht 149.9 cm (59.02\")   Wt 119 kg (263 lb)   SpO2 92%   BMI 53.09 kg/m²    Pain Score    01/09/23 1335   PainSc: 0-No pain        ECOG score: 0            ECOG Performance Status: 1 - Symptomatic but completely ambulatory  General Appearance:  alert, cooperative, no apparent distress and appears stated age   Neurologic/Psychiatric: A&O x 3, gait steady, appropriate affect, strength 5/5 in all muscle groups   HEENT:  Normocephalic, without obvious abnormality, mucous membranes moist   Neck: Supple, symmetrical, trachea midline, no adenopathy;  No thyromegaly, masses, or tenderness   Lungs:   Clear to auscultation bilaterally; respirations regular, even, and unlabored bilaterally   Heart:  Regular rate and rhythm, no murmurs appreciated   Abdomen:   Soft, non-tender, non-distended and no organomegaly   Lymph nodes: No cervical, supraclavicular, inguinal or axillary adenopathy noted   Extremities: Normal, atraumatic; no clubbing, cyanosis, or edema    Skin: No rashes, ulcers, or suspicious lesions noted     Lab on 01/09/2023   Component Date Value Ref Range Status   • WBC 01/09/2023 6.93  3.40 - 10.80 10*3/mm3 Final   • RBC 01/09/2023 4.78  3.77 - 5.28 10*6/mm3 Final   • Hemoglobin 01/09/2023 13.2  12.0 - 15.9 g/dL Final   • Hematocrit 01/09/2023 42.0  34.0 - 46.6 % Final   • MCV 01/09/2023 87.9  79.0 - 97.0 fL Final   • MCH 01/09/2023 27.6  26.6 - 33.0 pg Final   • MCHC 01/09/2023 31.4 (L)  31.5 - 35.7 g/dL Final   • RDW 01/09/2023 13.3  12.3 - 15.4 % Final   • RDW-SD 01/09/2023 43.3  37.0 - 54.0 fl Final    • MPV 01/09/2023 8.4  6.0 - 12.0 fL Final   • Platelets 01/09/2023 202  140 - 450 10*3/mm3 Final   • Neutrophil % 01/09/2023 73.3  42.7 - 76.0 % Final   • Lymphocyte % 01/09/2023 17.2 (L)  19.6 - 45.3 % Final   • Monocyte % 01/09/2023 7.4  5.0 - 12.0 % Final   • Eosinophil % 01/09/2023 1.7  0.3 - 6.2 % Final   • Basophil % 01/09/2023 0.3  0.0 - 1.5 % Final   • Immature Grans % 01/09/2023 0.1  0.0 - 0.5 % Final   • Neutrophils, Absolute 01/09/2023 5.08  1.70 - 7.00 10*3/mm3 Final   • Lymphocytes, Absolute 01/09/2023 1.19  0.70 - 3.10 10*3/mm3 Final   • Monocytes, Absolute 01/09/2023 0.51  0.10 - 0.90 10*3/mm3 Final   • Eosinophils, Absolute 01/09/2023 0.12  0.00 - 0.40 10*3/mm3 Final   • Basophils, Absolute 01/09/2023 0.02  0.00 - 0.20 10*3/mm3 Final   • Immature Grans, Absolute 01/09/2023 0.01  0.00 - 0.05 10*3/mm3 Final        Mammo Screening Digital Tomosynthesis Bilateral With CAD    Result Date: 1/2/2023  Narrative: ROUTINE DIGITAL SCREENING MAMMOGRAM WITH TOMOSYNTHESIS  HISTORY: Routine screening.  IMAGE COMPARISON:  [facility] extending to 2019.  TECHNIQUE:  Low dose full field digital breast tomosynthesis imaging was performed with 2D and 3D acquisitions consisting of bilateral CC and MLO views.  FINDINGS: . There are scattered fibroglandular densities. The fibroglandular pattern appears stable. Postsurgical changes on the right are stable. There is no mass, worrisome microcalcifications, or architectural distortion to suggest development of malignancy.      Impression: No findings suspicious for malignancy.  ACR BI-RADS CATEGORY: 2, BENIGN   RECOMMENDATION: Yearly mammogram, yearly clinical breast exam, and encourage self breast awareness.  CAD was used.  The standard false negative rate of mammography is between 10% and 25%. Complex patterns or increased breast density will markedly elevate the false negative rate of mammography.  A letter, in lay terminology, with the results of this exam will be mailed to  the patient.   If there is a palpable area of concern, biopsy should be considered regardless of imaging findings.    This report was finalized on 1/2/2023 2:55 PM by Naomi Duncan MD.        ASSESSMENT: The patient is a very pleasant 62 y.o. female  with right breast cancer      PLAN:    1.  Right breast cancer:  A.  I did go over her mammogram results from January 2, 2023 and reassured no abnormalities were noted.  She will need to maintain annual mammograms.  Her next mammogram will be January 2024.    2.  Osteoporosis:  A.  I will continue calcium vitamin D daily.  B.  I will continue Prolia 60 minutes subcu every 6 months.  Should be treated today.  C.  I did go over the bone density result with the patient from June 20, 2022.  I did reveal persistent osteopenia with T score -1.6 in the left femoral neck.  She will need to have 2-year follow-up study which should be due June 2024.    3.  Obesity:  A.  Patient was advised to exercise and lose weight.      FOLLOW UP: 6 months with Prolia injection.    Idalia Driscoll MD  1/9/2023

## 2023-01-22 DIAGNOSIS — J45.909 UNCOMPLICATED ASTHMA: ICD-10-CM

## 2023-03-14 DIAGNOSIS — J30.89 NON-SEASONAL ALLERGIC RHINITIS DUE TO OTHER ALLERGIC TRIGGER: ICD-10-CM

## 2023-03-15 RX ORDER — MONTELUKAST SODIUM 10 MG/1
10 TABLET ORAL DAILY
Qty: 90 TABLET | Refills: 0 | Status: SHIPPED | OUTPATIENT
Start: 2023-03-15

## 2023-03-22 DIAGNOSIS — J45.909 UNCOMPLICATED ASTHMA: ICD-10-CM

## 2023-05-17 DIAGNOSIS — J45.909 UNCOMPLICATED ASTHMA: ICD-10-CM

## 2023-06-09 DIAGNOSIS — J30.89 NON-SEASONAL ALLERGIC RHINITIS DUE TO OTHER ALLERGIC TRIGGER: ICD-10-CM

## 2023-06-09 RX ORDER — MONTELUKAST SODIUM 10 MG/1
10 TABLET ORAL DAILY
Qty: 90 TABLET | Refills: 0 | Status: SHIPPED | OUTPATIENT
Start: 2023-06-09

## 2023-07-21 ENCOUNTER — LAB (OUTPATIENT)
Dept: LAB | Facility: HOSPITAL | Age: 63
End: 2023-07-21
Payer: COMMERCIAL

## 2023-07-21 DIAGNOSIS — Z00.00 ROUTINE GENERAL MEDICAL EXAMINATION AT A HEALTH CARE FACILITY: ICD-10-CM

## 2023-07-21 DIAGNOSIS — E55.9 VITAMIN D DEFICIENCY: ICD-10-CM

## 2023-07-21 DIAGNOSIS — E53.8 B12 DEFICIENCY: ICD-10-CM

## 2023-07-21 LAB
25(OH)D3 SERPL-MCNC: 46.6 NG/ML (ref 30–100)
ALBUMIN SERPL-MCNC: 4.4 G/DL (ref 3.5–5.2)
ALBUMIN/GLOB SERPL: 1.4 G/DL
ALP SERPL-CCNC: 85 U/L (ref 39–117)
ALT SERPL W P-5'-P-CCNC: 18 U/L (ref 1–33)
ANION GAP SERPL CALCULATED.3IONS-SCNC: 16.5 MMOL/L (ref 5–15)
AST SERPL-CCNC: 18 U/L (ref 1–32)
BILIRUB SERPL-MCNC: 0.8 MG/DL (ref 0–1.2)
BUN SERPL-MCNC: 18 MG/DL (ref 8–23)
BUN/CREAT SERPL: 22.8 (ref 7–25)
CALCIUM SPEC-SCNC: 9.4 MG/DL (ref 8.6–10.5)
CHLORIDE SERPL-SCNC: 98 MMOL/L (ref 98–107)
CHOLEST SERPL-MCNC: 159 MG/DL (ref 0–200)
CO2 SERPL-SCNC: 21.5 MMOL/L (ref 22–29)
CREAT SERPL-MCNC: 0.79 MG/DL (ref 0.57–1)
DEPRECATED RDW RBC AUTO: 38.4 FL (ref 37–54)
EGFRCR SERPLBLD CKD-EPI 2021: 84.7 ML/MIN/1.73
ERYTHROCYTE [DISTWIDTH] IN BLOOD BY AUTOMATED COUNT: 13.2 % (ref 12.3–15.4)
GLOBULIN UR ELPH-MCNC: 3.2 GM/DL
GLUCOSE SERPL-MCNC: 106 MG/DL (ref 65–99)
HCT VFR BLD AUTO: 42.4 % (ref 34–46.6)
HDLC SERPL-MCNC: 40 MG/DL (ref 40–60)
HGB BLD-MCNC: 14.5 G/DL (ref 12–15.9)
LDLC SERPL CALC-MCNC: 102 MG/DL (ref 0–100)
LDLC/HDLC SERPL: 2.54 {RATIO}
MCH RBC QN AUTO: 28 PG (ref 26.6–33)
MCHC RBC AUTO-ENTMCNC: 34.2 G/DL (ref 31.5–35.7)
MCV RBC AUTO: 82 FL (ref 79–97)
PLATELET # BLD AUTO: 250 10*3/MM3 (ref 140–450)
PMV BLD AUTO: 9.4 FL (ref 6–12)
POTASSIUM SERPL-SCNC: 4.2 MMOL/L (ref 3.5–5.2)
PROT SERPL-MCNC: 7.6 G/DL (ref 6–8.5)
RBC # BLD AUTO: 5.17 10*6/MM3 (ref 3.77–5.28)
SODIUM SERPL-SCNC: 136 MMOL/L (ref 136–145)
TRIGL SERPL-MCNC: 88 MG/DL (ref 0–150)
TSH SERPL DL<=0.05 MIU/L-ACNC: 2.06 UIU/ML (ref 0.27–4.2)
VIT B12 BLD-MCNC: 510 PG/ML (ref 211–946)
VLDLC SERPL-MCNC: 17 MG/DL (ref 5–40)
WBC NRBC COR # BLD: 7.45 10*3/MM3 (ref 3.4–10.8)

## 2023-07-21 PROCEDURE — 80050 GENERAL HEALTH PANEL: CPT

## 2023-07-21 PROCEDURE — 82607 VITAMIN B-12: CPT

## 2023-07-21 PROCEDURE — 82306 VITAMIN D 25 HYDROXY: CPT

## 2023-07-21 PROCEDURE — 80061 LIPID PANEL: CPT

## 2023-11-17 ENCOUNTER — APPOINTMENT (OUTPATIENT)
Dept: GENERAL RADIOLOGY | Facility: HOSPITAL | Age: 63
End: 2023-11-17
Payer: COMMERCIAL

## 2023-11-17 ENCOUNTER — HOSPITAL ENCOUNTER (EMERGENCY)
Facility: HOSPITAL | Age: 63
Discharge: HOME | End: 2023-11-17
Attending: STUDENT IN AN ORGANIZED HEALTH CARE EDUCATION/TRAINING PROGRAM
Payer: COMMERCIAL

## 2023-11-17 VITALS
DIASTOLIC BLOOD PRESSURE: 73 MMHG | HEART RATE: 61 BPM | RESPIRATION RATE: 16 BRPM | OXYGEN SATURATION: 94 % | TEMPERATURE: 98.2 F | SYSTOLIC BLOOD PRESSURE: 148 MMHG | BODY MASS INDEX: 51.41 KG/M2 | HEIGHT: 59 IN | WEIGHT: 255 LBS

## 2023-11-17 DIAGNOSIS — I49.3 PVC'S (PREMATURE VENTRICULAR CONTRACTIONS): ICD-10-CM

## 2023-11-17 DIAGNOSIS — E11.65 HYPERGLYCEMIA DUE TO DIABETES MELLITUS: Primary | ICD-10-CM

## 2023-11-17 DIAGNOSIS — G47.33 OBSTRUCTIVE SLEEP APNEA SYNDROME: ICD-10-CM

## 2023-11-17 DIAGNOSIS — J34.89 RHINORRHEA: ICD-10-CM

## 2023-11-17 DIAGNOSIS — I51.7 CARDIOMEGALY: ICD-10-CM

## 2023-11-17 LAB
ALBUMIN SERPL-MCNC: 4.5 G/DL (ref 3.5–5.2)
ALBUMIN/GLOB SERPL: 1.5 G/DL
ALP SERPL-CCNC: 114 U/L (ref 39–117)
ALT SERPL W P-5'-P-CCNC: 19 U/L (ref 1–33)
ANION GAP SERPL CALCULATED.3IONS-SCNC: 11 MMOL/L (ref 5–15)
AST SERPL-CCNC: 18 U/L (ref 1–32)
B PARAPERT DNA SPEC QL NAA+PROBE: NOT DETECTED
B PERT DNA SPEC QL NAA+PROBE: NOT DETECTED
BASOPHILS # BLD AUTO: 0.05 10*3/MM3 (ref 0–0.2)
BASOPHILS NFR BLD AUTO: 0.5 % (ref 0–1.5)
BILIRUB SERPL-MCNC: 0.4 MG/DL (ref 0–1.2)
BUN SERPL-MCNC: 12 MG/DL (ref 8–23)
BUN/CREAT SERPL: 18.5 (ref 7–25)
C PNEUM DNA NPH QL NAA+NON-PROBE: NOT DETECTED
CALCIUM SPEC-SCNC: 9.7 MG/DL (ref 8.6–10.5)
CHLORIDE SERPL-SCNC: 96 MMOL/L (ref 98–107)
CO2 SERPL-SCNC: 28 MMOL/L (ref 22–29)
CREAT SERPL-MCNC: 0.65 MG/DL (ref 0.57–1)
D DIMER PPP FEU-MCNC: 0.39 MCGFEU/ML (ref 0–0.63)
DEPRECATED RDW RBC AUTO: 41.9 FL (ref 37–54)
EGFRCR SERPLBLD CKD-EPI 2021: 99.1 ML/MIN/1.73
EOSINOPHIL # BLD AUTO: 0.17 10*3/MM3 (ref 0–0.4)
EOSINOPHIL NFR BLD AUTO: 1.8 % (ref 0.3–6.2)
ERYTHROCYTE [DISTWIDTH] IN BLOOD BY AUTOMATED COUNT: 13.2 % (ref 12.3–15.4)
FLUAV SUBTYP SPEC NAA+PROBE: NOT DETECTED
FLUBV RNA ISLT QL NAA+PROBE: NOT DETECTED
GLOBULIN UR ELPH-MCNC: 3.1 GM/DL
GLUCOSE SERPL-MCNC: 153 MG/DL (ref 65–99)
HADV DNA SPEC NAA+PROBE: NOT DETECTED
HCOV 229E RNA SPEC QL NAA+PROBE: NOT DETECTED
HCOV HKU1 RNA SPEC QL NAA+PROBE: NOT DETECTED
HCOV NL63 RNA SPEC QL NAA+PROBE: NOT DETECTED
HCOV OC43 RNA SPEC QL NAA+PROBE: NOT DETECTED
HCT VFR BLD AUTO: 47.5 % (ref 34–46.6)
HGB BLD-MCNC: 15.3 G/DL (ref 12–15.9)
HMPV RNA NPH QL NAA+NON-PROBE: NOT DETECTED
HOLD SPECIMEN: NORMAL
HPIV1 RNA ISLT QL NAA+PROBE: NOT DETECTED
HPIV2 RNA SPEC QL NAA+PROBE: NOT DETECTED
HPIV3 RNA NPH QL NAA+PROBE: NOT DETECTED
HPIV4 P GENE NPH QL NAA+PROBE: NOT DETECTED
IMM GRANULOCYTES # BLD AUTO: 0.02 10*3/MM3 (ref 0–0.05)
IMM GRANULOCYTES NFR BLD AUTO: 0.2 % (ref 0–0.5)
LYMPHOCYTES # BLD AUTO: 1.51 10*3/MM3 (ref 0.7–3.1)
LYMPHOCYTES NFR BLD AUTO: 16 % (ref 19.6–45.3)
M PNEUMO IGG SER IA-ACNC: NOT DETECTED
MAGNESIUM SERPL-MCNC: 2.3 MG/DL (ref 1.6–2.4)
MCH RBC QN AUTO: 28 PG (ref 26.6–33)
MCHC RBC AUTO-ENTMCNC: 32.2 G/DL (ref 31.5–35.7)
MCV RBC AUTO: 86.8 FL (ref 79–97)
MONOCYTES # BLD AUTO: 0.65 10*3/MM3 (ref 0.1–0.9)
MONOCYTES NFR BLD AUTO: 6.9 % (ref 5–12)
NEUTROPHILS NFR BLD AUTO: 7.04 10*3/MM3 (ref 1.7–7)
NEUTROPHILS NFR BLD AUTO: 74.6 % (ref 42.7–76)
NRBC BLD AUTO-RTO: 0 /100 WBC (ref 0–0.2)
NT-PROBNP SERPL-MCNC: 302.9 PG/ML (ref 0–900)
PLATELET # BLD AUTO: 256 10*3/MM3 (ref 140–450)
PMV BLD AUTO: 8.7 FL (ref 6–12)
POTASSIUM SERPL-SCNC: 4.1 MMOL/L (ref 3.5–5.2)
PROT SERPL-MCNC: 7.6 G/DL (ref 6–8.5)
QT INTERVAL: 380 MS
QTC INTERVAL: 443 MS
RBC # BLD AUTO: 5.47 10*6/MM3 (ref 3.77–5.28)
RHINOVIRUS RNA SPEC NAA+PROBE: NOT DETECTED
RSV RNA NPH QL NAA+NON-PROBE: NOT DETECTED
SARS-COV-2 RNA NPH QL NAA+NON-PROBE: NOT DETECTED
SODIUM SERPL-SCNC: 135 MMOL/L (ref 136–145)
T4 FREE SERPL-MCNC: 1.47 NG/DL (ref 0.93–1.7)
TROPONIN T SERPL HS-MCNC: 11 NG/L
TSH SERPL DL<=0.05 MIU/L-ACNC: 2.6 UIU/ML (ref 0.27–4.2)
WBC NRBC COR # BLD AUTO: 9.44 10*3/MM3 (ref 3.4–10.8)
WHOLE BLOOD HOLD COAG: NORMAL
WHOLE BLOOD HOLD SPECIMEN: NORMAL

## 2023-11-17 PROCEDURE — 71045 X-RAY EXAM CHEST 1 VIEW: CPT

## 2023-11-17 PROCEDURE — 83880 ASSAY OF NATRIURETIC PEPTIDE: CPT | Performed by: STUDENT IN AN ORGANIZED HEALTH CARE EDUCATION/TRAINING PROGRAM

## 2023-11-17 PROCEDURE — 99284 EMERGENCY DEPT VISIT MOD MDM: CPT

## 2023-11-17 PROCEDURE — 93005 ELECTROCARDIOGRAM TRACING: CPT | Performed by: STUDENT IN AN ORGANIZED HEALTH CARE EDUCATION/TRAINING PROGRAM

## 2023-11-17 PROCEDURE — 80050 GENERAL HEALTH PANEL: CPT | Performed by: STUDENT IN AN ORGANIZED HEALTH CARE EDUCATION/TRAINING PROGRAM

## 2023-11-17 PROCEDURE — 84439 ASSAY OF FREE THYROXINE: CPT | Performed by: STUDENT IN AN ORGANIZED HEALTH CARE EDUCATION/TRAINING PROGRAM

## 2023-11-17 PROCEDURE — 96360 HYDRATION IV INFUSION INIT: CPT

## 2023-11-17 PROCEDURE — 36415 COLL VENOUS BLD VENIPUNCTURE: CPT

## 2023-11-17 PROCEDURE — 83735 ASSAY OF MAGNESIUM: CPT | Performed by: STUDENT IN AN ORGANIZED HEALTH CARE EDUCATION/TRAINING PROGRAM

## 2023-11-17 PROCEDURE — 25810000003 SODIUM CHLORIDE 0.9 % SOLUTION: Performed by: STUDENT IN AN ORGANIZED HEALTH CARE EDUCATION/TRAINING PROGRAM

## 2023-11-17 PROCEDURE — 85379 FIBRIN DEGRADATION QUANT: CPT | Performed by: STUDENT IN AN ORGANIZED HEALTH CARE EDUCATION/TRAINING PROGRAM

## 2023-11-17 PROCEDURE — 0202U NFCT DS 22 TRGT SARS-COV-2: CPT | Performed by: STUDENT IN AN ORGANIZED HEALTH CARE EDUCATION/TRAINING PROGRAM

## 2023-11-17 PROCEDURE — 84484 ASSAY OF TROPONIN QUANT: CPT | Performed by: STUDENT IN AN ORGANIZED HEALTH CARE EDUCATION/TRAINING PROGRAM

## 2023-11-17 RX ORDER — IPRATROPIUM BROMIDE 21 UG/1
2 SPRAY, METERED NASAL EVERY 12 HOURS
Status: DISCONTINUED | OUTPATIENT
Start: 2023-11-17 | End: 2023-11-17 | Stop reason: HOSPADM

## 2023-11-17 RX ORDER — IPRATROPIUM BROMIDE 21 UG/1
2 SPRAY, METERED NASAL EVERY 12 HOURS
Qty: 30 ML | Refills: 0 | Status: SHIPPED | OUTPATIENT
Start: 2023-11-17

## 2023-11-17 RX ORDER — SODIUM CHLORIDE 0.9 % (FLUSH) 0.9 %
10 SYRINGE (ML) INJECTION AS NEEDED
Status: DISCONTINUED | OUTPATIENT
Start: 2023-11-17 | End: 2023-11-17 | Stop reason: HOSPADM

## 2023-11-17 RX ADMIN — SODIUM CHLORIDE 1000 ML: 9 INJECTION, SOLUTION INTRAVENOUS at 20:14

## 2023-11-17 RX ADMIN — IPRATROPIUM BROMIDE 2 SPRAY: 21 SPRAY NASAL at 21:26

## 2023-11-18 NOTE — DISCHARGE INSTRUCTIONS
You will be contacted with cardiology follow-up.  Try the provided nasal spray I think this will help with the sinus congestion and cough.  While today's work-up was reassuring if your symptoms change or worsen please return to the ED or seek other medical care.

## 2023-11-18 NOTE — ED PROVIDER NOTES
EMERGENCY DEPARTMENT ENCOUNTER    Pt Name: Dana Rincon  MRN: [id number]  Pt :   1960  Room Number:    Date of encounter:  2023  PCP: Noreen Smith MD  ED Provider: Carlo Echols MD    Historian: Patient      HPI:  Chief Complaint: Sinus congestion, cough, abnormal cardiac auscultation and urgent care        Context: Dana Rincon is a 63-year-old woman with history of diabetes that is pretty well managed A1c in the sevens, obesity, sleep apnea managed with CPAP, COPD, who presents for evaluation of multiple issues.  She went to an urgent care because of 3 days of sinus congestion and productive cough.  She was evaluated there and she says she was told by the person at the urgent care that she had an abnormal heart sound to auscultation as well as what they thought was an enlarged heart on her x-ray.  She does have family history of heart failure so she was sent here for cardiac work-up.  She denies chest pain or palpitations.  She did have some PVCs on her ECG in triage and she says she was not able to feel those at that time.  She denies fevers or systemic symptoms.  No other complaints at this time.      PAST MEDICAL HISTORY  Past Medical History:   Diagnosis Date    Allergic Demerol    1965    Asthma     Breast cancer 2016    right    Breast injury     PT STATES SHE FELL AND BRUSIED HER LEFT BREAST    Diabetes mellitus     Dry eyes     Emphysema/COPD     H/O colonoscopy 10/2015    HBP (high blood pressure)     Hot flashes     Hx of radiation therapy 2016    right breast    Itchy eyes     Obesity     HANNA (obstructive sleep apnea)     Sinus problem     Wears glasses          PAST SURGICAL HISTORY  Past Surgical History:   Procedure Laterality Date    ADENOIDECTOMY  1965    BREAST BIOPSY Right 2015    stereo bx    BREAST BIOPSY Right 03/10/2016     bx    BREAST BIOPSY Right 2018    stereo bx     BREAST LUMPECTOMY Right 2016    COLONOSCOPY  2016    DILATATION  AND CURETTAGE  2005    OTHER SURGICAL HISTORY  2016    lumpectomy    TONSILLECTOMY  1965    TONSILLECTOMY  1965         FAMILY HISTORY  Family History   Problem Relation Age of Onset    Colon cancer Mother     Cancer Mother         colon cancer    Diabetes Father     Heart disease Father     Breast cancer Paternal Aunt         pt states 50's    Colon cancer Paternal Uncle     Breast cancer Other 30    Breast cancer Other 30    Ovarian cancer Neg Hx          SOCIAL HISTORY  Social History     Socioeconomic History    Marital status:    Tobacco Use    Smoking status: Never    Smokeless tobacco: Never   Vaping Use    Vaping Use: Never used   Substance and Sexual Activity    Alcohol use: Yes     Alcohol/week: 3.0 standard drinks of alcohol     Types: 3 Glasses of wine per week     Comment: 2-3/week for 20 years    Drug use: No    Sexual activity: Defer         ALLERGIES  Meperidine        REVIEW OF SYSTEMS  Review of Systems       All systems reviewed and negative except for those discussed in HPI.       PHYSICAL EXAM    I have reviewed the triage vital signs and nursing notes.    ED Triage Vitals   Temp Heart Rate Resp BP SpO2   11/17/23 1714 11/17/23 1714 11/17/23 1714 11/17/23 1714 11/17/23 1714   98.2 °F (36.8 °C) 93 24 (!) 186/79 93 %      Temp src Heart Rate Source Patient Position BP Location FiO2 (%)   11/17/23 1714 11/17/23 1714 11/17/23 1714 11/17/23 2011 --   Oral Monitor Sitting Left arm        Physical Exam  GENERAL:   Appears in no acute distress.   HENT: Nares patent.  EYES: No scleral icterus.  CV: Regular rhythm, regular rate.  RESPIRATORY: Normal effort.  No audible wheezes, rales or rhonchi.  ABDOMEN: Soft, nontender  MUSCULOSKELETAL: No deformities.   NEURO: Alert, moves all extremities, follows commands.  SKIN: Warm, dry, no rash visualized.      LAB RESULTS  Recent Results (from the past 24 hour(s))   POC Rapid Strep A    Collection Time: 11/17/23  2:26 PM    Specimen: Swab   Result Value  Ref Range    Rapid Strep A Screen Negative     Internal Control Passed     Lot Number [id number]     Expiration Date 05/24/2026    POCT AIDAN FLU + SARS PCR ([id number])    Collection Time: 11/17/23  2:38 PM    Specimen: Nasal Cavity; Swab   Result Value Ref Range    COVID19 Not Detected Not Detected - Ref. Range    POC Influenza A, Molecular Not Detected Negative / Not Detected    POC Influenza B, Molecular Not Detected Negative / Not Detected    Internal Control Passed Passed    Lot Number [id number]     Expiration Date 8/31/25    POC Glucose Once    Collection Time: 11/17/23  3:10 PM    Specimen: Blood   Result Value Ref Range    Glucose 282 (A) 70 - 130 mg/dL   ECG 12 Lead ED Triage Standing Order; Dysrhythmia    Collection Time: 11/17/23  5:18 PM   Result Value Ref Range    QT Interval 380 ms    QTC Interval 443 ms   Comprehensive Metabolic Panel    Collection Time: 11/17/23  6:09 PM    Specimen: Arm, Left; Blood   Result Value Ref Range    Glucose 153 (H) 65 - 99 mg/dL    BUN 12 8 - 23 mg/dL    Creatinine 0.65 0.57 - 1.00 mg/dL    Sodium 135 (L) 136 - 145 mmol/L    Potassium 4.1 3.5 - 5.2 mmol/L    Chloride 96 (L) 98 - 107 mmol/L    CO2 28.0 22.0 - 29.0 mmol/L    Calcium 9.7 8.6 - 10.5 mg/dL    Total Protein 7.6 6.0 - 8.5 g/dL    Albumin 4.5 3.5 - 5.2 g/dL    ALT (SGPT) 19 1 - 33 U/L    AST (SGOT) 18 1 - 32 U/L    Alkaline Phosphatase 114 39 - 117 U/L    Total Bilirubin 0.4 0.0 - 1.2 mg/dL    Globulin 3.1 gm/dL    A/G Ratio 1.5 g/dL    BUN/Creatinine Ratio 18.5 7.0 - 25.0    Anion Gap 11.0 5.0 - 15.0 mmol/L    eGFR 99.1 >60.0 mL/min/1.73   Magnesium    Collection Time: 11/17/23  6:09 PM    Specimen: Arm, Left; Blood   Result Value Ref Range    Magnesium 2.3 1.6 - 2.4 mg/dL   Single High Sensitivity Troponin T    Collection Time: 11/17/23  6:09 PM    Specimen: Arm, Left; Blood   Result Value Ref Range    HS Troponin T 11 <14 ng/L   TSH    Collection Time: 11/17/23  6:09 PM    Specimen: Arm, Left; Blood   Result Value  Ref Range    TSH 2.600 0.270 - 4.200 uIU/mL   BNP    Collection Time: 11/17/23  6:09 PM    Specimen: Arm, Left; Blood   Result Value Ref Range    proBNP 302.9 0.0 - 900.0 pg/mL   Green Top (Gel)    Collection Time: 11/17/23  6:09 PM   Result Value Ref Range    Extra Tube Hold for add-ons.    Lavender Top    Collection Time: 11/17/23  6:09 PM   Result Value Ref Range    Extra Tube hold for add-on    Gold Top - SST    Collection Time: 11/17/23  6:09 PM   Result Value Ref Range    Extra Tube Hold for add-ons.    Gray Top    Collection Time: 11/17/23  6:09 PM   Result Value Ref Range    Extra Tube Hold for add-ons.    Light Blue Top    Collection Time: 11/17/23  6:09 PM   Result Value Ref Range    Extra Tube Hold for add-ons.    CBC Auto Differential    Collection Time: 11/17/23  6:09 PM    Specimen: Arm, Left; Blood   Result Value Ref Range    WBC 9.44 3.40 - 10.80 10*3/mm3    RBC 5.47 (H) 3.77 - 5.28 10*6/mm3    Hemoglobin 15.3 12.0 - 15.9 g/dL    Hematocrit 47.5 (H) 34.0 - 46.6 %    MCV 86.8 79.0 - 97.0 fL    MCH 28.0 26.6 - 33.0 pg    MCHC 32.2 31.5 - 35.7 g/dL    RDW 13.2 12.3 - 15.4 %    RDW-SD 41.9 37.0 - 54.0 fl    MPV 8.7 6.0 - 12.0 fL    Platelets 256 140 - 450 10*3/mm3    Neutrophil % 74.6 42.7 - 76.0 %    Lymphocyte % 16.0 (L) 19.6 - 45.3 %    Monocyte % 6.9 5.0 - 12.0 %    Eosinophil % 1.8 0.3 - 6.2 %    Basophil % 0.5 0.0 - 1.5 %    Immature Grans % 0.2 0.0 - 0.5 %    Neutrophils, Absolute 7.04 (H) 1.70 - 7.00 10*3/mm3    Lymphocytes, Absolute 1.51 0.70 - 3.10 10*3/mm3    Monocytes, Absolute 0.65 0.10 - 0.90 10*3/mm3    Eosinophils, Absolute 0.17 0.00 - 0.40 10*3/mm3    Basophils, Absolute 0.05 0.00 - 0.20 10*3/mm3    Immature Grans, Absolute 0.02 0.00 - 0.05 10*3/mm3    nRBC 0.0 0.0 - 0.2 /100 WBC   D-dimer, Quantitative    Collection Time: 11/17/23  6:09 PM    Specimen: Arm, Left; Blood   Result Value Ref Range    D-Dimer, Quantitative 0.39 0.00 - 0.63 MCGFEU/mL   T4, Free    Collection Time: 11/17/23   6:09 PM    Specimen: Arm, Left; Blood   Result Value Ref Range    Free T4 1.47 0.93 - 1.70 ng/dL   Respiratory Panel PCR w/COVID-19(SARS-CoV-2) [facility]CK/BALJINDER/PAD/[facility]/EFE In-House, NP Swab in UTM/VTM, 2 HR TAT - Swab, Nasopharynx    Collection Time: 11/17/23  6:11 PM    Specimen: Nasopharynx; Swab   Result Value Ref Range    ADENOVIRUS, PCR Not Detected Not Detected    Coronavirus 229E Not Detected Not Detected    Coronavirus HKU1 Not Detected Not Detected    Coronavirus NL63 Not Detected Not Detected    Coronavirus OC43 Not Detected Not Detected    COVID19 Not Detected Not Detected - Ref. Range    Human Metapneumovirus Not Detected Not Detected    Human Rhinovirus/Enterovirus Not Detected Not Detected    Influenza A PCR Not Detected Not Detected    Influenza B PCR Not Detected Not Detected    Parainfluenza Virus 1 Not Detected Not Detected    Parainfluenza Virus 2 Not Detected Not Detected    Parainfluenza Virus 3 Not Detected Not Detected    Parainfluenza Virus 4 Not Detected Not Detected    RSV, PCR Not Detected Not Detected    Bordetella pertussis pcr Not Detected Not Detected    Bordetella parapertussis PCR Not Detected Not Detected    Chlamydophila pneumoniae PCR Not Detected Not Detected    Mycoplasma pneumo by PCR Not Detected Not Detected       If labs were ordered, I independently reviewed the results and considered them in treating the patient.        RADIOLOGY  XR Chest 1 View    Result Date: 11/17/2023  XR CHEST 1 VW Date of Exam: 11/17/2023 7:25 PM EST Indication: Dysrhythmia triage protocol Comparison: 11/17/2023 Findings: Heart shadow is enlarged. The pulmonary vasculature appears within normal limits. Mild coarsening of the basilar interstitial markings is stable from the prior exam, and essentially unchanged back to 12/16/2019 exam. No lung consolidation, effusion or pneumothorax is seen. Bony structures appear to be intact.     Impression: Stable cardiomegaly without evidence of overt congestive  failure. Stable mild chronic appearing lower lung changes. No clearly acute chest disease. Electronically Signed: Benoit Mao MD  11/17/2023 7:52 PM EST  Workstation ID: [id number]    XR Chest 2 View    Result Date: 11/17/2023  XR CHEST 2 VW Date of Exam: 11/17/2023 3:03 PM EST Indication: productive cough, abnormal heart tones Comparison: 12/26/2019. Findings: There is mild cardiomegaly. There is a mildly prominent interstitial pattern which appears chronic. There is mildly prominent epicardial fat. No acute infiltrates or effusions are identified. Thoracic aorta is somewhat tortuous and ectatic.     Impression: Chronic findings. No acute process. Electronically Signed: Nelly Keenan MD  11/17/2023 3:28 PM EST  Workstation ID: [id number]     I ordered and independently reviewed the above noted radiographic studies.      I viewed images of chest x-ray which is clear.  She has mild borderline cardiomegaly which formal read comments is stable with prior.    See radiologist's dictation for official interpretation.        PROCEDURES    Procedures    ECG 12 Lead ED Triage Standing Order; Dysrhythmia   Preliminary Result   Test Reason : ED Triage Standing Order~   Blood Pressure :   */*   mmHG   Vent. Rate :  82 BPM     Atrial Rate :  82 BPM      P-R Int : 130 ms          QRS Dur :  76 ms       QT Int : 380 ms       P-R-T Axes :  61   0  51 degrees      QTc Int : 443 ms      Sinus rhythm with frequent premature ventricular complexes in a pattern of    bigeminy   Anteroseptal infarct (cited on or before 26-DEC-2019)   Abnormal ECG   When compared with ECG of 26-DEC-2019 09:48,   premature ventricular complexes are now present   Questionable change in initial forces of Septal leads      Referred By:            Confirmed By:           MEDICATIONS GIVEN IN ER    Medications   sodium chloride 0.9 % bolus 1,000 mL (0 mL Intravenous Stopped 11/17/23 2121)         MEDICAL DECISION MAKING, PROGRESS, and CONSULTS    All labs, if  obtained, have been independently reviewed by me.  All radiology studies, if obtained, have been reviewed by me and the radiologist dictating the report.  All EKG's, if obtained, have been independently viewed and interpreted by me/my attending physician.      Discussion below represents my analysis of pertinent findings related to patient's condition, differential diagnosis, treatment plan and final disposition.                         Differential diagnosis:    Congestive heart failure, cardiac murmur, cardiac ischemia, myocarditis, hyperthyroidism, COVID, flu, viral URI, pneumonia, bronchitis, anemia, electrolyte abnormality      Additional sources:    - Discussed/ obtained information from independent historians:      - External (non-ED) record review: Oncology notes from breast cancer, numerous urgent care notes for sinus congestion, acute cough, dysuria    - Chronic or social conditions impacting care: Sleep apnea, history of breast cancer, obesity    - Shared decision making: Griebel to discharge with cardiology follow-up.      Orders placed during this visit:  Orders Placed This Encounter   Procedures    COVID PRE-OP / PRE-PROCEDURE SCREENING ORDER (NO ISOLATION) - Swab, Nasopharynx    Respiratory Panel PCR w/COVID-19(SARS-CoV-2) [facility]CK/BALJINDER/PAD/[facility]/EFE In-House, NP Swab in UTM/VTM, 2 HR TAT - Swab, Nasopharynx    XR Chest 1 View    [place] Draw    Comprehensive Metabolic Panel    Magnesium    Single High Sensitivity Troponin T    TSH    BNP    CBC Auto Differential    D-dimer, Quantitative    T4, Free    Ambulatory Referral to [facility]    ECG 12 Lead ED Triage Standing Order; Dysrhythmia    CBC & Differential    Green Top (Gel)    Lavender Top    Gold Top - SST    Gray Top    Light Blue Top         Additional orders considered but not ordered:      ED Course:    Consultants:      ED Course as of 11/18/23 0053   Fri Nov 17, 2023   1733 This is a very nice 63-year-old woman with history of  diabetes that is pretty well managed A1c in the sevens, obesity, sleep apnea managed with CPAP, COPD, who presents for evaluation of multiple issues.  She went to an urgent care because of 3 days of sinus congestion and productive cough.  She was evaluated there and she says she was told by the person at the urgent care that she had an abnormal heart sound to auscultation as well as what they thought was an enlarged heart on her x-ray.  She does have family history of heart failure so she was sent here for cardiac work-up.  She denies chest pain or palpitations.  She did have some PVCs on her ECG in triage and she says she was not able to feel those at that time.  She denies fevers or systemic symptoms.  No other complaints at this time. [CC]      ED Course User Index  [CC] Carlo Echols MD     She arrived awake and alert initially significantly hypertensive this resolved without intervention.  I do not appreciate any murmurs rubs gallops or other abnormalities to cardiac auscultation.  ECG shows PVCs.  CBC reassuring and nonactionable.  Metabolic panel shows hyperglycemia 153.  No elevation in D-dimer pointing away from pulmonary embolism or vascular injury.  No elevation in high-sensitivity troponin.  Chest x-ray does show borderline cardiomegaly which is stable from prior but there is no elevation in proBNP no pulmonary edema this could be from pulmonary hypertension from her sleep apnea which is now well managed.  Normal thyroid function.  Full viral panel is negative.  Overall work-up generally reassuring I do not think she has new onset heart failure but she is agreeable to cardiology follow-up.  Her cough and rhinorrhea sound like she may be having postnasal drip given prescription for Protopic and bromide nasal spray.  Counseled on return precautions verbally expressed understanding of these         Shared Decision Making:  After my consideration of clinical presentation and any laboratory/radiology  studies obtained, I discussed the findings with the patient/patient representative who is in agreement with the treatment plan and the final disposition.   Risks and benefits of discharge and/or observation/admission were discussed.       AS OF 00:53 EST VITALS:    BP - 148/73  HR - 61  TEMP - 98.2 °F (36.8 °C) (Oral)  O2 SATS - 94%                  DIAGNOSIS  Final diagnoses:   Hyperglycemia due to diabetes mellitus   Rhinorrhea   Obstructive sleep apnea syndrome   PVC's (premature ventricular contractions)   Cardiomegaly         DISPOSITION  DISCHARGE    Patient discharged in stable condition.    Reviewed implications of results, diagnosis, meds, responsibility to follow up, warning signs and symptoms of possible worsening, potential complications and reasons to return to ER.    Patient/Family voiced understanding of above instructions.    Discussed plan for discharge, as there is no emergent indication for admission.  Pt/family is agreeable and understands need for follow up and possible repeat testing.  Pt/family is aware that discharge does not mean that nothing is wrong but that it indicates no emergency is currently present that requires admission and they must continue care with follow-up as given below or with a physician of their choice.     FOLLOW-UP  [facility] CARDIOLOGY  [address]  [address]  [facility] [address] [phone]        Noreen Smith MD  [address] [id number] [phone]               Medication List        New Prescriptions      ipratropium 0.03 % nasal spray  Commonly known as: ATROVENT  2 sprays into the nostril(s) as directed by provider Every 12 (Twelve) Hours.               Where to Get Your Medications        These medications were sent to [id number] - [place], [place] - [address] [place] [facility] [address] - [phone] [facility] [phone] FX  [address], [facility] [address]       Phone: [phone]   ipratropium 0.03 % nasal spray             Please note that portions of this document were completed with voice recognition software.        Carlo Echols MD  11/18/23 0055

## 2023-11-21 LAB
QT INTERVAL: 380 MS
QTC INTERVAL: 443 MS

## 2023-11-29 ENCOUNTER — OFFICE VISIT (OUTPATIENT)
Dept: CARDIOLOGY | Facility: HOSPITAL | Age: 63
End: 2023-11-29
Payer: COMMERCIAL

## 2023-11-29 ENCOUNTER — HOSPITAL ENCOUNTER (OUTPATIENT)
Dept: CARDIOLOGY | Facility: HOSPITAL | Age: 63
Discharge: HOME | End: 2023-11-29
Payer: COMMERCIAL

## 2023-11-29 DIAGNOSIS — Z82.49 FAMILY HISTORY OF CARDIOMYOPATHY: ICD-10-CM

## 2023-11-29 DIAGNOSIS — I10 PRIMARY HYPERTENSION: ICD-10-CM

## 2023-11-29 DIAGNOSIS — I49.3 PVC'S (PREMATURE VENTRICULAR CONTRACTIONS): ICD-10-CM

## 2023-11-29 DIAGNOSIS — I51.7 CARDIOMEGALY: Primary | ICD-10-CM

## 2023-11-29 DIAGNOSIS — R06.09 DOE (DYSPNEA ON EXERTION): ICD-10-CM

## 2023-11-29 PROCEDURE — 93246 EXT ECG>7D<15D RECORDING: CPT

## 2023-11-29 NOTE — PROGRESS NOTES
[facility] Heart Monitor Documentation    Dana Rincon  1960  [id number]  11/29/23      [] ZIO XT Patch  Model [id number] Prescribed for N/A Days    Serial Number: (N + 9 Digits) N   Apply-By Date on Box:   USPS Tracking Number:   USPS Tracking        [] [employer] MINI PLUS Mobile Cardiac Telemetry  Model BGMINIPLUS Prescribed for N/A Days    Serial Number: (BGM + 7 Digits) BGM  Shipped-By Date on Box:   UPS Tracking Number: [id number]  UPS Tracking      [] [employer] MINI Holter Monitor  Model BGMINIEL Prescribed for 7 Days    Serial Number: (7 Digits) [id number]  Shipped-By Date on Box: 488458  UPS Tracking Number: [id number]  UPS Tracking        This monitor was applied to the patient's chest and checked for proper functioning.  Ms. Dana Rincon was instructed in the proper use of this monitor.  She was given the opportunity to ask questions and left the office with the device 's instruction manual.    Christal Roberts MA, 14:10 EST, 11/29/23                  [facility] Hospital[facility]ORDOCUMENTATION 8.8.2019

## 2023-11-30 VITALS
TEMPERATURE: 97.2 F | BODY MASS INDEX: 51.86 KG/M2 | RESPIRATION RATE: 18 BRPM | HEIGHT: 59 IN | OXYGEN SATURATION: 93 % | DIASTOLIC BLOOD PRESSURE: 98 MMHG | WEIGHT: 257.25 LBS | SYSTOLIC BLOOD PRESSURE: 150 MMHG

## 2023-11-30 NOTE — PROGRESS NOTES
"Chief Complaint  Establish Care and Rapid Heart Rate    Subjective    History of Present Illness {CC  Problem List  Visit  Diagnosis   Encounters  Notes  Medications  Labs  Result Review Imaging  Media :23}       History of Present Illness   63-year-old female presents the office today for ongoing evaluation of her dyspnea on exertion and PVCs.  Patient denies chest pain but does report dyspnea on exertion.  She reports she does have asthma but feels like this dyspnea is more intense.  Also reports intermittent weakness and fatigue.  She does report palpitations intermittently as well as occasional pedal edema.  She does deny orthopnea, presyncope or syncope.  She has a family history of cardiomyopathy in her mother.  Father  of heart disease and heart failure age 63.  She does report that she has been feeling poorly for the last few weeks.  Objective     Vital Signs:   Vitals:    23 1336 23 1337 23 1400   BP: (!) 215/82 (!) 197/80 150/98   BP Location: Left arm Left arm Left arm   Patient Position: Standing Sitting Sitting   Cuff Size: Adult Adult    Resp:  18    Temp:  97.2 °F (36.2 °C)    TempSrc:  Temporal    SpO2: 94% 93%    Weight:  117 kg (257 lb 4 oz)    Height:  149.9 cm (59\")      Body mass index is 51.96 kg/m².  Physical Exam  Vitals and nursing note reviewed.   Constitutional:       Appearance: Normal appearance. She is obese.   HENT:      Head: Normocephalic.   Eyes:      Pupils: Pupils are equal, round, and reactive to light.   Cardiovascular:      Rate and Rhythm: Normal rate and regular rhythm. Frequent Extrasystoles are present.     Pulses: Normal pulses.      Heart sounds: Normal heart sounds. No murmur heard.  Pulmonary:      Effort: Pulmonary effort is normal.      Breath sounds: Normal breath sounds.   Abdominal:      General: Bowel sounds are normal.      Palpations: Abdomen is soft.   Musculoskeletal:         General: Normal range of motion.      Cervical back: " Normal range of motion.      Right lower leg: No edema.      Left lower leg: No edema.   Skin:     General: Skin is warm and dry.      Capillary Refill: Capillary refill takes less than 2 seconds.   Neurological:      Mental Status: She is alert and oriented to person, place, and time.   Psychiatric:         Mood and Affect: Mood normal.         Thought Content: Thought content normal.              Result Review  Data Reviewed:{ Labs  Result Review  Imaging  Med Tab  Media :23}   ECG 12 Lead ED Triage Standing Order; Dysrhythmia (11/17/2023 17:18)   COVID PRE-OP / PRE-PROCEDURE SCREENING ORDER (NO ISOLATION) - Swab, Nasopharynx (11/17/2023 18:11)  T4, Free (11/17/2023 18:09)  D-dimer, Quantitative (11/17/2023 18:09)  BNP (11/17/2023 18:09)  TSH (11/17/2023 18:09)  Single High Sensitivity Troponin T (11/17/2023 18:09)  Magnesium (11/17/2023 18:09)  Comprehensive Metabolic Panel (11/17/2023 18:09)  CBC & Differential (11/17/2023 18:09)      XR Chest 1 View (11/17/2023 19:32)   Assessment and Plan {CC Problem List  Visit Diagnosis  ROS  Review (Popup)  Health Maintenance  Quality  BestPractice  Medications  SmartSets  SnapShot Encounters  Media :23}   1. Cardiomegaly    - Adult Transthoracic Echo Complete W/ Cont if Necessary Per Protocol; Future    2. BERG (dyspnea on exertion)    - Adult Transthoracic Echo Complete W/ Cont if Necessary Per Protocol; Future    3. Primary hypertension    - Adult Transthoracic Echo Complete W/ Cont if Necessary Per Protocol; Future    4. PVC's (premature ventricular contractions)    - Adult Transthoracic Echo Complete W/ Cont if Necessary Per Protocol; Future  - Holter Monitor - 72 Hour Up To 7 Days; Future    5. Family history of cardiomyopathy    - Adult Transthoracic Echo Complete W/ Cont if Necessary Per Protocol; Future          Follow Up {Instructions Charge Capture  Follow-up Communications :23}   Return in about 3 weeks (around 12/20/2023) for Telemedicine  visit, Monitor results.    Patient was given instructions and counseling regarding her condition or for health maintenance advice. Please see specific information pulled into the AVS if appropriate.  Patient was instructed to call the [facility] with any questions, concerns, or worsening symptoms.

## 2023-12-01 DIAGNOSIS — J45.909 UNCOMPLICATED ASTHMA: ICD-10-CM

## 2023-12-01 RX ORDER — MOMETASONE FUROATE AND FORMOTEROL FUMARATE DIHYDRATE 100; 5 UG/1; UG/1
2 AEROSOL RESPIRATORY (INHALATION) DAILY
Qty: 8.8 G | Refills: 1 | Status: SHIPPED | OUTPATIENT
Start: 2023-12-01

## 2023-12-07 ENCOUNTER — HOSPITAL ENCOUNTER (OUTPATIENT)
Dept: CARDIOLOGY | Facility: HOSPITAL | Age: 63
Discharge: HOME | End: 2023-12-07
Admitting: NURSE PRACTITIONER
Payer: COMMERCIAL

## 2023-12-07 VITALS — WEIGHT: 257 LBS | BODY MASS INDEX: 51.81 KG/M2 | HEIGHT: 59 IN

## 2023-12-07 DIAGNOSIS — I51.7 CARDIOMEGALY: ICD-10-CM

## 2023-12-07 DIAGNOSIS — I49.3 PVC'S (PREMATURE VENTRICULAR CONTRACTIONS): ICD-10-CM

## 2023-12-07 DIAGNOSIS — I10 PRIMARY HYPERTENSION: ICD-10-CM

## 2023-12-07 DIAGNOSIS — Z82.49 FAMILY HISTORY OF CARDIOMYOPATHY: ICD-10-CM

## 2023-12-07 PROCEDURE — 93306 TTE W/DOPPLER COMPLETE: CPT

## 2023-12-08 LAB
BH CV ECHO MEAS - AO MAX PG: 28.5 MMHG
BH CV ECHO MEAS - AO MEAN PG: 16.9 MMHG
BH CV ECHO MEAS - AO ROOT DIAM: 2.8 CM
BH CV ECHO MEAS - AO V2 MAX: 266.7 CM/SEC
BH CV ECHO MEAS - AO V2 VTI: 50.7 CM
BH CV ECHO MEAS - AVA(I,D): 2.35 CM2
BH CV ECHO MEAS - EDV(CUBED): 211.1 ML
BH CV ECHO MEAS - EDV(MOD-SP2): 103 ML
BH CV ECHO MEAS - EDV(MOD-SP4): 138 ML
BH CV ECHO MEAS - EF(MOD-BP): 59.7 %
BH CV ECHO MEAS - EF(MOD-SP2): 50.4 %
BH CV ECHO MEAS - EF(MOD-SP4): 67.2 %
BH CV ECHO MEAS - ESV(CUBED): 22.4 ML
BH CV ECHO MEAS - ESV(MOD-SP2): 51.1 ML
BH CV ECHO MEAS - ESV(MOD-SP4): 45.2 ML
BH CV ECHO MEAS - FS: 52.7 %
BH CV ECHO MEAS - IVS/LVPW: 1.27 CM
BH CV ECHO MEAS - IVSD: 0.98 CM
BH CV ECHO MEAS - LA DIMENSION: 4.1 CM
BH CV ECHO MEAS - LAT PEAK E' VEL: 13.1 CM/SEC
BH CV ECHO MEAS - LV DIASTOLIC VOL/BSA (35-75): 67.3 CM2
BH CV ECHO MEAS - LV MASS(C)D: 206.6 GRAMS
BH CV ECHO MEAS - LV MAX PG: 13.9 MMHG
BH CV ECHO MEAS - LV MEAN PG: 7 MMHG
BH CV ECHO MEAS - LV SYSTOLIC VOL/BSA (12-30): 22 CM2
BH CV ECHO MEAS - LV V1 MAX: 186.7 CM/SEC
BH CV ECHO MEAS - LV V1 VTI: 38.4 CM
BH CV ECHO MEAS - LVIDS: 2.8 CM
BH CV ECHO MEAS - LVOT AREA: 3.1 CM2
BH CV ECHO MEAS - LVOT DIAM: 1.99 CM
BH CV ECHO MEAS - LVPWD: 0.78 CM
BH CV ECHO MEAS - MED PEAK E' VEL: 5.9 CM/SEC
BH CV ECHO MEAS - MV A MAX VEL: 113.1 CM/SEC
BH CV ECHO MEAS - MV DEC SLOPE: 290.3 CM/SEC2
BH CV ECHO MEAS - MV DEC TIME: 0.23 SEC
BH CV ECHO MEAS - MV E MAX VEL: 85.3 CM/SEC
BH CV ECHO MEAS - MV E/A: 0.75
BH CV ECHO MEAS - MV MAX PG: 6.8 MMHG
BH CV ECHO MEAS - MV MEAN PG: 1.31 MMHG
BH CV ECHO MEAS - MV P1/2T: 90.4 MSEC
BH CV ECHO MEAS - MV V2 VTI: 40.8 CM
BH CV ECHO MEAS - MVA(P1/2T): 2.43 CM2
BH CV ECHO MEAS - MVA(VTI): 2.9 CM2
BH CV ECHO MEAS - PA ACC TIME: 0.14 SEC
BH CV ECHO MEAS - RAP SYSTOLE: 3 MMHG
BH CV ECHO MEAS - RVSP: 26 MMHG
BH CV ECHO MEAS - SI(MOD-SP2): 25.3 ML/M2
BH CV ECHO MEAS - SI(MOD-SP4): 45.2 ML/M2
BH CV ECHO MEAS - SV(LVOT): 119 ML
BH CV ECHO MEAS - SV(MOD-SP2): 51.9 ML
BH CV ECHO MEAS - SV(MOD-SP4): 92.8 ML
BH CV ECHO MEAS - TAPSE (>1.6): 3.3 CM
BH CV ECHO MEAS - TR MAX PG: 23.1 MMHG
BH CV ECHO MEAS - TR MAX VEL: 238.9 CM/SEC
BH CV ECHO MEASUREMENTS AVERAGE E/E' RATIO: 8.98
BH CV XLRA - RV BASE: 4.3 CM
BH CV XLRA - RV LENGTH: 7.9 CM
BH CV XLRA - RV MID: 3.6 CM
BH CV XLRA - TDI S': 23.6 CM/SEC
LEFT ATRIUM VOLUME INDEX: 24.8 ML/M2
LV EF 2D ECHO EST: 60 %

## 2023-12-11 NOTE — PROGRESS NOTES
Your ejection fraction is strong and your heart is pumping efficiently. Valves show trace regurgitation and that is not a concerning finding.

## 2023-12-20 ENCOUNTER — OFFICE VISIT (OUTPATIENT)
Dept: CARDIOLOGY | Facility: HOSPITAL | Age: 63
End: 2023-12-20
Payer: COMMERCIAL

## 2023-12-20 VITALS
WEIGHT: 258.25 LBS | DIASTOLIC BLOOD PRESSURE: 67 MMHG | HEART RATE: 65 BPM | HEIGHT: 59 IN | TEMPERATURE: 97.7 F | SYSTOLIC BLOOD PRESSURE: 154 MMHG | OXYGEN SATURATION: 96 % | BODY MASS INDEX: 52.06 KG/M2 | RESPIRATION RATE: 16 BRPM

## 2023-12-20 DIAGNOSIS — R06.09 DOE (DYSPNEA ON EXERTION): Primary | ICD-10-CM

## 2023-12-20 DIAGNOSIS — I10 PRIMARY HYPERTENSION: ICD-10-CM

## 2023-12-20 DIAGNOSIS — E11.65 UNCONTROLLED TYPE 2 DIABETES MELLITUS WITH HYPERGLYCEMIA: ICD-10-CM

## 2023-12-20 DIAGNOSIS — R53.83 OTHER FATIGUE: ICD-10-CM

## 2023-12-20 DIAGNOSIS — E78.49 OTHER HYPERLIPIDEMIA: ICD-10-CM

## 2023-12-20 DIAGNOSIS — I49.3 PVC'S (PREMATURE VENTRICULAR CONTRACTIONS): ICD-10-CM

## 2023-12-22 NOTE — PROGRESS NOTES
"Chief Complaint  Follow-up, Hypertension, and Palpitations    Subjective    History of Present Illness {  Problem List  Visit  Diagnosis   Encounters  Notes  Medications  Labs  Result Review Imaging  Media :23}       History of Present Illness   63-year-old female presents the office today for ongoing evaluation of her dyspnea on exertion and PVCs.  Patient denies chest pain but does report dyspnea on exertion.  She reports she does have asthma but feels like this dyspnea is more intense.  Also reports intermittent weakness and fatigue.  She does report palpitations intermittently as well as occasional pedal edema.  She does deny orthopnea, presyncope or syncope.  She has a family history of cardiomyopathy in her mother.  Father  of heart disease and heart failure age 63.  She does report that she has been feeling poorly for the last few weeks.  Objective     Vital Signs:   Vitals:    23 1502   BP: 154/67   BP Location: Left arm   Patient Position: Sitting   Cuff Size: Adult   Pulse: 65   Resp: 16   Temp: 97.7 °F (36.5 °C)   TempSrc: Temporal   SpO2: 96%   Weight: 117 kg (258 lb 4 oz)   Height: 149.9 cm (59\")     Body mass index is 52.16 kg/m².  Physical Exam  Vitals and nursing note reviewed.   Constitutional:       Appearance: Normal appearance. She is obese.   HENT:      Head: Normocephalic.   Eyes:      Pupils: Pupils are equal, round, and reactive to light.   Cardiovascular:      Rate and Rhythm: Normal rate and regular rhythm. Frequent Extrasystoles are present.     Pulses: Normal pulses.      Heart sounds: Normal heart sounds. No murmur heard.  Pulmonary:      Effort: Pulmonary effort is normal.      Breath sounds: Normal breath sounds.   Abdominal:      General: Bowel sounds are normal.      Palpations: Abdomen is soft.   Musculoskeletal:         General: Normal range of motion.      Cervical back: Normal range of motion.      Right lower leg: No edema.      Left lower leg: No edema. "   Skin:     General: Skin is warm and dry.      Capillary Refill: Capillary refill takes less than 2 seconds.   Neurological:      Mental Status: She is alert and oriented to person, place, and time.   Psychiatric:         Mood and Affect: Mood normal.         Thought Content: Thought content normal.            Result Review  Data Reviewed:{ Labs  Result Review  Imaging  Med Tab  Media :23}   ECG 12 Lead ED Triage Standing Order; Dysrhythmia (11/17/2023 17:18)   COVID PRE-OP / PRE-PROCEDURE SCREENING ORDER (NO ISOLATION) - Swab, Nasopharynx (11/17/2023 18:11)  T4, Free (11/17/2023 18:09)  D-dimer, Quantitative (11/17/2023 18:09)  BNP (11/17/2023 18:09)  TSH (11/17/2023 18:09)  Single High Sensitivity Troponin T (11/17/2023 18:09)  Magnesium (11/17/2023 18:09)  Comprehensive Metabolic Panel (11/17/2023 18:09)  CBC & Differential (11/17/2023 18:09)  Adult Transthoracic Echo Complete W/ Cont if Necessary Per Protocol (12/07/2023 17:23)   7-day Holter shows an average heart rate of 65 bpm, PVC burden 19%, PAC burden less than 1%  2 runs of atrial tach with the longest lasting 3 beats  58 runs of VT with the longest lasting 3 beats      XR Chest 1 View (11/17/2023 19:32)   Assessment and Plan {CC Problem List  Visit Diagnosis  ROS  Review (Popup)  Health Maintenance  Quality  BestPractice  Medications  SmartSets  SnapShot Encounters  Media :23}   1. BERG (dyspnea on exertion)  Stress pet in near future   2. PVCS   Will defer initiation of beta-blocker until after stress test results  High burden noted on recent Holter monitor  3. Primary hypertension  Stable on Benicar    4. Other fatigue   Upcoming PET stress    5. Other hyperlipidemia   Stable on pravastatin  6. Uncontrolled type 2 diabetes mellitus with hyperglycemia   Continue Jardiance, Amaryl, metformin    Follow Up {Instructions Charge Capture  Follow-up Communications :23}   Return if symptoms worsen or fail to improve.    Patient was given  instructions and counseling regarding her condition or for health maintenance advice. Please see specific information pulled into the AVS if appropriate.  Patient was instructed to call the [facility] with any questions, concerns, or worsening symptoms.

## 2023-12-28 ENCOUNTER — HOSPITAL ENCOUNTER (OUTPATIENT)
Dept: MAMMOGRAPHY | Facility: HOSPITAL | Age: 63
Discharge: HOME | End: 2023-12-28
Admitting: INTERNAL MEDICINE
Payer: COMMERCIAL

## 2023-12-28 DIAGNOSIS — C50.511 MALIGNANT NEOPLASM OF LOWER-OUTER QUADRANT OF RIGHT BREAST OF FEMALE, ESTROGEN RECEPTOR POSITIVE: ICD-10-CM

## 2023-12-28 DIAGNOSIS — Z17.0 MALIGNANT NEOPLASM OF LOWER-OUTER QUADRANT OF RIGHT BREAST OF FEMALE, ESTROGEN RECEPTOR POSITIVE: ICD-10-CM

## 2023-12-28 PROCEDURE — 77066 DX MAMMO INCL CAD BI: CPT

## 2023-12-28 PROCEDURE — G0279 TOMOSYNTHESIS, MAMMO: HCPCS

## 2024-01-02 DIAGNOSIS — E11.65 UNCONTROLLED TYPE 2 DIABETES MELLITUS WITH HYPERGLYCEMIA: ICD-10-CM

## 2024-01-02 RX ORDER — GLIMEPIRIDE 4 MG/1
4 TABLET ORAL
Qty: 90 TABLET | Refills: 1 | Status: SHIPPED | OUTPATIENT
Start: 2024-01-02

## 2024-01-08 ENCOUNTER — LAB (OUTPATIENT)
Dept: LAB | Facility: HOSPITAL | Age: 64
End: 2024-01-08
Payer: COMMERCIAL

## 2024-01-08 ENCOUNTER — OFFICE VISIT (OUTPATIENT)
Dept: ONCOLOGY | Facility: CLINIC | Age: 64
End: 2024-01-08
Payer: COMMERCIAL

## 2024-01-08 ENCOUNTER — HOSPITAL ENCOUNTER (OUTPATIENT)
Dept: ONCOLOGY | Facility: HOSPITAL | Age: 64
Discharge: HOME | End: 2024-01-08
Admitting: NURSE PRACTITIONER
Payer: COMMERCIAL

## 2024-01-08 VITALS
TEMPERATURE: 97.3 F | WEIGHT: 260 LBS | OXYGEN SATURATION: 96 % | DIASTOLIC BLOOD PRESSURE: 80 MMHG | HEART RATE: 65 BPM | SYSTOLIC BLOOD PRESSURE: 174 MMHG | HEIGHT: 59 IN | RESPIRATION RATE: 18 BRPM | BODY MASS INDEX: 52.41 KG/M2

## 2024-01-08 DIAGNOSIS — M81.0 OSTEOPOROSIS, UNSPECIFIED OSTEOPOROSIS TYPE, UNSPECIFIED PATHOLOGICAL FRACTURE PRESENCE: ICD-10-CM

## 2024-01-08 DIAGNOSIS — C50.511 MALIGNANT NEOPLASM OF LOWER-OUTER QUADRANT OF RIGHT BREAST OF FEMALE, ESTROGEN RECEPTOR POSITIVE: ICD-10-CM

## 2024-01-08 DIAGNOSIS — C50.511 MALIGNANT NEOPLASM OF LOWER-OUTER QUADRANT OF RIGHT BREAST OF FEMALE, ESTROGEN RECEPTOR POSITIVE: Primary | ICD-10-CM

## 2024-01-08 DIAGNOSIS — Z17.0 MALIGNANT NEOPLASM OF LOWER-OUTER QUADRANT OF RIGHT BREAST OF FEMALE, ESTROGEN RECEPTOR POSITIVE: ICD-10-CM

## 2024-01-08 DIAGNOSIS — Z17.0 MALIGNANT NEOPLASM OF LOWER-OUTER QUADRANT OF RIGHT BREAST OF FEMALE, ESTROGEN RECEPTOR POSITIVE: Primary | ICD-10-CM

## 2024-01-08 LAB
ALBUMIN SERPL-MCNC: 3.9 G/DL (ref 3.5–5.2)
ALBUMIN/GLOB SERPL: 1.1 G/DL
ALP SERPL-CCNC: 98 U/L (ref 39–117)
ALT SERPL W P-5'-P-CCNC: 14 U/L (ref 1–33)
ANION GAP SERPL CALCULATED.3IONS-SCNC: 12 MMOL/L (ref 5–15)
AST SERPL-CCNC: 14 U/L (ref 1–32)
BASOPHILS # BLD AUTO: 0.04 10*3/MM3 (ref 0–0.2)
BASOPHILS NFR BLD AUTO: 0.6 % (ref 0–1.5)
BILIRUB SERPL-MCNC: 0.4 MG/DL (ref 0–1.2)
BUN SERPL-MCNC: 12 MG/DL (ref 8–23)
BUN/CREAT SERPL: 15.4 (ref 7–25)
CALCIUM SPEC-SCNC: 9.1 MG/DL (ref 8.6–10.5)
CHLORIDE SERPL-SCNC: 96 MMOL/L (ref 98–107)
CO2 SERPL-SCNC: 27 MMOL/L (ref 22–29)
CREAT SERPL-MCNC: 0.78 MG/DL (ref 0.57–1)
DEPRECATED RDW RBC AUTO: 42.8 FL (ref 37–54)
EGFRCR SERPLBLD CKD-EPI 2021: 85.5 ML/MIN/1.73
EOSINOPHIL # BLD AUTO: 0.12 10*3/MM3 (ref 0–0.4)
EOSINOPHIL NFR BLD AUTO: 1.8 % (ref 0.3–6.2)
ERYTHROCYTE [DISTWIDTH] IN BLOOD BY AUTOMATED COUNT: 13.3 % (ref 12.3–15.4)
GLOBULIN UR ELPH-MCNC: 3.5 GM/DL
GLUCOSE SERPL-MCNC: 179 MG/DL (ref 65–99)
HCT VFR BLD AUTO: 43.7 % (ref 34–46.6)
HGB BLD-MCNC: 14.1 G/DL (ref 12–15.9)
IMM GRANULOCYTES # BLD AUTO: 0.01 10*3/MM3 (ref 0–0.05)
IMM GRANULOCYTES NFR BLD AUTO: 0.1 % (ref 0–0.5)
LYMPHOCYTES # BLD AUTO: 1.46 10*3/MM3 (ref 0.7–3.1)
LYMPHOCYTES NFR BLD AUTO: 21.4 % (ref 19.6–45.3)
MAGNESIUM SERPL-MCNC: 1.9 MG/DL (ref 1.6–2.4)
MCH RBC QN AUTO: 28 PG (ref 26.6–33)
MCHC RBC AUTO-ENTMCNC: 32.3 G/DL (ref 31.5–35.7)
MCV RBC AUTO: 86.9 FL (ref 79–97)
MONOCYTES # BLD AUTO: 0.44 10*3/MM3 (ref 0.1–0.9)
MONOCYTES NFR BLD AUTO: 6.5 % (ref 5–12)
NEUTROPHILS NFR BLD AUTO: 4.74 10*3/MM3 (ref 1.7–7)
NEUTROPHILS NFR BLD AUTO: 69.6 % (ref 42.7–76)
PHOSPHATE SERPL-MCNC: 3 MG/DL (ref 2.5–4.5)
PLATELET # BLD AUTO: 248 10*3/MM3 (ref 140–450)
PMV BLD AUTO: 8.8 FL (ref 6–12)
POTASSIUM SERPL-SCNC: 4.2 MMOL/L (ref 3.5–5.2)
PROT SERPL-MCNC: 7.4 G/DL (ref 6–8.5)
RBC # BLD AUTO: 5.03 10*6/MM3 (ref 3.77–5.28)
SODIUM SERPL-SCNC: 135 MMOL/L (ref 136–145)
WBC NRBC COR # BLD AUTO: 6.81 10*3/MM3 (ref 3.4–10.8)

## 2024-01-08 PROCEDURE — 84100 ASSAY OF PHOSPHORUS: CPT

## 2024-01-08 PROCEDURE — 80053 COMPREHEN METABOLIC PANEL: CPT

## 2024-01-08 PROCEDURE — 96372 THER/PROPH/DIAG INJ SC/IM: CPT

## 2024-01-08 PROCEDURE — 25010000002 DENOSUMAB 60 MG/ML SOLUTION PREFILLED SYRINGE: Performed by: NURSE PRACTITIONER

## 2024-01-08 PROCEDURE — 85025 COMPLETE CBC W/AUTO DIFF WBC: CPT

## 2024-01-08 PROCEDURE — 83735 ASSAY OF MAGNESIUM: CPT

## 2024-01-08 PROCEDURE — 99214 OFFICE O/P EST MOD 30 MIN: CPT | Performed by: INTERNAL MEDICINE

## 2024-01-08 PROCEDURE — 36415 COLL VENOUS BLD VENIPUNCTURE: CPT

## 2024-01-08 RX ADMIN — DENOSUMAB 60 MG: 60 INJECTION SUBCUTANEOUS at 14:18

## 2024-01-08 NOTE — PROGRESS NOTES
DATE OF VISIT: 1/8/2024    REASON FOR VISIT: Followup for right breast cancer     PROBLEM LIST:  1. Stage I invasive ductal carcinoma of the right breast, [id number]:  A. Tumor at the 7 o'clock position, tumor size 1.4 cm, estrogen receptor and progesterone  receptor strongly positive, HER-2/greg negative zero by IHC. Low risk group Oncotype testing, score of 16, 10% risk of recurrence with hormone treatment alone.  B. Diagnosed on ultrasound guided biopsy 03/10/2016.   C. Status post lumpectomy with clear margins done by Dr. Sales 05/03/2016.   D. Started Arimidex 1 mg p.o. daily 06/03/2016.   E. Completed breast adjuvant radiation by Dr. Thompson.   2.  Osteoporosis:  A.  DEXA scan done June 2018 revealed T score -2.3  B. Starting Prolia 1/22/2020.   3. Right breast mammogram abnormality  4.  Obesity    HISTORY OF PRESENT ILLNESS: The patient is a very pleasant 63 y.o. female  with past medical history significant for right breast cancer diagnosed March 2016.  Patient status post lumpectomy followed by adjuvant radiation hormone treatment. The  patient is here today for scheduled follow-up visit.    SUBJECTIVE: Dana is here today by herself.  She is doing fairly well.  She has been compliant with her calcium supplement.  She is anxious about the mammogram result.    Past History:  Medical History: has a past medical history of Allergic (Demerol), Asthma, Breast cancer (2016), Breast injury, Diabetes mellitus, Dry eyes, Emphysema/COPD, H/O colonoscopy (10/2015), HBP (high blood pressure), Hot flashes, radiation therapy (06/2016), Itchy eyes, Obesity, HANNA (obstructive sleep apnea), Sinus problem, and Wears glasses.   Surgical History: has a past surgical history that includes Tonsillectomy (1965); Dilation and curettage of uterus (2005); Other surgical history (2016); Breast lumpectomy (Right, 05/03/2016); Breast biopsy (Right, 09/08/2015); Breast biopsy (Right, 03/10/2016); Breast biopsy (Right, 03/2018);  "Adenoidectomy (1965); Tonsillectomy (1965); and Colonoscopy (2016).   Family History: family history includes Breast cancer in her paternal aunt; Breast cancer (age of onset: 30) in some other family members; Cancer in her mother; Cardiomyopathy in her mother; Colon cancer in her mother and paternal uncle; Diabetes in her father; Heart disease (age of onset: 63) in her father; Heart failure in her father.   Social History: reports that she has never smoked. She has never used smokeless tobacco. She reports current alcohol use of about 3.0 standard drinks of alcohol per week. She reports that she does not use drugs.    (Not in a hospital admission)     Allergies: Meperidine     Review of Systems   Constitutional:  Positive for fatigue.   Genitourinary:         Improving, currently on antibiotics.   Musculoskeletal:  Positive for arthralgias.         PHYSICAL EXAMINATION:   /80   Pulse 65   Temp 97.3 °F (36.3 °C) (Temporal)   Resp 18   Ht 149.9 cm (59.02\")   Wt 118 kg (260 lb)   SpO2 96%   BMI 52.49 kg/m²    Pain Score    01/08/24 1315   PainSc: 0-No pain      ECOG score: 0        ECOG Performance Status: 0 - Asymptomatic  General Appearance:  alert, cooperative, no apparent distress and appears stated age   Lungs:   Clear to auscultation bilaterally; respirations regular, even, and unlabored bilaterally   Heart:  Regular rate and rhythm, no murmurs appreciated   Abdomen:   Soft, non-tender, non-distended, and no organomegaly     Lab on 01/08/2024   Component Date Value Ref Range Status    WBC 01/08/2024 6.81  3.40 - 10.80 10*3/mm3 Final    RBC 01/08/2024 5.03  3.77 - 5.28 10*6/mm3 Final    Hemoglobin 01/08/2024 14.1  12.0 - 15.9 g/dL Final    Hematocrit 01/08/2024 43.7  34.0 - 46.6 % Final    MCV 01/08/2024 86.9  79.0 - 97.0 fL Final    MCH 01/08/2024 28.0  26.6 - 33.0 pg Final    MCHC 01/08/2024 32.3  31.5 - 35.7 g/dL Final    RDW 01/08/2024 13.3  12.3 - 15.4 % Final    RDW-SD 01/08/2024 42.8  37.0 - " 54.0 fl Final    MPV 01/08/2024 8.8  6.0 - 12.0 fL Final    Platelets 01/08/2024 248  140 - 450 10*3/mm3 Final    Neutrophil % 01/08/2024 69.6  42.7 - 76.0 % Final    Lymphocyte % 01/08/2024 21.4  19.6 - 45.3 % Final    Monocyte % 01/08/2024 6.5  5.0 - 12.0 % Final    Eosinophil % 01/08/2024 1.8  0.3 - 6.2 % Final    Basophil % 01/08/2024 0.6  0.0 - 1.5 % Final    Immature Grans % 01/08/2024 0.1  0.0 - 0.5 % Final    Neutrophils, Absolute 01/08/2024 4.74  1.70 - 7.00 10*3/mm3 Final    Lymphocytes, Absolute 01/08/2024 1.46  0.70 - 3.10 10*3/mm3 Final    Monocytes, Absolute 01/08/2024 0.44  0.10 - 0.90 10*3/mm3 Final    Eosinophils, Absolute 01/08/2024 0.12  0.00 - 0.40 10*3/mm3 Final    Basophils, Absolute 01/08/2024 0.04  0.00 - 0.20 10*3/mm3 Final    Immature Grans, Absolute 01/08/2024 0.01  0.00 - 0.05 10*3/mm3 Final        Mammo Diagnostic Digital Tomosynthesis Bilateral With CAD    Result Date: 12/28/2023  Narrative: BILATERAL DIAGNOSTIC MAMMOGRAM WITH TOMOSYNTHESIS  CLINICAL INDICATION: 63-year-old patient who is status post right breast conservation surgery presents for annual bilateral mammogram. She has no focal breast complaints.  TECHNIQUE: Low dose full field digital breast tomosynthesis imaging was performed with 2D and 3D acquisitions consisting of bilateral CC and MLO views.   COMPARISON: 12/27/2022, 12/21/2021, 12/17/2020, 12/11/2019, 12/5/2019  FINDINGS: There are scattered areas of fibroglandular density.  The bilateral fibroglandular pattern is stable in appearance including postoperative changes in the right breast. There are stable bilateral calcifications. No spiculated mass, suspicious calcifications, or nonsurgical areas of architectural distortion are seen in either breast.      Impression: Benign bilateral mammographic findings.   BI-RADS CATEGORY: 2, BENIGN   RECOMMENDATION: Recommend the patient continue annual bilateral mammographic evaluation.  CAD was utilized.  The standard  false-negative rate of mammography is between 10% and 25%. Complex patterns or increased breast density will markedly elevate the false-negative rate of mammography.    The patient was notified of the results and recommendations at the time of her visit.  Additionally, a letter, in lay terminology, with the results of this exam will be mailed to the patient.   This report was finalized on 12/28/2023 1:27 PM by Dr. Yumiko Lewis MD.         ASSESSMENT: The patient is a very pleasant 63 y.o. female  with right breast cancer      PLAN:    1.  Right breast cancer:  A.  I did go over her mammogram result from December 28, 2023 and reassured her it revealed benign changes.  She will need to have 1 year follow-up study which will be due January 2025.  B.  The patient has completed 5 years adjuvant chemotherapy course.    2.  Osteoporosis:  A.  I will continue calcium vitamin D daily.  B.  I will continue Prolia 60 minutes subcu every 6 months.  She will be treated today.  C.  She will need to have 2-year follow-up study which will be due June 2024.    3.  Obesity:  A.  Patient was advised to exercise and lose weight.    FOLLOW UP: 6 months with Prolia.    Idalia Driscoll MD  1/8/2024

## 2024-01-10 ENCOUNTER — TELEPHONE (OUTPATIENT)
Dept: CARDIOLOGY | Facility: HOSPITAL | Age: 64
End: 2024-01-10

## 2024-01-10 NOTE — TELEPHONE ENCOUNTER
Caller: Dana Rincon    Relationship to patient: Self    Best call back number: [phone]    Patient is needing: TO DISCUSS HER INSURANCE DENYING THE STRESS TEST. PLEASE ADVISE THANK YOU

## 2024-01-24 ENCOUNTER — HOSPITAL ENCOUNTER (OUTPATIENT)
Dept: CARDIOLOGY | Facility: HOSPITAL | Age: 64
Discharge: HOME | End: 2024-01-24
Admitting: NURSE PRACTITIONER
Payer: COMMERCIAL

## 2024-01-24 DIAGNOSIS — E78.49 OTHER HYPERLIPIDEMIA: ICD-10-CM

## 2024-01-24 DIAGNOSIS — R06.09 DOE (DYSPNEA ON EXERTION): ICD-10-CM

## 2024-01-24 DIAGNOSIS — E11.65 UNCONTROLLED TYPE 2 DIABETES MELLITUS WITH HYPERGLYCEMIA: ICD-10-CM

## 2024-01-24 DIAGNOSIS — I10 PRIMARY HYPERTENSION: ICD-10-CM

## 2024-01-24 DIAGNOSIS — I49.3 PVC'S (PREMATURE VENTRICULAR CONTRACTIONS): ICD-10-CM

## 2024-01-24 DIAGNOSIS — R53.83 OTHER FATIGUE: ICD-10-CM

## 2024-01-24 LAB
BH CV REST NUCLEAR ISOTOPE DOSE: 31.7 MCI
BH CV STRESS BP STAGE 2: NORMAL
BH CV STRESS BP STAGE 4: NORMAL
BH CV STRESS COMMENTS STAGE 1: NORMAL
BH CV STRESS DOSE REGADENOSON STAGE 1: 0.4
BH CV STRESS DURATION MIN STAGE 1: 1
BH CV STRESS DURATION MIN STAGE 2: 1
BH CV STRESS DURATION MIN STAGE 3: 1
BH CV STRESS DURATION MIN STAGE 4: 1
BH CV STRESS DURATION SEC STAGE 1: 0
BH CV STRESS DURATION SEC STAGE 2: 0
BH CV STRESS DURATION SEC STAGE 3: 0
BH CV STRESS DURATION SEC STAGE 4: 0
BH CV STRESS HR STAGE 1: 96
BH CV STRESS HR STAGE 2: 88
BH CV STRESS HR STAGE 3: 85
BH CV STRESS HR STAGE 4: 69
BH CV STRESS NUCLEAR ISOTOPE DOSE: 31.7 MCI
BH CV STRESS O2 STAGE 1: 95
BH CV STRESS O2 STAGE 2: 96
BH CV STRESS O2 STAGE 3: 96
BH CV STRESS O2 STAGE 4: 96
BH CV STRESS PROTOCOL 1: NORMAL
BH CV STRESS RECOVERY BP: NORMAL MMHG
BH CV STRESS RECOVERY HR: 56 BPM
BH CV STRESS RECOVERY O2: 96 %
BH CV STRESS STAGE 1: 1
BH CV STRESS STAGE 2: 2
BH CV STRESS STAGE 3: 3
BH CV STRESS STAGE 4: 4
LV EF NUC BP: 67 %
MAXIMAL PREDICTED HEART RATE: 157 BPM
PERCENT MAX PREDICTED HR: 64.33 %
STRESS BASELINE BP: NORMAL MMHG
STRESS BASELINE HR: 62 BPM
STRESS O2 SAT REST: 96 %
STRESS PERCENT HR: 76 %
STRESS POST ESTIMATED WORKLOAD: 1 METS
STRESS POST EXERCISE DUR MIN: 4 MIN
STRESS POST EXERCISE DUR SEC: 0 SEC
STRESS POST O2 SAT PEAK: 96 %
STRESS POST PEAK BP: NORMAL MMHG
STRESS POST PEAK HR: 101 BPM
STRESS TARGET HR: 133 BPM

## 2024-01-24 PROCEDURE — 93017 CV STRESS TEST TRACING ONLY: CPT

## 2024-01-24 PROCEDURE — 0 RUBIDIUM CHLORIDE: Performed by: NURSE PRACTITIONER

## 2024-01-24 PROCEDURE — A9555 RB82 RUBIDIUM: HCPCS | Performed by: NURSE PRACTITIONER

## 2024-01-24 PROCEDURE — 25010000002 REGADENOSON 0.4 MG/5ML SOLUTION: Performed by: NURSE PRACTITIONER

## 2024-01-24 PROCEDURE — 78431 MYOCRD IMG PET RST&STRS CT: CPT

## 2024-01-24 RX ORDER — REGADENOSON 0.08 MG/ML
0.4 INJECTION, SOLUTION INTRAVENOUS ONCE
Status: COMPLETED | OUTPATIENT
Start: 2024-01-24 | End: 2024-01-24

## 2024-01-24 RX ADMIN — RUBIDIUM CHLORIDE RB-82 1 DOSE: 150 INJECTION, SOLUTION INTRAVENOUS at 12:07

## 2024-01-24 RX ADMIN — REGADENOSON 0.4 MG: 0.08 INJECTION, SOLUTION INTRAVENOUS at 12:20

## 2024-01-24 RX ADMIN — RUBIDIUM CHLORIDE RB-82 1 DOSE: 150 INJECTION, SOLUTION INTRAVENOUS at 12:18

## 2024-01-26 ENCOUNTER — TELEPHONE (OUTPATIENT)
Dept: CARDIOLOGY | Facility: HOSPITAL | Age: 64
End: 2024-01-26
Payer: COMMERCIAL

## 2024-01-26 DIAGNOSIS — I49.3 FREQUENT PVCS: ICD-10-CM

## 2024-01-26 DIAGNOSIS — R06.09 DOE (DYSPNEA ON EXERTION): ICD-10-CM

## 2024-01-26 DIAGNOSIS — E11.65 UNCONTROLLED TYPE 2 DIABETES MELLITUS WITH HYPERGLYCEMIA: ICD-10-CM

## 2024-01-26 DIAGNOSIS — E78.49 OTHER HYPERLIPIDEMIA: ICD-10-CM

## 2024-01-26 DIAGNOSIS — I10 PRIMARY HYPERTENSION: Primary | ICD-10-CM

## 2024-01-26 RX ORDER — METOPROLOL SUCCINATE 25 MG/1
12.5 TABLET, EXTENDED RELEASE ORAL DAILY
Qty: 30 TABLET | Refills: 3 | Status: SHIPPED | OUTPATIENT
Start: 2024-01-26

## 2024-01-26 NOTE — TELEPHONE ENCOUNTER
Reviewed stress test with patient that showed no ischemia.  Will be getting 12.5 mg of Toprol daily due to 19% PVC burden.  Patient is symptomatic with her PVCs and does note dyspnea.  Ambulatory referral to cardiology for ongoing evaluation.  Patient has requested to see Dr. Moreno.  Referral placed

## 2024-01-29 DIAGNOSIS — J45.909 UNCOMPLICATED ASTHMA: ICD-10-CM

## 2024-01-29 RX ORDER — MOMETASONE FUROATE AND FORMOTEROL FUMARATE DIHYDRATE 100; 5 UG/1; UG/1
2 AEROSOL RESPIRATORY (INHALATION) DAILY
Qty: 8.8 G | Refills: 1 | Status: SHIPPED | OUTPATIENT
Start: 2024-01-29

## 2024-02-23 NOTE — PROGRESS NOTES
Subjective:     Encounter Date:02/26/2024    Primary Care Physician: Noreen Smith MD      Patient ID: Dana Rincon is a 63 y.o. female.    Chief Complaint:Hyperlipidemia, Hypertension, PVC's (premature ventricular contractions), and BERG (dyspnea on exertion)    PROBLEM LIST:  Dyspnea on exertion  12/2023 echo EF 60%.  Mean aortic valve gradient 17.  Trace MR.  1/24/2024 normal MPS.  Mild scattered coronary calcifications.  PVC  2023 Holter monitor showing 19% PVC burden  Hypertension  Dyslipidemia  Sleep apnea on CPAP  Obesity  COPD/asthma   Diabetes   Breast cancer 2015 or 2016  XRT right side  Lumpectomy and anastrazole  Surgeries:  Tonsillectomy and adenoidectomy  Lumpectomy  D&C  [place] teeth extraction        Allergies   Allergen Reactions    Meperidine Nausea And Vomiting         Current Outpatient Medications:     albuterol sulfate  (90 Base) MCG/ACT inhaler, Inhale 2 puffs Every 4 (Four) Hours As Needed for Wheezing., Disp: 18 g, Rfl: 5    CALCIUM PO, Take 1 tablet by mouth Daily., Disp: , Rfl:     Cholecalciferol (VITAMIN D3 PO), Take 1 capsule by mouth Daily., Disp: , Rfl:     clotrimazole-betamethasone (Lotrisone) 1-0.05 % cream, Apply 1 application  topically to the appropriate area as directed 2 (Two) Times a Day., Disp: 45 g, Rfl: 3    Cyanocobalamin 2500 MCG sublingual tablet, Place 2,500 mcg under the tongue Daily. (Patient taking differently: Place 2,500 mcg under the tongue As Needed.), Disp: 90 each, Rfl: 3    Dulera 100-5 MCG/ACT inhaler, INHALE 2 PUFFS BY MOUTH DAILY, Disp: 8.8 g, Rfl: 1    empagliflozin (Jardiance) 25 MG tablet tablet, Take 1 tablet by mouth Daily. For DMII, replaces Rybelsus, Disp: 90 tablet, Rfl: 3    fexofenadine (ALLEGRA) 30 MG tablet, Take 1 tablet by mouth As Needed., Disp: , Rfl:     furosemide (LASIX) 20 MG tablet, Take 1 tablet by mouth Daily As Needed (edema). swelling, Disp: 90 tablet, Rfl: 1    glimepiride (AMARYL) 4 MG tablet, TAKE 1 TABLET BY MOUTH  DAILY WITH BREAKFAST, Disp: 90 tablet, Rfl: 1    ipratropium (ATROVENT) 0.03 % nasal spray, 2 sprays into the nostril(s) as directed by provider Every 12 (Twelve) Hours., Disp: 30 mL, Rfl: 0    Melatonin 10 MG capsule, Take 1 capsule by mouth As Needed., Disp: , Rfl:     metFORMIN ER (GLUCOPHAGE-XR) 750 MG 24 hr tablet, Take 1 tablet by mouth Daily., Disp: 90 tablet, Rfl: 3    metoprolol succinate XL (TOPROL-XL) 25 MG 24 hr tablet, Take 0.5 tablets by mouth Daily., Disp: 30 tablet, Rfl: 3    montelukast (SINGULAIR) 10 MG tablet, Take 1 tablet by mouth Daily., Disp: 90 tablet, Rfl: 3    olmesartan-hydrochlorothiazide (BENICAR HCT) 40-12.5 MG per tablet, Take 1 tablet by mouth Daily., Disp: 90 tablet, Rfl: 3    potassium chloride (K-DUR,KLOR-CON) 10 MEQ CR tablet, Take 1 tablet by mouth Daily As Needed (with lasix)., Disp: 90 tablet, Rfl: 0    pravastatin (PRAVACHOL) 10 MG tablet, Take 1 tablet by mouth Daily., Disp: 90 tablet, Rfl: 3        History of Present Illness    Patient is a 63-year-old  female who presents today for further evaluation of dyspnea and PVCs.  Patient notes over the last few months she has had increasing shortness of breath with exertion as well as some intermittent episodes of palpitation and weakness.  She denies any chest pain, pressure, tightness.  But endorses whenever she attempts to walk she has shortness of breath with wheezing.  Feels as if her asthma has returned.  She does not previously remember having any PFTs or formal pulmonary consultation.  [facility] who obtained echo, stress test and she has had a previous Holter monitor with results as noted in the problem list.  She had a significant PVC burden.  Notes that she will get alerts via her smart watch that says low pulse rate.  During these times she notes symptoms of palpitations and weakness.  She notes that she will be traveling to [place] in 2 months for a guided tour and is hoping to help  with some of her symptoms prior to this trip.    The following portions of the patient's history were reviewed and updated as appropriate: allergies, current medications, past family history, past medical history, past social history, past surgical history and problem list.    Family History   Problem Relation Age of Onset    Hypertension Mother     Colon cancer Mother     Cardiomyopathy Mother     Heart failure Father     Diabetes Father     Heart disease Father 63        stents, cabg    Breast cancer Paternal Aunt         pt states 50's    Colon cancer Paternal Uncle     Breast cancer Other 30    Breast cancer Other 30    No Known Problems Sister     Ovarian cancer Neg Hx        Social History     Tobacco Use    Smoking status: Never    Smokeless tobacco: Never   Vaping Use    Vaping Use: Never used   Substance Use Topics    Alcohol use: Yes     Alcohol/week: 3.0 standard drinks of alcohol     Types: 3 Glasses of wine per week     Comment: 2-3/week for 20 years    Drug use: No         Review of Systems   Constitutional: Positive for malaise/fatigue. Negative for fever.   HENT:  Negative for nosebleeds.    Eyes:  Negative for redness and visual disturbance.   Cardiovascular:  Positive for leg swelling and orthopnea. Negative for palpitations and paroxysmal nocturnal dyspnea.   Respiratory:  Positive for snoring and wheezing. Negative for cough and sputum production.    Endocrine: Positive for heat intolerance.   Hematologic/Lymphatic: Negative for bleeding problem.   Skin:  Negative for flushing, itching and rash.   Musculoskeletal:  Negative for falls, joint pain and muscle cramps.   Gastrointestinal:  Negative for abdominal pain, diarrhea, heartburn, nausea and vomiting.   Genitourinary:  Negative for hematuria.   Neurological:  Positive for loss of balance. Negative for excessive daytime sleepiness, dizziness, headaches, tremors and weakness.   Psychiatric/Behavioral:  Negative for substance abuse. The patient  "is not nervous/anxious.           Objective:   /62 (BP Location: Left arm, Patient Position: Sitting)   Pulse 60   Ht 149.9 cm (59\")   Wt 116 kg (256 lb)   SpO2 93%   BMI 51.71 kg/m²         Vitals reviewed.   Constitutional:       Appearance: Healthy appearance. Well-developed and not in distress.   Eyes:      Conjunctiva/sclera: Conjunctivae normal.      Pupils: Pupils are equal, round, and reactive to light.   HENT:      Head: Normocephalic and atraumatic.    Mouth/Throat:      Pharynx: Oropharynx is clear.   Neck:      Thyroid: Thyroid normal. No thyromegaly.      Vascular: Normal carotid pulses. No carotid bruit or JVD. JVD normal.      Lymphadenopathy: No cervical adenopathy.   Pulmonary:      Effort: No respiratory distress.      Breath sounds: No wheezing. No rales.   Chest:      Chest wall: Not tender to palpatation.   Cardiovascular:      Normal rate. Regular rhythm.      No gallop.    Pulses:     Carotid: 2+ bilaterally.     Dorsalis pedis: 2+ bilaterally.     Posterior tibial: 2+ bilaterally.  Edema:     Peripheral edema absent.   Abdominal:      General: There is no distension or abdominal bruit.      Palpations: There is no abdominal mass.      Tenderness: There is no abdominal tenderness. There is no rebound.   Musculoskeletal:         General: No tenderness or deformity.      Extremities: No clubbing present.Skin:     General: Skin is warm and dry. There is no cyanosis.      Findings: No rash.   Neurological:      Mental Status: Alert, oriented to person, place, and time and oriented to person, place and time.           ECG 12 Lead    Date/Time: 2/26/2024 1:09 PM  Performed by: Dagoberto Moreno MD    Authorized by: Dagoberto Moreno MD  Comparison: compared with previous ECG from 11/17/2023  Comparison to previous ECG: PVC's not noted  Rhythm: sinus rhythm  Other findings: low voltage                Assessment:   Assessment & Plan      Diagnoses and all orders for this visit:    1. PVC " (premature ventricular contraction) (Primary)  -     ECG 12 Lead      1.  Dyspnea on exertion wheezing, known history of allergic asthma and patient thinks symptoms are similar to what she has had in the past with this.  2.  Frequent PVCs.  Normal echocardiogram and stress perfusion study.  19% burden.  3.  Reported bradycardia, likely due to frequent PVCs as this occurs only with manual palpation, not with telemetry.  4.  Morbid obesity, BMI 51  5.  Hypertension, plus minus control  6.  Diabetes type 2  7.  Dyslipidemia on low intensity statin    Recommendations:  1.  Unclear that the PVCs are symptomatic.  She has a high burden, but this may be due to other issues such as pulmonary asthma.  2.  Have her undergo pulmonary evaluation  3.  Her symptoms may have worsened slightly after starting on metoprolol.  Will switch her to a more 81 selective beta-blocker (bisoprolol 2.5 mg daily.  3.  Discussed exercise weight loss.  4.  Will continue current primary preventative strategies.  5.  If after pulmonary evaluation, her PVC burden remains high, discussed the possibility of either uptitrating beta-blocker or possibly PVC ablation (she appears to have 1 dominant morphology on her Holter monitor).  However at this time do not think ablation is indicated.  6.       Lena CHIU scribed portions of this dictation for Dr. Dagoberto Moreno.   I have seen and examined the patient, I have reviewed the note, discussed the case with the advance practice clinician, made necessary changes and I agree with the final note.    Dagoberto Moreno MD  02/26/24  16:46 EST              Dictated utilizing Dragon dictation

## 2024-02-26 ENCOUNTER — OFFICE VISIT (OUTPATIENT)
Dept: CARDIOLOGY | Facility: CLINIC | Age: 64
End: 2024-02-26
Payer: COMMERCIAL

## 2024-02-26 VITALS
SYSTOLIC BLOOD PRESSURE: 148 MMHG | BODY MASS INDEX: 51.61 KG/M2 | WEIGHT: 256 LBS | OXYGEN SATURATION: 93 % | DIASTOLIC BLOOD PRESSURE: 62 MMHG | HEART RATE: 60 BPM | HEIGHT: 59 IN

## 2024-02-26 DIAGNOSIS — I49.3 PVC'S (PREMATURE VENTRICULAR CONTRACTIONS): Primary | ICD-10-CM

## 2024-02-26 DIAGNOSIS — G47.33 OBSTRUCTIVE SLEEP APNEA SYNDROME: ICD-10-CM

## 2024-02-26 DIAGNOSIS — R06.09 DOE (DYSPNEA ON EXERTION): ICD-10-CM

## 2024-02-26 DIAGNOSIS — I49.3 PVC (PREMATURE VENTRICULAR CONTRACTION): Primary | ICD-10-CM

## 2024-02-26 PROCEDURE — 93000 ELECTROCARDIOGRAM COMPLETE: CPT | Performed by: INTERNAL MEDICINE

## 2024-02-26 PROCEDURE — 99204 OFFICE O/P NEW MOD 45 MIN: CPT | Performed by: INTERNAL MEDICINE

## 2024-02-26 RX ORDER — BISOPROLOL FUMARATE 5 MG/1
2.5 TABLET, FILM COATED ORAL DAILY
Qty: 30 TABLET | Refills: 11 | Status: SHIPPED | OUTPATIENT
Start: 2024-02-26

## 2024-03-07 ENCOUNTER — TELEPHONE (OUTPATIENT)
Dept: CARDIOLOGY | Facility: CLINIC | Age: 64
End: 2024-03-07
Payer: COMMERCIAL

## 2024-03-07 NOTE — TELEPHONE ENCOUNTER
Patient has an upcoming appt scheduled with pulmonary on May 20, 2024, left VM advising do not have scheduling authority, if requesting sooner, but could ask to be placed on cancellation list.

## 2024-03-07 NOTE — TELEPHONE ENCOUNTER
Caller: Dana Rincon     Relationship: SELF    Best call back number: [phone]    What is your medical concern?  REFERRED PT OVER TO PULMONOLOGY. SHE IS HAVING TROUBLE GETTING SCHEDULED THERE. SHE WOULD LIKE TO SPEAK TO THE NURSE TO SEE IF THE OFFICE CAN HELP. PLEASE REACH OUT TO THE PT TO ADDRESS THESE CONCERNS       Nephrology Progress Note


Date of Service:


Dec 29, 2017.


Subjective


58 yo female with esrd who presented with volume overload and syncopal episode 

while on dialysis. requiring bipap at night. on oxygen. pt feels better. today 

is her regular dialysis day.





Objective











  Date Time  Temp Pulse Resp B/P (MAP) Pulse Ox O2 Delivery O2 Flow Rate FiO2


 


12/29/17 07:37 36.5 96 22 150/84 (106) 100 Nasal Cannula 2.0 


 


12/29/17 04:00      BiPAP  


 


12/29/17 03:30 36.8 95 19 123/69 (87) 97 BiPAP  


 


12/29/17 02:30  90   98   21


 


12/29/17 02:29  90 27  98 BiPAP/CPAP 30.0 


 


12/29/17 00:00      BiPAP  


 


12/28/17 23:57 36.5 94 19 101/62 (75) 94 Room Air  


 


12/28/17 22:14  99   97   21


 


12/28/17 20:00      Nasal Cannula 2.0 


 


12/28/17 19:17 36.5 99 22 109/71 (84) 97 Nasal Cannula 2.0 


 


12/28/17 16:00      Nasal Cannula 2.0 


 


12/28/17 15:30  65  115/62    


 


12/28/17 15:15  93  120/70    


 


12/28/17 15:00  95  107/75    


 


12/28/17 15:00 36.6 93 22 120/70 (87) 96 Nasal Cannula 2.0 


 


12/28/17 14:45  66  101/59    


 


12/28/17 14:30  100  91/61    


 


12/28/17 14:15  96  124/96    


 


12/28/17 14:00  96  102/76    


 


12/28/17 13:45  96  115/79    


 


12/28/17 13:30  100  109/66    


 


12/28/17 13:15  92  128/74    


 


12/28/17 12:32 36.6 94  117/69 (85)    


 


12/28/17 12:00      Nasal Cannula 4.0 


 


12/28/17 11:48 36.4 96 20 143/84 (103) 99 Nasal Cannula 4.0 


 


12/28/17 08:00 36.6 97 22 122/78 (93) 97 BiPAP  


 


12/28/17 08:00      Nasal Cannula 4.0 








Physical Exam:


General-aaox3


Eyes-no scleral icterus


ENT-mmm


Neck-supple


Lungs-rales right base, slight end expiratory wheeze


Heart-rrr


Abdomen-bs+ s/nt/nd


Extremities-no c/c/e


Neuro-nonfocal





Current Inpatient Medications








 Medications


  (Trade)  Dose


 Ordered  Sig/Daniel


 Route  Start Time


 Stop Time Status Last Admin


Dose Admin


 


 Heparin Sodium


  (Porcine)


  (Heparin Sq 5000


 Unit/0.5ml)  5,000 unit  Q8


 SQ  12/27/17 22:00


 1/26/18 21:59  12/29/17 06:21


5,000 UNIT


 


 Acetaminophen


  (Tylenol Tab)  650 mg  Q4H  PRN


 PO  12/27/17 17:45


 1/26/18 17:44  12/29/17 00:34


650 MG


 


 Ondansetron HCl


  (Zofran Inj)  4 mg  Q6H  PRN


 IV  12/27/17 17:45


 1/26/18 17:44   


 


 


 Albuterol Sulfate


  (Ventolin 0.083%


 2.5MG/3ML Neb)  2.5 mg  Q6R


 INH  12/27/17 21:00


 1/26/18 20:59  12/29/17 02:28


2.5 MG


 


 Ceftriaxone


 Sodium 1000 mg/


 Dextrose  60 ml @ 


 100 mls/hr  DAILY@1800


 IV  12/27/17 18:00


 1/1/18 17:59  12/28/17 16:59


100 MLS/HR


 


 Allopurinol


  (Zyloprim Tab)  100 mg  BID


 PO  12/27/17 21:00


 1/26/18 20:59  12/28/17 20:56


100 MG


 


 Aspirin


  (Ecotrin Tab)  81 mg  DAILY


 PO  12/28/17 09:00


 1/27/18 08:59  12/28/17 08:21


81 MG


 


 Atorvastatin


 Calcium


  (Lipitor Tab)  40 mg  DAILY


 PO  12/28/17 09:00


 1/27/18 08:59  12/28/17 09:48


40 MG


 


 Clopidogrel


 Bisulfate


  (plAVix TAB)  75 mg  DAILY


 PO  12/28/17 09:00


 1/27/18 08:59  12/28/17 08:21


75 MG


 


 Docusate Sodium


  (coLACE CAP)  100 mg  BID


 PO  12/27/17 21:00


 1/26/18 20:59  12/28/17 20:54


100 MG


 


 Albuterol/


 Ipratropium


  (Combivent


 Respimat Inh)  1 puffs  QIDR


 INH  12/27/17 20:00


 1/26/18 19:59  12/28/17 20:54


1 PUFFS


 


 Pantoprazole


 Sodium


  (Protonix Tab)  40 mg  DAILY


 PO  12/28/17 09:00


 1/27/18 08:59  12/28/17 08:22


40 MG


 


 Pramipexole


 Dihydrochloride


  (miraPEX TAB)  0.25 mg  HS


 PO  12/27/17 21:00


 1/26/18 20:59  12/28/17 20:55


0.25 MG


 


 Sertraline HCl


  (Zoloft Tab)  75 mg  DAILY


 PO  12/28/17 09:00


 1/27/18 08:59  12/28/17 08:22


75 MG


 


 Thiamine HCl


  (Vitamin B-1 Tab)  50 mg  DAILY


 PO  12/28/17 09:00


 1/27/18 08:59  12/28/17 08:21


50 MG


 


 Tramadol HCl


  (Ultram Tab)  50 mg  Q4H  PRN


 PO  12/27/17 19:15


 1/26/18 19:14   


 


 


 Vitamin B Complex/


 Vit C/Folic Acid


  (Nephrocaps)  1 cap  DAILY


 PO  12/28/17 09:00


 1/27/18 08:59  12/28/17 08:22


1 CAP


 


 Miscellaneous


 Information


  (Order Awaiting


 Action)  1 ea  QS


 N/A  12/28/17 00:00


 1/27/18 00:00   


 


 


 Miscellaneous


 Information


  (Order Awaiting


 Action)  1 ea  QS


 N/A  12/28/17 00:00


 1/27/18 00:00   


 


 


 Miscellaneous


 Information


  (Order Awaiting


 Action)  1 ea  QS


 N/A  12/28/17 00:00


 1/27/18 00:00   


 


 


 Miscellaneous


 Information


  (Order Awaiting


 Action)  1 ea  QS


 N/A  12/28/17 00:00


 1/27/18 00:00   


 


 


 Glucose


  (Glucose 40% Gel)  15-30


 GRAMS 15


 GRAMS...  UD  PRN


 PO  12/27/17 19:30


 1/26/18 19:29   


 


 


 Glucose


  (Glucose Chew


 Tab)  4-8


 Tablets 4


 Tabl...  UD  PRN


 PO  12/27/17 19:30


 1/26/18 19:29   


 


 


 Dextrose


  (Dextrose 50%


 50ML Syringe)  25-50ML OF


 50% DW IV


 FOR...  UD  PRN


 IV  12/27/17 19:30


 1/26/18 19:29   


 


 


 Glucagon


  (Glucagon Inj)  1 mg  UD  PRN


 SQ  12/27/17 19:30


 1/26/18 19:29   


 


 


 Miscellaneous


 Information


  (Consult


 Glycemic


 Management


 Pharmacy)  1 ea  UD  PRN


 N/A  12/28/17 12:14


 1/27/18 12:13   


 


 


 Methylprednisolone


 Sodium Succinate


 40 mg/Syringe  0.64 ml @ 


 1.5 mls/min  Q12H


 IV  12/28/17 21:00


 1/27/18 20:59  12/28/17 20:54


1.5 MLS/MIN


 


 Insulin Human


 Regular 250 units/


 Sodium Chloride  252.5 ml @ 


 0 mls/hr  Q24H


 IV  12/28/17 20:15


 1/27/18 20:14  12/28/17 21:02


3.8 MLS/HR


 


 Insulin Aspart


  (novoLOG ASPART)  SLIDING


 SCALE  [facility]


 SC  12/28/17 21:00


 1/27/18 20:59   


 











Last 24 Hours








Test


  12/28/17


08:48 12/28/17


11:23 12/28/17


16:09 12/28/17


19:49


 


Arterial Blood pH 7.25    


 


Arterial Blood Partial


Pressure CO2 43 mmHg 


  


  


  


 


 


Arterial Blood Partial


Pressure O2 153 mm/Hg 


  


  


  


 


 


Arterial Blood HCO3 18 mmol/L    


 


Arterial Blood Oxygen


Saturation 98.4 % 


  


  


  


 


 


Arterial Blood Base Excess -8.7 mEq/L    


 


Arterial Blood Gas Delivery 4 L    


 


Kiko Test POS    


 


Bedside Glucose  366 mg/dl  140 mg/dl  366 mg/dl 


 


Test


  12/28/17


22:16 12/28/17


23:01 12/29/17


00:01 12/29/17


00:55


 


Bedside Glucose 192 mg/dl  209 mg/dl  232 mg/dl  216 mg/dl 


 


Test


  12/29/17


01:59 12/29/17


03:04 12/29/17


04:18 12/29/17


04:58


 


Bedside Glucose 231 mg/dl  221 mg/dl  191 mg/dl  189 mg/dl 


 


Test


  12/29/17


05:57 12/29/17


06:57 12/29/17


07:02 


 


 


Bedside Glucose 149 mg/dl  138 mg/dl   











Assessment & Plan


ESRD-for dialysis again today since today is her regularly scheduled day.  

volume status improving. pt clinically improving.

## 2024-03-18 ENCOUNTER — LAB (OUTPATIENT)
Dept: LAB | Facility: HOSPITAL | Age: 64
End: 2024-03-18
Payer: COMMERCIAL

## 2024-03-18 ENCOUNTER — OFFICE VISIT (OUTPATIENT)
Dept: INTERNAL MEDICINE | Facility: CLINIC | Age: 64
End: 2024-03-18
Payer: COMMERCIAL

## 2024-03-18 VITALS
TEMPERATURE: 97.7 F | SYSTOLIC BLOOD PRESSURE: 128 MMHG | WEIGHT: 251.6 LBS | BODY MASS INDEX: 50.72 KG/M2 | HEIGHT: 59 IN | DIASTOLIC BLOOD PRESSURE: 70 MMHG | HEART RATE: 54 BPM | OXYGEN SATURATION: 94 %

## 2024-03-18 DIAGNOSIS — J45.40 MODERATE PERSISTENT ASTHMA WITHOUT COMPLICATION: ICD-10-CM

## 2024-03-18 DIAGNOSIS — R60.0 PEDAL EDEMA: ICD-10-CM

## 2024-03-18 DIAGNOSIS — I10 ESSENTIAL HYPERTENSION: ICD-10-CM

## 2024-03-18 DIAGNOSIS — E78.49 OTHER HYPERLIPIDEMIA: ICD-10-CM

## 2024-03-18 DIAGNOSIS — E11.65 UNCONTROLLED TYPE 2 DIABETES MELLITUS WITH HYPERGLYCEMIA: Primary | ICD-10-CM

## 2024-03-18 DIAGNOSIS — J45.909 UNCOMPLICATED ASTHMA: ICD-10-CM

## 2024-03-18 DIAGNOSIS — J30.89 NON-SEASONAL ALLERGIC RHINITIS DUE TO OTHER ALLERGIC TRIGGER: ICD-10-CM

## 2024-03-18 LAB
ALBUMIN SERPL-MCNC: 4.1 G/DL (ref 3.5–5.2)
ALBUMIN/GLOB SERPL: 1.5 G/DL
ALP SERPL-CCNC: 85 U/L (ref 39–117)
ALT SERPL W P-5'-P-CCNC: 15 U/L (ref 1–33)
ANION GAP SERPL CALCULATED.3IONS-SCNC: 14.9 MMOL/L (ref 5–15)
AST SERPL-CCNC: 18 U/L (ref 1–32)
BILIRUB SERPL-MCNC: 0.6 MG/DL (ref 0–1.2)
BUN SERPL-MCNC: 17 MG/DL (ref 8–23)
BUN/CREAT SERPL: 18.9 (ref 7–25)
CALCIUM SPEC-SCNC: 9.3 MG/DL (ref 8.6–10.5)
CHLORIDE SERPL-SCNC: 98 MMOL/L (ref 98–107)
CHOLEST SERPL-MCNC: 134 MG/DL (ref 0–200)
CO2 SERPL-SCNC: 22.1 MMOL/L (ref 22–29)
CREAT SERPL-MCNC: 0.9 MG/DL (ref 0.57–1)
EGFRCR SERPLBLD CKD-EPI 2021: 72 ML/MIN/1.73
EXPIRATION DATE: ABNORMAL
GLOBULIN UR ELPH-MCNC: 2.7 GM/DL
GLUCOSE SERPL-MCNC: 91 MG/DL (ref 65–99)
HBA1C MFR BLD: 7.9 % (ref 4.5–5.7)
HDLC SERPL-MCNC: 34 MG/DL (ref 40–60)
LDLC SERPL CALC-MCNC: 81 MG/DL (ref 0–100)
LDLC/HDLC SERPL: 2.36 {RATIO}
Lab: ABNORMAL
POTASSIUM SERPL-SCNC: 4.5 MMOL/L (ref 3.5–5.2)
PROT SERPL-MCNC: 6.8 G/DL (ref 6–8.5)
SODIUM SERPL-SCNC: 135 MMOL/L (ref 136–145)
TRIGL SERPL-MCNC: 99 MG/DL (ref 0–150)
VLDLC SERPL-MCNC: 19 MG/DL (ref 5–40)

## 2024-03-18 PROCEDURE — 80061 LIPID PANEL: CPT

## 2024-03-18 PROCEDURE — 80053 COMPREHEN METABOLIC PANEL: CPT

## 2024-03-18 RX ORDER — ALBUTEROL SULFATE 90 UG/1
2 AEROSOL, METERED RESPIRATORY (INHALATION) EVERY 4 HOURS PRN
Qty: 18 G | Refills: 5 | Status: SHIPPED | OUTPATIENT
Start: 2024-03-18

## 2024-03-18 RX ORDER — FLUTICASONE FUROATE, UMECLIDINIUM BROMIDE AND VILANTEROL TRIFENATATE 100; 62.5; 25 UG/1; UG/1; UG/1
1 POWDER RESPIRATORY (INHALATION)
Qty: 60 EACH | Refills: 2 | Status: SHIPPED | OUTPATIENT
Start: 2024-03-18

## 2024-03-18 RX ORDER — POTASSIUM CHLORIDE 750 MG/1
10 TABLET, EXTENDED RELEASE ORAL DAILY PRN
Qty: 90 TABLET | Refills: 1 | Status: SHIPPED | OUTPATIENT
Start: 2024-03-18

## 2024-03-18 RX ORDER — MOMETASONE FUROATE AND FORMOTEROL FUMARATE DIHYDRATE 100; 5 UG/1; UG/1
2 AEROSOL RESPIRATORY (INHALATION) DAILY
Qty: 8.8 G | Refills: 1 | Status: SHIPPED | OUTPATIENT
Start: 2024-03-18

## 2024-03-18 RX ORDER — FUROSEMIDE 20 MG/1
20 TABLET ORAL DAILY PRN
Qty: 90 TABLET | Refills: 1 | Status: SHIPPED | OUTPATIENT
Start: 2024-03-18

## 2024-03-18 NOTE — PROGRESS NOTES
Diabetes, Hypertension, and COPD (Has been referred to Pulmonary, but unable to to seen until May)    Subjective   Dana Rincon is a 63 y.o. female is here today for follow-up.    Diabetes  Pertinent negatives for hypoglycemia include no confusion, dizziness or headaches. Pertinent negatives for diabetes include no chest pain.   Hypertension  Associated symptoms include shortness of breath. Pertinent negatives include no chest pain, headaches or palpitations.   COPD  She complains of shortness of breath. There is no cough. Pertinent negatives include no chest pain, ear pain, fever, headaches, myalgias or sore throat.     Was in the [facility] in Nov and was found to have PVCs, and seen by Cardiology. Was adv likely from COPD as she has significant 2nd hand smoke.  Notes that she has been more soa when she walks . On dulera and proair.  Here for a f/u on dm2, htn and hlp.      Current Outpatient Medications:     albuterol sulfate  (90 Base) MCG/ACT inhaler, Inhale 2 puffs Every 4 (Four) Hours As Needed for Wheezing., Disp: 18 g, Rfl: 5    bisoprolol (ZEBeta) 5 MG tablet, Take 0.5 tablets by mouth Daily., Disp: 30 tablet, Rfl: 11    CALCIUM PO, Take 1 tablet by mouth Daily., Disp: , Rfl:     Cholecalciferol (VITAMIN D3 PO), Take 1 capsule by mouth Daily., Disp: , Rfl:     clotrimazole-betamethasone (Lotrisone) 1-0.05 % cream, Apply 1 application  topically to the appropriate area as directed 2 (Two) Times a Day., Disp: 45 g, Rfl: 3    Cyanocobalamin 2500 MCG sublingual tablet, Place 2,500 mcg under the tongue Daily. (Patient taking differently: Place 2,500 mcg under the tongue As Needed.), Disp: 90 each, Rfl: 3    empagliflozin (Jardiance) 25 MG tablet tablet, Take 1 tablet by mouth Daily. For DMII, replaces Rybelsus, Disp: 90 tablet, Rfl: 3    fexofenadine (ALLEGRA) 30 MG tablet, Take 1 tablet by mouth As Needed., Disp: , Rfl:     furosemide (LASIX) 20 MG tablet, Take 1 tablet by mouth Daily As Needed (edema).  swelling, Disp: 90 tablet, Rfl: 1    glimepiride (AMARYL) 4 MG tablet, TAKE 1 TABLET BY MOUTH DAILY WITH BREAKFAST, Disp: 90 tablet, Rfl: 1    Melatonin 10 MG capsule, Take 1 capsule by mouth As Needed., Disp: , Rfl:     metFORMIN ER (GLUCOPHAGE-XR) 750 MG 24 hr tablet, Take 1 tablet by mouth Daily., Disp: 90 tablet, Rfl: 3    mometasone-formoterol (Dulera) 100-5 MCG/ACT inhaler, Inhale 2 puffs Daily., Disp: 8.8 g, Rfl: 1    montelukast (SINGULAIR) 10 MG tablet, Take 1 tablet by mouth Daily., Disp: 90 tablet, Rfl: 3    olmesartan-hydrochlorothiazide (BENICAR HCT) 40-12.5 MG per tablet, Take 1 tablet by mouth Daily., Disp: 90 tablet, Rfl: 3    potassium chloride (KLOR-CON M10) 10 MEQ CR tablet, Take 1 tablet by mouth Daily As Needed (with lasix)., Disp: 90 tablet, Rfl: 1    pravastatin (PRAVACHOL) 10 MG tablet, Take 1 tablet by mouth Daily., Disp: 90 tablet, Rfl: 3    Fluticasone-Umeclidin-Vilant (Trelegy Ellipta) 100-62.5-25 MCG/ACT inhaler, Inhale 1 puff Daily., Disp: 60 each, Rfl: 2    Semaglutide, 1 MG/DOSE, (OZEMPIC) 4 MG/3ML solution pen-injector, Inject 1 mg under the skin into the appropriate area as directed 1 (One) Time Per Week., Disp: 3 mL, Rfl: 5      The following portions of the patient's history were reviewed and updated as appropriate: allergies, current medications, past family history, past medical history, past social history, past surgical history and problem list.    Review of Systems   Constitutional: Negative.  Negative for chills and fever.   HENT:  Negative for ear discharge, ear pain, sinus pressure and sore throat.    Respiratory:  Positive for shortness of breath. Negative for cough and chest tightness.    Cardiovascular:  Negative for chest pain, palpitations and leg swelling.   Gastrointestinal:  Negative for diarrhea, nausea and vomiting.   Musculoskeletal:  Negative for arthralgias, back pain and myalgias.   Neurological:  Negative for dizziness, syncope and headaches.  "  Psychiatric/Behavioral:  Negative for confusion and sleep disturbance.        Objective   /70   Pulse 54   Temp 97.7 °F (36.5 °C)   Ht 149.9 cm (59\")   Wt 114 kg (251 lb 9.6 oz)   SpO2 94% Comment: ra  BMI 50.82 kg/m²   Physical Exam  Vitals and nursing note reviewed.   Constitutional:       Appearance: She is well-developed.   HENT:      Head: Normocephalic and atraumatic.      Right Ear: External ear normal.      Left Ear: External ear normal.      Mouth/Throat:      Pharynx: No oropharyngeal exudate.   Eyes:      Conjunctiva/sclera: Conjunctivae normal.      Pupils: Pupils are equal, round, and reactive to light.   Neck:      Thyroid: No thyromegaly.   Cardiovascular:      Rate and Rhythm: Regular rhythm. Bradycardia present.      Pulses: Normal pulses.      Heart sounds: Normal heart sounds. No murmur heard.     No friction rub. No gallop.   Pulmonary:      Effort: Pulmonary effort is normal.      Breath sounds: Normal breath sounds.   Abdominal:      General: Bowel sounds are normal. There is no distension.      Palpations: Abdomen is soft.      Tenderness: There is no abdominal tenderness.   Musculoskeletal:      Cervical back: Neck supple.   Skin:     General: Skin is warm and dry.   Neurological:      Mental Status: She is alert and oriented to person, place, and time.      Cranial Nerves: No cranial nerve deficit.   Psychiatric:         Judgment: Judgment normal.         Class 3 Severe Obesity (BMI >=40). Obesity-related health conditions include the following: diabetes mellitus and dyslipidemias. Obesity is unchanged. BMI is is above average; BMI management plan is completed. We discussed portion control, increasing exercise, and pharmacologic options including ozempic .       Results for orders placed or performed in visit on 03/18/24   POC Glycosylated Hemoglobin (Hb A1C)    Specimen: Blood   Result Value Ref Range    Hemoglobin A1C 7.9 (A) 4.5 - 5.7 %    Lot Number [id number]     " Expiration Date 11/27/25              Assessment & Plan   Diagnoses and all orders for this visit:    Uncontrolled type 2 diabetes mellitus with hyperglycemia  -     POC Glycosylated Hemoglobin (Hb A1C)    Non-seasonal allergic rhinitis due to other allergic trigger  -     albuterol sulfate  (90 Base) MCG/ACT inhaler; Inhale 2 puffs Every 4 (Four) Hours As Needed for Wheezing.    Uncomplicated asthma  -     mometasone-formoterol (Dulera) 100-5 MCG/ACT inhaler; Inhale 2 puffs Daily.    Pedal edema  -     furosemide (LASIX) 20 MG tablet; Take 1 tablet by mouth Daily As Needed (edema). swelling    Essential hypertension  -     potassium chloride (KLOR-CON M10) 10 MEQ CR tablet; Take 1 tablet by mouth Daily As Needed (with lasix).    Moderate persistent asthma without complication  -     Semaglutide, 1 MG/DOSE, (OZEMPIC) 4 MG/3ML solution pen-injector; Inject 1 mg under the skin into the appropriate area as directed 1 (One) Time Per Week.  -     Fluticasone-Umeclidin-Vilant (Trelegy Ellipta) 100-62.5-25 MCG/ACT inhaler; Inhale 1 puff Daily.    Other hyperlipidemia  -     Comprehensive Metabolic Panel; Future  -     Lipid Panel; Future           Adv to stop amaryl if sugars low.   She wants to hold off on the CGM for now    Samples of ozempic and stiolto inhaler given  Ozempic- 0.25mg x 2 wks then 0.5mg weekly x 3 weeks  Instructions given on use of ozempic  and stiolto inhaler.      Return for Medicare Wellness, Next scheduled follow up.    Electronically signed by:    Noreen Smith MD

## 2024-04-12 ENCOUNTER — OFFICE VISIT (OUTPATIENT)
Dept: PULMONOLOGY | Facility: CLINIC | Age: 64
End: 2024-04-12
Payer: COMMERCIAL

## 2024-04-12 VITALS
HEIGHT: 59 IN | RESPIRATION RATE: 12 BRPM | OXYGEN SATURATION: 94 % | TEMPERATURE: 98 F | WEIGHT: 257 LBS | DIASTOLIC BLOOD PRESSURE: 80 MMHG | HEART RATE: 34 BPM | SYSTOLIC BLOOD PRESSURE: 142 MMHG | BODY MASS INDEX: 51.81 KG/M2

## 2024-04-12 DIAGNOSIS — G47.33 OSA ON CPAP: ICD-10-CM

## 2024-04-12 DIAGNOSIS — R06.09 DOE (DYSPNEA ON EXERTION): Primary | ICD-10-CM

## 2024-04-12 DIAGNOSIS — J45.40 MODERATE PERSISTENT ASTHMA WITHOUT COMPLICATION: ICD-10-CM

## 2024-04-12 RX ORDER — FLUTICASONE FUROATE, UMECLIDINIUM BROMIDE AND VILANTEROL TRIFENATATE 200; 62.5; 25 UG/1; UG/1; UG/1
1 POWDER RESPIRATORY (INHALATION)
Qty: 1 EACH | Refills: 11 | Status: SHIPPED | OUTPATIENT
Start: 2024-04-12

## 2024-04-12 NOTE — PROGRESS NOTES
Subjective:     Chief Complaint:   Chief Complaint   Patient presents with    Dyspnea on Exertion    Sleep Apnea    Cough       HPI:    Dana Rincon is a 63 y.o. female seen in consultation at the request of Dagoberto Moreno MD for evaluation of dyspnea    She describes having asthma as a child.  This went away by the time she was a teenager.  This returned at about the age of 45 or bit later.    She has been on inhaled therapy in the form of Dulera and as needed albuterol but recently Dr. Gil switched her to Trelegy and as needed albuterol.  She also takes Singulair.    Her biggest problem that she relates is that about half the time when she exerts herself she gets out of breath suddenly.  This occurs while she is walking.  This rarely occurs to a much less degree when she is at rest.    She notes that this happens more when she has more allergy symptoms in her eyes and sinus cavity.    She thinks the Trelegy has helped somewhat.    She has undergone a full cardiac evaluation.  The PET myocardial scan revealed a low risk for ischemia.  Her echocardiogram revealed a normal EF but some grade 1 diastolic dysfunction and some mild enlargement of the right ventricle.    She has longstanding obstructive sleep apnea and has been followed at [facility] by Dr. Wynn but she is interested in having all her physicians here.  I reviewed the most recent notes there which indicated adequate treatment with auto CPAP and a nasal pillow mask.  She has been compliant with therapy and she says she continues to benefit from therapy.    Current medications are:   Current Outpatient Medications:     albuterol sulfate  (90 Base) MCG/ACT inhaler, Inhale 2 puffs Every 4 (Four) Hours As Needed for Wheezing., Disp: 18 g, Rfl: 5    bisoprolol (ZEBeta) 5 MG tablet, Take 0.5 tablets by mouth Daily., Disp: 30 tablet, Rfl: 11    CALCIUM PO, Take 1 tablet by mouth Daily., Disp: , Rfl:     Cholecalciferol (VITAMIN D3 PO),  Take 1 capsule by mouth Daily., Disp: , Rfl:     clotrimazole-betamethasone (Lotrisone) 1-0.05 % cream, Apply 1 application  topically to the appropriate area as directed 2 (Two) Times a Day., Disp: 45 g, Rfl: 3    Cyanocobalamin 2500 MCG sublingual tablet, Place 2,500 mcg under the tongue Daily. (Patient taking differently: Place 2,500 mcg under the tongue As Needed.), Disp: 90 each, Rfl: 3    empagliflozin (Jardiance) 25 MG tablet tablet, Take 1 tablet by mouth Daily. For DMII, replaces Rybelsus, Disp: 90 tablet, Rfl: 3    fexofenadine (ALLEGRA) 30 MG tablet, Take 1 tablet by mouth As Needed., Disp: , Rfl:     furosemide (LASIX) 20 MG tablet, Take 1 tablet by mouth Daily As Needed (edema). swelling, Disp: 90 tablet, Rfl: 1    glimepiride (AMARYL) 4 MG tablet, TAKE 1 TABLET BY MOUTH DAILY WITH BREAKFAST, Disp: 90 tablet, Rfl: 1    Melatonin 10 MG capsule, Take 1 capsule by mouth As Needed., Disp: , Rfl:     metFORMIN ER (GLUCOPHAGE-XR) 750 MG 24 hr tablet, Take 1 tablet by mouth Daily., Disp: 90 tablet, Rfl: 3    montelukast (SINGULAIR) 10 MG tablet, Take 1 tablet by mouth Daily., Disp: 90 tablet, Rfl: 3    olmesartan-hydrochlorothiazide (BENICAR HCT) 40-12.5 MG per tablet, Take 1 tablet by mouth Daily., Disp: 90 tablet, Rfl: 3    potassium chloride (KLOR-CON M10) 10 MEQ CR tablet, Take 1 tablet by mouth Daily As Needed (with lasix)., Disp: 90 tablet, Rfl: 1    pravastatin (PRAVACHOL) 10 MG tablet, Take 1 tablet by mouth Daily., Disp: 90 tablet, Rfl: 3    Semaglutide, 1 MG/DOSE, (OZEMPIC) 4 MG/3ML solution pen-injector, Inject 1 mg under the skin into the appropriate area as directed 1 (One) Time Per Week., Disp: 3 mL, Rfl: 5    Fluticasone-Umeclidin-Vilant (Trelegy Ellipta) 200-62.5-25 MCG/ACT inhaler, Inhale 1 puff Daily., Disp: 1 each, Rfl: 11.      The patient's relevant past medical, surgical, family and social history were reviewed and updated in Epic as appropriate.     ROS:    Review of Systems  ROS  documented in patient questionnaire ×14 systems.  Reviewed with patient.  Otherwise negative except as noted in HPI.    Objective:    Physical Exam  Vitals and nursing note reviewed.   Constitutional:       Appearance: Normal appearance. She is well-developed.   HENT:      Head: Normocephalic and atraumatic.      Nose: Nose normal.      Mouth/Throat:      Mouth: Mucous membranes are moist.      Pharynx: Oropharynx is clear. No oropharyngeal exudate.      Comments: Class IV airway  Eyes:      General: No scleral icterus.     Conjunctiva/sclera: Conjunctivae normal.   Neck:      Thyroid: No thyromegaly.      Trachea: No tracheal deviation.   Cardiovascular:      Rate and Rhythm: Normal rate and regular rhythm.      Heart sounds: No murmur heard.     No friction rub. No gallop.   Pulmonary:      Effort: Pulmonary effort is normal. No respiratory distress.      Breath sounds: No wheezing or rales.   Musculoskeletal:         General: No deformity. Normal range of motion.   Skin:     General: Skin is warm and dry.      Findings: No rash.   Neurological:      Mental Status: She is alert and oriented to person, place, and time.   Psychiatric:         Behavior: Behavior normal.         Thought Content: Thought content normal.         Data:     PFT: Mild restriction based upon her total lung capacity.  There is no obstruction by spirometry though there is a decrease in the mid flows.  Diffusion is normal.    CXR: Enlarged cardiac silhouette.  Prominent interstitial markings.    Assessment:    Problem List Items Addressed This Visit          Pulmonary Problems    HANNA on CPAP    Relevant Orders    PAP Therapy    Asthma    Overview     Impression: 11/05/2014 - had problems as a child and outgrew it, has come back more recently with allergies. Uses Dulera during spring, summer, fall along with rescue inhaler. Has been using it more often this fall.;          Relevant Medications    Fluticasone-Umeclidin-Vilant (Trelegy Ellipta)  200-62.5-25 MCG/ACT inhaler     Other Visit Diagnoses       BERG (dyspnea on exertion)    -  Primary    Relevant Orders    XR Chest PA & Lateral    Spirometry with Diffusion Capacity & Lung Volumes (Completed)            63-year-old female with dyspnea on exertion that appears multifactorial.  She has asthma and this is likely playing a role.  Her symptoms seem worse when her allergies are acting up.  She also likely has limitations to exercise capacity related to her obesity as well and has diastolic dysfunction.  She has some mild RV enlargement possibly related to her underlying obstructive sleep apnea though this is well treated based upon her clinical response and download.    Plan:     Change Trelegy to the 200 strength and she will continue to use this once a day  I reminded her not to use her Dulera while on Trelegy  Albuterol as needed  Continue Singulair  I will change her auto CPAP range from 7-16 to 10-20 centimeters H2O  I think that some weight loss will certainly help her and she has recently started semaglutide and she is hopeful that this will support her efforts  I will see her back in follow-up in several months to assess her response to the above    Level of Risk Moderate due to: prescription drug management    Signed by  Feliberto Guerra MD

## 2024-05-13 ENCOUNTER — PATIENT ROUNDING (BHMG ONLY) (OUTPATIENT)
Dept: URGENT CARE | Facility: CLINIC | Age: 64
End: 2024-05-13
Payer: COMMERCIAL

## 2024-05-14 ENCOUNTER — TELEPHONE (OUTPATIENT)
Dept: PULMONOLOGY | Facility: CLINIC | Age: 64
End: 2024-05-14
Payer: COMMERCIAL

## 2024-05-14 DIAGNOSIS — J45.40 MODERATE PERSISTENT ASTHMA WITHOUT COMPLICATION: Primary | ICD-10-CM

## 2024-05-14 NOTE — TELEPHONE ENCOUNTER
Spoke with pt and she states that she has finished Rx Prednisone 20 mg that was given and Tessalon 200 mg is not really working. Last Prednisone 20 was taken as of yesterday. Pt is taking Trelegy 1 puff daily and Albuterol HFA 4 x daily PRN. Please advise.

## 2024-05-14 NOTE — TELEPHONE ENCOUNTER
Pt LVM stating that she tested positive for COVID-19. As of Saturday 5/10/24. Pt requesting a sooner apt to be seen due to an Asthma flare up(sob/cough/wheezing). Called pt LVM requesting to return my call. Office # given.

## 2024-05-15 RX ORDER — PREDNISONE 10 MG/1
TABLET ORAL
Qty: 31 TABLET | Refills: 0 | Status: ON HOLD | OUTPATIENT
Start: 2024-05-15

## 2024-05-17 ENCOUNTER — HOSPITAL ENCOUNTER (INPATIENT)
Facility: HOSPITAL | Age: 64
LOS: 4 days | Discharge: HOME | End: 2024-05-21
Attending: EMERGENCY MEDICINE | Admitting: HOSPITALIST
Payer: COMMERCIAL

## 2024-05-17 ENCOUNTER — APPOINTMENT (OUTPATIENT)
Dept: GENERAL RADIOLOGY | Facility: HOSPITAL | Age: 64
End: 2024-05-17
Payer: COMMERCIAL

## 2024-05-17 ENCOUNTER — APPOINTMENT (OUTPATIENT)
Dept: CT IMAGING | Facility: HOSPITAL | Age: 64
End: 2024-05-17
Payer: COMMERCIAL

## 2024-05-17 DIAGNOSIS — J45.40 MODERATE PERSISTENT ASTHMA WITHOUT COMPLICATION: ICD-10-CM

## 2024-05-17 DIAGNOSIS — J18.9 PNEUMONIA OF BOTH LOWER LOBES DUE TO INFECTIOUS ORGANISM: ICD-10-CM

## 2024-05-17 DIAGNOSIS — J96.01 ACUTE RESPIRATORY FAILURE WITH HYPOXIA: Primary | ICD-10-CM

## 2024-05-17 DIAGNOSIS — J45.51 SEVERE PERSISTENT ASTHMA WITH EXACERBATION: ICD-10-CM

## 2024-05-17 LAB
ALBUMIN SERPL-MCNC: 3.3 G/DL (ref 3.5–5.2)
ALBUMIN/GLOB SERPL: 1 G/DL
ALP SERPL-CCNC: 68 U/L (ref 39–117)
ALT SERPL W P-5'-P-CCNC: 18 U/L (ref 1–33)
ANION GAP SERPL CALCULATED.3IONS-SCNC: 9 MMOL/L (ref 5–15)
AST SERPL-CCNC: 15 U/L (ref 1–32)
B PARAPERT DNA SPEC QL NAA+PROBE: NOT DETECTED
B PERT DNA SPEC QL NAA+PROBE: NOT DETECTED
BASOPHILS # BLD AUTO: 0.02 10*3/MM3 (ref 0–0.2)
BASOPHILS NFR BLD AUTO: 0.2 % (ref 0–1.5)
BILIRUB SERPL-MCNC: 0.4 MG/DL (ref 0–1.2)
BUN BLDA-MCNC: 8 MG/DL (ref 8–26)
BUN SERPL-MCNC: 8 MG/DL (ref 8–23)
BUN/CREAT SERPL: 13.8 (ref 7–25)
C PNEUM DNA NPH QL NAA+NON-PROBE: NOT DETECTED
CA-I BLDA-SCNC: 1.09 MMOL/L (ref 1.2–1.32)
CALCIUM SPEC-SCNC: 8.6 MG/DL (ref 8.6–10.5)
CHLORIDE BLDA-SCNC: 96 MMOL/L (ref 98–109)
CHLORIDE SERPL-SCNC: 99 MMOL/L (ref 98–107)
CO2 BLDA-SCNC: 28 MMOL/L (ref 24–29)
CO2 SERPL-SCNC: 31 MMOL/L (ref 22–29)
CREAT BLDA-MCNC: 0.5 MG/DL (ref 0.6–1.3)
CREAT SERPL-MCNC: 0.58 MG/DL (ref 0.57–1)
CRP SERPL-MCNC: 2.53 MG/DL (ref 0–0.5)
D-LACTATE SERPL-SCNC: 1.1 MMOL/L (ref 0.5–2)
DEPRECATED RDW RBC AUTO: 43.2 FL (ref 37–54)
EGFRCR SERPLBLD CKD-EPI 2021: 101.8 ML/MIN/1.73
EGFRCR SERPLBLD CKD-EPI 2021: 105.5 ML/MIN/1.73
EOSINOPHIL # BLD AUTO: 0.04 10*3/MM3 (ref 0–0.4)
EOSINOPHIL NFR BLD AUTO: 0.3 % (ref 0.3–6.2)
ERYTHROCYTE [DISTWIDTH] IN BLOOD BY AUTOMATED COUNT: 13.5 % (ref 12.3–15.4)
FLUAV H1 2009 PAND RNA NPH QL NAA+PROBE: DETECTED
FLUBV RNA ISLT QL NAA+PROBE: NOT DETECTED
GLOBULIN UR ELPH-MCNC: 3.2 GM/DL
GLUCOSE BLDC GLUCOMTR-MCNC: 116 MG/DL (ref 70–130)
GLUCOSE BLDC GLUCOMTR-MCNC: 167 MG/DL (ref 70–130)
GLUCOSE BLDC GLUCOMTR-MCNC: 248 MG/DL (ref 70–130)
GLUCOSE SERPL-MCNC: 110 MG/DL (ref 65–99)
HADV DNA SPEC NAA+PROBE: NOT DETECTED
HCOV 229E RNA SPEC QL NAA+PROBE: NOT DETECTED
HCOV HKU1 RNA SPEC QL NAA+PROBE: NOT DETECTED
HCOV NL63 RNA SPEC QL NAA+PROBE: NOT DETECTED
HCOV OC43 RNA SPEC QL NAA+PROBE: NOT DETECTED
HCT VFR BLD AUTO: 41.3 % (ref 34–46.6)
HCT VFR BLDA CALC: 41 % (ref 38–51)
HGB BLD-MCNC: 13.2 G/DL (ref 12–15.9)
HGB BLDA-MCNC: 13.9 G/DL (ref 12–17)
HMPV RNA NPH QL NAA+NON-PROBE: NOT DETECTED
HOLD SPECIMEN: NORMAL
HPIV1 RNA ISLT QL NAA+PROBE: NOT DETECTED
HPIV2 RNA SPEC QL NAA+PROBE: NOT DETECTED
HPIV3 RNA NPH QL NAA+PROBE: NOT DETECTED
HPIV4 P GENE NPH QL NAA+PROBE: NOT DETECTED
IMM GRANULOCYTES # BLD AUTO: 0.09 10*3/MM3 (ref 0–0.05)
IMM GRANULOCYTES NFR BLD AUTO: 0.7 % (ref 0–0.5)
LYMPHOCYTES # BLD AUTO: 1.77 10*3/MM3 (ref 0.7–3.1)
LYMPHOCYTES NFR BLD AUTO: 14.5 % (ref 19.6–45.3)
M PNEUMO IGG SER IA-ACNC: NOT DETECTED
MCH RBC QN AUTO: 27.6 PG (ref 26.6–33)
MCHC RBC AUTO-ENTMCNC: 32 G/DL (ref 31.5–35.7)
MCV RBC AUTO: 86.2 FL (ref 79–97)
MONOCYTES # BLD AUTO: 0.9 10*3/MM3 (ref 0.1–0.9)
MONOCYTES NFR BLD AUTO: 7.4 % (ref 5–12)
NEUTROPHILS NFR BLD AUTO: 76.9 % (ref 42.7–76)
NEUTROPHILS NFR BLD AUTO: 9.41 10*3/MM3 (ref 1.7–7)
NRBC BLD AUTO-RTO: 0 /100 WBC (ref 0–0.2)
NT-PROBNP SERPL-MCNC: 559.5 PG/ML (ref 0–900)
PLATELET # BLD AUTO: 360 10*3/MM3 (ref 140–450)
PMV BLD AUTO: 8.7 FL (ref 6–12)
POTASSIUM BLDA-SCNC: 3.3 MMOL/L (ref 3.5–4.9)
POTASSIUM SERPL-SCNC: 3.7 MMOL/L (ref 3.5–5.2)
PROCALCITONIN SERPL-MCNC: 0.05 NG/ML (ref 0–0.25)
PROT SERPL-MCNC: 6.5 G/DL (ref 6–8.5)
QT INTERVAL: 582 MS
QTC INTERVAL: 595 MS
RBC # BLD AUTO: 4.79 10*6/MM3 (ref 3.77–5.28)
RHINOVIRUS RNA SPEC NAA+PROBE: NOT DETECTED
RSV RNA NPH QL NAA+NON-PROBE: NOT DETECTED
SARS-COV-2 RNA NPH QL NAA+NON-PROBE: NOT DETECTED
SODIUM BLD-SCNC: 137 MMOL/L (ref 138–146)
SODIUM SERPL-SCNC: 139 MMOL/L (ref 136–145)
TROPONIN T SERPL HS-MCNC: 16 NG/L
WBC NRBC COR # BLD AUTO: 12.23 10*3/MM3 (ref 3.4–10.8)
WHOLE BLOOD HOLD COAG: NORMAL
WHOLE BLOOD HOLD SPECIMEN: NORMAL

## 2024-05-17 PROCEDURE — 83605 ASSAY OF LACTIC ACID: CPT | Performed by: EMERGENCY MEDICINE

## 2024-05-17 PROCEDURE — 93005 ELECTROCARDIOGRAM TRACING: CPT | Performed by: EMERGENCY MEDICINE

## 2024-05-17 PROCEDURE — 94799 UNLISTED PULMONARY SVC/PX: CPT

## 2024-05-17 PROCEDURE — 25010000002 METHYLPREDNISOLONE PER 125 MG: Performed by: INTERNAL MEDICINE

## 2024-05-17 PROCEDURE — 80053 COMPREHEN METABOLIC PANEL: CPT | Performed by: EMERGENCY MEDICINE

## 2024-05-17 PROCEDURE — 25010000002 METHYLPREDNISOLONE PER 125 MG: Performed by: EMERGENCY MEDICINE

## 2024-05-17 PROCEDURE — 86140 C-REACTIVE PROTEIN: CPT | Performed by: INTERNAL MEDICINE

## 2024-05-17 PROCEDURE — 87040 BLOOD CULTURE FOR BACTERIA: CPT | Performed by: EMERGENCY MEDICINE

## 2024-05-17 PROCEDURE — 0202U NFCT DS 22 TRGT SARS-COV-2: CPT | Performed by: EMERGENCY MEDICINE

## 2024-05-17 PROCEDURE — 99285 EMERGENCY DEPT VISIT HI MDM: CPT

## 2024-05-17 PROCEDURE — 71275 CT ANGIOGRAPHY CHEST: CPT

## 2024-05-17 PROCEDURE — 85025 COMPLETE CBC W/AUTO DIFF WBC: CPT

## 2024-05-17 PROCEDURE — 25010000002 CEFTRIAXONE PER 250 MG: Performed by: EMERGENCY MEDICINE

## 2024-05-17 PROCEDURE — 84145 PROCALCITONIN (PCT): CPT | Performed by: INTERNAL MEDICINE

## 2024-05-17 PROCEDURE — 84484 ASSAY OF TROPONIN QUANT: CPT | Performed by: EMERGENCY MEDICINE

## 2024-05-17 PROCEDURE — 25510000001 IOPAMIDOL PER 1 ML: Performed by: EMERGENCY MEDICINE

## 2024-05-17 PROCEDURE — 36415 COLL VENOUS BLD VENIPUNCTURE: CPT

## 2024-05-17 PROCEDURE — 85014 HEMATOCRIT: CPT | Performed by: EMERGENCY MEDICINE

## 2024-05-17 PROCEDURE — 83880 ASSAY OF NATRIURETIC PEPTIDE: CPT | Performed by: EMERGENCY MEDICINE

## 2024-05-17 PROCEDURE — 94640 AIRWAY INHALATION TREATMENT: CPT

## 2024-05-17 PROCEDURE — 94664 DEMO&/EVAL PT USE INHALER: CPT

## 2024-05-17 PROCEDURE — 80047 BASIC METABLC PNL IONIZED CA: CPT | Performed by: EMERGENCY MEDICINE

## 2024-05-17 PROCEDURE — 99222 1ST HOSP IP/OBS MODERATE 55: CPT | Performed by: INTERNAL MEDICINE

## 2024-05-17 PROCEDURE — 63710000001 INSULIN LISPRO (HUMAN) PER 5 UNITS: Performed by: INTERNAL MEDICINE

## 2024-05-17 PROCEDURE — 82948 REAGENT STRIP/BLOOD GLUCOSE: CPT

## 2024-05-17 PROCEDURE — 71045 X-RAY EXAM CHEST 1 VIEW: CPT

## 2024-05-17 PROCEDURE — 25010000002 HEPARIN (PORCINE) PER 1000 UNITS: Performed by: INTERNAL MEDICINE

## 2024-05-17 PROCEDURE — 93005 ELECTROCARDIOGRAM TRACING: CPT

## 2024-05-17 RX ORDER — HYDROCHLOROTHIAZIDE 12.5 MG/1
12.5 TABLET ORAL
Status: DISCONTINUED | OUTPATIENT
Start: 2024-05-17 | End: 2024-05-21 | Stop reason: HOSPADM

## 2024-05-17 RX ORDER — PRAVASTATIN SODIUM 10 MG
10 TABLET ORAL NIGHTLY
Status: DISCONTINUED | OUTPATIENT
Start: 2024-05-17 | End: 2024-05-21 | Stop reason: HOSPADM

## 2024-05-17 RX ORDER — SODIUM CHLORIDE 0.9 % (FLUSH) 0.9 %
10 SYRINGE (ML) INJECTION AS NEEDED
Status: DISCONTINUED | OUTPATIENT
Start: 2024-05-17 | End: 2024-05-21 | Stop reason: HOSPADM

## 2024-05-17 RX ORDER — TRAMADOL HYDROCHLORIDE 50 MG/1
50 TABLET ORAL EVERY 6 HOURS PRN
Status: DISCONTINUED | OUTPATIENT
Start: 2024-05-17 | End: 2024-05-21 | Stop reason: HOSPADM

## 2024-05-17 RX ORDER — CALCIUM CARBONATE 500 MG/1
1 TABLET, CHEWABLE ORAL 3 TIMES DAILY PRN
Status: DISCONTINUED | OUTPATIENT
Start: 2024-05-17 | End: 2024-05-21 | Stop reason: HOSPADM

## 2024-05-17 RX ORDER — FUROSEMIDE 20 MG/1
20 TABLET ORAL DAILY PRN
Status: DISCONTINUED | OUTPATIENT
Start: 2024-05-17 | End: 2024-05-21 | Stop reason: HOSPADM

## 2024-05-17 RX ORDER — MONTELUKAST SODIUM 10 MG/1
10 TABLET ORAL NIGHTLY
Status: DISCONTINUED | OUTPATIENT
Start: 2024-05-17 | End: 2024-05-21 | Stop reason: HOSPADM

## 2024-05-17 RX ORDER — METHYLPREDNISOLONE SODIUM SUCCINATE 125 MG/2ML
60 INJECTION, POWDER, LYOPHILIZED, FOR SOLUTION INTRAMUSCULAR; INTRAVENOUS EVERY 8 HOURS
Status: DISCONTINUED | OUTPATIENT
Start: 2024-05-17 | End: 2024-05-20

## 2024-05-17 RX ORDER — BISOPROLOL FUMARATE 5 MG/1
2.5 TABLET, FILM COATED ORAL DAILY
Status: DISCONTINUED | OUTPATIENT
Start: 2024-05-17 | End: 2024-05-21 | Stop reason: HOSPADM

## 2024-05-17 RX ORDER — PREDNISONE 20 MG/1
40 TABLET ORAL
Status: DISCONTINUED | OUTPATIENT
Start: 2024-05-18 | End: 2024-05-17

## 2024-05-17 RX ORDER — BUDESONIDE AND FORMOTEROL FUMARATE DIHYDRATE 160; 4.5 UG/1; UG/1
2 AEROSOL RESPIRATORY (INHALATION)
Status: DISCONTINUED | OUTPATIENT
Start: 2024-05-17 | End: 2024-05-21 | Stop reason: HOSPADM

## 2024-05-17 RX ORDER — INSULIN LISPRO 100 [IU]/ML
2-7 INJECTION, SOLUTION INTRAVENOUS; SUBCUTANEOUS
Status: DISCONTINUED | OUTPATIENT
Start: 2024-05-17 | End: 2024-05-21 | Stop reason: HOSPADM

## 2024-05-17 RX ORDER — ACETAMINOPHEN 325 MG/1
650 TABLET ORAL EVERY 4 HOURS PRN
Status: DISCONTINUED | OUTPATIENT
Start: 2024-05-17 | End: 2024-05-21 | Stop reason: HOSPADM

## 2024-05-17 RX ORDER — SODIUM CHLORIDE 9 MG/ML
40 INJECTION, SOLUTION INTRAVENOUS AS NEEDED
Status: DISCONTINUED | OUTPATIENT
Start: 2024-05-17 | End: 2024-05-21 | Stop reason: HOSPADM

## 2024-05-17 RX ORDER — LOSARTAN POTASSIUM 50 MG/1
100 TABLET ORAL
Status: DISCONTINUED | OUTPATIENT
Start: 2024-05-17 | End: 2024-05-21 | Stop reason: HOSPADM

## 2024-05-17 RX ORDER — ONDANSETRON 4 MG/1
4 TABLET, ORALLY DISINTEGRATING ORAL EVERY 6 HOURS PRN
Status: DISCONTINUED | OUTPATIENT
Start: 2024-05-17 | End: 2024-05-17

## 2024-05-17 RX ORDER — SODIUM CHLORIDE 0.9 % (FLUSH) 0.9 %
10 SYRINGE (ML) INJECTION EVERY 12 HOURS SCHEDULED
Status: DISCONTINUED | OUTPATIENT
Start: 2024-05-17 | End: 2024-05-21 | Stop reason: HOSPADM

## 2024-05-17 RX ORDER — DEXTROSE MONOHYDRATE 25 G/50ML
25 INJECTION, SOLUTION INTRAVENOUS
Status: DISCONTINUED | OUTPATIENT
Start: 2024-05-17 | End: 2024-05-21 | Stop reason: HOSPADM

## 2024-05-17 RX ORDER — HEPARIN SODIUM 5000 [USP'U]/ML
5000 INJECTION, SOLUTION INTRAVENOUS; SUBCUTANEOUS EVERY 8 HOURS SCHEDULED
Status: DISCONTINUED | OUTPATIENT
Start: 2024-05-17 | End: 2024-05-21 | Stop reason: HOSPADM

## 2024-05-17 RX ORDER — NICOTINE POLACRILEX 4 MG
15 LOZENGE BUCCAL
Status: DISCONTINUED | OUTPATIENT
Start: 2024-05-17 | End: 2024-05-21 | Stop reason: HOSPADM

## 2024-05-17 RX ORDER — ALBUTEROL SULFATE 2.5 MG/3ML
2.5 SOLUTION RESPIRATORY (INHALATION) ONCE
Status: COMPLETED | OUTPATIENT
Start: 2024-05-17 | End: 2024-05-17

## 2024-05-17 RX ORDER — ONDANSETRON 2 MG/ML
4 INJECTION INTRAMUSCULAR; INTRAVENOUS EVERY 6 HOURS PRN
Status: DISCONTINUED | OUTPATIENT
Start: 2024-05-17 | End: 2024-05-17

## 2024-05-17 RX ORDER — METHYLPREDNISOLONE SODIUM SUCCINATE 125 MG/2ML
125 INJECTION, POWDER, LYOPHILIZED, FOR SOLUTION INTRAMUSCULAR; INTRAVENOUS ONCE
Status: COMPLETED | OUTPATIENT
Start: 2024-05-17 | End: 2024-05-17

## 2024-05-17 RX ORDER — IPRATROPIUM BROMIDE AND ALBUTEROL SULFATE 2.5; .5 MG/3ML; MG/3ML
3 SOLUTION RESPIRATORY (INHALATION)
Status: DISCONTINUED | OUTPATIENT
Start: 2024-05-17 | End: 2024-05-21 | Stop reason: HOSPADM

## 2024-05-17 RX ORDER — IBUPROFEN 600 MG/1
1 TABLET ORAL
Status: DISCONTINUED | OUTPATIENT
Start: 2024-05-17 | End: 2024-05-21 | Stop reason: HOSPADM

## 2024-05-17 RX ORDER — UREA 10 %
10 LOTION (ML) TOPICAL NIGHTLY
Status: DISCONTINUED | OUTPATIENT
Start: 2024-05-17 | End: 2024-05-21 | Stop reason: HOSPADM

## 2024-05-17 RX ADMIN — IPRATROPIUM BROMIDE AND ALBUTEROL SULFATE 3 ML: 2.5; .5 SOLUTION RESPIRATORY (INHALATION) at 19:03

## 2024-05-17 RX ADMIN — LOSARTAN POTASSIUM 100 MG: 50 TABLET, FILM COATED ORAL at 18:28

## 2024-05-17 RX ADMIN — MONTELUKAST 10 MG: 10 TABLET, FILM COATED ORAL at 22:17

## 2024-05-17 RX ADMIN — ALBUTEROL SULFATE 2.5 MG: 2.5 SOLUTION RESPIRATORY (INHALATION) at 14:36

## 2024-05-17 RX ADMIN — BUDESONIDE AND FORMOTEROL FUMARATE DIHYDRATE 2 PUFF: 160; 4.5 AEROSOL RESPIRATORY (INHALATION) at 19:03

## 2024-05-17 RX ADMIN — DOXYCYCLINE 100 MG: 100 INJECTION, POWDER, LYOPHILIZED, FOR SOLUTION INTRAVENOUS at 17:45

## 2024-05-17 RX ADMIN — HEPARIN SODIUM 5000 UNITS: 5000 INJECTION INTRAVENOUS; SUBCUTANEOUS at 17:45

## 2024-05-17 RX ADMIN — SODIUM CHLORIDE 2000 MG: 900 INJECTION INTRAVENOUS at 15:38

## 2024-05-17 RX ADMIN — TRAMADOL HYDROCHLORIDE 50 MG: 50 TABLET ORAL at 22:17

## 2024-05-17 RX ADMIN — INSULIN LISPRO 2 UNITS: 100 INJECTION, SOLUTION INTRAVENOUS; SUBCUTANEOUS at 17:45

## 2024-05-17 RX ADMIN — Medication 10 ML: at 22:10

## 2024-05-17 RX ADMIN — HYDROCHLOROTHIAZIDE 12.5 MG: 12.5 TABLET ORAL at 18:28

## 2024-05-17 RX ADMIN — IOPAMIDOL 75 ML: 755 INJECTION, SOLUTION INTRAVENOUS at 13:05

## 2024-05-17 RX ADMIN — METHYLPREDNISOLONE SODIUM SUCCINATE 125 MG: 125 INJECTION, POWDER, FOR SOLUTION INTRAMUSCULAR; INTRAVENOUS at 13:30

## 2024-05-17 RX ADMIN — Medication 10 MG: at 22:18

## 2024-05-17 RX ADMIN — METHYLPREDNISOLONE SODIUM SUCCINATE 60 MG: 125 INJECTION, POWDER, FOR SOLUTION INTRAMUSCULAR; INTRAVENOUS at 22:13

## 2024-05-17 RX ADMIN — BISOPROLOL FUMARATE 2.5 MG: 5 TABLET ORAL at 18:27

## 2024-05-17 NOTE — PLAN OF CARE
Goal Outcome Evaluation:              Outcome Evaluation: Pt admitted from the ER today. Ra. Sinus bradycardia. Pt up with one assist. On Iv antibiotics. Droplet precautions for flu. Pt currently in bed with call light in place.

## 2024-05-17 NOTE — ED NOTES
Dana Rincon    Nursing Report ED to Floor:  Mental status: A&O X4  Ambulatory status: up with 2  Oxygen Therapy:  2L NC  Cardiac Rhythm: Sinus rae   Admitted from: ED  Safety Concerns:  none  Social Issues: none  ED Room #:  14    ED Nurse Phone Extension - [phone] or may call [phone].      HPI:   Chief Complaint   Patient presents with    Shortness of Breath       Past Medical History:  Past Medical History:   Diagnosis Date    Allergic Demerol    1965    Asthma     Breast cancer 2016    right    Breast injury     PT STATES SHE FELL AND BRUSIED HER LEFT BREAST    Diabetes mellitus     Dry eyes     Emphysema/COPD     H/O colonoscopy 10/2015    HBP (high blood pressure)     Hot flashes     Hx of radiation therapy 06/2016    right breast    Itchy eyes     Obesity     HANNA (obstructive sleep apnea)     Sinus problem     Wears glasses         Past Surgical History:  Past Surgical History:   Procedure Laterality Date    ADENOIDECTOMY  1965    BREAST BIOPSY Right 09/08/2015    stereo bx    BREAST BIOPSY Right 03/10/2016     bx    BREAST BIOPSY Right 03/2018    stereo bx     BREAST LUMPECTOMY Right 05/03/2016    COLONOSCOPY  2016    DILATATION AND CURETTAGE  2005    OTHER SURGICAL HISTORY  2016    lumpectomy    TONSILLECTOMY  1965    TONSILLECTOMY  1965        Admitting Doctor:   No admitting provider for patient encounter.    Consulting Provider(s):  Consults       No orders found from 4/18/2024 to 5/18/2024.             Admitting Diagnosis:   The primary encounter diagnosis was Acute respiratory failure with hypoxia. Diagnoses of Severe persistent asthma with exacerbation and Pneumonia of both lower lobes due to infectious organism were also pertinent to this visit.    Most Recent Vitals:   Vitals:    05/17/24 1400 05/17/24 1429 05/17/24 1430 05/17/24 1434   BP: 143/52 152/67     BP Location:       Patient Position:       Pulse: (!) 43 52 52    Resp:       Temp:       TempSrc:       SpO2: 94%  95% 94%   Weight:        Height:           Active LDAs/IV Access:   Lines, Drains & Airways       Active LDAs       Name Placement date Placement time Site Days    Peripheral IV 05/17/24 0910 Anterior;Left Forearm 05/17/24  0910  Forearm  less than 1                    Labs (abnormal labs have a star):   Labs Reviewed   RESPIRATORY PANEL PCR W/ COVID-19 (SARS-COV-2), NP SWAB IN UTM/VTP, 2 HR TAT - Abnormal; Notable for the following components:       Result Value    Influenza A H1 2009 PCR Detected (*)     All other components within normal limits    Narrative:     In the setting of a positive respiratory panel with a viral infection PLUS a negative procalcitonin without other underlying concern for bacterial infection, consider observing off antibiotics or discontinuation of antibiotics and continue supportive care. If the respiratory panel is positive for atypical bacterial infection (Bordetella pertussis, Chlamydophila pneumoniae, or Mycoplasma pneumoniae), consider antibiotic de-escalation to target atypical bacterial infection.   COMPREHENSIVE METABOLIC PANEL - Abnormal; Notable for the following components:    Glucose 110 (*)     CO2 31.0 (*)     Albumin 3.3 (*)     All other components within normal limits    Narrative:     GFR Normal >60  Chronic Kidney Disease <60  Kidney Failure <15     SINGLE HS TROPONIN T - Abnormal; Notable for the following components:    HS Troponin T 16 (*)     All other components within normal limits    Narrative:     High Sensitive Troponin T Reference Range:  <14.0 ng/L- Negative Female for AMI  <22.0 ng/L- Negative Male for AMI  >=14 - Abnormal Female indicating possible myocardial injury.  >=22 - Abnormal Male indicating possible myocardial injury.   Clinicians would have to utilize clinical acumen, EKG, Troponin, and serial changes to determine if it is an Acute Myocardial Infarction or myocardial injury due to an underlying chronic condition.        CBC WITH AUTO DIFFERENTIAL - Abnormal; Notable  for the following components:    WBC 12.23 (*)     Neutrophil % 76.9 (*)     Lymphocyte % 14.5 (*)     Immature Grans % 0.7 (*)     Neutrophils, Absolute 9.41 (*)     Immature Grans, Absolute 0.09 (*)     All other components within normal limits   POCT CHEM 8 - Abnormal; Notable for the following components:    Creatinine 0.50 (*)     Sodium 137 (*)     POC Potassium 3.3 (*)     Chloride 96 (*)     Ionized Calcium 1.09 (*)     All other components within normal limits   BNP (IN-HOUSE) - Normal    Narrative:     This assay is used as an aid in the diagnosis of individuals suspected of having heart failure. It can be used as an aid in the diagnosis of acute decompensated heart failure (ADHF) in patients presenting with signs and symptoms of ADHF to the emergency department (ED). In addition, NT-proBNP of <300 pg/mL indicates ADHF is not likely.    Age Range Result Interpretation  NT-proBNP Concentration (pg/mL:      <50             Positive            >450                   Gray                 300-450                    Negative             <300    50-75           Positive            >900                  Gray                300-900                  Negative            <300      >75             Positive            >1800                  Gray                300-1800                  Negative            <300   BLOOD CULTURE   BLOOD CULTURE   RAINBOW DRAW    Narrative:     The following orders were created for panel order [facility] Draw.  Procedure                               Abnormality         Status                     ---------                               -----------         ------                     Green Top (Gel)[[id number]]                                  Final result               Lavender Top[[id number]]                                     Final result               Gold Top - SST[[id number]]                                   Final result               Mendoza Top[[id number]]                                          Final result               Light Blue Top[[id number]]                                   Final result                 Please view results for these tests on the individual orders.   LACTIC ACID, PLASMA   CBC AND DIFFERENTIAL    Narrative:     The following orders were created for panel order CBC & Differential.  Procedure                               Abnormality         Status                     ---------                               -----------         ------                     CBC Auto Differential[[id number]]        Abnormal            Final result                 Please view results for these tests on the individual orders.   GREEN TOP   LAVENDER TOP   GOLD TOP - SST   GRAY TOP   LIGHT BLUE TOP       Meds Given in ED:   Medications   sodium chloride 0.9 % flush 10 mL (has no administration in time range)   cefTRIAXone (ROCEPHIN) 2,000 mg in sodium chloride 0.9 % 100 mL MBP (has no administration in time range)   doxycycline (VIBRAMYCIN) 100 mg in sodium chloride 0.9 % 100 mL MBP (has no administration in time range)   methylPREDNISolone sodium succinate (SOLU-Medrol) injection 125 mg (125 mg Intravenous Given 5/17/24 1330)   albuterol (PROVENTIL) nebulizer solution 0.083% 2.5 mg/3mL (2.5 mg Nebulization Given 5/17/24 1436)   iopamidol (ISOVUE-370) 76 % injection 100 mL (75 mL Intravenous Given 5/17/24 1305)           Last NIH score:                                                          Dysphagia screening results:  Patient Factors Component (Dysphagia:Stroke or Rule-out)  Best Eye Response: 4-->(E4) spontaneous (05/17/24 1049)  Best Motor Response: 6-->(M6) obeys commands (05/17/24 1049)  Best Verbal Response: 5-->(V5) oriented (05/17/24 1049)  [place] Coma Scale Score: 15 (05/17/24 1049)     [place] Coma Scale:  No data recorded     CIWA:        Restraint Type:            Isolation Status:  Contact and Airborne

## 2024-05-17 NOTE — H&P
[facility] Medicine Services  HISTORY AND PHYSICAL    Patient Name: Dana Rincon  : 1960  MRN: [id number]  Primary Care Physician: Noreen Smith MD  Date of admission: 2024      Subjective   Subjective     Chief Complaint:  SOB    HPI:  Dana Rincon is a 63 y.o. female with past medical history significant for hypertension, hyperlipidemia, diabetes mellitus type 2, asthma, history of breast cancer status post lumpectomy and radiation treatment, allergies, sleep apnea.  Patient tells me that recently she was in [place] and while there she started developing respiratory symptoms including cough, itchy eyes, and fever and chills.  Patient came back this past Friday and was seen at urgent treatment center on Saturday.  She tested positive for COVID there and she was started on steroids only.  Patient tells me that initially steroids helped her but then it started getting worse and PCP continued steroids.  As symptoms continued patient came to the emergency room today.  Still has cough and shortness of breath and her eyes have discharge in the morning.  Has had no fever or chills over the past couple of days.  No nausea vomiting diarrhea or abdominal pain.      Personal History     Past Medical History:   Diagnosis Date    Allergic Demerol        Asthma     Breast cancer     right    Breast injury     PT STATES SHE FELL AND BRUSIED HER LEFT BREAST    Diabetes mellitus     Dry eyes     Emphysema/COPD     H/O colonoscopy 10/2015    HBP (high blood pressure)     Hot flashes     Hx of radiation therapy 2016    right breast    Itchy eyes     Obesity     HANNA (obstructive sleep apnea)     Sinus problem     Wears glasses          Oncology Problem List:  Malignant neoplasm of lower-outer quadrant of right breast of female,   estrogen receptor positive (2019; Status: Active)  Breast cancer of lower-outer quadrant of right female breast (2016; Status:  Active)  Oncology/Hematology History   Malignant neoplasm of lower-outer quadrant of right breast of female, estrogen receptor positive   6/14/2019 Initial Diagnosis    Malignant neoplasm of lower-outer quadrant of right breast of female, estrogen receptor positive     1/22/2020 -  Chemotherapy    OP Denosumab (Prolia) Q6M       Past Surgical History:   Procedure Laterality Date    ADENOIDECTOMY  1965    BREAST BIOPSY Right 09/08/2015    stereo bx    BREAST BIOPSY Right 03/10/2016     bx    BREAST BIOPSY Right 03/2018    stereo bx     BREAST LUMPECTOMY Right 05/03/2016    COLONOSCOPY  2016    DILATATION AND CURETTAGE  2005    OTHER SURGICAL HISTORY  2016    lumpectomy    TONSILLECTOMY  1965    TONSILLECTOMY  1965       Family History: family history includes Breast cancer in her paternal aunt; Breast cancer (age of onset: 30) in some other family members; Cardiomyopathy in her mother; Colon cancer in her mother and paternal uncle; Diabetes in her father; Heart disease (age of onset: 63) in her father; Heart failure in her father; Hypertension in her mother; No Known Problems in her sister.     Social History:  reports that she has never smoked. She has never been exposed to tobacco smoke. She has never used smokeless tobacco. She reports current alcohol use of about 3.0 standard drinks of alcohol per week. She reports that she does not use drugs.  Social History     Social History Narrative    Not on file       Medications:  Available home medication information reviewed.  Calcium, Cholecalciferol, Cyanocobalamin, Fluticasone-Umeclidin-Vilant, Melatonin, Semaglutide (1 MG/DOSE), albuterol sulfate HFA, benzonatate, bisoprolol, clotrimazole-betamethasone, empagliflozin, fexofenadine, furosemide, glimepiride, metFORMIN ER, montelukast, olmesartan-hydrochlorothiazide, potassium chloride, pravastatin, and predniSONE    Allergies   Allergen Reactions    Meperidine Nausea And Vomiting       Objective   Objective      Vital Signs:   Temp:  [99.1 °F (37.3 °C)] 99.1 °F (37.3 °C)  Heart Rate:  [42-53] 52  Resp:  [18] 18  BP: (135-202)/() 152/67  Flow (L/min):  [2] 2       Physical Exam   Constitutional: No acute distress, awake, alert  HENT: NCAT, mucous membranes moist  Respiratory: Breath sounds, decreased breath sounds bilaterally, cough on deep inspiration, respiratory effort normal   Cardiovascular: RRR, no murmurs, rubs, or gallops  Gastrointestinal: Abdomen is obese.  Positive bowel sounds, soft, nontender, nondistended  Musculoskeletal:  bilateral ankle edema, morbid obesity  Psychiatric: Appropriate affect, cooperative  Neurologic: Oriented x 3, strength symmetric in all extremities, Cranial Nerves grossly intact to confrontation, speech clear  Skin: No rashes     Result Review:  I have personally reviewed the results from the time of this admission to 5/17/2024 16:09 EDT and agree with these findings:  [x]  Laboratory list / accordion  []  Microbiology  [x]  Radiology  []  EKG/Telemetry   []  Cardiology/Vascular   []  Pathology  [x]  Old records  []  Other:  Most notable findings include: CT is suspicious for lower lobe pneumonia.      LAB RESULTS:      Lab 05/17/24  1117 05/17/24  1112 05/17/24  0913   WBC  --   --  12.23*   HEMOGLOBIN  --   --  13.2   HEMOGLOBIN, POC 13.9  --   --    HEMATOCRIT  --   --  41.3   HEMATOCRIT POC 41  --   --    PLATELETS  --   --  360   NEUTROS ABS  --   --  9.41*   IMMATURE GRANS (ABS)  --   --  0.09*   LYMPHS ABS  --   --  1.77   MONOS ABS  --   --  0.90   EOS ABS  --   --  0.04   MCV  --   --  86.2   CRP  --  2.53*  --    PROCALCITONIN  --  0.05  --          Lab 05/17/24  1117 05/17/24  1112   SODIUM  --  139   POTASSIUM  --  3.7   CHLORIDE  --  99   CO2  --  31.0*   ANION GAP  --  9.0   BUN  --  8   CREATININE 0.50* 0.58   EGFR 105.5 101.8   GLUCOSE  --  110*   CALCIUM  --  8.6         Lab 05/17/24  1112   TOTAL PROTEIN 6.5   ALBUMIN 3.3*   GLOBULIN 3.2   ALT (SGPT) 18   AST  (SGOT) 15   BILIRUBIN 0.4   ALK PHOS 68         Lab 05/17/24  1112   PROBNP 559.5   HSTROP T 16*                 UA          7/8/2023    13:14 7/21/2023    10:20   Urinalysis   Ketones, UA Negative  Negative    Leukocytes, UA Moderate (2+)  Negative        Microbiology Results (last 10 days)       Procedure Component Value - Date/Time    Respiratory Panel PCR w/COVID-19(SARS-CoV-2) TANNER/FRANCK/BALJINDER/PAD/COR/EFE In-House, NP Swab in UTM/VTM, 2 HR TAT - Swab, Nasopharynx [[id number]]  (Abnormal) Collected: 05/17/24 0957    Lab Status: Final result Specimen: Swab from Nasopharynx Updated: 05/17/24 1220     ADENOVIRUS, PCR Not Detected     Coronavirus 229E Not Detected     Coronavirus HKU1 Not Detected     Coronavirus NL63 Not Detected     Coronavirus OC43 Not Detected     COVID19 Not Detected     Human Metapneumovirus Not Detected     Human Rhinovirus/Enterovirus Not Detected     Influenza A H1 2009 PCR Detected     Influenza B PCR Not Detected     Parainfluenza Virus 1 Not Detected     Parainfluenza Virus 2 Not Detected     Parainfluenza Virus 3 Not Detected     Parainfluenza Virus 4 Not Detected     RSV, PCR Not Detected     Bordetella pertussis pcr Not Detected     Bordetella parapertussis PCR Not Detected     Chlamydophila pneumoniae PCR Not Detected     Mycoplasma pneumo by PCR Not Detected    Narrative:      In the setting of a positive respiratory panel with a viral infection PLUS a negative procalcitonin without other underlying concern for bacterial infection, consider observing off antibiotics or discontinuation of antibiotics and continue supportive care. If the respiratory panel is positive for atypical bacterial infection (Bordetella pertussis, Chlamydophila pneumoniae, or Mycoplasma pneumoniae), consider antibiotic de-escalation to target atypical bacterial infection.            CT Angiogram Chest    Result Date: 5/17/2024  CT ANGIOGRAM CHEST Date of Exam: 5/17/2024 12:53 PM EDT Indication: low sats, covid, pe  protocol. Comparison: None available. Technique: CTA of the chest was performed after the uneventful intravenous administration of 75 cc Isovue-370. Reconstructed coronal and sagittal images were also obtained. In addition, a 3-D volume rendered image was created for interpretation. Automated exposure control and iterative reconstruction methods were used. Findings: PULMONARY VASCULATURE: Pulmonary arteries are widely patent without evidence of embolus.  Main pulmonary artery is normal in size. No evidence of right heart strain. MEDIASTINUM:Unremarkable. Aortic and heart size are normal. No aortic dissection identified. No mass nor pericardial effusion. CORONARY ARTERIES: There is calcified atherosclerotic disease. LUNGS: There is bilateral lower lung airspace disease with bronchial wall thickening. Correlate for signs of bronchitis/pneumonia. No significant nodule nor interstitial changes. PLEURAL SPACE: No effusion, mass, nor pneumothorax. LYMPH NODES: There are no pathologically enlarged lymph nodes. UPPER ABDOMEN: Unremarkable OSSEOUS STRUCTURES: Appropriate for age with no acute process identified. There is a central chest 3.1 cm subcutaneous cyst.     Impression: Impression: 1.No evidence of pulmonary embolism. 2.Bilateral lower lung bronchitis and possible pneumonia. 3.There is a simple appearing 3.1 cm subcutaneous cyst within the anterior mid chest. Electronically Signed: Julito Horne MD  5/17/2024 1:12 PM EDT  Workstation ID: [id number]    XR Chest 1 View    Result Date: 5/17/2024  XR CHEST 1 VW Date of Exam: 5/17/2024 9:08 AM EDT Indication: SOA triage protocol Comparison: 5/11/2024 Findings: The lungs appear adequately aerated without consolidation or mass. No pleural effusion or pneumothorax is identified. Cardiac silhouette is prominent. Pulmonary vasculature is unremarkable. No acute or suspicious osseous lesion is identified.     Impression: Impression: 1.Cardiomegaly. No acute radiographic  abnormality is identified. Electronically Signed: Zane Thompson MD  5/17/2024 9:33 AM EDT  Workstation ID: [id number]     Results for orders placed during the hospital encounter of 12/07/23    Adult Transthoracic Echo Complete W/ Cont if Necessary Per Protocol    Interpretation Summary    Left ventricular systolic function is normal. Estimated left ventricular EF = 60%    Left ventricular diastolic function is consistent with (grade I) impaired relaxation.    The right ventricular cavity is borderline dilated.    Aortic sclerosis without significant aortic stenosis.  Aortic valve mean pressure gradient is 17 mmHg.    Trace mitral regurgitation.    Trace tricuspid regurgitation with normal RVSP.      Assessment & Plan   Assessment & Plan       Pneumonia      Patient is a 63-year-old  female with past medical history significant for asthma, hypertension, hyperlipidemia, diabetes mellitus, history of breast cancer, sleep apnea.  Has had respiratory symptoms for at least couple of weeks now.  Was seen at urgent treatment center this past Saturday and was diagnosed with COVID and was started on steroids.  PCP continued spit steroids after 3 days.  Patient comes to the ER for continuation and worsening of respiratory symptoms.    Cough, shortness of breath  Asthma exacerbation  -Respiratory symptoms started about 2 weeks ago while patient was in [facility].  Evidently symptoms included fever and chills.  Patient was started on steroids after she was diagnosed with COVID last Saturday( currently patient.)  -Respiratory panel is negative for COVID here but is positive for influenza A  -CT scan which was done at the emergency room is somewhat suspicious for pneumonia.  However, patient is afebrile, WBC is just marginally elevated and patient has been on steroids recently, procalcitonin is 0.05 which points to very low probability of respiratory infection.  In light of the above we will put the patient on doxycycline,  neb treatment, steroids.  Should be mentioned that patient was treated with Rocephin at the emergency room.  At this point we will not continue that.  Will monitor and if the respiratory symptoms worsened will restart Rocephin.    Diabetes mellitus  -Latest hemoglobin A1c which was done on 3/18/2024 was 7.9  -Currently will put patient on low-dose sliding scale with Humalog    -Other stable conditions include:  Hypertension  Hyperlipidemia  History of breast cancer  Sleep apnea  -For the above we will continue home medication and monitor.      DVT prophylaxis:  Medical DVT prophylaxis orders are present.          CODE STATUS:    Code Status and Medical Interventions:   Ordered at: 05/17/24 1540     Code Status (Patient has no pulse and is not breathing):    CPR (Attempt to Resuscitate)     Medical Interventions (Patient has pulse or is breathing):    Full Support       Expected Discharge   Expected discharge date/ time has not been documented.   Home in a day or two    Stevenson Zacarias MD  05/17/24

## 2024-05-17 NOTE — ED PROVIDER NOTES
Subjective   History of Present Illness  Mrs. Rincon presents with shortness of breath and cough.  She just returned from [facility] several days ago.  Several days before leaving [facility] she developed what started with sore throat and then progressed to cough and shortness of breath.  She went to an urgent treatment center when she got home for 5 days ago and was diagnosed with COVID.  She was placed on prednisone taper.  She has history of asthma.  She presents with worsening shortness of breath.  Oxygen saturations and triage were noted to be 83%.  She does not wear oxygen at home.      Review of Systems    Past Medical History:   Diagnosis Date    Allergic Demerol    1965    Asthma     Breast cancer 2016    right    Breast injury     PT STATES SHE FELL AND BRUSIED HER LEFT BREAST    Diabetes mellitus     Dry eyes     Emphysema/COPD     H/O colonoscopy 10/2015    HBP (high blood pressure)     Hot flashes     Hx of radiation therapy 06/2016    right breast    Itchy eyes     Obesity     HANNA (obstructive sleep apnea)     Sinus problem     Wears glasses        Allergies   Allergen Reactions    Meperidine Nausea And Vomiting       Past Surgical History:   Procedure Laterality Date    ADENOIDECTOMY  1965    BREAST BIOPSY Right 09/08/2015    stereo bx    BREAST BIOPSY Right 03/10/2016     bx    BREAST BIOPSY Right 03/2018    stereo bx     BREAST LUMPECTOMY Right 05/03/2016    COLONOSCOPY  2016    DILATATION AND CURETTAGE  2005    OTHER SURGICAL HISTORY  2016    lumpectomy    TONSILLECTOMY  1965    TONSILLECTOMY  1965       Family History   Problem Relation Age of Onset    Hypertension Mother     Colon cancer Mother     Cardiomyopathy Mother     Heart failure Father     Diabetes Father     Heart disease Father 63        stents, cabg    Breast cancer Paternal Aunt         pt states 50's    Colon cancer Paternal Uncle     Breast cancer Other 30    Breast cancer Other 30    No Known Problems Sister     Ovarian cancer Neg Hx         Social History     Socioeconomic History    Marital status:    Tobacco Use    Smoking status: Never     Passive exposure: Never    Smokeless tobacco: Never   Vaping Use    Vaping status: Never Used   Substance and Sexual Activity    Alcohol use: Yes     Alcohol/week: 3.0 standard drinks of alcohol     Types: 3 Glasses of wine per week     Comment: 2-3/week for 20 years    Drug use: No    Sexual activity: Defer           Objective   Physical Exam  Vitals and nursing note reviewed.   Constitutional:       General: She is not in acute distress.     Appearance: Normal appearance. She is obese.   HENT:      Head: Normocephalic and atraumatic.      Nose: Nose normal. No congestion or rhinorrhea.   Eyes:      General: No scleral icterus.     Conjunctiva/sclera: Conjunctivae normal.   Neck:      Comments: No JVD   Cardiovascular:      Rate and Rhythm: Normal rate and regular rhythm.      Heart sounds: No murmur heard.     No friction rub.   Pulmonary:      Effort: Pulmonary effort is normal.      Breath sounds: Decreased breath sounds, wheezing and rhonchi present. No rales.   Abdominal:      General: Bowel sounds are normal.      Palpations: Abdomen is soft.      Tenderness: There is no abdominal tenderness. There is no guarding or rebound.   Musculoskeletal:         General: No tenderness.      Cervical back: Normal range of motion and neck supple.      Right lower leg: No edema.      Left lower leg: No edema.   Skin:     General: Skin is warm and dry.      Coloration: Skin is not pale.      Findings: No erythema.   Neurological:      General: No focal deficit present.      Mental Status: She is alert and oriented to person, place, and time.      Motor: No weakness.      Coordination: Coordination normal.   Psychiatric:         Mood and Affect: Mood normal.         Behavior: Behavior normal.         Thought Content: Thought content normal.         Procedures           ED Course  ED Course as of 05/17/24 1510    Fri May 17, 2024   8717 I reviewed and interpreted records from her urgent treatment center visit.  We have placed her on oxygen.  Blood pressure was 202/139 in triage, we have rechecked that and is currently 136/69. [DT]   1245 COVID is now negative, flu a is positive.  Have ordered albuterol and Solu-Medrol.  Awaiting CT [DT]   1412 CT shows no pulmonary emboli but does show bilateral lower lobe pneumonia.  Will initiate antibiotics [DT]      ED Course User Index  [DT] Julito Davila MD                                             Medical Decision Making  Please see course notes.  Ordered and interpreted multiple labs.  Ordered and interpreted chest x-ray and then CT scan of her chest.  Gave multiple IV medications.  Had reevaluation.  Had consultation with the hospitalist    Problems Addressed:  Acute respiratory failure with hypoxia: complicated acute illness or injury that poses a threat to life or bodily functions  Pneumonia of both lower lobes due to infectious organism: complicated acute illness or injury that poses a threat to life or bodily functions  Severe persistent asthma with exacerbation: complicated acute illness or injury that poses a threat to life or bodily functions    Amount and/or Complexity of Data Reviewed  External Data Reviewed: notes.  Labs: ordered. Decision-making details documented in ED Course.  Radiology: ordered. Decision-making details documented in ED Course.  ECG/medicine tests: ordered. Decision-making details documented in ED Course.    Risk  Prescription drug management.  Decision regarding hospitalization.        Final diagnoses:   Acute respiratory failure with hypoxia   Severe persistent asthma with exacerbation   Pneumonia of both lower lobes due to infectious organism       ED Disposition  ED Disposition       ED Disposition   Decision to Admit    Condition   --    Comment   Level of Care: Telemetry [5]   Diagnosis: Pneumonia [957336]   Certification: I Certify That  Inpatient Hospital Services Are Medically Necessary For Greater Than 2 Midnights                 No follow-up provider specified.       Medication List      No changes were made to your prescriptions during this visit.            Julito Davila MD  05/17/24 0329

## 2024-05-18 LAB
GLUCOSE BLDC GLUCOMTR-MCNC: 165 MG/DL (ref 70–130)
GLUCOSE BLDC GLUCOMTR-MCNC: 181 MG/DL (ref 70–130)
GLUCOSE BLDC GLUCOMTR-MCNC: 201 MG/DL (ref 70–130)
GLUCOSE BLDC GLUCOMTR-MCNC: 207 MG/DL (ref 70–130)
GLUCOSE BLDC GLUCOMTR-MCNC: 234 MG/DL (ref 70–130)

## 2024-05-18 PROCEDURE — 63710000001 INSULIN LISPRO (HUMAN) PER 5 UNITS: Performed by: INTERNAL MEDICINE

## 2024-05-18 PROCEDURE — 25010000002 METHYLPREDNISOLONE PER 125 MG: Performed by: INTERNAL MEDICINE

## 2024-05-18 PROCEDURE — 94799 UNLISTED PULMONARY SVC/PX: CPT

## 2024-05-18 PROCEDURE — 99232 SBSQ HOSP IP/OBS MODERATE 35: CPT | Performed by: HOSPITALIST

## 2024-05-18 PROCEDURE — 82948 REAGENT STRIP/BLOOD GLUCOSE: CPT

## 2024-05-18 PROCEDURE — 25010000002 HEPARIN (PORCINE) PER 1000 UNITS: Performed by: INTERNAL MEDICINE

## 2024-05-18 RX ADMIN — IPRATROPIUM BROMIDE AND ALBUTEROL SULFATE 3 ML: 2.5; .5 SOLUTION RESPIRATORY (INHALATION) at 20:00

## 2024-05-18 RX ADMIN — TIOTROPIUM BROMIDE INHALATION SPRAY 2 PUFF: 3.12 SPRAY, METERED RESPIRATORY (INHALATION) at 07:44

## 2024-05-18 RX ADMIN — EMPAGLIFLOZIN 25 MG: 25 TABLET, FILM COATED ORAL at 08:42

## 2024-05-18 RX ADMIN — INSULIN LISPRO 3 UNITS: 100 INJECTION, SOLUTION INTRAVENOUS; SUBCUTANEOUS at 21:39

## 2024-05-18 RX ADMIN — HEPARIN SODIUM 5000 UNITS: 5000 INJECTION INTRAVENOUS; SUBCUTANEOUS at 14:25

## 2024-05-18 RX ADMIN — METHYLPREDNISOLONE SODIUM SUCCINATE 60 MG: 125 INJECTION, POWDER, FOR SOLUTION INTRAMUSCULAR; INTRAVENOUS at 21:37

## 2024-05-18 RX ADMIN — Medication 10 ML: at 08:42

## 2024-05-18 RX ADMIN — INSULIN LISPRO 2 UNITS: 100 INJECTION, SOLUTION INTRAVENOUS; SUBCUTANEOUS at 08:41

## 2024-05-18 RX ADMIN — NAPHAZOLINE HYDROCHLORIDE AND PHENIRAMINE MALEATE 1 DROP: .25; 3 SOLUTION/ DROPS OPHTHALMIC at 15:04

## 2024-05-18 RX ADMIN — HEPARIN SODIUM 5000 UNITS: 5000 INJECTION INTRAVENOUS; SUBCUTANEOUS at 21:38

## 2024-05-18 RX ADMIN — DOXYCYCLINE 100 MG: 100 INJECTION, POWDER, LYOPHILIZED, FOR SOLUTION INTRAVENOUS at 17:52

## 2024-05-18 RX ADMIN — METHYLPREDNISOLONE SODIUM SUCCINATE 60 MG: 125 INJECTION, POWDER, FOR SOLUTION INTRAMUSCULAR; INTRAVENOUS at 06:22

## 2024-05-18 RX ADMIN — BUDESONIDE AND FORMOTEROL FUMARATE DIHYDRATE 2 PUFF: 160; 4.5 AEROSOL RESPIRATORY (INHALATION) at 07:44

## 2024-05-18 RX ADMIN — IPRATROPIUM BROMIDE AND ALBUTEROL SULFATE 3 ML: 2.5; .5 SOLUTION RESPIRATORY (INHALATION) at 07:45

## 2024-05-18 RX ADMIN — METHYLPREDNISOLONE SODIUM SUCCINATE 60 MG: 125 INJECTION, POWDER, FOR SOLUTION INTRAMUSCULAR; INTRAVENOUS at 14:25

## 2024-05-18 RX ADMIN — IPRATROPIUM BROMIDE AND ALBUTEROL SULFATE 3 ML: 2.5; .5 SOLUTION RESPIRATORY (INHALATION) at 12:54

## 2024-05-18 RX ADMIN — Medication 10 MG: at 21:39

## 2024-05-18 RX ADMIN — HEPARIN SODIUM 5000 UNITS: 5000 INJECTION INTRAVENOUS; SUBCUTANEOUS at 06:23

## 2024-05-18 RX ADMIN — HYDROCHLOROTHIAZIDE 12.5 MG: 12.5 TABLET ORAL at 08:48

## 2024-05-18 RX ADMIN — INSULIN LISPRO 2 UNITS: 100 INJECTION, SOLUTION INTRAVENOUS; SUBCUTANEOUS at 17:42

## 2024-05-18 RX ADMIN — BUDESONIDE AND FORMOTEROL FUMARATE DIHYDRATE 2 PUFF: 160; 4.5 AEROSOL RESPIRATORY (INHALATION) at 20:00

## 2024-05-18 RX ADMIN — MONTELUKAST 10 MG: 10 TABLET, FILM COATED ORAL at 21:39

## 2024-05-18 RX ADMIN — INSULIN LISPRO 3 UNITS: 100 INJECTION, SOLUTION INTRAVENOUS; SUBCUTANEOUS at 00:19

## 2024-05-18 RX ADMIN — PRAVASTATIN SODIUM 10 MG: 10 TABLET ORAL at 22:40

## 2024-05-18 RX ADMIN — INSULIN LISPRO 3 UNITS: 100 INJECTION, SOLUTION INTRAVENOUS; SUBCUTANEOUS at 11:45

## 2024-05-18 RX ADMIN — DOXYCYCLINE 100 MG: 100 INJECTION, POWDER, LYOPHILIZED, FOR SOLUTION INTRAVENOUS at 06:23

## 2024-05-18 NOTE — PROGRESS NOTES
[facility]  PROGRESS NOTE    Patient Name: Dana Rincon  : 1960  MRN: [id number]    Date of Admission: 2024  Primary Care Physician: Noreen Smith MD    Subjective   Subjective     CC:  Shortness of air    HPI:  Patient resting in bed, bradycardic overnight. She states this is chronic for her and she was asymptomatic with the bradycardia. Stable on 2L NC. States she is hopeful  to be weaned off oxygen at to go home tomorrow.      Objective   Objective     Vital Signs:   Temp:  [97.7 °F (36.5 °C)-99.6 °F (37.6 °C)] 98.8 °F (37.1 °C)  Heart Rate:  [34-61] 41  Resp:  [16-20] 18  BP: (118-166)/(43-81) 118/65  Flow (L/min):  [2-4] 2     Physical Exam:  Constitutional: No acute distress, awake, alert  HENT: NCAT, mucous membranes moist  Respiratory: decreased to auscultation bilaterally, respiratory effort normal on 2L NC  Cardiovascular: bradycardic, RR, no murmurs, rubs, or gallops  Gastrointestinal: Positive bowel sounds, soft, nontender, nondistended  Musculoskeletal: No bilateral ankle edema  Psychiatric: Appropriate affect, cooperative  Neurologic: Oriented x 3, DURANT, speech clear  Skin: No rashes    Results Reviewed:  LAB RESULTS:      Lab 24  1528 24  1117 24  1112 24  0913   WBC  --   --   --  12.23*   HEMOGLOBIN  --   --   --  13.2   HEMOGLOBIN, POC  --  13.9  --   --    HEMATOCRIT  --   --   --  41.3   HEMATOCRIT POC  --  41  --   --    PLATELETS  --   --   --  360   NEUTROS ABS  --   --   --  9.41*   IMMATURE GRANS (ABS)  --   --   --  0.09*   LYMPHS ABS  --   --   --  1.77   MONOS ABS  --   --   --  0.90   EOS ABS  --   --   --  0.04   MCV  --   --   --  86.2   CRP  --   --  2.53*  --    PROCALCITONIN  --   --  0.05  --    LACTATE 1.1  --   --   --          Lab 24  1117 24  1112   SODIUM  --  139   POTASSIUM  --  3.7   CHLORIDE  --  99   CO2  --  31.0*   ANION GAP  --  9.0   BUN  --  8   CREATININE 0.50* 0.58    EGFR 105.5 101.8   GLUCOSE  --  110*   CALCIUM  --  8.6         Lab 05/17/24  1112   TOTAL PROTEIN 6.5   ALBUMIN 3.3*   GLOBULIN 3.2   ALT (SGPT) 18   AST (SGOT) 15   BILIRUBIN 0.4   ALK PHOS 68         Lab 05/17/24  1112   PROBNP 559.5   HSTROP T 16*                 Brief Urine Lab Results  (Last result in the past 365 days)        Color   Clarity   Blood   Leuk Est   Nitrite   Protein   CREAT   Urine HCG        07/21/23 1020 Straw   Clear   Negative   Negative   Negative   Negative           07/21/23 1020             10                 Microbiology Results Abnormal       None            CT Angiogram Chest    Result Date: 5/17/2024  CT ANGIOGRAM CHEST Date of Exam: 5/17/2024 12:53 PM EDT Indication: low sats, covid, pe protocol. Comparison: None available. Technique: CTA of the chest was performed after the uneventful intravenous administration of 75 cc Isovue-370. Reconstructed coronal and sagittal images were also obtained. In addition, a 3-D volume rendered image was created for interpretation. Automated exposure control and iterative reconstruction methods were used. Findings: PULMONARY VASCULATURE: Pulmonary arteries are widely patent without evidence of embolus.  Main pulmonary artery is normal in size. No evidence of right heart strain. MEDIASTINUM:Unremarkable. Aortic and heart size are normal. No aortic dissection identified. No mass nor pericardial effusion. CORONARY ARTERIES: There is calcified atherosclerotic disease. LUNGS: There is bilateral lower lung airspace disease with bronchial wall thickening. Correlate for signs of bronchitis/pneumonia. No significant nodule nor interstitial changes. PLEURAL SPACE: No effusion, mass, nor pneumothorax. LYMPH NODES: There are no pathologically enlarged lymph nodes. UPPER ABDOMEN: Unremarkable OSSEOUS STRUCTURES: Appropriate for age with no acute process identified. There is a central chest 3.1 cm subcutaneous cyst.     Impression: Impression: 1.No evidence of  pulmonary embolism. 2.Bilateral lower lung bronchitis and possible pneumonia. 3.There is a simple appearing 3.1 cm subcutaneous cyst within the anterior mid chest. Electronically Signed: Julito Horne MD  5/17/2024 1:12 PM EDT  Workstation ID: [id number]    XR Chest 1 View    Result Date: 5/17/2024  XR CHEST 1 VW Date of Exam: 5/17/2024 9:08 AM EDT Indication: SOA triage protocol Comparison: 5/11/2024 Findings: The lungs appear adequately aerated without consolidation or mass. No pleural effusion or pneumothorax is identified. Cardiac silhouette is prominent. Pulmonary vasculature is unremarkable. No acute or suspicious osseous lesion is identified.     Impression: Impression: 1.Cardiomegaly. No acute radiographic abnormality is identified. Electronically Signed: Zane Thompson MD  5/17/2024 9:33 AM EDT  Workstation ID: [id number]     Results for orders placed during the hospital encounter of 12/07/23    Adult Transthoracic Echo Complete W/ Cont if Necessary Per Protocol    Interpretation Summary    Left ventricular systolic function is normal. Estimated left ventricular EF = 60%    Left ventricular diastolic function is consistent with (grade I) impaired relaxation.    The right ventricular cavity is borderline dilated.    Aortic sclerosis without significant aortic stenosis.  Aortic valve mean pressure gradient is 17 mmHg.    Trace mitral regurgitation.    Trace tricuspid regurgitation with normal RVSP.      Current medications:  Scheduled Meds:[Held by provider] bisoprolol, 2.5 mg, Oral, Daily  budesonide-formoterol, 2 puff, Inhalation, BID - RT   And  tiotropium bromide monohydrate, 2 puff, Inhalation, Daily - RT  doxycycline, 100 mg, Intravenous, Q12H  empagliflozin, 25 mg, Oral, Daily  heparin (porcine), 5,000 Units, Subcutaneous, Q8H  losartan, 100 mg, Oral, Q24H   And  hydroCHLOROthiazide, 12.5 mg, Oral, Q24H  insulin lispro, 2-7 Units, Subcutaneous, 4x Daily AC & at Bedtime  ipratropium-albuterol, 3 mL,  Nebulization, Q6H While Awake - RT  melatonin, 10 mg, Oral, Nightly  methylPREDNISolone sodium succinate, 60 mg, Intravenous, Q8H  montelukast, 10 mg, Oral, Nightly  pravastatin, 10 mg, Oral, Nightly  sodium chloride, 10 mL, Intravenous, Q12H      Continuous Infusions:   PRN Meds:.  acetaminophen    calcium carbonate    Calcium Replacement - Follow Nurse / BPA Driven Protocol    dextrose    dextrose    furosemide    glucagon (human recombinant)    Magnesium Standard Dose Replacement - Follow Nurse / BPA Driven Protocol    Phosphorus Replacement - Follow Nurse / BPA Driven Protocol    Potassium Replacement - Follow Nurse / BPA Driven Protocol    sodium chloride    sodium chloride    sodium chloride    traMADol    Assessment & Plan   Assessment & Plan     Active Hospital Problems    Diagnosis  POA    **Pneumonia [J18.9]  Yes      Resolved Hospital Problems   No resolved problems to display.        Brief Hospital Course to date:  Dana Rincon is a 63 y.o. female with past medical history significant for asthma, hypertension, hyperlipidemia, diabetes mellitus, history of breast cancer, sleep apnea.  Has had respiratory symptoms for at least couple of weeks now.  Was seen at urgent treatment center this past Saturday and was diagnosed with COVID and was started on steroids.  PCP continued spit steroids after 3 days.  Patient comes to the ER for continuation and worsening of respiratory symptoms.    This patient's problems and plans were partially entered by my partner and updated as appropriate by me 05/18/24.    All problems are new to me today.     Cough, shortness of breath  Asthma exacerbation  Flu A+  -Respiratory symptoms started about 2 weeks ago while patient was in [facility].  Evidently symptoms included fever and chills.  Patient was started on steroids after she was diagnosed with COVID last Saturday (currently patient.)  -Respiratory panel is negative for COVID here but is positive for influenza A  -CT scan  which was done at the emergency room is somewhat suspicious for pneumonia.    - continue doxycycline, neb treatment, steroids  - stable on 2L NC     Diabetes mellitus 2  -Latest hemoglobin A1c which was done on 3/18/2024 was 7.9  -Currently will put patient on low-dose sliding scale with Humalog    Hypertension  - bradycardic overnight  - hold Bisoprolol    Hyperlipidemia  History of breast cancer  Sleep apnea  -continue home meds    Expected Discharge Location and Transportation: home  Expected Discharge   Expected Discharge Date: 5/19/2024; Expected Discharge Time:      DVT prophylaxis:  Medical DVT prophylaxis orders are present.         AM-PAC 6 Clicks Score (PT): 21 (05/17/24 2200)    CODE STATUS:   Code Status and Medical Interventions:   Ordered at: 05/17/24 1540     Code Status (Patient has no pulse and is not breathing):    CPR (Attempt to Resuscitate)     Medical Interventions (Patient has pulse or is breathing):    Full Support       Melissa Campos, DO  05/18/24

## 2024-05-18 NOTE — SIGNIFICANT NOTE
Notified regarding concern for bradycardia w/ pt heart rate dropping into the mid 30's. It is reported patient is asymptomatic, BP is stable, last documented 137/57 at 0414 w/ HR 43. Reviewing patient's medical record, she has history of bradycardia, last pulmonary office note on 4/12/24 w/ HR documented 34 and /80. Pt is on bisoprolol 2.5mg daily, she was given her scheduled dose last PM at 1827 at which time her HR was documented to be 55. Hold parameters on bisoprolol updated to hold for HR < 60. She follows with cardiology outpatient, Dr. Moreno. If patient continues to be bradycardic despite changing hold parameters on bisoprolol or if patient becomes symptomatic with the bradycardia, consdier cardiology consult.

## 2024-05-19 LAB
ALBUMIN SERPL-MCNC: 3.4 G/DL (ref 3.5–5.2)
ALBUMIN/GLOB SERPL: 1.1 G/DL
ALP SERPL-CCNC: 68 U/L (ref 39–117)
ALT SERPL W P-5'-P-CCNC: 17 U/L (ref 1–33)
ANION GAP SERPL CALCULATED.3IONS-SCNC: 13 MMOL/L (ref 5–15)
AST SERPL-CCNC: 11 U/L (ref 1–32)
BILIRUB SERPL-MCNC: 0.4 MG/DL (ref 0–1.2)
BUN SERPL-MCNC: 23 MG/DL (ref 8–23)
BUN/CREAT SERPL: 27.4 (ref 7–25)
CALCIUM SPEC-SCNC: 9 MG/DL (ref 8.6–10.5)
CHLORIDE SERPL-SCNC: 98 MMOL/L (ref 98–107)
CO2 SERPL-SCNC: 26 MMOL/L (ref 22–29)
CREAT SERPL-MCNC: 0.84 MG/DL (ref 0.57–1)
DEPRECATED RDW RBC AUTO: 45.7 FL (ref 37–54)
EGFRCR SERPLBLD CKD-EPI 2021: 78.2 ML/MIN/1.73
ERYTHROCYTE [DISTWIDTH] IN BLOOD BY AUTOMATED COUNT: 13.9 % (ref 12.3–15.4)
GLOBULIN UR ELPH-MCNC: 3.1 GM/DL
GLUCOSE BLDC GLUCOMTR-MCNC: 171 MG/DL (ref 70–130)
GLUCOSE BLDC GLUCOMTR-MCNC: 197 MG/DL (ref 70–130)
GLUCOSE BLDC GLUCOMTR-MCNC: 256 MG/DL (ref 70–130)
GLUCOSE BLDC GLUCOMTR-MCNC: 259 MG/DL (ref 70–130)
GLUCOSE SERPL-MCNC: 184 MG/DL (ref 65–99)
HCT VFR BLD AUTO: 41.5 % (ref 34–46.6)
HGB BLD-MCNC: 12.8 G/DL (ref 12–15.9)
MCH RBC QN AUTO: 27.7 PG (ref 26.6–33)
MCHC RBC AUTO-ENTMCNC: 30.8 G/DL (ref 31.5–35.7)
MCV RBC AUTO: 89.8 FL (ref 79–97)
PLATELET # BLD AUTO: 291 10*3/MM3 (ref 140–450)
PMV BLD AUTO: 9.2 FL (ref 6–12)
POTASSIUM SERPL-SCNC: 5.1 MMOL/L (ref 3.5–5.2)
PROT SERPL-MCNC: 6.5 G/DL (ref 6–8.5)
RBC # BLD AUTO: 4.62 10*6/MM3 (ref 3.77–5.28)
SODIUM SERPL-SCNC: 137 MMOL/L (ref 136–145)
WBC NRBC COR # BLD AUTO: 13.41 10*3/MM3 (ref 3.4–10.8)

## 2024-05-19 PROCEDURE — 85027 COMPLETE CBC AUTOMATED: CPT | Performed by: HOSPITALIST

## 2024-05-19 PROCEDURE — 94664 DEMO&/EVAL PT USE INHALER: CPT

## 2024-05-19 PROCEDURE — 63710000001 INSULIN LISPRO (HUMAN) PER 5 UNITS: Performed by: INTERNAL MEDICINE

## 2024-05-19 PROCEDURE — 94799 UNLISTED PULMONARY SVC/PX: CPT

## 2024-05-19 PROCEDURE — 93005 ELECTROCARDIOGRAM TRACING: CPT | Performed by: HOSPITALIST

## 2024-05-19 PROCEDURE — 93010 ELECTROCARDIOGRAM REPORT: CPT | Performed by: INTERNAL MEDICINE

## 2024-05-19 PROCEDURE — 25010000002 HEPARIN (PORCINE) PER 1000 UNITS: Performed by: INTERNAL MEDICINE

## 2024-05-19 PROCEDURE — 82948 REAGENT STRIP/BLOOD GLUCOSE: CPT

## 2024-05-19 PROCEDURE — 99232 SBSQ HOSP IP/OBS MODERATE 35: CPT | Performed by: HOSPITALIST

## 2024-05-19 PROCEDURE — 94761 N-INVAS EAR/PLS OXIMETRY MLT: CPT

## 2024-05-19 PROCEDURE — 80053 COMPREHEN METABOLIC PANEL: CPT | Performed by: HOSPITALIST

## 2024-05-19 PROCEDURE — 25010000002 METHYLPREDNISOLONE PER 125 MG: Performed by: INTERNAL MEDICINE

## 2024-05-19 RX ADMIN — Medication 10 ML: at 09:16

## 2024-05-19 RX ADMIN — PRAVASTATIN SODIUM 10 MG: 10 TABLET ORAL at 23:25

## 2024-05-19 RX ADMIN — INSULIN LISPRO 4 UNITS: 100 INJECTION, SOLUTION INTRAVENOUS; SUBCUTANEOUS at 17:33

## 2024-05-19 RX ADMIN — IPRATROPIUM BROMIDE AND ALBUTEROL SULFATE 3 ML: 2.5; .5 SOLUTION RESPIRATORY (INHALATION) at 13:00

## 2024-05-19 RX ADMIN — LOSARTAN POTASSIUM 100 MG: 50 TABLET, FILM COATED ORAL at 09:16

## 2024-05-19 RX ADMIN — MONTELUKAST 10 MG: 10 TABLET, FILM COATED ORAL at 21:32

## 2024-05-19 RX ADMIN — METHYLPREDNISOLONE SODIUM SUCCINATE 60 MG: 125 INJECTION, POWDER, FOR SOLUTION INTRAMUSCULAR; INTRAVENOUS at 21:31

## 2024-05-19 RX ADMIN — IPRATROPIUM BROMIDE AND ALBUTEROL SULFATE 3 ML: 2.5; .5 SOLUTION RESPIRATORY (INHALATION) at 07:50

## 2024-05-19 RX ADMIN — HEPARIN SODIUM 5000 UNITS: 5000 INJECTION INTRAVENOUS; SUBCUTANEOUS at 05:36

## 2024-05-19 RX ADMIN — METHYLPREDNISOLONE SODIUM SUCCINATE 60 MG: 125 INJECTION, POWDER, FOR SOLUTION INTRAMUSCULAR; INTRAVENOUS at 05:36

## 2024-05-19 RX ADMIN — INSULIN LISPRO 2 UNITS: 100 INJECTION, SOLUTION INTRAVENOUS; SUBCUTANEOUS at 09:16

## 2024-05-19 RX ADMIN — INSULIN LISPRO 4 UNITS: 100 INJECTION, SOLUTION INTRAVENOUS; SUBCUTANEOUS at 21:31

## 2024-05-19 RX ADMIN — BUDESONIDE AND FORMOTEROL FUMARATE DIHYDRATE 2 PUFF: 160; 4.5 AEROSOL RESPIRATORY (INHALATION) at 19:45

## 2024-05-19 RX ADMIN — HEPARIN SODIUM 5000 UNITS: 5000 INJECTION INTRAVENOUS; SUBCUTANEOUS at 21:31

## 2024-05-19 RX ADMIN — IPRATROPIUM BROMIDE AND ALBUTEROL SULFATE 3 ML: 2.5; .5 SOLUTION RESPIRATORY (INHALATION) at 19:45

## 2024-05-19 RX ADMIN — EMPAGLIFLOZIN 25 MG: 25 TABLET, FILM COATED ORAL at 09:16

## 2024-05-19 RX ADMIN — INSULIN LISPRO 2 UNITS: 100 INJECTION, SOLUTION INTRAVENOUS; SUBCUTANEOUS at 11:44

## 2024-05-19 RX ADMIN — TIOTROPIUM BROMIDE INHALATION SPRAY 2 PUFF: 3.12 SPRAY, METERED RESPIRATORY (INHALATION) at 07:50

## 2024-05-19 RX ADMIN — HYDROCHLOROTHIAZIDE 12.5 MG: 12.5 TABLET ORAL at 09:44

## 2024-05-19 RX ADMIN — DOXYCYCLINE 100 MG: 100 INJECTION, POWDER, LYOPHILIZED, FOR SOLUTION INTRAVENOUS at 05:36

## 2024-05-19 RX ADMIN — METHYLPREDNISOLONE SODIUM SUCCINATE 60 MG: 125 INJECTION, POWDER, FOR SOLUTION INTRAMUSCULAR; INTRAVENOUS at 13:32

## 2024-05-19 RX ADMIN — Medication 10 MG: at 21:32

## 2024-05-19 RX ADMIN — DOXYCYCLINE 100 MG: 100 INJECTION, POWDER, LYOPHILIZED, FOR SOLUTION INTRAVENOUS at 17:33

## 2024-05-19 RX ADMIN — HEPARIN SODIUM 5000 UNITS: 5000 INJECTION INTRAVENOUS; SUBCUTANEOUS at 13:32

## 2024-05-19 RX ADMIN — Medication 10 ML: at 21:44

## 2024-05-19 NOTE — NURSING NOTE
0000: Paged NP, patient still remains bradycardic HR in mid 30's occasionally down to 30, currently Bisoprolol being held, BP stable 137/59, and remains asymptomatic. Spoke with ZINA Simpson, no new orders at this time.

## 2024-05-19 NOTE — PLAN OF CARE
Goal Outcome Evaluation:            No event throughout shift, Pt VS wnl. Remains on 2/3LNC. C/o cough MD aware.

## 2024-05-19 NOTE — PROGRESS NOTES
[facility] Medicine Services  PROGRESS NOTE    Patient Name: Dana Rincon  : 1960  MRN: [id number]    Date of Admission: 2024  Primary Care Physician: Noreen Smith MD    Subjective   Subjective     CC:  Shortness of air    HPI:  Patient resting in bed, bradycardic overnight. She states this is chronic for her and she was asymptomatic with the bradycardia. Stable on 2L NC and satting 90%. Patient states she is feeling a little better than yesterday but not well enough yet to go home.      Objective   Objective     Vital Signs:   Temp:  [98.1 °F (36.7 °C)-98.8 °F (37.1 °C)] 98.3 °F (36.8 °C)  Heart Rate:  [33-61] 61  Resp:  [16-20] 20  BP: (118-166)/(59-92) 146/69  Flow (L/min):  [2-3] 2     Physical Exam:  Constitutional: No acute distress, awake, alert  HENT: NCAT, mucous membranes moist  Respiratory: decreased to auscultation bilaterally, respiratory effort normal on 2L NC  Cardiovascular: bradycardic, RR, no murmurs, rubs, or gallops  Gastrointestinal: Positive bowel sounds, soft, nontender, nondistended  Musculoskeletal: No bilateral ankle edema  Psychiatric: Appropriate affect, cooperative  Neurologic: Oriented x 3, DURANT, speech clear  Skin: No rashes    Results Reviewed:  LAB RESULTS:      Lab 24  0645 24  1528 24  1117 24  1112 24  0913   WBC 13.41*  --   --   --  12.23*   HEMOGLOBIN 12.8  --   --   --  13.2   HEMOGLOBIN, POC  --   --  13.9  --   --    HEMATOCRIT 41.5  --   --   --  41.3   HEMATOCRIT POC  --   --  41  --   --    PLATELETS 291  --   --   --  360   NEUTROS ABS  --   --   --   --  9.41*   IMMATURE GRANS (ABS)  --   --   --   --  0.09*   LYMPHS ABS  --   --   --   --  1.77   MONOS ABS  --   --   --   --  0.90   EOS ABS  --   --   --   --  0.04   MCV 89.8  --   --   --  86.2   CRP  --   --   --  2.53*  --    PROCALCITONIN  --   --   --  0.05  --    LACTATE  --  1.1  --   --   --          Lab 24  0750 24  1110  05/17/24  1112   SODIUM 137  --  139   POTASSIUM 5.1  --  3.7   CHLORIDE 98  --  99   CO2 26.0  --  31.0*   ANION GAP 13.0  --  9.0   BUN 23  --  8   CREATININE 0.84 0.50* 0.58   EGFR 78.2 105.5 101.8   GLUCOSE 184*  --  110*   CALCIUM 9.0  --  8.6         Lab 05/19/24  0750 05/17/24  1112   TOTAL PROTEIN 6.5 6.5   ALBUMIN 3.4* 3.3*   GLOBULIN 3.1 3.2   ALT (SGPT) 17 18   AST (SGOT) 11 15   BILIRUBIN 0.4 0.4   ALK PHOS 68 68         Lab 05/17/24  1112   PROBNP 559.5   HSTROP T 16*                 Brief Urine Lab Results  (Last result in the past 365 days)        Color   Clarity   Blood   Leuk Est   Nitrite   Protein   CREAT   Urine HCG        07/21/23 1020 Straw   Clear   Negative   Negative   Negative   Negative           07/21/23 1020             10                 Microbiology Results Abnormal       Procedure Component Value - Date/Time    Blood Culture - Blood, Hand, Left [[id number]]  (Normal) Collected: 05/17/24 1528    Lab Status: Preliminary result Specimen: Blood from Hand, Left Updated: 05/18/24 1700     Blood Culture No growth at 24 hours    Narrative:      Less than seven (7) mL's of blood was collected.  Insufficient quantity may yield false negative results.    Blood Culture - Blood, Arm, Left [[id number]]  (Normal) Collected: 05/17/24 1528    Lab Status: Preliminary result Specimen: Blood from Arm, Left Updated: 05/18/24 1700     Blood Culture No growth at 24 hours    Narrative:      Less than seven (7) mL's of blood was collected.  Insufficient quantity may yield false negative results.            CT Angiogram Chest    Result Date: 5/17/2024  CT ANGIOGRAM CHEST Date of Exam: 5/17/2024 12:53 PM EDT Indication: low sats, covid, pe protocol. Comparison: None available. Technique: CTA of the chest was performed after the uneventful intravenous administration of 75 cc Isovue-370. Reconstructed coronal and sagittal images were also obtained. In addition, a 3-D volume rendered image was created for  interpretation. Automated exposure control and iterative reconstruction methods were used. Findings: PULMONARY VASCULATURE: Pulmonary arteries are widely patent without evidence of embolus.  Main pulmonary artery is normal in size. No evidence of right heart strain. MEDIASTINUM:Unremarkable. Aortic and heart size are normal. No aortic dissection identified. No mass nor pericardial effusion. CORONARY ARTERIES: There is calcified atherosclerotic disease. LUNGS: There is bilateral lower lung airspace disease with bronchial wall thickening. Correlate for signs of bronchitis/pneumonia. No significant nodule nor interstitial changes. PLEURAL SPACE: No effusion, mass, nor pneumothorax. LYMPH NODES: There are no pathologically enlarged lymph nodes. UPPER ABDOMEN: Unremarkable OSSEOUS STRUCTURES: Appropriate for age with no acute process identified. There is a central chest 3.1 cm subcutaneous cyst.     Impression: Impression: 1.No evidence of pulmonary embolism. 2.Bilateral lower lung bronchitis and possible pneumonia. 3.There is a simple appearing 3.1 cm subcutaneous cyst within the anterior mid chest. Electronically Signed: Julito Horne MD  5/17/2024 1:12 PM EDT  Workstation ID: [id number]     Results for orders placed during the hospital encounter of 12/07/23    Adult Transthoracic Echo Complete W/ Cont if Necessary Per Protocol    Interpretation Summary    Left ventricular systolic function is normal. Estimated left ventricular EF = 60%    Left ventricular diastolic function is consistent with (grade I) impaired relaxation.    The right ventricular cavity is borderline dilated.    Aortic sclerosis without significant aortic stenosis.  Aortic valve mean pressure gradient is 17 mmHg.    Trace mitral regurgitation.    Trace tricuspid regurgitation with normal RVSP.      Current medications:  Scheduled Meds:[Held by provider] bisoprolol, 2.5 mg, Oral, Daily  budesonide-formoterol, 2 puff, Inhalation, BID - RT    And  tiotropium bromide monohydrate, 2 puff, Inhalation, Daily - RT  doxycycline, 100 mg, Intravenous, Q12H  empagliflozin, 25 mg, Oral, Daily  heparin (porcine), 5,000 Units, Subcutaneous, Q8H  losartan, 100 mg, Oral, Q24H   And  hydroCHLOROthiazide, 12.5 mg, Oral, Q24H  insulin lispro, 2-7 Units, Subcutaneous, 4x Daily AC & at Bedtime  ipratropium-albuterol, 3 mL, Nebulization, Q6H While Awake - RT  melatonin, 10 mg, Oral, Nightly  methylPREDNISolone sodium succinate, 60 mg, Intravenous, Q8H  montelukast, 10 mg, Oral, Nightly  pravastatin, 10 mg, Oral, Nightly  sodium chloride, 10 mL, Intravenous, Q12H      Continuous Infusions:   PRN Meds:.  acetaminophen    calcium carbonate    Calcium Replacement - Follow Nurse / BPA Driven Protocol    dextrose    dextrose    furosemide    glucagon (human recombinant)    Magnesium Standard Dose Replacement - Follow Nurse / BPA Driven Protocol    naphazoline-pheniramine    Phosphorus Replacement - Follow Nurse / BPA Driven Protocol    Potassium Replacement - Follow Nurse / BPA Driven Protocol    sodium chloride    sodium chloride    sodium chloride    traMADol    Assessment & Plan   Assessment & Plan     Active Hospital Problems    Diagnosis  POA    **Pneumonia [J18.9]  Yes      Resolved Hospital Problems   No resolved problems to display.        Brief Hospital Course to date:  Dana Rincon is a 63 y.o. female with past medical history significant for asthma, hypertension, hyperlipidemia, diabetes mellitus, history of breast cancer, sleep apnea.  Has had respiratory symptoms for at least couple of weeks now.  Was seen at urgent treatment center this past Saturday and was diagnosed with COVID and was started on steroids.  PCP continued spit steroids after 3 days.  Patient comes to the ER for continuation and worsening of respiratory symptoms.    This patient's problems and plans were partially entered by my partner and updated as appropriate by me 05/19/24.     Cough, shortness  of breath  Asthma exacerbation  Flu A+  -Respiratory symptoms started about 2 weeks ago while patient was in [facility].  Evidently symptoms included fever and chills.  Patient was started on steroids after she was diagnosed with COVID last Saturday (currently patient.)  -Respiratory panel is negative for COVID here but is positive for influenza A  -CT scan which was done at the emergency room is somewhat suspicious for pneumonia.    - continue doxycycline, neb treatment, steroids  - stable on 2L NC     Diabetes mellitus 2  -Latest hemoglobin A1c which was done on 3/18/2024 was 7.9  -Currently will put patient on low-dose sliding scale with Humalog    Hypertension  - bradycardic overnight to 35  - continue holding Bisoprolol    Hyperlipidemia  History of breast cancer  Sleep apnea  -continue home meds    Expected Discharge Location and Transportation: home  Expected Discharge   Expected Discharge Date: 5/20/2024; Expected Discharge Time:      DVT prophylaxis:  Medical DVT prophylaxis orders are present.         AM-PAC 6 Clicks Score (PT): 18 (05/18/24 2019)    CODE STATUS:   Code Status and Medical Interventions:   Ordered at: 05/17/24 1540     Code Status (Patient has no pulse and is not breathing):    CPR (Attempt to Resuscitate)     Medical Interventions (Patient has pulse or is breathing):    Full Support       Melissa Campos, DO  05/19/24

## 2024-05-19 NOTE — PLAN OF CARE
Goal Outcome Evaluation:   Please see additional notes. Pt. Over night 3L with CPAP, ambulated to bathroom with some minor SOB, patient remains bradycardic Hospitalitis team aware. Patient rested well over night.      Problem: Adult Inpatient Plan of Care  Goal: Plan of Care Review  Outcome: Ongoing, Progressing  Goal: Patient-Specific Goal (Individualized)  Outcome: Ongoing, Progressing  Goal: Absence of Hospital-Acquired Illness or Injury  Outcome: Ongoing, Progressing  Intervention: Prevent Skin Injury  Recent Flowsheet Documentation  Taken 5/19/2024 0430 by Mary Johnson RN  Body Position: position changed independently  Skin Protection:   adhesive use limited   tubing/devices free from skin contact  Taken 5/19/2024 0230 by Mary Johnson RN  Body Position: position changed independently  Skin Protection:   adhesive use limited   tubing/devices free from skin contact  Taken 5/19/2024 0015 by Mary Johnson RN  Body Position: position changed independently  Skin Protection:   adhesive use limited   tubing/devices free from skin contact  Taken 5/18/2024 2200 by Mary Johnson RN  Body Position: position changed independently  Skin Protection:   adhesive use limited   tubing/devices free from skin contact  Taken 5/18/2024 2019 by Mary Johnson RN  Body Position: position changed independently  Skin Protection:   adhesive use limited   tubing/devices free from skin contact  Intervention: Prevent and Manage VTE (Venous Thromboembolism) Risk  Recent Flowsheet Documentation  Taken 5/19/2024 0430 by Mary Johnson RN  Activity Management: activity encouraged  Taken 5/19/2024 0230 by Mary Johnson RN  Activity Management: activity encouraged  Taken 5/19/2024 0015 by Mary Johnson RN  Activity Management: activity encouraged  Taken 5/18/2024 2200 by Mary Johnson RN  Activity Management: activity encouraged  Taken 5/18/2024 2019 by Mary Johnson RN  Activity Management: activity  encouraged  Range of Motion: active ROM (range of motion) encouraged  Intervention: Prevent Infection  Recent Flowsheet Documentation  Taken 5/19/2024 0430 by Mary Johnson RN  Infection Prevention:   hand hygiene promoted   personal protective equipment utilized  Taken 5/19/2024 0230 by Mary Johnson RN  Infection Prevention:   hand hygiene promoted   personal protective equipment utilized  Taken 5/19/2024 0015 by Mary Johnson RN  Infection Prevention:   hand hygiene promoted   personal protective equipment utilized  Taken 5/18/2024 2200 by Mary Johnson RN  Infection Prevention:   hand hygiene promoted   personal protective equipment utilized  Taken 5/18/2024 2019 by Mary Johnson RN  Infection Prevention:   hand hygiene promoted   personal protective equipment utilized  Goal: Optimal Comfort and Wellbeing  Outcome: Ongoing, Progressing  Intervention: Provide Person-Centered Care  Recent Flowsheet Documentation  Taken 5/18/2024 2019 by Mary Johnson RN  Trust Relationship/Rapport:   choices provided   care explained  Goal: Readiness for Transition of Care  Outcome: Ongoing, Progressing     Problem: Asthma Comorbidity  Goal: Maintenance of Asthma Control  Outcome: Ongoing, Progressing     Problem: Diabetes Comorbidity  Goal: Blood Glucose Level Within Targeted Range  Outcome: Ongoing, Progressing  Intervention: Monitor and Manage Glycemia  Recent Flowsheet Documentation  Taken 5/18/2024 2019 by Mary Johnson RN  Glycemic Management: blood glucose monitored     Problem: Obstructive Sleep Apnea Risk or Actual Comorbidity Management  Goal: Unobstructed Breathing During Sleep  Outcome: Ongoing, Progressing

## 2024-05-20 LAB
ALBUMIN SERPL-MCNC: 3.9 G/DL (ref 3.5–5.2)
ALBUMIN/GLOB SERPL: 1.2 G/DL
ALP SERPL-CCNC: 79 U/L (ref 39–117)
ALT SERPL W P-5'-P-CCNC: 19 U/L (ref 1–33)
ANION GAP SERPL CALCULATED.3IONS-SCNC: 17 MMOL/L (ref 5–15)
AST SERPL-CCNC: 11 U/L (ref 1–32)
BILIRUB SERPL-MCNC: 0.5 MG/DL (ref 0–1.2)
BUN SERPL-MCNC: 25 MG/DL (ref 8–23)
BUN/CREAT SERPL: 26.9 (ref 7–25)
CALCIUM SPEC-SCNC: 9.4 MG/DL (ref 8.6–10.5)
CHLORIDE SERPL-SCNC: 95 MMOL/L (ref 98–107)
CO2 SERPL-SCNC: 25 MMOL/L (ref 22–29)
CREAT SERPL-MCNC: 0.93 MG/DL (ref 0.57–1)
DEPRECATED RDW RBC AUTO: 45.2 FL (ref 37–54)
EGFRCR SERPLBLD CKD-EPI 2021: 69.2 ML/MIN/1.73
ERYTHROCYTE [DISTWIDTH] IN BLOOD BY AUTOMATED COUNT: 13.9 % (ref 12.3–15.4)
GLOBULIN UR ELPH-MCNC: 3.2 GM/DL
GLUCOSE BLDC GLUCOMTR-MCNC: 190 MG/DL (ref 70–130)
GLUCOSE BLDC GLUCOMTR-MCNC: 204 MG/DL (ref 70–130)
GLUCOSE BLDC GLUCOMTR-MCNC: 233 MG/DL (ref 70–130)
GLUCOSE BLDC GLUCOMTR-MCNC: 239 MG/DL (ref 70–130)
GLUCOSE SERPL-MCNC: 249 MG/DL (ref 65–99)
HCT VFR BLD AUTO: 42.9 % (ref 34–46.6)
HGB BLD-MCNC: 13 G/DL (ref 12–15.9)
MCH RBC QN AUTO: 27.1 PG (ref 26.6–33)
MCHC RBC AUTO-ENTMCNC: 30.3 G/DL (ref 31.5–35.7)
MCV RBC AUTO: 89.4 FL (ref 79–97)
PLATELET # BLD AUTO: 288 10*3/MM3 (ref 140–450)
PMV BLD AUTO: 9.6 FL (ref 6–12)
POTASSIUM SERPL-SCNC: 4.7 MMOL/L (ref 3.5–5.2)
PROT SERPL-MCNC: 7.1 G/DL (ref 6–8.5)
QT INTERVAL: 562 MS
QTC INTERVAL: 446 MS
RBC # BLD AUTO: 4.8 10*6/MM3 (ref 3.77–5.28)
SODIUM SERPL-SCNC: 137 MMOL/L (ref 136–145)
WBC NRBC COR # BLD AUTO: 10.71 10*3/MM3 (ref 3.4–10.8)

## 2024-05-20 PROCEDURE — 25010000002 METHYLPREDNISOLONE PER 125 MG: Performed by: INTERNAL MEDICINE

## 2024-05-20 PROCEDURE — 99231 SBSQ HOSP IP/OBS SF/LOW 25: CPT | Performed by: HOSPITALIST

## 2024-05-20 PROCEDURE — 25010000002 HEPARIN (PORCINE) PER 1000 UNITS: Performed by: INTERNAL MEDICINE

## 2024-05-20 PROCEDURE — 85027 COMPLETE CBC AUTOMATED: CPT | Performed by: HOSPITALIST

## 2024-05-20 PROCEDURE — 94799 UNLISTED PULMONARY SVC/PX: CPT

## 2024-05-20 PROCEDURE — 63710000001 INSULIN LISPRO (HUMAN) PER 5 UNITS: Performed by: INTERNAL MEDICINE

## 2024-05-20 PROCEDURE — 80053 COMPREHEN METABOLIC PANEL: CPT | Performed by: HOSPITALIST

## 2024-05-20 PROCEDURE — 82948 REAGENT STRIP/BLOOD GLUCOSE: CPT

## 2024-05-20 RX ORDER — DOXYCYCLINE 100 MG/1
100 CAPSULE ORAL EVERY 12 HOURS SCHEDULED
Status: DISCONTINUED | OUTPATIENT
Start: 2024-05-20 | End: 2024-05-21 | Stop reason: HOSPADM

## 2024-05-20 RX ORDER — PREDNISONE 20 MG/1
40 TABLET ORAL
Status: DISCONTINUED | OUTPATIENT
Start: 2024-05-21 | End: 2024-05-21 | Stop reason: HOSPADM

## 2024-05-20 RX ADMIN — HEPARIN SODIUM 5000 UNITS: 5000 INJECTION INTRAVENOUS; SUBCUTANEOUS at 21:22

## 2024-05-20 RX ADMIN — MONTELUKAST 10 MG: 10 TABLET, FILM COATED ORAL at 21:21

## 2024-05-20 RX ADMIN — HEPARIN SODIUM 5000 UNITS: 5000 INJECTION INTRAVENOUS; SUBCUTANEOUS at 14:41

## 2024-05-20 RX ADMIN — DOXYCYCLINE 100 MG: 100 INJECTION, POWDER, LYOPHILIZED, FOR SOLUTION INTRAVENOUS at 05:58

## 2024-05-20 RX ADMIN — PRAVASTATIN SODIUM 10 MG: 10 TABLET ORAL at 21:22

## 2024-05-20 RX ADMIN — EMPAGLIFLOZIN 25 MG: 25 TABLET, FILM COATED ORAL at 09:00

## 2024-05-20 RX ADMIN — TIOTROPIUM BROMIDE INHALATION SPRAY 2 PUFF: 3.12 SPRAY, METERED RESPIRATORY (INHALATION) at 08:58

## 2024-05-20 RX ADMIN — Medication 10 MG: at 21:21

## 2024-05-20 RX ADMIN — INSULIN LISPRO 2 UNITS: 100 INJECTION, SOLUTION INTRAVENOUS; SUBCUTANEOUS at 09:00

## 2024-05-20 RX ADMIN — DOXYCYCLINE 100 MG: 100 CAPSULE ORAL at 17:21

## 2024-05-20 RX ADMIN — INSULIN LISPRO 3 UNITS: 100 INJECTION, SOLUTION INTRAVENOUS; SUBCUTANEOUS at 17:21

## 2024-05-20 RX ADMIN — METHYLPREDNISOLONE SODIUM SUCCINATE 60 MG: 125 INJECTION, POWDER, FOR SOLUTION INTRAMUSCULAR; INTRAVENOUS at 05:57

## 2024-05-20 RX ADMIN — IPRATROPIUM BROMIDE AND ALBUTEROL SULFATE 3 ML: 2.5; .5 SOLUTION RESPIRATORY (INHALATION) at 14:03

## 2024-05-20 RX ADMIN — LOSARTAN POTASSIUM 100 MG: 50 TABLET, FILM COATED ORAL at 09:00

## 2024-05-20 RX ADMIN — Medication 10 ML: at 09:00

## 2024-05-20 RX ADMIN — Medication 10 ML: at 21:22

## 2024-05-20 RX ADMIN — INSULIN LISPRO 3 UNITS: 100 INJECTION, SOLUTION INTRAVENOUS; SUBCUTANEOUS at 12:06

## 2024-05-20 RX ADMIN — HYDROCHLOROTHIAZIDE 12.5 MG: 12.5 TABLET ORAL at 09:00

## 2024-05-20 RX ADMIN — BUDESONIDE AND FORMOTEROL FUMARATE DIHYDRATE 2 PUFF: 160; 4.5 AEROSOL RESPIRATORY (INHALATION) at 09:04

## 2024-05-20 RX ADMIN — IPRATROPIUM BROMIDE AND ALBUTEROL SULFATE 3 ML: 2.5; .5 SOLUTION RESPIRATORY (INHALATION) at 19:25

## 2024-05-20 RX ADMIN — BUDESONIDE AND FORMOTEROL FUMARATE DIHYDRATE 2 PUFF: 160; 4.5 AEROSOL RESPIRATORY (INHALATION) at 19:25

## 2024-05-20 RX ADMIN — IPRATROPIUM BROMIDE AND ALBUTEROL SULFATE 3 ML: 2.5; .5 SOLUTION RESPIRATORY (INHALATION) at 08:57

## 2024-05-20 RX ADMIN — HEPARIN SODIUM 5000 UNITS: 5000 INJECTION INTRAVENOUS; SUBCUTANEOUS at 05:57

## 2024-05-20 RX ADMIN — INSULIN LISPRO 3 UNITS: 100 INJECTION, SOLUTION INTRAVENOUS; SUBCUTANEOUS at 21:23

## 2024-05-20 NOTE — PAYOR COMM NOTE
"Ref# [id number]  5/17/24 Admission    Utilization Review  Phone [phone]  Fax [phone]    [facility]  [address] [place] [address]           Dana Rincon (63 y.o. Female)       Date of Birth   1960    Social Security Number       Address   [address] [address]    Home Phone   [phone]    MRN   [id number]       [facility]    Marital Status                               Admission Date   5/17/24    Admission Type   Emergency    Admitting Provider   Melissa Campos DO    Attending Provider   Melissa Campos DO    Department, Room/Bed   [facility] 4H, S478/1       Discharge Date       Discharge Disposition       Discharge Destination                                 Attending Provider: Melissa Campos DO    Allergies: Meperidine    Isolation: Contact Air, Droplet   Infection: COVID (confirmed) (05/11/24), Influenza (05/17/24)   Code Status: CPR    Ht: 149.9 cm (59\")   Wt: 112 kg (248 lb)    Admission Cmt: None   Principal Problem: Pneumonia [J18.9]                   Active Insurance as of 5/17/2024       Primary Coverage       Payor Plan Insurance Group Employer/Plan Group    [employer] [facility]       Payor Plan Address Payor Plan Phone Number Payor Plan Fax Number Effective Dates    [address]   1/11/2016 - None Entered    [place] [address]         Subscriber Name Subscriber Birth Date Member ID       DANA RINCON 1960 [id number]                     Emergency Contacts        (Rel.) Home Phone Work Phone Mobile Phone    SergeyJovany camejo (Spouse) [phone] -- [phone]              Barto: [facility] [id number] Tax ID [id number]  Insurance Information                  [employer] Phone: --    Subscriber: Dana Rincon Subscriber#: [id number]    Group#: [id number] Precert#: --             History & Physical        Stevenson Zacarias MD at 05/17/24 " 1609              [facility] Medicine Services  HISTORY AND PHYSICAL    Patient Name: Dana Rincon  : 1960  MRN: [id number]  Primary Care Physician: Noreen Smith MD  Date of admission: 2024      Subjective  Subjective     Chief Complaint:  SOB    HPI:  Dana Rincon is a 63 y.o. female with past medical history significant for hypertension, hyperlipidemia, diabetes mellitus type 2, asthma, history of breast cancer status post lumpectomy and radiation treatment, allergies, sleep apnea.  Patient tells me that recently she was in [place] and while there she started developing respiratory symptoms including cough, itchy eyes, and fever and chills.  Patient came back this past Friday and was seen at urgent treatment center on Saturday.  She tested positive for COVID there and she was started on steroids only.  Patient tells me that initially steroids helped her but then it started getting worse and PCP continued steroids.  As symptoms continued patient came to the emergency room today.  Still has cough and shortness of breath and her eyes have discharge in the morning.  Has had no fever or chills over the past couple of days.  No nausea vomiting diarrhea or abdominal pain.      Personal History     Past Medical History:   Diagnosis Date    Allergic Demerol        Asthma     Breast cancer 2016    right    Breast injury     PT STATES SHE FELL AND BRUSIED HER LEFT BREAST    Diabetes mellitus     Dry eyes     Emphysema/COPD     H/O colonoscopy 10/2015    HBP (high blood pressure)     Hot flashes     Hx of radiation therapy 2016    right breast    Itchy eyes     Obesity     HANNA (obstructive sleep apnea)     Sinus problem     Wears glasses          Oncology Problem List:  Malignant neoplasm of lower-outer quadrant of right breast of female,   estrogen receptor positive (2019; Status: Active)  Breast cancer of lower-outer quadrant of right female breast (2016;  Status: Active)  Oncology/Hematology History   Malignant neoplasm of lower-outer quadrant of right breast of female, estrogen receptor positive   6/14/2019 Initial Diagnosis    Malignant neoplasm of lower-outer quadrant of right breast of female, estrogen receptor positive     1/22/2020 -  Chemotherapy    OP Denosumab (Prolia) Q6M       Past Surgical History:   Procedure Laterality Date    ADENOIDECTOMY  1965    BREAST BIOPSY Right 09/08/2015    stereo bx    BREAST BIOPSY Right 03/10/2016     bx    BREAST BIOPSY Right 03/2018    stereo bx     BREAST LUMPECTOMY Right 05/03/2016    COLONOSCOPY  2016    DILATATION AND CURETTAGE  2005    OTHER SURGICAL HISTORY  2016    lumpectomy    TONSILLECTOMY  1965    TONSILLECTOMY  1965       Family History: family history includes Breast cancer in her paternal aunt; Breast cancer (age of onset: 30) in some other family members; Cardiomyopathy in her mother; Colon cancer in her mother and paternal uncle; Diabetes in her father; Heart disease (age of onset: 63) in her father; Heart failure in her father; Hypertension in her mother; No Known Problems in her sister.     Social History:  reports that she has never smoked. She has never been exposed to tobacco smoke. She has never used smokeless tobacco. She reports current alcohol use of about 3.0 standard drinks of alcohol per week. She reports that she does not use drugs.  Social History     Social History Narrative    Not on file       Medications:  Available home medication information reviewed.  Calcium, Cholecalciferol, Cyanocobalamin, Fluticasone-Umeclidin-Vilant, Melatonin, Semaglutide (1 MG/DOSE), albuterol sulfate HFA, benzonatate, bisoprolol, clotrimazole-betamethasone, empagliflozin, fexofenadine, furosemide, glimepiride, metFORMIN ER, montelukast, olmesartan-hydrochlorothiazide, potassium chloride, pravastatin, and predniSONE    Allergies   Allergen Reactions    Meperidine Nausea And Vomiting       Objective  Objective      Vital Signs:   Temp:  [99.1 °F (37.3 °C)] 99.1 °F (37.3 °C)  Heart Rate:  [42-53] 52  Resp:  [18] 18  BP: (135-202)/() 152/67  Flow (L/min):  [2] 2       Physical Exam   Constitutional: No acute distress, awake, alert  HENT: NCAT, mucous membranes moist  Respiratory: Breath sounds, decreased breath sounds bilaterally, cough on deep inspiration, respiratory effort normal   Cardiovascular: RRR, no murmurs, rubs, or gallops  Gastrointestinal: Abdomen is obese.  Positive bowel sounds, soft, nontender, nondistended  Musculoskeletal:  bilateral ankle edema, morbid obesity  Psychiatric: Appropriate affect, cooperative  Neurologic: Oriented x 3, strength symmetric in all extremities, Cranial Nerves grossly intact to confrontation, speech clear  Skin: No rashes     Result Review:  I have personally reviewed the results from the time of this admission to 5/17/2024 16:09 EDT and agree with these findings:  [x]  Laboratory list / accordion  []  Microbiology  [x]  Radiology  []  EKG/Telemetry   []  Cardiology/Vascular   []  Pathology  [x]  Old records  []  Other:  Most notable findings include: CT is suspicious for lower lobe pneumonia.      LAB RESULTS:      Lab 05/17/24  1117 05/17/24  1112 05/17/24  0913   WBC  --   --  12.23*   HEMOGLOBIN  --   --  13.2   HEMOGLOBIN, POC 13.9  --   --    HEMATOCRIT  --   --  41.3   HEMATOCRIT POC 41  --   --    PLATELETS  --   --  360   NEUTROS ABS  --   --  9.41*   IMMATURE GRANS (ABS)  --   --  0.09*   LYMPHS ABS  --   --  1.77   MONOS ABS  --   --  0.90   EOS ABS  --   --  0.04   MCV  --   --  86.2   CRP  --  2.53*  --    PROCALCITONIN  --  0.05  --          Lab 05/17/24  1117 05/17/24  1112   SODIUM  --  139   POTASSIUM  --  3.7   CHLORIDE  --  99   CO2  --  31.0*   ANION GAP  --  9.0   BUN  --  8   CREATININE 0.50* 0.58   EGFR 105.5 101.8   GLUCOSE  --  110*   CALCIUM  --  8.6         Lab 05/17/24  1112   TOTAL PROTEIN 6.5   ALBUMIN 3.3*   GLOBULIN 3.2   ALT (SGPT) 18   AST  (SGOT) 15   BILIRUBIN 0.4   ALK PHOS 68         Lab 05/17/24  1112   PROBNP 559.5   HSTROP T 16*                 UA          7/8/2023    13:14 7/21/2023    10:20   Urinalysis   Ketones, UA Negative  Negative    Leukocytes, UA Moderate (2+)  Negative        Microbiology Results (last 10 days)       Procedure Component Value - Date/Time    Respiratory Panel PCR w/COVID-19(SARS-CoV-2) TANNER/FRANCK/BALJINDER/PAD/COR/EFE In-House, NP Swab in UTM/VTM, 2 HR TAT - Swab, Nasopharynx [[id number]]  (Abnormal) Collected: 05/17/24 0957    Lab Status: Final result Specimen: Swab from Nasopharynx Updated: 05/17/24 1220     ADENOVIRUS, PCR Not Detected     Coronavirus 229E Not Detected     Coronavirus HKU1 Not Detected     Coronavirus NL63 Not Detected     Coronavirus OC43 Not Detected     COVID19 Not Detected     Human Metapneumovirus Not Detected     Human Rhinovirus/Enterovirus Not Detected     Influenza A H1 2009 PCR Detected     Influenza B PCR Not Detected     Parainfluenza Virus 1 Not Detected     Parainfluenza Virus 2 Not Detected     Parainfluenza Virus 3 Not Detected     Parainfluenza Virus 4 Not Detected     RSV, PCR Not Detected     Bordetella pertussis pcr Not Detected     Bordetella parapertussis PCR Not Detected     Chlamydophila pneumoniae PCR Not Detected     Mycoplasma pneumo by PCR Not Detected    Narrative:      In the setting of a positive respiratory panel with a viral infection PLUS a negative procalcitonin without other underlying concern for bacterial infection, consider observing off antibiotics or discontinuation of antibiotics and continue supportive care. If the respiratory panel is positive for atypical bacterial infection (Bordetella pertussis, Chlamydophila pneumoniae, or Mycoplasma pneumoniae), consider antibiotic de-escalation to target atypical bacterial infection.            CT Angiogram Chest    Result Date: 5/17/2024  CT ANGIOGRAM CHEST Date of Exam: 5/17/2024 12:53 PM EDT Indication: low sats, covid, pe  protocol. Comparison: None available. Technique: CTA of the chest was performed after the uneventful intravenous administration of 75 cc Isovue-370. Reconstructed coronal and sagittal images were also obtained. In addition, a 3-D volume rendered image was created for interpretation. Automated exposure control and iterative reconstruction methods were used. Findings: PULMONARY VASCULATURE: Pulmonary arteries are widely patent without evidence of embolus.  Main pulmonary artery is normal in size. No evidence of right heart strain. MEDIASTINUM:Unremarkable. Aortic and heart size are normal. No aortic dissection identified. No mass nor pericardial effusion. CORONARY ARTERIES: There is calcified atherosclerotic disease. LUNGS: There is bilateral lower lung airspace disease with bronchial wall thickening. Correlate for signs of bronchitis/pneumonia. No significant nodule nor interstitial changes. PLEURAL SPACE: No effusion, mass, nor pneumothorax. LYMPH NODES: There are no pathologically enlarged lymph nodes. UPPER ABDOMEN: Unremarkable OSSEOUS STRUCTURES: Appropriate for age with no acute process identified. There is a central chest 3.1 cm subcutaneous cyst.     Impression: Impression: 1.No evidence of pulmonary embolism. 2.Bilateral lower lung bronchitis and possible pneumonia. 3.There is a simple appearing 3.1 cm subcutaneous cyst within the anterior mid chest. Electronically Signed: Julito Horne MD  5/17/2024 1:12 PM EDT  Workstation ID: [id number]    XR Chest 1 View    Result Date: 5/17/2024  XR CHEST 1 VW Date of Exam: 5/17/2024 9:08 AM EDT Indication: SOA triage protocol Comparison: 5/11/2024 Findings: The lungs appear adequately aerated without consolidation or mass. No pleural effusion or pneumothorax is identified. Cardiac silhouette is prominent. Pulmonary vasculature is unremarkable. No acute or suspicious osseous lesion is identified.     Impression: Impression: 1.Cardiomegaly. No acute radiographic  abnormality is identified. Electronically Signed: Zane Thompson MD  5/17/2024 9:33 AM EDT  Workstation ID: [id number]     Results for orders placed during the hospital encounter of 12/07/23    Adult Transthoracic Echo Complete W/ Cont if Necessary Per Protocol    Interpretation Summary    Left ventricular systolic function is normal. Estimated left ventricular EF = 60%    Left ventricular diastolic function is consistent with (grade I) impaired relaxation.    The right ventricular cavity is borderline dilated.    Aortic sclerosis without significant aortic stenosis.  Aortic valve mean pressure gradient is 17 mmHg.    Trace mitral regurgitation.    Trace tricuspid regurgitation with normal RVSP.      Assessment & Plan  Assessment & Plan       Pneumonia      Patient is a 63-year-old  female with past medical history significant for asthma, hypertension, hyperlipidemia, diabetes mellitus, history of breast cancer, sleep apnea.  Has had respiratory symptoms for at least couple of weeks now.  Was seen at urgent treatment center this past Saturday and was diagnosed with COVID and was started on steroids.  PCP continued spit steroids after 3 days.  Patient comes to the ER for continuation and worsening of respiratory symptoms.    Cough, shortness of breath  Asthma exacerbation  -Respiratory symptoms started about 2 weeks ago while patient was in [facility].  Evidently symptoms included fever and chills.  Patient was started on steroids after she was diagnosed with COVID last Saturday( currently patient.)  -Respiratory panel is negative for COVID here but is positive for influenza A  -CT scan which was done at the emergency room is somewhat suspicious for pneumonia.  However, patient is afebrile, WBC is just marginally elevated and patient has been on steroids recently, procalcitonin is 0.05 which points to very low probability of respiratory infection.  In light of the above we will put the patient on doxycycline, neb  treatment, steroids.  Should be mentioned that patient was treated with Rocephin at the emergency room.  At this point we will not continue that.  Will monitor and if the respiratory symptoms worsened will restart Rocephin.    Diabetes mellitus  -Latest hemoglobin A1c which was done on 3/18/2024 was 7.9  -Currently will put patient on low-dose sliding scale with Humalog    -Other stable conditions include:  Hypertension  Hyperlipidemia  History of breast cancer  Sleep apnea  -For the above we will continue home medication and monitor.      DVT prophylaxis:  Medical DVT prophylaxis orders are present.          CODE STATUS:    Code Status and Medical Interventions:   Ordered at: 05/17/24 1540     Code Status (Patient has no pulse and is not breathing):    CPR (Attempt to Resuscitate)     Medical Interventions (Patient has pulse or is breathing):    Full Support       Expected Discharge   Expected discharge date/ time has not been documented.   Home in a day or two    Stevenson Zacarias MD  05/17/24     Electronically signed by Stevenson Zacarias MD at 05/17/24 1631          Emergency Department Notes        Jovi Rodriguez RN at 05/17/24 1517           Dana Rincon    Nursing Report ED to Floor:  Mental status: A&O X4  Ambulatory status: up with 2  Oxygen Therapy:  2L NC  Cardiac Rhythm: Sinus rae   Admitted from: ED  Safety Concerns:  none  Social Issues: none  ED Room #:  14    ED Nurse Phone Extension - [phone] or may call [phone].      HPI:   Chief Complaint   Patient presents with    Shortness of Breath       Past Medical History:  Past Medical History:   Diagnosis Date    Allergic Demerol    1965    Asthma     Breast cancer 2016    right    Breast injury     PT STATES SHE FELL AND BRUSIED HER LEFT BREAST    Diabetes mellitus     Dry eyes     Emphysema/COPD     H/O colonoscopy 10/2015    HBP (high blood pressure)     Hot flashes     Hx of radiation therapy 06/2016    right breast    Itchy eyes     Obesity     HANNA  (obstructive sleep apnea)     Sinus problem     Wears glasses         Past Surgical History:  Past Surgical History:   Procedure Laterality Date    ADENOIDECTOMY  1965    BREAST BIOPSY Right 09/08/2015    stereo bx    BREAST BIOPSY Right 03/10/2016    US bx    BREAST BIOPSY Right 03/2018    stereo bx     BREAST LUMPECTOMY Right 05/03/2016    COLONOSCOPY  2016    DILATATION AND CURETTAGE  2005    OTHER SURGICAL HISTORY  2016    lumpectomy    TONSILLECTOMY  1965    TONSILLECTOMY  1965        Admitting Doctor:   No admitting provider for patient encounter.    Consulting Provider(s):  Consults       No orders found from 4/18/2024 to 5/18/2024.             Admitting Diagnosis:   The primary encounter diagnosis was Acute respiratory failure with hypoxia. Diagnoses of Severe persistent asthma with exacerbation and Pneumonia of both lower lobes due to infectious organism were also pertinent to this visit.    Most Recent Vitals:   Vitals:    05/17/24 1400 05/17/24 1429 05/17/24 1430 05/17/24 1434   BP: 143/52 152/67     BP Location:       Patient Position:       Pulse: (!) 43 52 52    Resp:       Temp:       TempSrc:       SpO2: 94%  95% 94%   Weight:       Height:           Active LDAs/IV Access:   Lines, Drains & Airways       Active LDAs       Name Placement date Placement time Site Days    Peripheral IV 05/17/24 0910 Anterior;Left Forearm 05/17/24  0910  Forearm  less than 1                    Labs (abnormal labs have a star):   Labs Reviewed   RESPIRATORY PANEL PCR W/ COVID-19 (SARS-COV-2), NP SWAB IN UTM/VTP, 2 HR TAT - Abnormal; Notable for the following components:       Result Value    Influenza A H1 2009 PCR Detected (*)     All other components within normal limits    Narrative:     In the setting of a positive respiratory panel with a viral infection PLUS a negative procalcitonin without other underlying concern for bacterial infection, consider observing off antibiotics or discontinuation of antibiotics and  continue supportive care. If the respiratory panel is positive for atypical bacterial infection (Bordetella pertussis, Chlamydophila pneumoniae, or Mycoplasma pneumoniae), consider antibiotic de-escalation to target atypical bacterial infection.   COMPREHENSIVE METABOLIC PANEL - Abnormal; Notable for the following components:    Glucose 110 (*)     CO2 31.0 (*)     Albumin 3.3 (*)     All other components within normal limits    Narrative:     GFR Normal >60  Chronic Kidney Disease <60  Kidney Failure <15     SINGLE HS TROPONIN T - Abnormal; Notable for the following components:    HS Troponin T 16 (*)     All other components within normal limits    Narrative:     High Sensitive Troponin T Reference Range:  <14.0 ng/L- Negative Female for AMI  <22.0 ng/L- Negative Male for AMI  >=14 - Abnormal Female indicating possible myocardial injury.  >=22 - Abnormal Male indicating possible myocardial injury.   Clinicians would have to utilize clinical acumen, EKG, Troponin, and serial changes to determine if it is an Acute Myocardial Infarction or myocardial injury due to an underlying chronic condition.        CBC WITH AUTO DIFFERENTIAL - Abnormal; Notable for the following components:    WBC 12.23 (*)     Neutrophil % 76.9 (*)     Lymphocyte % 14.5 (*)     Immature Grans % 0.7 (*)     Neutrophils, Absolute 9.41 (*)     Immature Grans, Absolute 0.09 (*)     All other components within normal limits   POCT CHEM 8 - Abnormal; Notable for the following components:    Creatinine 0.50 (*)     Sodium 137 (*)     POC Potassium 3.3 (*)     Chloride 96 (*)     Ionized Calcium 1.09 (*)     All other components within normal limits   BNP (IN-HOUSE) - Normal    Narrative:     This assay is used as an aid in the diagnosis of individuals suspected of having heart failure. It can be used as an aid in the diagnosis of acute decompensated heart failure (ADHF) in patients presenting with signs and symptoms of ADHF to the emergency department  (ED). In addition, NT-proBNP of <300 pg/mL indicates ADHF is not likely.    Age Range Result Interpretation  NT-proBNP Concentration (pg/mL:      <50             Positive            >450                   Gray                 300-450                    Negative             <300    50-75           Positive            >900                  Gray                300-900                  Negative            <300      >75             Positive            >1800                  Gray                300-1800                  Negative            <300   BLOOD CULTURE   BLOOD CULTURE   RAINBOW DRAW    Narrative:     The following orders were created for panel order [place] Draw.  Procedure                               Abnormality         Status                     ---------                               -----------         ------                     Green Top (Gel)[[id number]]                                  Final result               Lavender Top[[id number]]                                     Final result               Gold Top - SST[[id number]]                                   Final result               Mendoza Top[[id number]]                                         Final result               Light Blue Top[[id number]]                                   Final result                 Please view results for these tests on the individual orders.   LACTIC ACID, PLASMA   CBC AND DIFFERENTIAL    Narrative:     The following orders were created for panel order CBC & Differential.  Procedure                               Abnormality         Status                     ---------                               -----------         ------                     CBC Auto Differential[[id number]]        Abnormal            Final result                 Please view results for these tests on the individual orders.   GREEN TOP   LAVENDER TOP   GOLD TOP - SST   GRAY TOP   LIGHT BLUE TOP       Meds Given in ED:   Medications   sodium chloride 0.9 % flush 10  mL (has no administration in time range)   cefTRIAXone (ROCEPHIN) 2,000 mg in sodium chloride 0.9 % 100 mL MBP (has no administration in time range)   doxycycline (VIBRAMYCIN) 100 mg in sodium chloride 0.9 % 100 mL MBP (has no administration in time range)   methylPREDNISolone sodium succinate (SOLU-Medrol) injection 125 mg (125 mg Intravenous Given 5/17/24 1330)   albuterol (PROVENTIL) nebulizer solution 0.083% 2.5 mg/3mL (2.5 mg Nebulization Given 5/17/24 1436)   iopamidol (ISOVUE-370) 76 % injection 100 mL (75 mL Intravenous Given 5/17/24 1305)           Last NIH score:                                                          Dysphagia screening results:  Patient Factors Component (Dysphagia:Stroke or Rule-out)  Best Eye Response: 4-->(E4) spontaneous (05/17/24 1049)  Best Motor Response: 6-->(M6) obeys commands (05/17/24 1049)  Best Verbal Response: 5-->(V5) oriented (05/17/24 1049)  Edith Coma Scale Score: 15 (05/17/24 1049)     Edith Coma Scale:  No data recorded     CIWA:        Restraint Type:            Isolation Status:  Contact and Airborne          Electronically signed by Jovi Rodriguez RN at 05/17/24 7774       Julito Davila MD at 05/17/24 0963          Subjective   History of Present Illness  Mrs. Rincon presents with shortness of breath and cough.  She just returned from [facility] several days ago.  Several days before leaving [facility] she developed what started with sore throat and then progressed to cough and shortness of breath.  She went to an urgent treatment center when she got home for 5 days ago and was diagnosed with COVID.  She was placed on prednisone taper.  She has history of asthma.  She presents with worsening shortness of breath.  Oxygen saturations and triage were noted to be 83%.  She does not wear oxygen at home.      Review of Systems    Past Medical History:   Diagnosis Date    Allergic Demerol    1965    Asthma     Breast cancer 2016    right    Breast injury     PT STATES  SHE FELL AND BRUSIED HER LEFT BREAST    Diabetes mellitus     Dry eyes     Emphysema/COPD     H/O colonoscopy 10/2015    HBP (high blood pressure)     Hot flashes     Hx of radiation therapy 06/2016    right breast    Itchy eyes     Obesity     HANNA (obstructive sleep apnea)     Sinus problem     Wears glasses        Allergies   Allergen Reactions    Meperidine Nausea And Vomiting       Past Surgical History:   Procedure Laterality Date    ADENOIDECTOMY  1965    BREAST BIOPSY Right 09/08/2015    stereo bx    BREAST BIOPSY Right 03/10/2016     bx    BREAST BIOPSY Right 03/2018    stereo bx     BREAST LUMPECTOMY Right 05/03/2016    COLONOSCOPY  2016    DILATATION AND CURETTAGE  2005    OTHER SURGICAL HISTORY  2016    lumpectomy    TONSILLECTOMY  1965    TONSILLECTOMY  1965       Family History   Problem Relation Age of Onset    Hypertension Mother     Colon cancer Mother     Cardiomyopathy Mother     Heart failure Father     Diabetes Father     Heart disease Father 63        stents, cabg    Breast cancer Paternal Aunt         pt states 50's    Colon cancer Paternal Uncle     Breast cancer Other 30    Breast cancer Other 30    No Known Problems Sister     Ovarian cancer Neg Hx        Social History     Socioeconomic History    Marital status:    Tobacco Use    Smoking status: Never     Passive exposure: Never    Smokeless tobacco: Never   Vaping Use    Vaping status: Never Used   Substance and Sexual Activity    Alcohol use: Yes     Alcohol/week: 3.0 standard drinks of alcohol     Types: 3 Glasses of wine per week     Comment: 2-3/week for 20 years    Drug use: No    Sexual activity: Defer           Objective   Physical Exam  Vitals and nursing note reviewed.   Constitutional:       General: She is not in acute distress.     Appearance: Normal appearance. She is obese.   HENT:      Head: Normocephalic and atraumatic.      Nose: Nose normal. No congestion or rhinorrhea.   Eyes:      General: No scleral  icterus.     Conjunctiva/sclera: Conjunctivae normal.   Neck:      Comments: No JVD   Cardiovascular:      Rate and Rhythm: Normal rate and regular rhythm.      Heart sounds: No murmur heard.     No friction rub.   Pulmonary:      Effort: Pulmonary effort is normal.      Breath sounds: Decreased breath sounds, wheezing and rhonchi present. No rales.   Abdominal:      General: Bowel sounds are normal.      Palpations: Abdomen is soft.      Tenderness: There is no abdominal tenderness. There is no guarding or rebound.   Musculoskeletal:         General: No tenderness.      Cervical back: Normal range of motion and neck supple.      Right lower leg: No edema.      Left lower leg: No edema.   Skin:     General: Skin is warm and dry.      Coloration: Skin is not pale.      Findings: No erythema.   Neurological:      General: No focal deficit present.      Mental Status: She is alert and oriented to person, place, and time.      Motor: No weakness.      Coordination: Coordination normal.   Psychiatric:         Mood and Affect: Mood normal.         Behavior: Behavior normal.         Thought Content: Thought content normal.         Procedures          ED Course  ED Course as of 05/17/24 1510   Fri May 17, 2024   0947 I reviewed and interpreted records from her urgent treatment center visit.  We have placed her on oxygen.  Blood pressure was 202/139 in triage, we have rechecked that and is currently 136/69. [DT]   1245 COVID is now negative, flu a is positive.  Have ordered albuterol and Solu-Medrol.  Awaiting CT [DT]   1412 CT shows no pulmonary emboli but does show bilateral lower lobe pneumonia.  Will initiate antibiotics [DT]      ED Course User Index  [DT] Julito Davila MD                                             Medical Decision Making  Please see course notes.  Ordered and interpreted multiple labs.  Ordered and interpreted chest x-ray and then CT scan of her chest.  Gave multiple IV medications.  Had  reevaluation.  Had consultation with the hospitalist    Problems Addressed:  Acute respiratory failure with hypoxia: complicated acute illness or injury that poses a threat to life or bodily functions  Pneumonia of both lower lobes due to infectious organism: complicated acute illness or injury that poses a threat to life or bodily functions  Severe persistent asthma with exacerbation: complicated acute illness or injury that poses a threat to life or bodily functions    Amount and/or Complexity of Data Reviewed  External Data Reviewed: notes.  Labs: ordered. Decision-making details documented in ED Course.  Radiology: ordered. Decision-making details documented in ED Course.  ECG/medicine tests: ordered. Decision-making details documented in ED Course.    Risk  Prescription drug management.  Decision regarding hospitalization.        Final diagnoses:   Acute respiratory failure with hypoxia   Severe persistent asthma with exacerbation   Pneumonia of both lower lobes due to infectious organism       ED Disposition  ED Disposition       ED Disposition   Decision to Admit    Condition   --    Comment   Level of Care: Telemetry [5]   Diagnosis: Pneumonia [919088]   Certification: I Certify That Inpatient Hospital Services Are Medically Necessary For Greater Than 2 Midnights                 No follow-up provider specified.       Medication List      No changes were made to your prescriptions during this visit.            Julito Davila MD  05/17/24 1510      Electronically signed by Julito Davila MD at 05/17/24 1510       Oxygen Therapy (since admission)       Date/Time SpO2 Device (Oxygen Therapy) Flow (L/min) Oxygen Concentration (%) ETCO2 (mmHg)    05/20/24 0904 -- room air -- -- --    05/20/24 0311 -- CPAP -- -- --    05/19/24 2352 -- CPAP -- -- --    05/19/24 2047 -- nasal cannula -- -- --    05/19/24 2020 -- nasal cannula 2 -- --    05/19/24 1945 90 nasal cannula 2 -- --    05/19/24 1737 -- nasal cannula 2 --  --    05/19/24 1624 -- nasal cannula 2 -- --    05/19/24 1559 93 -- -- -- --    05/19/24 1400 -- nasal cannula 2 -- --    05/19/24 1300 93 nasal cannula 2 -- --    05/19/24 1200 -- nasal cannula 2 -- --    05/19/24 1100 91 -- -- -- --    05/19/24 1000 -- nasal cannula 2 -- --    05/19/24 0800 -- nasal cannula 2 -- --    05/19/24 0750 92 nasal cannula 2 -- --    05/19/24 0700 91 -- -- -- --    05/19/24 0300 93 -- -- -- --    05/19/24 0245 92 -- -- -- --    05/19/24 0228 90 CPAP 3 -- --    05/18/24 2353 91 CPAP 2 -- --    05/18/24 2019 -- nasal cannula 2 -- --    05/18/24 2000 92 nasal cannula 2 -- --    05/18/24 1927 91 -- -- -- --    05/18/24 1818 91 nasal cannula 2 -- --    05/18/24 1800 -- nasal cannula 2 -- --    05/18/24 1743 91 -- -- -- --    05/18/24 1600 -- room air -- -- --    05/18/24 1534 93 room air -- -- --    05/18/24 1525 93 -- -- -- --    05/18/24 1400 -- nasal cannula 2 -- --    05/18/24 1254 94 nasal cannula 2 -- --    05/18/24 1200 -- nasal cannula 2 -- --    05/18/24 1049 90 -- -- -- --    05/18/24 1000 -- nasal cannula 2 -- --    05/18/24 0853 94 nasal cannula 2 -- --    05/18/24 0846 97 -- -- -- --    05/18/24 0800 -- nasal cannula 4 -- --    05/18/24 0746 91 nasal cannula 4 -- --    05/18/24 0745 93 -- -- -- --    05/18/24 0744 91 nasal cannula 4 -- --    05/18/24 0712 94 -- -- -- --    05/18/24 0414 94 -- -- -- --    05/18/24 0300 95 CPAP 4 -- --    05/18/24 0255 96 CPAP 4 -- --    05/18/24 0200 94 CPAP 4 -- --    05/18/24 0100 -- nasal cannula;CPAP 2 -- --    05/18/24 0020 89 nasal cannula;CPAP 2 -- --    05/18/24 0000 86 CPAP;nasal cannula 2 -- --    05/17/24 2205 89 nasal cannula 2 -- --    05/17/24 2200 86 -- -- -- --    05/17/24 2107 92 nasal cannula 2 -- --    05/17/24 2100 -- nasal cannula 2 -- --    05/17/24 2005 95 nasal cannula 2 -- --    05/17/24 1903 -- nasal cannula 2 -- --    05/17/24 1827 92 -- -- -- --    05/17/24 1800 92 nasal cannula 2 -- --    05/17/24 1752 -- nasal cannula  2 -- --    05/17/24 1657 93 nasal cannula 2 -- --    05/17/24 1434 94 -- -- -- --    05/17/24 1430 95 -- -- -- --    05/17/24 1400 94 -- -- -- --    05/17/24 1330 93 -- -- -- --    05/17/24 1230 94 -- -- -- --    05/17/24 1200 94 -- -- -- --    05/17/24 1130 94 -- -- -- --    05/17/24 1100 93 -- -- -- --    05/17/24 1030 93 -- -- -- --    05/17/24 1000 94 -- -- -- --    05/17/24 0948 93 -- -- -- --    05/17/24 0944 88 room air 2 -- --    05/17/24 0930 95 -- -- -- --    05/17/24 0843 94 nasal cannula 2 -- --    05/17/24 0839 84  room air -- -- --          Facility-Administered Medications as of 5/20/2024   Medication Dose Route Frequency Provider Last Rate Last Admin    acetaminophen (TYLENOL) tablet 650 mg  650 mg Oral Q4H PRN Stevenson Zacarias MD        [COMPLETED] albuterol (PROVENTIL) nebulizer solution 0.083% 2.5 mg/3mL  2.5 mg Nebulization Once Julito Davila MD   2.5 mg at 05/17/24 1436    [Held by provider] bisoprolol (ZEBeta) tablet 2.5 mg  2.5 mg Oral Daily Ami Simpson, APRN   2.5 mg at 05/17/24 1827    budesonide-formoterol (SYMBICORT) 160-4.5 MCG/ACT inhaler 2 puff  2 puff Inhalation BID - RT Stevenson Zacarias MD   2 puff at 05/20/24 0904    And    tiotropium (SPIRIVA RESPIMAT) 2.5 mcg/act aerosol solution inhaler  2 puff Inhalation Daily - RT Stevenson Zacarias MD   2 puff at 05/20/24 0858    calcium carbonate (TUMS) chewable tablet 500 mg (200 mg elemental)  1 tablet Oral TID PRN Stevenson Zacarias MD        Calcium Replacement - Follow Nurse / BPA Driven Protocol   Does not apply PRN Stevenson Zacarias MD        dextrose (D50W) (25 g/50 mL) IV injection 25 g  25 g Intravenous Q15 Min PRN Stevenson Zacarias MD        dextrose (GLUTOSE) oral gel 15 g  15 g Oral Q15 Min PRN Stevenson Zacarias MD        doxycycline (VIBRAMYCIN) 100 mg in sodium chloride 0.9 % 100 mL MBP  100 mg Intravenous Q12H Stevenson Zacarias MD   100 mg at 05/20/24 0558    empagliflozin (JARDIANCE) tablet 25 mg  25 mg Oral Daily  Stevenson Zacarias MD   25 mg at 05/20/24 0900    furosemide (LASIX) tablet 20 mg  20 mg Oral Daily PRN Stevenosn Zacarias MD        glucagon (GLUCAGEN) injection 1 mg  1 mg Intramuscular Q15 Min PRN Stevenson Zacarias MD        heparin (porcine) 5000 UNIT/ML injection 5,000 Units  5,000 Units Subcutaneous Q8H Stevenson Zacarias MD   5,000 Units at 05/20/24 0557    losartan (COZAAR) tablet 100 mg  100 mg Oral Q24H Stevenson Zacarias MD   100 mg at 05/20/24 0900    And    hydroCHLOROthiazide oral 12.5 mg  12.5 mg Oral Q24H Stevenson Zacarias MD   12.5 mg at 05/20/24 0900    Insulin Lispro (humaLOG) injection 2-7 Units  2-7 Units Subcutaneous 4x Daily AC & at Bedtime Stevenson Zacarias MD   2 Units at 05/20/24 0900    [COMPLETED] iopamidol (ISOVUE-370) 76 % injection 100 mL  100 mL Intravenous Once in imaging Julito Davila MD   75 mL at 05/17/24 1305    ipratropium-albuterol (DUO-NEB) nebulizer solution 3 mL  3 mL Nebulization Q6H While Awake - RT Stevenson Zacarias MD   3 mL at 05/20/24 0857    Magnesium Standard Dose Replacement - Follow Nurse / BPA Driven Protocol   Does not apply PRN Stevenson Zacarias MD        melatonin tablet 10 mg  10 mg Oral Nightly Stevenson Zacarias MD   10 mg at 05/19/24 2132    [COMPLETED] methylPREDNISolone sodium succinate (SOLU-Medrol) injection 125 mg  125 mg Intravenous Once Julito Davila MD   125 mg at 05/17/24 1330    montelukast (SINGULAIR) tablet 10 mg  10 mg Oral Nightly Stevenson Zacarias MD   10 mg at 05/19/24 2132    naphazoline-pheniramine (NAPHCON-A) 0.025-0.3 % ophthalmic solution 1 drop  1 drop Both Eyes 4x Daily PRN Melissa Campos P, DO   1 drop at 05/18/24 1504    Phosphorus Replacement - Follow Nurse / BPA Driven Protocol   Does not apply PRN Stevenson Zacarias MD        Potassium Replacement - Follow Nurse / BPA Driven Protocol   Does not apply PRN Stevenson Zacarias MD        pravastatin (PRAVACHOL) tablet 10 mg  10 mg Oral Nightly Stevenson Zacarias MD   10 mg at 05/19/24  2325    [START ON 5/21/2024] predniSONE (DELTASONE) tablet 40 mg  40 mg Oral Daily With Breakfast Melissa Campos DO        sodium chloride 0.9 % flush 10 mL  10 mL Intravenous PRN Julito Davila MD        sodium chloride 0.9 % flush 10 mL  10 mL Intravenous Q12H Stevenson Zacarias MD   10 mL at 05/20/24 0900    sodium chloride 0.9 % flush 10 mL  10 mL Intravenous PRN Stevenson Zacarias MD        sodium chloride 0.9 % infusion 40 mL  40 mL Intravenous PRN Stevenson Zacarias MD        traMADol (ULTRAM) tablet 50 mg  50 mg Oral Q6H PRN Stevenson Zacarias MD   50 mg at 05/17/24 2217     Lab Results (all)       Procedure Component Value Units Date/Time    Comprehensive Metabolic Panel [[id number]] Updated: 05/20/24 0905    Specimen: Blood     CBC (No Diff) [[id number]]  (Abnormal) Collected: 05/20/24 0813    Specimen: Blood Updated: 05/20/24 0901     WBC 10.71 10*3/mm3      RBC 4.80 10*6/mm3      Hemoglobin 13.0 g/dL      Hematocrit 42.9 %      MCV 89.4 fL      MCH 27.1 pg      MCHC 30.3 g/dL      RDW 13.9 %      RDW-SD 45.2 fl      MPV 9.6 fL      Platelets 288 10*3/mm3     POC Glucose Once [[id number]]  (Abnormal) Collected: 05/20/24 0706    Specimen: Blood Updated: 05/20/24 0718     Glucose 190 mg/dL     POC Glucose Once [[id number]]  (Abnormal) Collected: 05/19/24 2044    Specimen: Blood Updated: 05/19/24 2047     Glucose 259 mg/dL     Blood Culture - Blood, Hand, Left [[id number]]  (Normal) Collected: 05/17/24 1528    Specimen: Blood from Hand, Left Updated: 05/19/24 1700     Blood Culture No growth at 2 days    Narrative:      Less than seven (7) mL's of blood was collected.  Insufficient quantity may yield false negative results.    Blood Culture - Blood, Arm, Left [[id number]]  (Normal) Collected: 05/17/24 1528    Specimen: Blood from Arm, Left Updated: 05/19/24 1700     Blood Culture No growth at 2 days    Narrative:      Less than seven (7) mL's of blood was collected.  Insufficient quantity may yield false  negative results.    POC Glucose Once [[id number]]  (Abnormal) Collected: 05/19/24 1633    Specimen: Blood Updated: 05/19/24 1635     Glucose 256 mg/dL     POC Glucose Once [[id number]]  (Abnormal) Collected: 05/19/24 1137    Specimen: Blood Updated: 05/19/24 1138     Glucose 197 mg/dL     Comprehensive Metabolic Panel [[id number]]  (Abnormal) Collected: 05/19/24 0750    Specimen: Blood Updated: 05/19/24 0833     Glucose 184 mg/dL      BUN 23 mg/dL      Creatinine 0.84 mg/dL      Sodium 137 mmol/L      Potassium 5.1 mmol/L      Comment: Slight hemolysis detected by analyzer. Result may be falsely elevated.        Chloride 98 mmol/L      CO2 26.0 mmol/L      Calcium 9.0 mg/dL      Total Protein 6.5 g/dL      Albumin 3.4 g/dL      ALT (SGPT) 17 U/L      AST (SGOT) 11 U/L      Alkaline Phosphatase 68 U/L      Total Bilirubin 0.4 mg/dL      Globulin 3.1 gm/dL      Comment: Calculated Result        A/G Ratio 1.1 g/dL      BUN/Creatinine Ratio 27.4     Anion Gap 13.0 mmol/L      eGFR 78.2 mL/min/1.73     Narrative:      GFR Normal >60  Chronic Kidney Disease <60  Kidney Failure <15      CBC (No Diff) [[id number]]  (Abnormal) Collected: 05/19/24 0645    Specimen: Blood Updated: 05/19/24 0722     WBC 13.41 10*3/mm3      RBC 4.62 10*6/mm3      Hemoglobin 12.8 g/dL      Hematocrit 41.5 %      MCV 89.8 fL      MCH 27.7 pg      MCHC 30.8 g/dL      RDW 13.9 %      RDW-SD 45.7 fl      MPV 9.2 fL      Platelets 291 10*3/mm3     POC Glucose Once [[id number]]  (Abnormal) Collected: 05/19/24 0716    Specimen: Blood Updated: 05/19/24 0719     Glucose 171 mg/dL     POC Glucose Once [[id number]]  (Abnormal) Collected: 05/18/24 2045    Specimen: Blood Updated: 05/18/24 2047     Glucose 207 mg/dL     POC Glucose Once [[id number]]  (Abnormal) Collected: 05/18/24 1601    Specimen: Blood Updated: 05/18/24 1603     Glucose 165 mg/dL     POC Glucose Once [[id number]]  (Abnormal) Collected: 05/18/24 1107    Specimen: Blood Updated: 05/18/24 1111      "Glucose 201 mg/dL     POC Glucose Once [[id number]]  (Abnormal) Collected: 05/18/24 0714    Specimen: Blood Updated: 05/18/24 0721     Glucose 181 mg/dL     POC Glucose Once [[id number]]  (Abnormal) Collected: 05/18/24 0011    Specimen: Blood Updated: 05/18/24 0012     Glucose 234 mg/dL     POC Glucose Once [[id number]]  (Abnormal) Collected: 05/17/24 2110    Specimen: Blood Updated: 05/17/24 2111     Glucose 248 mg/dL     Lactic Acid, Plasma [[id number]]  (Normal) Collected: 05/17/24 1528    Specimen: Blood Updated: 05/17/24 1727     Lactate 1.1 mmol/L      Comment: Falsely depressed results may occur on samples drawn from patients receiving N-Acetylcysteine (NAC) or Metamizole.       POC Glucose Once [[id number]]  (Abnormal) Collected: 05/17/24 1705    Specimen: Blood Updated: 05/17/24 1706     Glucose 167 mg/dL     Procalcitonin [[id number]]  (Normal) Collected: 05/17/24 1112    Specimen: Blood Updated: 05/17/24 1604     Procalcitonin 0.05 ng/mL     Narrative:      As a Marker for Sepsis (Non-Neonates):    1. <0.5 ng/mL represents a low risk of severe sepsis and/or septic shock.  2. >2 ng/mL represents a high risk of severe sepsis and/or septic shock.    As a Marker for Lower Respiratory Tract Infections that require antibiotic therapy:    PCT on Admission    Antibiotic Therapy       6-12 Hrs later    >0.5                Strongly Recommended  >0.25 - <0.5        Recommended   0.1 - 0.25          Discouraged              Remeasure/reassess PCT  <0.1                Strongly Discouraged     Remeasure/reassess PCT    As 28 day mortality risk marker: \"Change in Procalcitonin Result\" (>80% or <=80%) if Day 0 (or Day 1) and Day 4 values are available. Refer to [email or web address]    Change in PCT <=80%  A decrease of PCT levels below or equal to 80% defines a positive change in PCT test result representing a higher risk for 28-day all-cause mortality of patients diagnosed with severe sepsis for septic " shock.    Change in PCT >80%  A decrease of PCT levels of more than 80% defines a negative change in PCT result representing a lower risk for 28-day all-cause mortality of patients diagnosed with severe sepsis or septic shock.       C-reactive Protein [[id number]]  (Abnormal) Collected: 05/17/24 1112    Specimen: Blood Updated: 05/17/24 1557     C-Reactive Protein 2.53 mg/dL     Respiratory Panel PCR w/COVID-19(SARS-CoV-2) [facility]ANNER/FRANCK/BALJINDER/PAD/[facility]/EFE In-House, NP Swab in UTM/VTM, 2 HR TAT - Swab, Nasopharynx [[id number]]  (Abnormal) Collected: 05/17/24 0957    Specimen: Swab from Nasopharynx Updated: 05/17/24 1220     ADENOVIRUS, PCR Not Detected     Coronavirus 229E Not Detected     Coronavirus HKU1 Not Detected     Coronavirus NL63 Not Detected     Coronavirus OC43 Not Detected     COVID19 Not Detected     Human Metapneumovirus Not Detected     Human Rhinovirus/Enterovirus Not Detected     Influenza A H1 2009 PCR Detected     Influenza B PCR Not Detected     Parainfluenza Virus 1 Not Detected     Parainfluenza Virus 2 Not Detected     Parainfluenza Virus 3 Not Detected     Parainfluenza Virus 4 Not Detected     RSV, PCR Not Detected     Bordetella pertussis pcr Not Detected     Bordetella parapertussis PCR Not Detected     Chlamydophila pneumoniae PCR Not Detected     Mycoplasma pneumo by PCR Not Detected    Narrative:      In the setting of a positive respiratory panel with a viral infection PLUS a negative procalcitonin without other underlying concern for bacterial infection, consider observing off antibiotics or discontinuation of antibiotics and continue supportive care. If the respiratory panel is positive for atypical bacterial infection (Bordetella pertussis, Chlamydophila pneumoniae, or Mycoplasma pneumoniae), consider antibiotic de-escalation to target atypical bacterial infection.    Comprehensive Metabolic Panel [[id number]]  (Abnormal) Collected: 05/17/24 1112    Specimen: Blood Updated: 05/17/24 1150      Glucose 110 mg/dL      BUN 8 mg/dL      Creatinine 0.58 mg/dL      Sodium 139 mmol/L      Potassium 3.7 mmol/L      Chloride 99 mmol/L      CO2 31.0 mmol/L      Calcium 8.6 mg/dL      Total Protein 6.5 g/dL      Albumin 3.3 g/dL      ALT (SGPT) 18 U/L      AST (SGOT) 15 U/L      Alkaline Phosphatase 68 U/L      Total Bilirubin 0.4 mg/dL      Globulin 3.2 gm/dL      Comment: Calculated Result        A/G Ratio 1.0 g/dL      BUN/Creatinine Ratio 13.8     Anion Gap 9.0 mmol/L      eGFR 101.8 mL/min/1.73     Narrative:      GFR Normal >60  Chronic Kidney Disease <60  Kidney Failure <15      Single High Sensitivity Troponin T [[id number]]  (Abnormal) Collected: 05/17/24 1112    Specimen: Blood Updated: 05/17/24 1147     HS Troponin T 16 ng/L     Narrative:      High Sensitive Troponin T Reference Range:  <14.0 ng/L- Negative Female for AMI  <22.0 ng/L- Negative Male for AMI  >=14 - Abnormal Female indicating possible myocardial injury.  >=22 - Abnormal Male indicating possible myocardial injury.   Clinicians would have to utilize clinical acumen, EKG, Troponin, and serial changes to determine if it is an Acute Myocardial Infarction or myocardial injury due to an underlying chronic condition.         BNP [[id number]]  (Normal) Collected: 05/17/24 1112    Specimen: Blood Updated: 05/17/24 1147     proBNP 559.5 pg/mL     Narrative:      This assay is used as an aid in the diagnosis of individuals suspected of having heart failure. It can be used as an aid in the diagnosis of acute decompensated heart failure (ADHF) in patients presenting with signs and symptoms of ADHF to the emergency department (ED). In addition, NT-proBNP of <300 pg/mL indicates ADHF is not likely.    Age Range Result Interpretation  NT-proBNP Concentration (pg/mL:      <50             Positive            >450                   Gray                 300-450                    Negative             <300    50-75           Positive            >900                   Mendoza                300-900                  Negative            <300      >75             Positive            >1800                  Gray                300-1800                  Negative            <300    [place] Draw [[id number]] Collected: 05/17/24 0913    Specimen: Blood Updated: 05/17/24 1131    Narrative:      The following orders were created for panel order [place] Draw.  Procedure                               Abnormality         Status                     ---------                               -----------         ------                     Green Top (Gel)[[id number]]                                  Final result               Lavender Top[[id number]]                                     Final result               Gold Top - SST[[id number]]                                   Final result               [place] Top[[id number]]                                         Final result               Light Blue Top[[id number]]                                   Final result                 Please view results for these tests on the individual orders.    Gold Top - SST [[id number]] Collected: 05/17/24 1112    Specimen: Blood Updated: 05/17/24 1131     Extra Tube Hold for add-ons.     Comment: Auto resulted.       Green Top (Gel) [[id number]] Collected: 05/17/24 1112    Specimen: Blood Updated: 05/17/24 1131     Extra Tube Hold for add-ons.     Comment: Auto resulted.       POC Chem 8 [[id number]]  (Abnormal) Collected: 05/17/24 1117    Specimen: Blood Updated: 05/17/24 1121     Glucose 116 mg/dL      BUN 8 mg/dL      Creatinine 0.50 mg/dL      Sodium 137 mmol/L      POC Potassium 3.3 mmol/L      Chloride 96 mmol/L      Total CO2 28 mmol/L      Hemoglobin 13.9 g/dL      Comment: Serial Number: [id number]:  [id number]        Hematocrit 41 %      Ionized Calcium 1.09 mmol/L      eGFR 105.5 mL/min/1.73     Lavender Top [[id number]] Collected: 05/17/24 0913    Specimen: Blood Updated: 05/17/24 0930     Extra Tube hold for add-on      Comment: Auto resulted       Gray Top [[id number]] Collected: 05/17/24 0913    Specimen: Blood Updated: 05/17/24 0930     Extra Tube Hold for add-ons.     Comment: Auto resulted.       Light Blue Top [[id number]] Collected: 05/17/24 0913    Specimen: Blood Updated: 05/17/24 0930     Extra Tube Hold for add-ons.     Comment: Auto resulted       CBC & Differential [[id number]]  (Abnormal) Collected: 05/17/24 0913    Specimen: Blood Updated: 05/17/24 0921    Narrative:      The following orders were created for panel order CBC & Differential.  Procedure                               Abnormality         Status                     ---------                               -----------         ------                     CBC Auto Differential[[id number]]        Abnormal            Final result                 Please view results for these tests on the individual orders.    CBC Auto Differential [[id number]]  (Abnormal) Collected: 05/17/24 0913    Specimen: Blood Updated: 05/17/24 0921     WBC 12.23 10*3/mm3      RBC 4.79 10*6/mm3      Hemoglobin 13.2 g/dL      Hematocrit 41.3 %      MCV 86.2 fL      MCH 27.6 pg      MCHC 32.0 g/dL      RDW 13.5 %      RDW-SD 43.2 fl      MPV 8.7 fL      Platelets 360 10*3/mm3      Neutrophil % 76.9 %      Lymphocyte % 14.5 %      Monocyte % 7.4 %      Eosinophil % 0.3 %      Basophil % 0.2 %      Immature Grans % 0.7 %      Neutrophils, Absolute 9.41 10*3/mm3      Lymphocytes, Absolute 1.77 10*3/mm3      Monocytes, Absolute 0.90 10*3/mm3      Eosinophils, Absolute 0.04 10*3/mm3      Basophils, Absolute 0.02 10*3/mm3      Immature Grans, Absolute 0.09 10*3/mm3      nRBC 0.0 /100 WBC           Imaging Results (All)       Procedure Component Value Units Date/Time    CT Angiogram Chest [[id number]] Collected: 05/17/24 1309     Updated: 05/17/24 1336    Narrative:      CT ANGIOGRAM CHEST    Date of Exam: 5/17/2024 12:53 PM EDT    Indication: low sats, covid, pe protocol.    Comparison: None  available.    Technique: CTA of the chest was performed after the uneventful intravenous administration of 75 cc Isovue-370. Reconstructed coronal and sagittal images were also obtained. In addition, a 3-D volume rendered image was created for interpretation.   Automated exposure control and iterative reconstruction methods were used.      Findings:  PULMONARY VASCULATURE: Pulmonary arteries are widely patent without evidence of embolus.  Main pulmonary artery is normal in size. No evidence of right heart strain.    MEDIASTINUM:Unremarkable. Aortic and heart size are normal. No aortic dissection identified. No mass nor pericardial effusion.  CORONARY ARTERIES: There is calcified atherosclerotic disease.  LUNGS: There is bilateral lower lung airspace disease with bronchial wall thickening. Correlate for signs of bronchitis/pneumonia. No significant nodule nor interstitial changes.  PLEURAL SPACE: No effusion, mass, nor pneumothorax.  LYMPH NODES: There are no pathologically enlarged lymph nodes.    UPPER ABDOMEN: Unremarkable    OSSEOUS STRUCTURES: Appropriate for age with no acute process identified. There is a central chest 3.1 cm subcutaneous cyst.      Impression:      Impression:  1.No evidence of pulmonary embolism.  2.Bilateral lower lung bronchitis and possible pneumonia.  3.There is a simple appearing 3.1 cm subcutaneous cyst within the anterior mid chest.        Electronically Signed: Julito Horne MD    5/17/2024 1:12 PM EDT    Workstation ID: [id number]    XR Chest 1 View [[id number]] Collected: 05/17/24 0929     Updated: 05/17/24 0936    Narrative:      XR CHEST 1 VW    Date of Exam: 5/17/2024 9:08 AM EDT    Indication: SOA triage protocol    Comparison: 5/11/2024    Findings:  The lungs appear adequately aerated without consolidation or mass. No pleural effusion or pneumothorax is identified. Cardiac silhouette is prominent. Pulmonary vasculature is unremarkable. No acute or suspicious osseous lesion is  identified.       Impression:      Impression:  1.Cardiomegaly. No acute radiographic abnormality is identified.       Electronically Signed: Zane Thompson MD    2024 9:33 AM EDT    Workstation ID: [id number]             Physician Progress Notes (all)        Melissa Campos DO at 24 0949              [facility] Medicine Services  PROGRESS NOTE    Patient Name: Dana Rincon  : 1960  MRN: [id number]    Date of Admission: 2024  Primary Care Physician: Noreen Smith MD    Subjective   Subjective     CC:  Shortness of air    HPI:  Patient resting in bedside chair, states she is having a dry cough this morning. She has been weaned to room air while sitting and is satting about 90%.       Objective   Objective     Vital Signs:   Temp:  [97.7 °F (36.5 °C)-98.5 °F (36.9 °C)] 98 °F (36.7 °C)  Heart Rate:  [46-64] 64  Resp:  [16-18] 16  BP: (139-170)/() 170/86  Flow (L/min):  [2] 2     Physical Exam:  Constitutional: No acute distress, awake, alert  HENT: NCAT, mucous membranes moist  Respiratory: decreased to auscultation bilaterally, respiratory effort normal on RA  Cardiovascular: RRR, no murmurs, rubs, or gallops  Gastrointestinal: Positive bowel sounds, soft, nontender, nondistended  Musculoskeletal: No bilateral ankle edema  Psychiatric: Appropriate affect, cooperative  Neurologic: Oriented x 3, DURANT, speech clear  Skin: No rashes    Results Reviewed:  LAB RESULTS:      Lab 24  0813 24  0645 24  1528 24  1117 24  1112 24  0913   WBC 10.71 13.41*  --   --   --  12.23*   HEMOGLOBIN 13.0 12.8  --   --   --  13.2   HEMOGLOBIN, POC  --   --   --  13.9  --   --    HEMATOCRIT 42.9 41.5  --   --   --  41.3   HEMATOCRIT POC  --   --   --  41  --   --    PLATELETS 288 291  --   --   --  360   NEUTROS ABS  --   --   --   --   --  9.41*   IMMATURE GRANS (ABS)  --   --   --   --   --  0.09*   LYMPHS ABS  --   --   --   --   --  1.77    MONOS ABS  --   --   --   --   --  0.90   EOS ABS  --   --   --   --   --  0.04   MCV 89.4 89.8  --   --   --  86.2   CRP  --   --   --   --  2.53*  --    PROCALCITONIN  --   --   --   --  0.05  --    LACTATE  --   --  1.1  --   --   --          Lab 05/19/24  0750 05/17/24  1117 05/17/24  1112   SODIUM 137  --  139   POTASSIUM 5.1  --  3.7   CHLORIDE 98  --  99   CO2 26.0  --  31.0*   ANION GAP 13.0  --  9.0   BUN 23  --  8   CREATININE 0.84 0.50* 0.58   EGFR 78.2 105.5 101.8   GLUCOSE 184*  --  110*   CALCIUM 9.0  --  8.6         Lab 05/19/24  0750 05/17/24  1112   TOTAL PROTEIN 6.5 6.5   ALBUMIN 3.4* 3.3*   GLOBULIN 3.1 3.2   ALT (SGPT) 17 18   AST (SGOT) 11 15   BILIRUBIN 0.4 0.4   ALK PHOS 68 68         Lab 05/17/24  1112   PROBNP 559.5   HSTROP T 16*                 Brief Urine Lab Results  (Last result in the past 365 days)        Color   Clarity   Blood   Leuk Est   Nitrite   Protein   CREAT   Urine HCG        07/21/23 1020 Straw   Clear   Negative   Negative   Negative   Negative           07/21/23 1020             10                 Microbiology Results Abnormal       Procedure Component Value - Date/Time    Blood Culture - Blood, Hand, Left [[id number]]  (Normal) Collected: 05/17/24 1528    Lab Status: Preliminary result Specimen: Blood from Hand, Left Updated: 05/19/24 1700     Blood Culture No growth at 2 days    Narrative:      Less than seven (7) mL's of blood was collected.  Insufficient quantity may yield false negative results.    Blood Culture - Blood, Arm, Left [[id number]]  (Normal) Collected: 05/17/24 1528    Lab Status: Preliminary result Specimen: Blood from Arm, Left Updated: 05/19/24 1700     Blood Culture No growth at 2 days    Narrative:      Less than seven (7) mL's of blood was collected.  Insufficient quantity may yield false negative results.            No radiology results from the last 24 hrs    Results for orders placed during the hospital encounter of 12/07/23    Adult  Transthoracic Echo Complete W/ Cont if Necessary Per Protocol    Interpretation Summary    Left ventricular systolic function is normal. Estimated left ventricular EF = 60%    Left ventricular diastolic function is consistent with (grade I) impaired relaxation.    The right ventricular cavity is borderline dilated.    Aortic sclerosis without significant aortic stenosis.  Aortic valve mean pressure gradient is 17 mmHg.    Trace mitral regurgitation.    Trace tricuspid regurgitation with normal RVSP.      Current medications:  Scheduled Meds:[Held by provider] bisoprolol, 2.5 mg, Oral, Daily  budesonide-formoterol, 2 puff, Inhalation, BID - RT   And  tiotropium bromide monohydrate, 2 puff, Inhalation, Daily - RT  doxycycline, 100 mg, Intravenous, Q12H  empagliflozin, 25 mg, Oral, Daily  heparin (porcine), 5,000 Units, Subcutaneous, Q8H  losartan, 100 mg, Oral, Q24H   And  hydroCHLOROthiazide, 12.5 mg, Oral, Q24H  insulin lispro, 2-7 Units, Subcutaneous, 4x Daily AC & at Bedtime  ipratropium-albuterol, 3 mL, Nebulization, Q6H While Awake - RT  melatonin, 10 mg, Oral, Nightly  methylPREDNISolone sodium succinate, 60 mg, Intravenous, Q8H  montelukast, 10 mg, Oral, Nightly  pravastatin, 10 mg, Oral, Nightly  sodium chloride, 10 mL, Intravenous, Q12H      Continuous Infusions:   PRN Meds:.  acetaminophen    calcium carbonate    Calcium Replacement - Follow Nurse / BPA Driven Protocol    dextrose    dextrose    furosemide    glucagon (human recombinant)    Magnesium Standard Dose Replacement - Follow Nurse / BPA Driven Protocol    naphazoline-pheniramine    Phosphorus Replacement - Follow Nurse / BPA Driven Protocol    Potassium Replacement - Follow Nurse / BPA Driven Protocol    sodium chloride    sodium chloride    sodium chloride    traMADol    Assessment & Plan   Assessment & Plan     Active Hospital Problems    Diagnosis  POA    **Pneumonia [J18.9]  Yes      Resolved Hospital Problems   No resolved problems to  display.        Brief Hospital Course to date:  Dana Rincon is a 63 y.o. female with past medical history significant for asthma, hypertension, hyperlipidemia, diabetes mellitus, history of breast cancer, sleep apnea.  Has had respiratory symptoms for at least couple of weeks now.  Was seen at urgent treatment center this past Saturday and was diagnosed with COVID and was started on steroids.  PCP continued spit steroids after 3 days.  Patient comes to the ER for continuation and worsening of respiratory symptoms.    This patient's problems and plans were partially entered by my partner and updated as appropriate by me 05/20/24.     Cough, shortness of breath  Asthma exacerbation  Flu A+  -Respiratory symptoms started about 2 weeks ago while patient was in [facility].  Evidently symptoms included fever and chills.  Patient was started on steroids after she was diagnosed with COVID last Saturday (currently patient.)  -Respiratory panel is negative for COVID here but is positive for influenza A  -CT scan which was done at the emergency room is somewhat suspicious for pneumonia.    - continue doxycycline, neb treatment, steroids- stop IV and change to PO Prednisone  - titrated to room air while sitting- will attempt to ambulate      Diabetes mellitus 2  -Latest hemoglobin A1c which was done on 3/18/2024 was 7.9  -Currently will put patient on low-dose sliding scale with Humalog    Hypertension  - bradycardia improved  - continue holding Bisoprolol    Hyperlipidemia  History of breast cancer  Sleep apnea  -continue home meds    Expected Discharge Location and Transportation: home  Expected Discharge   Expected Discharge Date: 5/21/2024; Expected Discharge Time:      DVT prophylaxis:  Medical DVT prophylaxis orders are present.         AM-PAC 6 Clicks Score (PT): 18 (05/18/24 2019)    CODE STATUS:   Code Status and Medical Interventions:   Ordered at: 05/17/24 0085     Code Status (Patient has no pulse and is not  breathing):    CPR (Attempt to Resuscitate)     Medical Interventions (Patient has pulse or is breathing):    Full Support       Melissa Campos DO  24        Electronically signed by Melissa Campos DO at 24 1003       Melissa Campos DO at 24 1029              [facility] Medicine Services  PROGRESS NOTE    Patient Name: Dana Rincon  : 1960  MRN: [id number]    Date of Admission: 2024  Primary Care Physician: Noreen Smith MD    Subjective   Subjective     CC:  Shortness of air    HPI:  Patient resting in bed, bradycardic overnight. She states this is chronic for her and she was asymptomatic with the bradycardia. Stable on 2L NC and satting 90%. Patient states she is feeling a little better than yesterday but not well enough yet to go home.      Objective   Objective     Vital Signs:   Temp:  [98.1 °F (36.7 °C)-98.8 °F (37.1 °C)] 98.3 °F (36.8 °C)  Heart Rate:  [33-61] 61  Resp:  [16-20] 20  BP: (118-166)/(59-92) 146/69  Flow (L/min):  [2-3] 2     Physical Exam:  Constitutional: No acute distress, awake, alert  HENT: NCAT, mucous membranes moist  Respiratory: decreased to auscultation bilaterally, respiratory effort normal on 2L NC  Cardiovascular: bradycardic, RR, no murmurs, rubs, or gallops  Gastrointestinal: Positive bowel sounds, soft, nontender, nondistended  Musculoskeletal: No bilateral ankle edema  Psychiatric: Appropriate affect, cooperative  Neurologic: Oriented x 3, DURANT, speech clear  Skin: No rashes    Results Reviewed:  LAB RESULTS:      Lab 24  0645 24  1528 24  1117 24  1112 24  0913   WBC 13.41*  --   --   --  12.23*   HEMOGLOBIN 12.8  --   --   --  13.2   HEMOGLOBIN, POC  --   --  13.9  --   --    HEMATOCRIT 41.5  --   --   --  41.3   HEMATOCRIT POC  --   --  41  --   --    PLATELETS 291  --   --   --  360   NEUTROS ABS  --   --   --   --  9.41*   IMMATURE GRANS (ABS)  --   --   --   --  0.09*    LYMPHS ABS  --   --   --   --  1.77   MONOS ABS  --   --   --   --  0.90   EOS ABS  --   --   --   --  0.04   MCV 89.8  --   --   --  86.2   CRP  --   --   --  2.53*  --    PROCALCITONIN  --   --   --  0.05  --    LACTATE  --  1.1  --   --   --          Lab 05/19/24  0750 05/17/24  1117 05/17/24  1112   SODIUM 137  --  139   POTASSIUM 5.1  --  3.7   CHLORIDE 98  --  99   CO2 26.0  --  31.0*   ANION GAP 13.0  --  9.0   BUN 23  --  8   CREATININE 0.84 0.50* 0.58   EGFR 78.2 105.5 101.8   GLUCOSE 184*  --  110*   CALCIUM 9.0  --  8.6         Lab 05/19/24  0750 05/17/24  1112   TOTAL PROTEIN 6.5 6.5   ALBUMIN 3.4* 3.3*   GLOBULIN 3.1 3.2   ALT (SGPT) 17 18   AST (SGOT) 11 15   BILIRUBIN 0.4 0.4   ALK PHOS 68 68         Lab 05/17/24  1112   PROBNP 559.5   HSTROP T 16*                 Brief Urine Lab Results  (Last result in the past 365 days)        Color   Clarity   Blood   Leuk Est   Nitrite   Protein   CREAT   Urine HCG        07/21/23 1020 Straw   Clear   Negative   Negative   Negative   Negative           07/21/23 1020             10                 Microbiology Results Abnormal       Procedure Component Value - Date/Time    Blood Culture - Blood, Hand, Left [[id number]]  (Normal) Collected: 05/17/24 1528    Lab Status: Preliminary result Specimen: Blood from Hand, Left Updated: 05/18/24 1700     Blood Culture No growth at 24 hours    Narrative:      Less than seven (7) mL's of blood was collected.  Insufficient quantity may yield false negative results.    Blood Culture - Blood, Arm, Left [[id number]]  (Normal) Collected: 05/17/24 1528    Lab Status: Preliminary result Specimen: Blood from Arm, Left Updated: 05/18/24 1700     Blood Culture No growth at 24 hours    Narrative:      Less than seven (7) mL's of blood was collected.  Insufficient quantity may yield false negative results.            CT Angiogram Chest    Result Date: 5/17/2024  CT ANGIOGRAM CHEST Date of Exam: 5/17/2024 12:53 PM EDT Indication: low  sats, covid, pe protocol. Comparison: None available. Technique: CTA of the chest was performed after the uneventful intravenous administration of 75 cc Isovue-370. Reconstructed coronal and sagittal images were also obtained. In addition, a 3-D volume rendered image was created for interpretation. Automated exposure control and iterative reconstruction methods were used. Findings: PULMONARY VASCULATURE: Pulmonary arteries are widely patent without evidence of embolus.  Main pulmonary artery is normal in size. No evidence of right heart strain. MEDIASTINUM:Unremarkable. Aortic and heart size are normal. No aortic dissection identified. No mass nor pericardial effusion. CORONARY ARTERIES: There is calcified atherosclerotic disease. LUNGS: There is bilateral lower lung airspace disease with bronchial wall thickening. Correlate for signs of bronchitis/pneumonia. No significant nodule nor interstitial changes. PLEURAL SPACE: No effusion, mass, nor pneumothorax. LYMPH NODES: There are no pathologically enlarged lymph nodes. UPPER ABDOMEN: Unremarkable OSSEOUS STRUCTURES: Appropriate for age with no acute process identified. There is a central chest 3.1 cm subcutaneous cyst.     Impression: Impression: 1.No evidence of pulmonary embolism. 2.Bilateral lower lung bronchitis and possible pneumonia. 3.There is a simple appearing 3.1 cm subcutaneous cyst within the anterior mid chest. Electronically Signed: Julito Horne MD  5/17/2024 1:12 PM EDT  Workstation ID: [id number]     Results for orders placed during the hospital encounter of 12/07/23    Adult Transthoracic Echo Complete W/ Cont if Necessary Per Protocol    Interpretation Summary    Left ventricular systolic function is normal. Estimated left ventricular EF = 60%    Left ventricular diastolic function is consistent with (grade I) impaired relaxation.    The right ventricular cavity is borderline dilated.    Aortic sclerosis without significant aortic stenosis.  Aortic  valve mean pressure gradient is 17 mmHg.    Trace mitral regurgitation.    Trace tricuspid regurgitation with normal RVSP.      Current medications:  Scheduled Meds:[Held by provider] bisoprolol, 2.5 mg, Oral, Daily  budesonide-formoterol, 2 puff, Inhalation, BID - RT   And  tiotropium bromide monohydrate, 2 puff, Inhalation, Daily - RT  doxycycline, 100 mg, Intravenous, Q12H  empagliflozin, 25 mg, Oral, Daily  heparin (porcine), 5,000 Units, Subcutaneous, Q8H  losartan, 100 mg, Oral, Q24H   And  hydroCHLOROthiazide, 12.5 mg, Oral, Q24H  insulin lispro, 2-7 Units, Subcutaneous, 4x Daily AC & at Bedtime  ipratropium-albuterol, 3 mL, Nebulization, Q6H While Awake - RT  melatonin, 10 mg, Oral, Nightly  methylPREDNISolone sodium succinate, 60 mg, Intravenous, Q8H  montelukast, 10 mg, Oral, Nightly  pravastatin, 10 mg, Oral, Nightly  sodium chloride, 10 mL, Intravenous, Q12H      Continuous Infusions:   PRN Meds:.  acetaminophen    calcium carbonate    Calcium Replacement - Follow Nurse / BPA Driven Protocol    dextrose    dextrose    furosemide    glucagon (human recombinant)    Magnesium Standard Dose Replacement - Follow Nurse / BPA Driven Protocol    naphazoline-pheniramine    Phosphorus Replacement - Follow Nurse / BPA Driven Protocol    Potassium Replacement - Follow Nurse / BPA Driven Protocol    sodium chloride    sodium chloride    sodium chloride    traMADol    Assessment & Plan   Assessment & Plan     Active Hospital Problems    Diagnosis  POA    **Pneumonia [J18.9]  Yes      Resolved Hospital Problems   No resolved problems to display.        Brief Hospital Course to date:  Dana Rincon is a 63 y.o. female with past medical history significant for asthma, hypertension, hyperlipidemia, diabetes mellitus, history of breast cancer, sleep apnea.  Has had respiratory symptoms for at least couple of weeks now.  Was seen at urgent treatment center this past Saturday and was diagnosed with COVID and was started on  steroids.  PCP continued spit steroids after 3 days.  Patient comes to the ER for continuation and worsening of respiratory symptoms.    This patient's problems and plans were partially entered by my partner and updated as appropriate by me 24.     Cough, shortness of breath  Asthma exacerbation  Flu A+  -Respiratory symptoms started about 2 weeks ago while patient was in [place].  Evidently symptoms included fever and chills.  Patient was started on steroids after she was diagnosed with COVID last Saturday (currently patient.)  -Respiratory panel is negative for COVID here but is positive for influenza A  -CT scan which was done at the emergency room is somewhat suspicious for pneumonia.    - continue doxycycline, neb treatment, steroids  - stable on 2L NC     Diabetes mellitus 2  -Latest hemoglobin A1c which was done on 3/18/2024 was 7.9  -Currently will put patient on low-dose sliding scale with Humalog    Hypertension  - bradycardic overnight to 35  - continue holding Bisoprolol    Hyperlipidemia  History of breast cancer  Sleep apnea  -continue home meds    Expected Discharge Location and Transportation: home  Expected Discharge   Expected Discharge Date: 2024; Expected Discharge Time:      DVT prophylaxis:  Medical DVT prophylaxis orders are present.         AM-PAC 6 Clicks Score (PT): 18 (24)    CODE STATUS:   Code Status and Medical Interventions:   Ordered at: 24 1540     Code Status (Patient has no pulse and is not breathing):    CPR (Attempt to Resuscitate)     Medical Interventions (Patient has pulse or is breathing):    Full Support       Melissa Campos DO  24        Electronically signed by Melissa Campos DO at 24 1041       Melissa Campos DO at 24 1105              [facility] Medicine Services  PROGRESS NOTE    Patient Name: Dana Rincon  : 1960  MRN: [id number]    Date of Admission: 2024  Primary Care  Physician: Noreen Smith MD    Subjective   Subjective     CC:  Shortness of air    HPI:  Patient resting in bed, bradycardic overnight. She states this is chronic for her and she was asymptomatic with the bradycardia. Stable on 2L NC. States she is hopeful  to be weaned off oxygen at to go home tomorrow.      Objective   Objective     Vital Signs:   Temp:  [97.7 °F (36.5 °C)-99.6 °F (37.6 °C)] 98.8 °F (37.1 °C)  Heart Rate:  [34-61] 41  Resp:  [16-20] 18  BP: (118-166)/(43-81) 118/65  Flow (L/min):  [2-4] 2     Physical Exam:  Constitutional: No acute distress, awake, alert  HENT: NCAT, mucous membranes moist  Respiratory: decreased to auscultation bilaterally, respiratory effort normal on 2L NC  Cardiovascular: bradycardic, RR, no murmurs, rubs, or gallops  Gastrointestinal: Positive bowel sounds, soft, nontender, nondistended  Musculoskeletal: No bilateral ankle edema  Psychiatric: Appropriate affect, cooperative  Neurologic: Oriented x 3, DURANT, speech clear  Skin: No rashes    Results Reviewed:  LAB RESULTS:      Lab 05/17/24  1528 05/17/24  1117 05/17/24  1112 05/17/24  0913   WBC  --   --   --  12.23*   HEMOGLOBIN  --   --   --  13.2   HEMOGLOBIN, POC  --  13.9  --   --    HEMATOCRIT  --   --   --  41.3   HEMATOCRIT POC  --  41  --   --    PLATELETS  --   --   --  360   NEUTROS ABS  --   --   --  9.41*   IMMATURE GRANS (ABS)  --   --   --  0.09*   LYMPHS ABS  --   --   --  1.77   MONOS ABS  --   --   --  0.90   EOS ABS  --   --   --  0.04   MCV  --   --   --  86.2   CRP  --   --  2.53*  --    PROCALCITONIN  --   --  0.05  --    LACTATE 1.1  --   --   --          Lab 05/17/24  1117 05/17/24  1112   SODIUM  --  139   POTASSIUM  --  3.7   CHLORIDE  --  99   CO2  --  31.0*   ANION GAP  --  9.0   BUN  --  8   CREATININE 0.50* 0.58   EGFR 105.5 101.8   GLUCOSE  --  110*   CALCIUM  --  8.6         Lab 05/17/24  1112   TOTAL PROTEIN 6.5   ALBUMIN 3.3*   GLOBULIN 3.2   ALT (SGPT) 18   AST (SGOT) 15   BILIRUBIN 0.4    ALK PHOS 68         Lab 05/17/24  1112   PROBNP 559.5   HSTROP T 16*                 Brief Urine Lab Results  (Last result in the past 365 days)        Color   Clarity   Blood   Leuk Est   Nitrite   Protein   CREAT   Urine HCG        07/21/23 1020 Straw   Clear   Negative   Negative   Negative   Negative           07/21/23 1020             10                 Microbiology Results Abnormal       None            CT Angiogram Chest    Result Date: 5/17/2024  CT ANGIOGRAM CHEST Date of Exam: 5/17/2024 12:53 PM EDT Indication: low sats, covid, pe protocol. Comparison: None available. Technique: CTA of the chest was performed after the uneventful intravenous administration of 75 cc Isovue-370. Reconstructed coronal and sagittal images were also obtained. In addition, a 3-D volume rendered image was created for interpretation. Automated exposure control and iterative reconstruction methods were used. Findings: PULMONARY VASCULATURE: Pulmonary arteries are widely patent without evidence of embolus.  Main pulmonary artery is normal in size. No evidence of right heart strain. MEDIASTINUM:Unremarkable. Aortic and heart size are normal. No aortic dissection identified. No mass nor pericardial effusion. CORONARY ARTERIES: There is calcified atherosclerotic disease. LUNGS: There is bilateral lower lung airspace disease with bronchial wall thickening. Correlate for signs of bronchitis/pneumonia. No significant nodule nor interstitial changes. PLEURAL SPACE: No effusion, mass, nor pneumothorax. LYMPH NODES: There are no pathologically enlarged lymph nodes. UPPER ABDOMEN: Unremarkable OSSEOUS STRUCTURES: Appropriate for age with no acute process identified. There is a central chest 3.1 cm subcutaneous cyst.     Impression: Impression: 1.No evidence of pulmonary embolism. 2.Bilateral lower lung bronchitis and possible pneumonia. 3.There is a simple appearing 3.1 cm subcutaneous cyst within the anterior mid chest. Electronically  Signed: Julito Horne MD  5/17/2024 1:12 PM EDT  Workstation ID: [id number]    XR Chest 1 View    Result Date: 5/17/2024  XR CHEST 1 VW Date of Exam: 5/17/2024 9:08 AM EDT Indication: SOA triage protocol Comparison: 5/11/2024 Findings: The lungs appear adequately aerated without consolidation or mass. No pleural effusion or pneumothorax is identified. Cardiac silhouette is prominent. Pulmonary vasculature is unremarkable. No acute or suspicious osseous lesion is identified.     Impression: Impression: 1.Cardiomegaly. No acute radiographic abnormality is identified. Electronically Signed: Zane Thompson MD  5/17/2024 9:33 AM EDT  Workstation ID: [id number]     Results for orders placed during the hospital encounter of 12/07/23    Adult Transthoracic Echo Complete W/ Cont if Necessary Per Protocol    Interpretation Summary    Left ventricular systolic function is normal. Estimated left ventricular EF = 60%    Left ventricular diastolic function is consistent with (grade I) impaired relaxation.    The right ventricular cavity is borderline dilated.    Aortic sclerosis without significant aortic stenosis.  Aortic valve mean pressure gradient is 17 mmHg.    Trace mitral regurgitation.    Trace tricuspid regurgitation with normal RVSP.      Current medications:  Scheduled Meds:[Held by provider] bisoprolol, 2.5 mg, Oral, Daily  budesonide-formoterol, 2 puff, Inhalation, BID - RT   And  tiotropium bromide monohydrate, 2 puff, Inhalation, Daily - RT  doxycycline, 100 mg, Intravenous, Q12H  empagliflozin, 25 mg, Oral, Daily  heparin (porcine), 5,000 Units, Subcutaneous, Q8H  losartan, 100 mg, Oral, Q24H   And  hydroCHLOROthiazide, 12.5 mg, Oral, Q24H  insulin lispro, 2-7 Units, Subcutaneous, 4x Daily AC & at Bedtime  ipratropium-albuterol, 3 mL, Nebulization, Q6H While Awake - RT  melatonin, 10 mg, Oral, Nightly  methylPREDNISolone sodium succinate, 60 mg, Intravenous, Q8H  montelukast, 10 mg, Oral, Nightly  pravastatin, 10 mg,  Oral, Nightly  sodium chloride, 10 mL, Intravenous, Q12H      Continuous Infusions:   PRN Meds:.  acetaminophen    calcium carbonate    Calcium Replacement - Follow Nurse / BPA Driven Protocol    dextrose    dextrose    furosemide    glucagon (human recombinant)    Magnesium Standard Dose Replacement - Follow Nurse / BPA Driven Protocol    Phosphorus Replacement - Follow Nurse / BPA Driven Protocol    Potassium Replacement - Follow Nurse / BPA Driven Protocol    sodium chloride    sodium chloride    sodium chloride    traMADol    Assessment & Plan   Assessment & Plan     Active Hospital Problems    Diagnosis  POA    **Pneumonia [J18.9]  Yes      Resolved Hospital Problems   No resolved problems to display.        Brief Hospital Course to date:  Dana Rincon is a 63 y.o. female with past medical history significant for asthma, hypertension, hyperlipidemia, diabetes mellitus, history of breast cancer, sleep apnea.  Has had respiratory symptoms for at least couple of weeks now.  Was seen at urgent treatment center this past Saturday and was diagnosed with COVID and was started on steroids.  PCP continued spit steroids after 3 days.  Patient comes to the ER for continuation and worsening of respiratory symptoms.    This patient's problems and plans were partially entered by my partner and updated as appropriate by me 05/18/24.    All problems are new to me today.     Cough, shortness of breath  Asthma exacerbation  Flu A+  -Respiratory symptoms started about 2 weeks ago while patient was in [facility].  Evidently symptoms included fever and chills.  Patient was started on steroids after she was diagnosed with COVID last Saturday (currently patient.)  -Respiratory panel is negative for COVID here but is positive for influenza A  -CT scan which was done at the emergency room is somewhat suspicious for pneumonia.    - continue doxycycline, neb treatment, steroids  - stable on 2L NC     Diabetes mellitus 2  -Latest hemoglobin  A1c which was done on 3/18/2024 was 7.9  -Currently will put patient on low-dose sliding scale with Humalog    Hypertension  - bradycardic overnight  - hold Bisoprolol    Hyperlipidemia  History of breast cancer  Sleep apnea  -continue home meds    Expected Discharge Location and Transportation: home  Expected Discharge   Expected Discharge Date: 5/19/2024; Expected Discharge Time:      DVT prophylaxis:  Medical DVT prophylaxis orders are present.         AM-PAC 6 Clicks Score (PT): 21 (05/17/24 2200)    CODE STATUS:   Code Status and Medical Interventions:   Ordered at: 05/17/24 1540     Code Status (Patient has no pulse and is not breathing):    CPR (Attempt to Resuscitate)     Medical Interventions (Patient has pulse or is breathing):    Full Support       Melissa Campos DO  05/18/24        Electronically signed by Melissa Campos DO at 05/18/24 1125       Consult Notes (all)    No notes of this type exist for this encounter.

## 2024-05-20 NOTE — CONSULTS
"Diabetes Education  Assessment/Teaching    Patient Name:  Dana Rincon  YOB: 1960  MRN: [id number]  Admit Date:  5/17/2024      Assessment Date:  5/20/2024  Flowsheet Row Most Recent Value   General Information     Referral From: MD lewis   Height 149.9 cm (59\")   Height Method Stated   Weight 112 kg (248 lb)   Pregnancy Assessment    Diabetes History    What type of diabetes do you have? Type 2   Length of Diabetes Diagnosis 1 - 5 years   Current DM knowledge fair   Have you had diabetes education/teaching in the past? no   Do you test your blood sugar at home? yes   Frequency of checks \"Maybe once per month\"   Who performs the test? self   Typical readings unable to recall   Education Preferences    What areas of diabetes would you like to learn about? avoiding high blood sugar, avoiding low blood sugar, diabetes complications   Nutrition Information    Assessment Topics    Healthy Eating - Assessment Needs education   Being Active - Assessment Needs education   Problem Solving - Assessment Needs education   Reducing Risk - Assessment Needs education   Healthy Coping - Assessment Needs education   Monitoring - Assessment Needs education   DM Goals             Flowsheet Row Most Recent Value   DM Education Needs    Meter Has own   Frequency of Testing 3 times a week   Blood Glucose Target Range Ranges per ADA guidelines   Medication Oral, Other injectables, Actions, Side effects   Problem Solving Hypoglycemia, Hyperglycemia, Sick days, Signs, Symptoms, Treatment   Reducing Risks A1C testing, Blood pressure   Physical Activity None   Healthy Coping Appropriate   Discharge Plan Home   Motivation Moderate   Teaching Method Discussion, Handouts, Teach back   Patient Response Needs reinforcement        Total time spent reviewing chart, preparing education/materials, providing education at bedside, and coordinating care approx 30 minutes.    Consult for diabetes education received per teach BG glucose " "monitoring. Chart reviewed. Pt was seen at bedside today. Permission given for visit. States she takes ozempic, jardiance, glimepiride, and metformin for glycemic control.   Reviewed type 2 diabetes self-management, risk factors, and importance of blood glucose control to reduce complications. Target blood glucose readings and A1c goals per ADA were reviewed. Reviewed  latest A1c7.9%, in March, and discussed its significance. Signs, symptoms, and treatment of hyperglycemia and hypoglycemia were discussed.     Lifestyle changes such as physical activity with MD approval and healthy eating were encouraged.  Encouraged pt to monitor blood sugar at home 3-4 times per  week and to call PCP if blood sugar is trending high.  She does have a meter at home and knows how to use it. Instructed her to make sure the date on her strips is good, if not, to contact her provider for a prescription.Encouraged to keep record of blood glucose readings to take to follow up appointment with PCP.  Provided patient with copy of [employer]'s \"What is Diabetes\" handout, \"Blood Glucose Goals\" handout, and \"What is A1c\" handout.     Thank you for this consult.     Electronically signed by:  Elizaebt Turk RN [facility]  05/20/24 14:41 EDT  "

## 2024-05-20 NOTE — PLAN OF CARE
Goal Outcome Evaluation:              Outcome Evaluation: Pt on RA. VSS. Sinus bradycardia. Pt up ad sagar. Currently in chair with call light in place.

## 2024-05-20 NOTE — PROGRESS NOTES
[facility]  PROGRESS NOTE    Patient Name: Dana Rincon  : 1960  MRN: [id number]    Date of Admission: 2024  Primary Care Physician: Noreen Smith MD    Subjective   Subjective     CC:  Shortness of air    HPI:  Patient resting in bedside chair, states she is having a dry cough this morning. She has been weaned to room air while sitting and is satting about 90%.       Objective   Objective     Vital Signs:   Temp:  [97.7 °F (36.5 °C)-98.5 °F (36.9 °C)] 98 °F (36.7 °C)  Heart Rate:  [46-64] 64  Resp:  [16-18] 16  BP: (139-170)/() 170/86  Flow (L/min):  [2] 2     Physical Exam:  Constitutional: No acute distress, awake, alert  HENT: NCAT, mucous membranes moist  Respiratory: decreased to auscultation bilaterally, respiratory effort normal on RA  Cardiovascular: RRR, no murmurs, rubs, or gallops  Gastrointestinal: Positive bowel sounds, soft, nontender, nondistended  Musculoskeletal: No bilateral ankle edema  Psychiatric: Appropriate affect, cooperative  Neurologic: Oriented x 3, DURANT, speech clear  Skin: No rashes    Results Reviewed:  LAB RESULTS:      Lab 24  0813 24  0645 24  1528 24  1117 24  1112 24  0913   WBC 10.71 13.41*  --   --   --  12.23*   HEMOGLOBIN 13.0 12.8  --   --   --  13.2   HEMOGLOBIN, POC  --   --   --  13.9  --   --    HEMATOCRIT 42.9 41.5  --   --   --  41.3   HEMATOCRIT POC  --   --   --  41  --   --    PLATELETS 288 291  --   --   --  360   NEUTROS ABS  --   --   --   --   --  9.41*   IMMATURE GRANS (ABS)  --   --   --   --   --  0.09*   LYMPHS ABS  --   --   --   --   --  1.77   MONOS ABS  --   --   --   --   --  0.90   EOS ABS  --   --   --   --   --  0.04   MCV 89.4 89.8  --   --   --  86.2   CRP  --   --   --   --  2.53*  --    PROCALCITONIN  --   --   --   --  0.05  --    LACTATE  --   --  1.1  --   --   --          Lab 24  0750 24  1117 24  1112   SODIUM 137  --   139   POTASSIUM 5.1  --  3.7   CHLORIDE 98  --  99   CO2 26.0  --  31.0*   ANION GAP 13.0  --  9.0   BUN 23  --  8   CREATININE 0.84 0.50* 0.58   EGFR 78.2 105.5 101.8   GLUCOSE 184*  --  110*   CALCIUM 9.0  --  8.6         Lab 05/19/24  0750 05/17/24  1112   TOTAL PROTEIN 6.5 6.5   ALBUMIN 3.4* 3.3*   GLOBULIN 3.1 3.2   ALT (SGPT) 17 18   AST (SGOT) 11 15   BILIRUBIN 0.4 0.4   ALK PHOS 68 68         Lab 05/17/24  1112   PROBNP 559.5   HSTROP T 16*                 Brief Urine Lab Results  (Last result in the past 365 days)        Color   Clarity   Blood   Leuk Est   Nitrite   Protein   CREAT   Urine HCG        07/21/23 1020 Straw   Clear   Negative   Negative   Negative   Negative           07/21/23 1020             10                 Microbiology Results Abnormal       Procedure Component Value - Date/Time    Blood Culture - Blood, Hand, Left [[id number]]  (Normal) Collected: 05/17/24 1528    Lab Status: Preliminary result Specimen: Blood from Hand, Left Updated: 05/19/24 1700     Blood Culture No growth at 2 days    Narrative:      Less than seven (7) mL's of blood was collected.  Insufficient quantity may yield false negative results.    Blood Culture - Blood, Arm, Left [[id number]]  (Normal) Collected: 05/17/24 1528    Lab Status: Preliminary result Specimen: Blood from Arm, Left Updated: 05/19/24 1700     Blood Culture No growth at 2 days    Narrative:      Less than seven (7) mL's of blood was collected.  Insufficient quantity may yield false negative results.            No radiology results from the last 24 hrs    Results for orders placed during the hospital encounter of 12/07/23    Adult Transthoracic Echo Complete W/ Cont if Necessary Per Protocol    Interpretation Summary    Left ventricular systolic function is normal. Estimated left ventricular EF = 60%    Left ventricular diastolic function is consistent with (grade I) impaired relaxation.    The right ventricular cavity is borderline dilated.    Aortic  sclerosis without significant aortic stenosis.  Aortic valve mean pressure gradient is 17 mmHg.    Trace mitral regurgitation.    Trace tricuspid regurgitation with normal RVSP.      Current medications:  Scheduled Meds:[Held by provider] bisoprolol, 2.5 mg, Oral, Daily  budesonide-formoterol, 2 puff, Inhalation, BID - RT   And  tiotropium bromide monohydrate, 2 puff, Inhalation, Daily - RT  doxycycline, 100 mg, Intravenous, Q12H  empagliflozin, 25 mg, Oral, Daily  heparin (porcine), 5,000 Units, Subcutaneous, Q8H  losartan, 100 mg, Oral, Q24H   And  hydroCHLOROthiazide, 12.5 mg, Oral, Q24H  insulin lispro, 2-7 Units, Subcutaneous, 4x Daily AC & at Bedtime  ipratropium-albuterol, 3 mL, Nebulization, Q6H While Awake - RT  melatonin, 10 mg, Oral, Nightly  methylPREDNISolone sodium succinate, 60 mg, Intravenous, Q8H  montelukast, 10 mg, Oral, Nightly  pravastatin, 10 mg, Oral, Nightly  sodium chloride, 10 mL, Intravenous, Q12H      Continuous Infusions:   PRN Meds:.  acetaminophen    calcium carbonate    Calcium Replacement - Follow Nurse / BPA Driven Protocol    dextrose    dextrose    furosemide    glucagon (human recombinant)    Magnesium Standard Dose Replacement - Follow Nurse / BPA Driven Protocol    naphazoline-pheniramine    Phosphorus Replacement - Follow Nurse / BPA Driven Protocol    Potassium Replacement - Follow Nurse / BPA Driven Protocol    sodium chloride    sodium chloride    sodium chloride    traMADol    Assessment & Plan   Assessment & Plan     Active Hospital Problems    Diagnosis  POA    **Pneumonia [J18.9]  Yes      Resolved Hospital Problems   No resolved problems to display.        Brief Hospital Course to date:  Dana Rincon is a 63 y.o. female with past medical history significant for asthma, hypertension, hyperlipidemia, diabetes mellitus, history of breast cancer, sleep apnea.  Has had respiratory symptoms for at least couple of weeks now.  Was seen at urgent treatment center this past  Saturday and was diagnosed with COVID and was started on steroids.  PCP continued spit steroids after 3 days.  Patient comes to the ER for continuation and worsening of respiratory symptoms.    This patient's problems and plans were partially entered by my partner and updated as appropriate by me 05/20/24.     Cough, shortness of breath  Asthma exacerbation  Flu A+  -Respiratory [facility]  Evidently symptoms included fever and chills.  Patient was started on steroids after she was diagnosed with COVID last Saturday (currently patient.)  -Respiratory panel is negative for COVID here but is positive for influenza A  -CT scan which was done at the emergency room is somewhat suspicious for pneumonia.    - continue doxycycline, neb treatment, steroids- stop IV and change to PO Prednisone  - titrated to room air while sitting- will attempt to ambulate      Diabetes mellitus 2  -Latest hemoglobin A1c which was done on 3/18/2024 was 7.9  -Currently will put patient on low-dose sliding scale with Humalog    Hypertension  - bradycardia improved  - continue holding Bisoprolol    Hyperlipidemia  History of breast cancer  Sleep apnea  -continue home meds    Expected Discharge Location and Transportation: home  Expected Discharge   Expected Discharge Date: 5/21/2024; Expected Discharge Time:      DVT prophylaxis:  Medical DVT prophylaxis orders are present.         AM-PAC 6 Clicks Score (PT): 18 (05/18/24 2019)    CODE STATUS:   Code Status and Medical Interventions:   Ordered at: 05/17/24 1540     Code Status (Patient has no pulse and is not breathing):    CPR (Attempt to Resuscitate)     Medical Interventions (Patient has pulse or is breathing):    Full Support       Melissa Campos, DO  05/20/24

## 2024-05-20 NOTE — CASE MANAGEMENT/SOCIAL WORK
Discharge Planning Assessment  [facility]     Patient Name: Dana Rincon  MRN: [id number]  Today's Date: 5/20/2024    Admit Date: 5/17/2024    Plan: Home   Discharge Needs Assessment       Row Name 05/20/24 1005       Living Environment    People in Home spouse    Name(s) of People in Home Jovany Rincon,     Current Living Arrangements home    Primary Care Provided by self    Provides Primary Care For no one    Family Caregiver if Needed spouse    Quality of Family Relationships unable to assess    Able to Return to Prior Arrangements yes       Transition Planning    Patient/Family Anticipates Transition to home with family    Patient/Family Anticipated Services at Transition none    Transportation Anticipated family or friend will provide       Discharge Needs Assessment    Readmission Within the Last 30 Days no previous admission in last 30 days    Equipment Currently Used at Home cpap    Concerns to be Addressed denies needs/concerns at this time                   Discharge Plan       Row Name 05/20/24 1006       Plan    Plan Home    Patient/Family in Agreement with Plan yes    Plan Comments Spoke with patient at bedside to initiate discharge planning. Patient lives with her  in [place]. Prior to admission, was not current with any home health agencies. Patient is independent with ADL, and only has a Cpap that she uses at night. Confirmed PCP is Noreen Smith. Verified insurance is Health: [employer]. Patient states she has Rx coverage and has medications filled at [facility]. Patient states her plans are to return home at time of discharge, and that her  would transport. CM will continue to follow.    Final Discharge Disposition Code 01 - home or self-care                  Continued Care and Services - Admitted Since 5/17/2024    No active coordination exists for this encounter.       Expected Discharge Date and Time       Expected Discharge Date Expected Discharge Time    May  21, 2024            Demographic Summary       Row Name 05/20/24 1004       General Information    Arrived From home    Reason for Consult discharge planning    Preferred Language English       Contact Information    Permission Granted to Share Info With ;family/designee                   Functional Status       Row Name 05/20/24 1004       Functional Status    Usual Activity Tolerance good    Current Activity Tolerance moderate       Functional Status, IADL    Medications independent    Meal Preparation independent    Housekeeping independent    Laundry independent    Shopping independent       Employment/    Employment Status retired                   Psychosocial    No documentation.                  Abuse/Neglect    No documentation.                  Legal    No documentation.                  Substance Abuse    No documentation.                  Patient Forms    No documentation.                     Meng Hernandez RN

## 2024-05-21 ENCOUNTER — NURSE TRIAGE (OUTPATIENT)
Dept: CALL CENTER | Facility: HOSPITAL | Age: 64
End: 2024-05-21
Payer: COMMERCIAL

## 2024-05-21 ENCOUNTER — READMISSION MANAGEMENT (OUTPATIENT)
Dept: CALL CENTER | Facility: HOSPITAL | Age: 64
End: 2024-05-21
Payer: COMMERCIAL

## 2024-05-21 VITALS
DIASTOLIC BLOOD PRESSURE: 64 MMHG | HEART RATE: 59 BPM | HEIGHT: 59 IN | RESPIRATION RATE: 18 BRPM | SYSTOLIC BLOOD PRESSURE: 131 MMHG | WEIGHT: 248 LBS | BODY MASS INDEX: 50 KG/M2 | OXYGEN SATURATION: 87 % | TEMPERATURE: 98.5 F

## 2024-05-21 PROBLEM — J10.1 INFLUENZA A: Status: ACTIVE | Noted: 2024-05-21

## 2024-05-21 PROBLEM — J12.9 VIRAL PNEUMONIA: Status: ACTIVE | Noted: 2024-05-21

## 2024-05-21 PROBLEM — J18.9 PNEUMONIA: Status: RESOLVED | Noted: 2024-05-17 | Resolved: 2024-05-21

## 2024-05-21 PROBLEM — J96.01 ACUTE RESPIRATORY FAILURE WITH HYPOXIA: Status: ACTIVE | Noted: 2024-05-21

## 2024-05-21 LAB
ALBUMIN SERPL-MCNC: 3.3 G/DL (ref 3.5–5.2)
ALBUMIN/GLOB SERPL: 1.3 G/DL
ALP SERPL-CCNC: 60 U/L (ref 39–117)
ALT SERPL W P-5'-P-CCNC: 16 U/L (ref 1–33)
ANION GAP SERPL CALCULATED.3IONS-SCNC: 13 MMOL/L (ref 5–15)
AST SERPL-CCNC: 14 U/L (ref 1–32)
BILIRUB SERPL-MCNC: 0.4 MG/DL (ref 0–1.2)
BUN SERPL-MCNC: 25 MG/DL (ref 8–23)
BUN/CREAT SERPL: 32.9 (ref 7–25)
CALCIUM SPEC-SCNC: 8.8 MG/DL (ref 8.6–10.5)
CHLORIDE SERPL-SCNC: 100 MMOL/L (ref 98–107)
CO2 SERPL-SCNC: 25 MMOL/L (ref 22–29)
CREAT SERPL-MCNC: 0.76 MG/DL (ref 0.57–1)
DEPRECATED RDW RBC AUTO: 42.6 FL (ref 37–54)
EGFRCR SERPLBLD CKD-EPI 2021: 88.2 ML/MIN/1.73
ERYTHROCYTE [DISTWIDTH] IN BLOOD BY AUTOMATED COUNT: 13.9 % (ref 12.3–15.4)
GLOBULIN UR ELPH-MCNC: 2.6 GM/DL
GLUCOSE BLDC GLUCOMTR-MCNC: 149 MG/DL (ref 70–130)
GLUCOSE BLDC GLUCOMTR-MCNC: 153 MG/DL (ref 70–130)
GLUCOSE SERPL-MCNC: 169 MG/DL (ref 65–99)
HCT VFR BLD AUTO: 41.5 % (ref 34–46.6)
HGB BLD-MCNC: 13.4 G/DL (ref 12–15.9)
MCH RBC QN AUTO: 27.2 PG (ref 26.6–33)
MCHC RBC AUTO-ENTMCNC: 32.3 G/DL (ref 31.5–35.7)
MCV RBC AUTO: 84.3 FL (ref 79–97)
PLATELET # BLD AUTO: 271 10*3/MM3 (ref 140–450)
PMV BLD AUTO: 9.2 FL (ref 6–12)
POTASSIUM SERPL-SCNC: 5.3 MMOL/L (ref 3.5–5.2)
PROT SERPL-MCNC: 5.9 G/DL (ref 6–8.5)
RBC # BLD AUTO: 4.92 10*6/MM3 (ref 3.77–5.28)
SODIUM SERPL-SCNC: 138 MMOL/L (ref 136–145)
WBC NRBC COR # BLD AUTO: 9.95 10*3/MM3 (ref 3.4–10.8)

## 2024-05-21 PROCEDURE — 63710000001 INSULIN LISPRO (HUMAN) PER 5 UNITS: Performed by: INTERNAL MEDICINE

## 2024-05-21 PROCEDURE — 85027 COMPLETE CBC AUTOMATED: CPT | Performed by: HOSPITALIST

## 2024-05-21 PROCEDURE — 80053 COMPREHEN METABOLIC PANEL: CPT | Performed by: HOSPITALIST

## 2024-05-21 PROCEDURE — 82948 REAGENT STRIP/BLOOD GLUCOSE: CPT

## 2024-05-21 PROCEDURE — 99239 HOSP IP/OBS DSCHRG MGMT >30: CPT | Performed by: HOSPITALIST

## 2024-05-21 PROCEDURE — 94799 UNLISTED PULMONARY SVC/PX: CPT

## 2024-05-21 PROCEDURE — 63710000001 PREDNISONE PER 1 MG: Performed by: HOSPITALIST

## 2024-05-21 PROCEDURE — 25010000002 HEPARIN (PORCINE) PER 1000 UNITS: Performed by: INTERNAL MEDICINE

## 2024-05-21 RX ORDER — PREDNISONE 10 MG/1
TABLET ORAL
Qty: 7 TABLET | Refills: 0 | Status: SHIPPED | OUTPATIENT
Start: 2024-05-21 | End: 2024-05-26

## 2024-05-21 RX ORDER — PREDNISONE 10 MG/1
TABLET ORAL
Qty: 7 TABLET | Refills: 0 | Status: SHIPPED | OUTPATIENT
Start: 2024-05-21 | End: 2024-05-21

## 2024-05-21 RX ADMIN — INSULIN LISPRO 2 UNITS: 100 INJECTION, SOLUTION INTRAVENOUS; SUBCUTANEOUS at 12:40

## 2024-05-21 RX ADMIN — PREDNISONE 40 MG: 20 TABLET ORAL at 09:07

## 2024-05-21 RX ADMIN — IPRATROPIUM BROMIDE AND ALBUTEROL SULFATE 3 ML: 2.5; .5 SOLUTION RESPIRATORY (INHALATION) at 13:32

## 2024-05-21 RX ADMIN — IPRATROPIUM BROMIDE AND ALBUTEROL SULFATE 3 ML: 2.5; .5 SOLUTION RESPIRATORY (INHALATION) at 07:53

## 2024-05-21 RX ADMIN — LOSARTAN POTASSIUM 100 MG: 50 TABLET, FILM COATED ORAL at 09:07

## 2024-05-21 RX ADMIN — DOXYCYCLINE 100 MG: 100 CAPSULE ORAL at 09:07

## 2024-05-21 RX ADMIN — HEPARIN SODIUM 5000 UNITS: 5000 INJECTION INTRAVENOUS; SUBCUTANEOUS at 06:28

## 2024-05-21 RX ADMIN — Medication 10 ML: at 09:08

## 2024-05-21 RX ADMIN — EMPAGLIFLOZIN 25 MG: 25 TABLET, FILM COATED ORAL at 09:07

## 2024-05-21 NOTE — TELEPHONE ENCOUNTER
Reason for Disposition   [1] Prescription prescribed recently is not at pharmacy AND [2] triager has access to patient's EMR AND [3] prescription is recorded in the EMR    Additional Information   Negative: [1] Intentional drug overdose AND [2] suicidal thoughts or ideas   Negative: Drug overdose and triager unable to answer question   Negative: Caller requesting a renewal or refill of a medicine patient is currently taking   Negative: Caller requesting information unrelated to medicine   Negative: Caller requesting information about COVID-19 Vaccine   Negative: Caller requesting information about Emergency Contraception   Negative: Caller requesting information about Combined Birth Control Pills   Negative: Caller requesting information about Progestin Birth Control Pills   Negative: Caller requesting information about Post-Op pain or medicines   Negative: Caller requesting a prescription antibiotic (such as Penicillin) for Strep throat and has a positive culture result   Negative: Caller requesting a prescription anti-viral med (such as Tamiflu) and has influenza (flu) symptoms   Negative: Immunization reaction suspected   Negative: Rash while taking a medicine or within 3 days of stopping it   Negative: [1] Asthma and [2] having symptoms of asthma (cough, wheezing, etc.)   Negative: [1] Symptom of illness (e.g., headache, abdominal pain, earache, vomiting) AND [2] more than mild   Negative: Breastfeeding questions about mother's medicines and diet   Negative: MORE THAN A DOUBLE DOSE of a prescription or over-the-counter (OTC) drug   Negative: [1] DOUBLE DOSE (an extra dose or lesser amount) of prescription drug AND [2] any symptoms (e.g., dizziness, nausea, pain, sleepiness)   Negative: [1] DOUBLE DOSE (an extra dose or lesser amount) of over-the-counter (OTC) drug AND [2] any symptoms (e.g., dizziness, nausea, pain, sleepiness)   Negative: Took another person's prescription drug   Negative: [1] DOUBLE DOSE (an  "extra dose or lesser amount) of prescription drug AND [2] NO symptoms  (Exception: A double dose of antibiotics.)   Negative: Diabetes drug error or overdose (e.g., took wrong type of insulin or took extra dose)   Negative: [1] Prescription not at pharmacy AND [2] was prescribed by PCP recently (Exception: Triager has access to EMR and prescription is recorded there. Go to Home Care and confirm for pharmacy.)   Negative: [1] Pharmacy calling with prescription question AND [2] triager unable to answer question   Negative: [1] Caller has URGENT medicine question about med that PCP or specialist prescribed AND [2] triager unable to answer question   Negative: Medicine patch causing local rash or itching   Negative: [1] Caller has medicine question about med NOT prescribed by PCP AND [2] triager unable to answer question (e.g., compatibility with other med, storage)   Negative: Prescription request for new medicine (not a refill)   Negative: [1] Caller has NON-URGENT medicine question about med that PCP prescribed AND [2] triager unable to answer question   Negative: Caller wants to use a complementary or alternative medicine    Answer Assessment - Initial Assessment Questions  1. NAME of MEDICINE: \"What medicine(s) are you calling about?\"      prednisone  2. QUESTION: \"What is your question?\" (e.g., double dose of medicine, side effect)      I was not given a prescription but it says I am supposed to be on a taper down dose.  3. PRESCRIBER: \"Who prescribed the medicine?\" Reason: if prescribed by specialist, call should be referred to that group.      hospitalist  4. SYMPTOMS: \"Do you have any symptoms?\" If Yes, ask: \"What symptoms are you having?\"  \"How bad are the symptoms (e.g., mild, moderate, severe)      yes  5. PREGNANCY:  \"Is there any chance that you are pregnant?\" \"When was your last menstrual period?\"      na    Protocols used: Medication Question Call-ADULT-AH    "

## 2024-05-21 NOTE — CASE MANAGEMENT/SOCIAL WORK
Case Management Discharge Note      Final Note: Spoke with patient at bedside who states her plans are to discharge today, 5/21. Patient requiring oxygen for home. Called and spoke with Adam at [facility] who will have portable oxygen tank delivered to bedside prior to discharge. Patient voiced no further discharge needs or concerns. Family will transport.         Selected Continued Care - Admitted Since 5/17/2024       Destination    No services have been selected for the patient.                Durable Medical Equipment Coordination complete.      Service Provider Selected Services Address Phone Fax Patient Preferred    [facility] [address] [place] [address] [place], [place] [id number] [phone] --              Dialysis/Infusion    No services have been selected for the patient.                Home Medical Care    No services have been selected for the patient.                Therapy    No services have been selected for the patient.                Community Resources    No services have been selected for the patient.                Community & DME    No services have been selected for the patient.                         Final Discharge Disposition Code: 01 - home or self-care

## 2024-05-21 NOTE — DISCHARGE SUMMARY
[facility] Medicine Services  DISCHARGE SUMMARY    Patient Name: Dana Rincon  : 1960  MRN: [id number]    Date of Admission: 2024  8:44 AM  Date of Discharge:  2024  Primary Care Physician: Noreen Smith MD    Consults       No orders found from 2024 to 2024.            Hospital Course     Presenting Problem: shortness of air    Active Hospital Problems    Diagnosis  POA    Influenza A [J10.1]  Yes    Viral pneumonia [J12.9]  Yes    Acute respiratory failure with hypoxia [J96.01]  Yes      Resolved Hospital Problems    Diagnosis Date Resolved POA    **Pneumonia [J18.9] 2024 Yes          Hospital Course:  Dana Rincon is a 63 y.o. female with past medical history significant for asthma, hypertension, hyperlipidemia, diabetes mellitus, history of breast cancer, sleep apnea.  Has had respiratory symptoms for at least couple of weeks now.  Was seen at urgent treatment center this past Saturday and was diagnosed with COVID and was started on steroids.  PCP continued spit steroids after 3 days.  Patient comes to the ER for continuation and worsening of respiratory symptoms.     This patient's problems and plans were partially entered by my partner and updated as appropriate by me 24.     Cough, shortness of breath  Asthma exacerbation  Flu A+  -Respiratory symptoms started about 2 weeks ago while patient was in [place].  Evidently symptoms included fever and chills.  Patient was started on steroids after she was diagnosed with COVID last Saturday (currently patient.)  -Respiratory panel is negative for COVID here but is positive for influenza A  -CT scan which was done at the emergency room is somewhat suspicious for pneumonia.    - completed abx- no further abx needed  - completed IV steroids- discharge on short prednisone taper  - f/u with PCP within 1 week     Diabetes mellitus 2  -Latest hemoglobin A1c which was done on 3/18/2024 was  7.9  -Currently will put patient on low-dose sliding scale with Humalog     Hypertension  Bradycardia  - bradycardia improving somewhat  - continue holding Bisoprolol- f/u with PCP and Dr. Moreno      Hyperlipidemia  History of breast cancer  Sleep apnea  -continue home meds      Discharge Follow Up Recommendations for outpatient labs/diagnostics:  - continue holding Bisoprolol  - f/u with Dr. Moreno- first available  - f/u with PCP within 1 week  - 2L NC O2 arranged for home  - complete short prednisone taper as prescribed    Day of Discharge     HPI:   Patient feeling better, still intermittently requiring 2L NC to keep O2 sats above 90%. Patient ready to go home.    Review of Systems  Gen- No fevers, chills  CV- No chest pain, palpitations  Resp- + cough, +dyspnea  GI- No N/V/D, abd pain    Vital Signs:   Temp:  [97.8 °F (36.6 °C)-98.8 °F (37.1 °C)] 98.6 °F (37 °C)  Heart Rate:  [46-76] 69  Resp:  [16-18] 18  BP: (142-179)/(60-99) 142/69  Flow (L/min):  [2] 2      Physical Exam:  Constitutional: No acute distress, awake, alert  HENT: NCAT, mucous membranes moist  Respiratory: decreased to auscultation bilaterally, respiratory effort normal on 2L NC  Cardiovascular: RRR, no murmurs, rubs, or gallops  Gastrointestinal: Positive bowel sounds, soft, nontender, nondistended  Musculoskeletal: No bilateral ankle edema  Psychiatric: Appropriate affect, cooperative  Neurologic: Oriented x 3, strength symmetric in all extremities, Cranial Nerves grossly intact to confrontation, speech clear  Skin: No rashes    Pertinent  and/or Most Recent Results     LAB RESULTS:      Lab 05/21/24  0530 05/20/24  0813 05/19/24  0645 05/17/24  1528 05/17/24  1117 05/17/24  1112 05/17/24  0913   WBC 9.95 10.71 13.41*  --   --   --  12.23*   HEMOGLOBIN 13.4 13.0 12.8  --   --   --  13.2   HEMOGLOBIN, POC  --   --   --   --  13.9  --   --    HEMATOCRIT 41.5 42.9 41.5  --   --   --  41.3   HEMATOCRIT POC  --   --   --   --  41  --   --     PLATELETS 271 288 291  --   --   --  360   NEUTROS ABS  --   --   --   --   --   --  9.41*   IMMATURE GRANS (ABS)  --   --   --   --   --   --  0.09*   LYMPHS ABS  --   --   --   --   --   --  1.77   MONOS ABS  --   --   --   --   --   --  0.90   EOS ABS  --   --   --   --   --   --  0.04   MCV 84.3 89.4 89.8  --   --   --  86.2   CRP  --   --   --   --   --  2.53*  --    PROCALCITONIN  --   --   --   --   --  0.05  --    LACTATE  --   --   --  1.1  --   --   --          Lab 05/21/24  0530 05/20/24  1110 05/19/24  0750 05/17/24  1117 05/17/24  1112   SODIUM 138 137 137  --  139   POTASSIUM 5.3* 4.7 5.1  --  3.7   CHLORIDE 100 95* 98  --  99   CO2 25.0 25.0 26.0  --  31.0*   ANION GAP 13.0 17.0* 13.0  --  9.0   BUN 25* 25* 23  --  8   CREATININE 0.76 0.93 0.84 0.50* 0.58   EGFR 88.2 69.2 78.2 105.5 101.8   GLUCOSE 169* 249* 184*  --  110*   CALCIUM 8.8 9.4 9.0  --  8.6         Lab 05/21/24  0530 05/20/24  1110 05/19/24  0750 05/17/24  1112   TOTAL PROTEIN 5.9* 7.1 6.5 6.5   ALBUMIN 3.3* 3.9 3.4* 3.3*   GLOBULIN 2.6 3.2 3.1 3.2   ALT (SGPT) 16 19 17 18   AST (SGOT) 14 11 11 15   BILIRUBIN 0.4 0.5 0.4 0.4   ALK PHOS 60 79 68 68         Lab 05/17/24  1112   PROBNP 559.5   HSTROP T 16*                 Brief Urine Lab Results  (Last result in the past 365 days)        Color   Clarity   Blood   Leuk Est   Nitrite   Protein   CREAT   Urine HCG        07/21/23 1020 Straw   Clear   Negative   Negative   Negative   Negative           07/21/23 1020             10               Microbiology Results (last 10 days)       Procedure Component Value - Date/Time    Blood Culture - Blood, Hand, Left [[id number]]  (Normal) Collected: 05/17/24 1528    Lab Status: Preliminary result Specimen: Blood from Hand, Left Updated: 05/20/24 1700     Blood Culture No growth at 3 days    Narrative:      Less than seven (7) mL's of blood was collected.  Insufficient quantity may yield false negative results.    Blood Culture - Blood, Arm, Left  [[id number]]  (Normal) Collected: 05/17/24 1528    Lab Status: Preliminary result Specimen: Blood from Arm, Left Updated: 05/20/24 1700     Blood Culture No growth at 3 days    Narrative:      Less than seven (7) mL's of blood was collected.  Insufficient quantity may yield false negative results.    Respiratory Panel PCR w/COVID-19(SARS-CoV-2) [facility] In-House, NP Swab in UTM/VTM, 2 HR TAT - Swab, Nasopharynx [[id number]]  (Abnormal) Collected: 05/17/24 0957    Lab Status: Final result Specimen: Swab from Nasopharynx Updated: 05/17/24 1220     ADENOVIRUS, PCR Not Detected     Coronavirus 229E Not Detected     Coronavirus HKU1 Not Detected     Coronavirus NL63 Not Detected     Coronavirus OC43 Not Detected     COVID19 Not Detected     Human Metapneumovirus Not Detected     Human Rhinovirus/Enterovirus Not Detected     Influenza A H1 2009 PCR Detected     Influenza B PCR Not Detected     Parainfluenza Virus 1 Not Detected     Parainfluenza Virus 2 Not Detected     Parainfluenza Virus 3 Not Detected     Parainfluenza Virus 4 Not Detected     RSV, PCR Not Detected     Bordetella pertussis pcr Not Detected     Bordetella parapertussis PCR Not Detected     Chlamydophila pneumoniae PCR Not Detected     Mycoplasma pneumo by PCR Not Detected    Narrative:      In the setting of a positive respiratory panel with a viral infection PLUS a negative procalcitonin without other underlying concern for bacterial infection, consider observing off antibiotics or discontinuation of antibiotics and continue supportive care. If the respiratory panel is positive for atypical bacterial infection (Bordetella pertussis, Chlamydophila pneumoniae, or Mycoplasma pneumoniae), consider antibiotic de-escalation to target atypical bacterial infection.            CT Angiogram Chest    Result Date: 5/17/2024  CT ANGIOGRAM CHEST Date of Exam: 5/17/2024 12:53 PM EDT Indication: low sats, covid, pe protocol. Comparison: None available.  Technique: CTA of the chest was performed after the uneventful intravenous administration of 75 cc Isovue-370. Reconstructed coronal and sagittal images were also obtained. In addition, a 3-D volume rendered image was created for interpretation. Automated exposure control and iterative reconstruction methods were used. Findings: PULMONARY VASCULATURE: Pulmonary arteries are widely patent without evidence of embolus.  Main pulmonary artery is normal in size. No evidence of right heart strain. MEDIASTINUM:Unremarkable. Aortic and heart size are normal. No aortic dissection identified. No mass nor pericardial effusion. CORONARY ARTERIES: There is calcified atherosclerotic disease. LUNGS: There is bilateral lower lung airspace disease with bronchial wall thickening. Correlate for signs of bronchitis/pneumonia. No significant nodule nor interstitial changes. PLEURAL SPACE: No effusion, mass, nor pneumothorax. LYMPH NODES: There are no pathologically enlarged lymph nodes. UPPER ABDOMEN: Unremarkable OSSEOUS STRUCTURES: Appropriate for age with no acute process identified. There is a central chest 3.1 cm subcutaneous cyst.     Impression: 1.No evidence of pulmonary embolism. 2.Bilateral lower lung bronchitis and possible pneumonia. 3.There is a simple appearing 3.1 cm subcutaneous cyst within the anterior mid chest. Electronically Signed: Julito Horne MD  5/17/2024 1:12 PM EDT  Workstation ID: [id number]    XR Chest 1 View    Result Date: 5/17/2024  XR CHEST 1 VW Date of Exam: 5/17/2024 9:08 AM EDT Indication: SOA triage protocol Comparison: 5/11/2024 Findings: The lungs appear adequately aerated without consolidation or mass. No pleural effusion or pneumothorax is identified. Cardiac silhouette is prominent. Pulmonary vasculature is unremarkable. No acute or suspicious osseous lesion is identified.     Impression: 1.Cardiomegaly. No acute radiographic abnormality is identified. Electronically Signed: Zane Thompson MD   5/17/2024 9:33 AM EDT  Workstation ID: [id number]    XR Chest 2 View    Result Date: 5/11/2024  XR CHEST 2 VW Date of Exam: 5/11/2024 1:23 PM EDT Indication: wheezing, hypoxia, + covid, hx of asthma Comparison: Chest x-ray 4/12/2024 Findings: Stable cardiomegaly. The lungs are grossly clear. No pleural effusion or pneumothorax. No acute or suspicious bony findings.     Impression: No acute cardiopulmonary findings. Electronically Signed: Pilo Cantor MD  5/11/2024 1:38 PM EDT  Workstation ID: [id number]             Results for orders placed during the hospital encounter of 12/07/23    Adult Transthoracic Echo Complete W/ Cont if Necessary Per Protocol    Interpretation Summary    Left ventricular systolic function is normal. Estimated left ventricular EF = 60%    Left ventricular diastolic function is consistent with (grade I) impaired relaxation.    The right ventricular cavity is borderline dilated.    Aortic sclerosis without significant aortic stenosis.  Aortic valve mean pressure gradient is 17 mmHg.    Trace mitral regurgitation.    Trace tricuspid regurgitation with normal RVSP.      Plan for Follow-up of Pending Labs/Results: none  Pending Labs       Order Current Status    Blood Culture - Blood, Arm, Left Preliminary result    Blood Culture - Blood, Hand, Left Preliminary result          Discharge Details        Discharge Medications        Changes to Medications        Instructions Start Date   predniSONE 10 MG tablet  Commonly known as: DELTASONE  What changed: See the new instructions.   Take 2 tablets by mouth Daily for 2 days, THEN 1 tablet Daily for 3 days.   Start Date: May 21, 2024            Continue These Medications        Instructions Start Date   albuterol sulfate  (90 Base) MCG/ACT inhaler  Commonly known as: PROVENTIL HFA;VENTOLIN HFA;PROAIR HFA   2 puffs, Inhalation, Every 4 Hours PRN      CALCIUM PO   1 tablet, Oral, Daily      clotrimazole-betamethasone 1-0.05 % cream  Commonly known  as: Lotrisone   1 application , Topical, 2 Times Daily      Cyanocobalamin 2500 MCG sublingual tablet   2,500 mcg, Sublingual, Daily      empagliflozin 25 MG tablet tablet  Commonly known as: Jardiance   25 mg, Oral, Daily, For DMII, replaces Rybelsus      fexofenadine 30 MG tablet  Commonly known as: ALLEGRA   30 mg, Oral, As Needed      furosemide 20 MG tablet  Commonly known as: LASIX   20 mg, Oral, Daily PRN, swelling      glimepiride 4 MG tablet  Commonly known as: AMARYL   4 mg, Oral, Daily With Breakfast      Melatonin 10 MG capsule   1 capsule, Oral, As Needed      metFORMIN  MG 24 hr tablet  Commonly known as: GLUCOPHAGE-XR   750 mg, Oral, Daily      montelukast 10 MG tablet  Commonly known as: SINGULAIR   10 mg, Oral, Daily      olmesartan-hydrochlorothiazide 40-12.5 MG per tablet  Commonly known as: BENICAR HCT   1 tablet, Oral, Daily      potassium chloride 10 MEQ CR tablet  Commonly known as: KLOR-CON M10   10 mEq, Oral, Daily PRN      pravastatin 10 MG tablet  Commonly known as: PRAVACHOL   10 mg, Oral, Daily      Semaglutide (1 MG/DOSE) 4 MG/3ML solution pen-injector  Commonly known as: OZEMPIC   1 mg, Subcutaneous, Weekly      Trelegy Ellipta 200-62.5-25 MCG/ACT inhaler  Generic drug: Fluticasone-Umeclidin-Vilant   1 puff, Inhalation, Daily - RT      VITAMIN D3 PO   1 capsule, Oral, Daily             Stop These Medications      benzonatate 200 MG capsule  Commonly known as: TESSALON     bisoprolol 5 MG tablet  Commonly known as: ZEBeta              Allergies   Allergen Reactions    Meperidine Nausea And Vomiting         Discharge Disposition:  Home or Self Care    Diet:  Hospital:  Diet Order   Procedures    Diet: Diabetic, Cardiac; Healthy Heart (2-3 Na+); Consistent Carbohydrate; Fluid Consistency: Thin (IDDSI 0)            Activity: as tolerated      Restrictions or Other Recommendations:  none       CODE STATUS:    Code Status and Medical Interventions:   Ordered at: 05/17/24 1540     Code  Status (Patient has no pulse and is not breathing):    CPR (Attempt to Resuscitate)     Medical Interventions (Patient has pulse or is breathing):    Full Support       Future Appointments   Date Time Provider Department Center   7/8/2024  1:00 PM Waleska Uribe APRN [facility]   7/8/2024  1:30 PM CHAIR 21 [facility]   7/12/2024  8:00 AM [facility], PFT LAB 1 [facility]   7/12/2024  8:30 AM Feliberto Guerra MD [facility]   7/23/2024  9:45 AM Noreen Smith MD [facility]       Additional Instructions for the Follow-ups that You Need to Schedule       Discharge Follow-up with PCP   As directed       Currently Documented PCP:    Noreen Smith MD    PCP Phone Number:    [phone]     Follow Up Details: f/u with PCP within 7 days- please ensure scheduled        Discharge Follow-up with Specified Provider: f/u with Dr. Moreno first available appointment   As directed      To: f/u with Dr. Moreno first available appointment                      Melissa Campos DO  05/21/24      Time Spent on Discharge:  I spent  35  minutes on this discharge activity which included: face-to-face encounter with the patient, reviewing the data in the system, coordination of the care with the nursing staff as well as consultants, documentation, and entering orders.

## 2024-05-21 NOTE — TELEPHONE ENCOUNTER
Caller was discharged from [facility] today and a prescription was sent to [facility] retail pharmacy and was not brought to her room. Will send prescription to pt's regular pharmacy. predniSONE (DELTASONE) 10 MG tablet [[id number]]    Order Details  Dose, Route, Frequency: As Directed   Dispense Quantity: 7 tablet Refills: 0          Sig: Take 2 tablets by mouth Daily for 2 days, THEN 1 tablet Daily for 3 days.         Start Date: 05/21/24 End Date: 05/26/24 after 5 doses   Written Date: 05/21/24 Expiration Date: 05/21/25   Original Order: predniSONE (DELTASONE) 10 MG tablet [[id number]]   Providers    Ordering Provider and Authorizing Provider:  Melissa Campos DO  [address] [place] [address]  Phone: [phone]  NPI: [id number]        Ordering User: Poonam Palmer RN    Cosigner: Melissa Campos DO Signed: --     Pharmacy    [id number] - [place], [place] - [address] [place] [facility] [address] [place] [phone] [facility] [phone]   [address] [facility] [address]  Phone: [phone]  Fax: [phone]   Caller updated that prescription has been sent and received.

## 2024-05-21 NOTE — PLAN OF CARE
Problem: Adult Inpatient Plan of Care  Goal: Plan of Care Review  Outcome: Ongoing, Progressing  Goal: Patient-Specific Goal (Individualized)  Outcome: Ongoing, Progressing  Goal: Absence of Hospital-Acquired Illness or Injury  Outcome: Ongoing, Progressing  Intervention: Identify and Manage Fall Risk  Recent Flowsheet Documentation  Taken 5/21/2024 0400 by Romeo Echevarria RN  Safety Promotion/Fall Prevention:   activity supervised   clutter free environment maintained   nonskid shoes/slippers when out of bed   safety round/check completed  Taken 5/21/2024 0200 by Romeo Echevarria RN  Safety Promotion/Fall Prevention:   activity supervised   clutter free environment maintained   nonskid shoes/slippers when out of bed   safety round/check completed  Taken 5/21/2024 0000 by Romeo Echevarria RN  Safety Promotion/Fall Prevention:   activity supervised   clutter free environment maintained   fall prevention program maintained   nonskid shoes/slippers when out of bed   safety round/check completed  Taken 5/20/2024 2200 by Romeo Echevarria RN  Safety Promotion/Fall Prevention:   activity supervised   clutter free environment maintained   fall prevention program maintained   nonskid shoes/slippers when out of bed   safety round/check completed  Taken 5/20/2024 2000 by Romeo Echevarria RN  Safety Promotion/Fall Prevention:   activity supervised   clutter free environment maintained   nonskid shoes/slippers when out of bed   safety round/check completed  Intervention: Prevent Skin Injury  Recent Flowsheet Documentation  Taken 5/21/2024 0400 by Romeo Echevarria RN  Body Position: position changed independently  Skin Protection: adhesive use limited  Taken 5/21/2024 0200 by Romeo Echevarria RN  Body Position: position changed independently  Skin Protection: adhesive use limited  Taken 5/21/2024 0000 by Romeo Echevarria RN  Skin Protection: adhesive use limited  Taken 5/20/2024 2200 by Romeo Echevarria RN  Body Position: position changed independently  Skin  Protection: adhesive use limited  Taken 5/20/2024 2000 by Romeo Echevarria RN  Body Position: position changed independently  Skin Protection: adhesive use limited  Intervention: Prevent and Manage VTE (Venous Thromboembolism) Risk  Recent Flowsheet Documentation  Taken 5/21/2024 0400 by Romeo Echevarria RN  Activity Management: activity encouraged  Taken 5/21/2024 0200 by Romeo Echevarria RN  Activity Management: activity encouraged  Taken 5/21/2024 0000 by Romeo Echevarria RN  Activity Management: activity encouraged  Taken 5/20/2024 2200 by Romeo Echevarria RN  Activity Management: activity minimized  Taken 5/20/2024 2000 by Romeo Echevarria RN  Activity Management: activity encouraged  VTE Prevention/Management: (heparin SQ) other (see comments)  Range of Motion: active ROM (range of motion) encouraged  Intervention: Prevent Infection  Recent Flowsheet Documentation  Taken 5/21/2024 0400 by Romeo Echevarria RN  Infection Prevention: rest/sleep promoted  Taken 5/21/2024 0200 by Romeo Echevarria RN  Infection Prevention: rest/sleep promoted  Taken 5/21/2024 0000 by Romeo Echevarria RN  Infection Prevention: rest/sleep promoted  Taken 5/20/2024 2200 by Romeo Echevarria RN  Infection Prevention: rest/sleep promoted  Taken 5/20/2024 2000 by Romeo Echevarria RN  Infection Prevention: rest/sleep promoted  Goal: Optimal Comfort and Wellbeing  Outcome: Ongoing, Progressing  Intervention: Provide Person-Centered Care  Recent Flowsheet Documentation  Taken 5/20/2024 2000 by Romeo Echevarria RN  Trust Relationship/Rapport:   care explained   choices provided   questions answered   thoughts/feelings acknowledged  Goal: Readiness for Transition of Care  Outcome: Ongoing, Progressing     Problem: Asthma Comorbidity  Goal: Maintenance of Asthma Control  Outcome: Ongoing, Progressing  Intervention: Maintain Asthma Symptom Control  Recent Flowsheet Documentation  Taken 5/20/2024 2000 by Romeo Echevarria RN  Medication Review/Management: medications reviewed     Problem: Diabetes  Comorbidity  Goal: Blood Glucose Level Within Targeted Range  Outcome: Ongoing, Progressing  Intervention: Monitor and Manage Glycemia  Recent Flowsheet Documentation  Taken 5/20/2024 2000 by Romeo Echevarria RN  Glycemic Management: blood glucose monitored     Problem: Obstructive Sleep Apnea Risk or Actual Comorbidity Management  Goal: Unobstructed Breathing During Sleep  Outcome: Ongoing, Progressing   Goal Outcome Evaluation:

## 2024-05-21 NOTE — OUTREACH NOTE
Prep Survey      Flowsheet Row Responses   [facility] patient discharged from? [place]   Is LACE score < 7 ? No   Eligibility [facility]   Date of Admission 05/17/24   Date of Discharge 05/21/24   Discharge Disposition Home or Self Care   Discharge diagnosis Pneumonia  [Asthma exacerbation, Flu A+]   Does the patient have one of the following disease processes/diagnoses(primary or secondary)? Pneumonia   Does the patient have Home health ordered? No   Is there a DME ordered? Yes   What DME was ordered? Oxygen--[employer]   Medication alerts for this patient see AVS   Prep survey completed? Yes            Vanessa SERRANO - Registered Nurse

## 2024-05-22 ENCOUNTER — TRANSITIONAL CARE MANAGEMENT TELEPHONE ENCOUNTER (OUTPATIENT)
Dept: CALL CENTER | Facility: HOSPITAL | Age: 64
End: 2024-05-22
Payer: COMMERCIAL

## 2024-05-22 LAB
BACTERIA SPEC AEROBE CULT: NORMAL
BACTERIA SPEC AEROBE CULT: NORMAL

## 2024-05-22 NOTE — OUTREACH NOTE
Call Center TCM Note      Flowsheet Row Responses   [facility] patient discharged from? [place]   Does the patient have one of the following disease processes/diagnoses(primary or secondary)? Pneumonia   TCM attempt successful? No   Unsuccessful attempts Attempt 2            Sharron Morales RN    5/22/2024, 15:49 EDT

## 2024-05-22 NOTE — PAYOR COMM NOTE
"Ref# [id number]   Still pending  5/17/24 Admission  5/21/24 Discharge Date    Utilization Review  Phone [phone]  Fax [phone]    [facility]  [address] [place] [address]       Arvin Leon (63 y.o. Female)       Date of Birth   1960    Social Security Number       Address   [address] [address]    Home Phone   [phone]    MRN   [id number]       [facility]   [facility]    Marital Status                               Admission Date   5/17/24    Admission Type   Emergency    Admitting Provider   Melissa Campos,     Attending Provider       Department, Room/Bed   [facility] 4H, S478/1       Discharge Date   5/21/2024    Discharge Disposition   Home or Self Care    Discharge Destination                                 Attending Provider: (none)   Allergies: Meperidine    Isolation: None   Infection: COVID (confirmed) (05/11/24), Influenza (05/17/24)   Code Status: Prior    Ht: 149.9 cm (59\")   Wt: 112 kg (248 lb)    Admission Cmt: None   Principal Problem: Pneumonia [J18.9]                   Active Insurance as of 5/17/2024       Primary Coverage       Payor Plan Insurance Group Employer/Plan Group    [id number]       Payor Plan Address Payor Plan Phone Number Payor Plan Fax Number Effective Dates    [address]   1/11/2016 - None Entered    [place] [address]         Subscriber Name Subscriber Birth Date Member ID       ARVIN LEON 1960 [id number]                     Emergency Contacts        (Rel.) Home Phone Work Phone Mobile Phone    Jovany Leon (Spouse) [phone] -- [phone]              No current facility-administered medications for this encounter.     Current Outpatient Medications   Medication Sig Dispense Refill    albuterol sulfate  (90 Base) MCG/ACT inhaler Inhale 2 puffs Every 4 (Four) Hours As Needed for Wheezing. 18 g 5    CALCIUM PO Take " 1 tablet by mouth Daily.      Cholecalciferol (VITAMIN D3 PO) Take 1 capsule by mouth Daily.      clotrimazole-betamethasone (Lotrisone) 1-0.05 % cream Apply 1 application  topically to the appropriate area as directed 2 (Two) Times a Day. 45 g 3    Cyanocobalamin 2500 MCG sublingual tablet Place 2,500 mcg under the tongue Daily. (Patient taking differently: Place 2,500 mcg under the tongue As Needed.) 90 each 3    empagliflozin (Jardiance) 25 MG tablet tablet Take 1 tablet by mouth Daily. For DMII, replaces Rybelsus 90 tablet 3    fexofenadine (ALLEGRA) 30 MG tablet Take 1 tablet by mouth As Needed.      Fluticasone-Umeclidin-Vilant (Trelegy Ellipta) 200-62.5-25 MCG/ACT inhaler Inhale 1 puff Daily. 1 each 11    furosemide (LASIX) 20 MG tablet Take 1 tablet by mouth Daily As Needed (edema). swelling 90 tablet 1    glimepiride (AMARYL) 4 MG tablet TAKE 1 TABLET BY MOUTH DAILY WITH BREAKFAST 90 tablet 1    Melatonin 10 MG capsule Take 1 capsule by mouth As Needed.      metFORMIN ER (GLUCOPHAGE-XR) 750 MG 24 hr tablet Take 1 tablet by mouth Daily. 90 tablet 3    montelukast (SINGULAIR) 10 MG tablet Take 1 tablet by mouth Daily. 90 tablet 3    olmesartan-hydrochlorothiazide (BENICAR HCT) 40-12.5 MG per tablet Take 1 tablet by mouth Daily. 90 tablet 3    potassium chloride (KLOR-CON M10) 10 MEQ CR tablet Take 1 tablet by mouth Daily As Needed (with lasix). 90 tablet 1    pravastatin (PRAVACHOL) 10 MG tablet Take 1 tablet by mouth Daily. 90 tablet 3    predniSONE (DELTASONE) 10 MG tablet Take 2 tablets by mouth Daily for 2 days, THEN 1 tablet Daily for 3 days. 7 tablet 0    Semaglutide, 1 MG/DOSE, (OZEMPIC) 4 MG/3ML solution pen-injector Inject 1 mg under the skin into the appropriate area as directed 1 (One) Time Per Week. 3 mL 5     Lab Results (last 72 hours)       Procedure Component Value Units Date/Time    POC Glucose Once [[id number]]  (Abnormal) Collected: 05/21/24 1116    Specimen: Blood Updated: 05/21/24 1128      Glucose 153 mg/dL     POC Glucose Once [[id number]]  (Abnormal) Collected: 05/21/24 0708    Specimen: Blood Updated: 05/21/24 0736     Glucose 149 mg/dL     Comprehensive Metabolic Panel [[id number]]  (Abnormal) Collected: 05/21/24 0530    Specimen: Blood Updated: 05/21/24 0617     Glucose 169 mg/dL      BUN 25 mg/dL      Creatinine 0.76 mg/dL      Sodium 138 mmol/L      Potassium 5.3 mmol/L      Comment: Slight hemolysis detected by analyzer. Result may be falsely elevated.        Chloride 100 mmol/L      CO2 25.0 mmol/L      Calcium 8.8 mg/dL      Total Protein 5.9 g/dL      Albumin 3.3 g/dL      ALT (SGPT) 16 U/L      AST (SGOT) 14 U/L      Alkaline Phosphatase 60 U/L      Total Bilirubin 0.4 mg/dL      Globulin 2.6 gm/dL      Comment: Calculated Result        A/G Ratio 1.3 g/dL      BUN/Creatinine Ratio 32.9     Anion Gap 13.0 mmol/L      eGFR 88.2 mL/min/1.73     Narrative:      GFR Normal >60  Chronic Kidney Disease <60  Kidney Failure <15      CBC (No Diff) [[id number]]  (Normal) Collected: 05/21/24 0530    Specimen: Blood Updated: 05/21/24 0549     WBC 9.95 10*3/mm3      RBC 4.92 10*6/mm3      Hemoglobin 13.4 g/dL      Hematocrit 41.5 %      MCV 84.3 fL      MCH 27.2 pg      MCHC 32.3 g/dL      RDW 13.9 %      RDW-SD 42.6 fl      MPV 9.2 fL      Platelets 271 10*3/mm3     POC Glucose Once [[id number]]  (Abnormal) Collected: 05/20/24 2021    Specimen: Blood Updated: 05/20/24 2037     Glucose 239 mg/dL     POC Glucose Once [[id number]]  (Abnormal) Collected: 05/20/24 1631    Specimen: Blood Updated: 05/20/24 1635     Glucose 204 mg/dL     Comprehensive Metabolic Panel [[id number]]  (Abnormal) Collected: 05/20/24 1110    Specimen: Blood Updated: 05/20/24 1212     Glucose 249 mg/dL      BUN 25 mg/dL      Creatinine 0.93 mg/dL      Sodium 137 mmol/L      Potassium 4.7 mmol/L      Comment: Slight hemolysis detected by analyzer. Result may be falsely elevated.        Chloride 95 mmol/L      CO2 25.0 mmol/L      Calcium  9.4 mg/dL      Total Protein 7.1 g/dL      Albumin 3.9 g/dL      ALT (SGPT) 19 U/L      AST (SGOT) 11 U/L      Alkaline Phosphatase 79 U/L      Total Bilirubin 0.5 mg/dL      Globulin 3.2 gm/dL      Comment: Calculated Result        A/G Ratio 1.2 g/dL      BUN/Creatinine Ratio 26.9     Anion Gap 17.0 mmol/L      eGFR 69.2 mL/min/1.73     Narrative:      GFR Normal >60  Chronic Kidney Disease <60  Kidney Failure <15      POC Glucose Once [[id number]]  (Abnormal) Collected: 05/20/24 1106    Specimen: Blood Updated: 05/20/24 1139     Glucose 233 mg/dL     CBC (No Diff) [[id number]]  (Abnormal) Collected: 05/20/24 0813    Specimen: Blood Updated: 05/20/24 0901     WBC 10.71 10*3/mm3      RBC 4.80 10*6/mm3      Hemoglobin 13.0 g/dL      Hematocrit 42.9 %      MCV 89.4 fL      MCH 27.1 pg      MCHC 30.3 g/dL      RDW 13.9 %      RDW-SD 45.2 fl      MPV 9.6 fL      Platelets 288 10*3/mm3     POC Glucose Once [[id number]]  (Abnormal) Collected: 05/20/24 0706    Specimen: Blood Updated: 05/20/24 0718     Glucose 190 mg/dL     POC Glucose Once [[id number]]  (Abnormal) Collected: 05/19/24 2044    Specimen: Blood Updated: 05/19/24 2047     Glucose 259 mg/dL     POC Glucose Once [[id number]]  (Abnormal) Collected: 05/19/24 1633    Specimen: Blood Updated: 05/19/24 1635     Glucose 256 mg/dL     POC Glucose Once [[id number]]  (Abnormal) Collected: 05/19/24 1137    Specimen: Blood Updated: 05/19/24 1138     Glucose 197 mg/dL     Comprehensive Metabolic Panel [[id number]]  (Abnormal) Collected: 05/19/24 0750    Specimen: Blood Updated: 05/19/24 0833     Glucose 184 mg/dL      BUN 23 mg/dL      Creatinine 0.84 mg/dL      Sodium 137 mmol/L      Potassium 5.1 mmol/L      Comment: Slight hemolysis detected by analyzer. Result may be falsely elevated.        Chloride 98 mmol/L      CO2 26.0 mmol/L      Calcium 9.0 mg/dL      Total Protein 6.5 g/dL      Albumin 3.4 g/dL      ALT (SGPT) 17 U/L      AST (SGOT) 11 U/L      Alkaline Phosphatase 68  U/L      Total Bilirubin 0.4 mg/dL      Globulin 3.1 gm/dL      Comment: Calculated Result        A/G Ratio 1.1 g/dL      BUN/Creatinine Ratio 27.4     Anion Gap 13.0 mmol/L      eGFR 78.2 mL/min/1.73     Narrative:      GFR Normal >60  Chronic Kidney Disease <60  Kidney Failure <15      CBC (No Diff) [[id number]]  (Abnormal) Collected: 24 0645    Specimen: Blood Updated: 24 07     WBC 13.41 10*3/mm3      RBC 4.62 10*6/mm3      Hemoglobin 12.8 g/dL      Hematocrit 41.5 %      MCV 89.8 fL      MCH 27.7 pg      MCHC 30.8 g/dL      RDW 13.9 %      RDW-SD 45.7 fl      MPV 9.2 fL      Platelets 291 10*3/mm3     POC Glucose Once [[id number]]  (Abnormal) Collected: 24 0716    Specimen: Blood Updated: 24 0719     Glucose 171 mg/dL     POC Glucose Once [[id number]]  (Abnormal) Collected: 24    Specimen: Blood Updated: 24 2047     Glucose 207 mg/dL     POC Glucose Once [[id number]]  (Abnormal) Collected: 24 1601    Specimen: Blood Updated: 24 1603     Glucose 165 mg/dL           Imaging Results (Last 72 Hours)       ** No results found for the last 72 hours. **             Physician Progress Notes (last 72 hours)        Melissa Campos DO at 24 0949              [facility] Medicine Services  PROGRESS NOTE    Patient Name: Dana Rincon  : 1960  MRN: [id number]    Date of Admission: 2024  Primary Care Physician: Noreen Smith MD    Subjective   Subjective     CC:  Shortness of air    HPI:  Patient resting in bedside chair, states she is having a dry cough this morning. She has been weaned to room air while sitting and is satting about 90%.       Objective   Objective     Vital Signs:   Temp:  [97.7 °F (36.5 °C)-98.5 °F (36.9 °C)] 98 °F (36.7 °C)  Heart Rate:  [46-64] 64  Resp:  [16-18] 16  BP: (139-170)/() 170/86  Flow (L/min):  [2] 2     Physical Exam:  Constitutional: No acute distress, awake, alert  HENT: NCAT, mucous  membranes moist  Respiratory: decreased to auscultation bilaterally, respiratory effort normal on RA  Cardiovascular: RRR, no murmurs, rubs, or gallops  Gastrointestinal: Positive bowel sounds, soft, nontender, nondistended  Musculoskeletal: No bilateral ankle edema  Psychiatric: Appropriate affect, cooperative  Neurologic: Oriented x 3, DURANT, speech clear  Skin: No rashes    Results Reviewed:  LAB RESULTS:      Lab 05/20/24  0813 05/19/24  0645 05/17/24  1528 05/17/24  1117 05/17/24  1112 05/17/24  0913   WBC 10.71 13.41*  --   --   --  12.23*   HEMOGLOBIN 13.0 12.8  --   --   --  13.2   HEMOGLOBIN, POC  --   --   --  13.9  --   --    HEMATOCRIT 42.9 41.5  --   --   --  41.3   HEMATOCRIT POC  --   --   --  41  --   --    PLATELETS 288 291  --   --   --  360   NEUTROS ABS  --   --   --   --   --  9.41*   IMMATURE GRANS (ABS)  --   --   --   --   --  0.09*   LYMPHS ABS  --   --   --   --   --  1.77   MONOS ABS  --   --   --   --   --  0.90   EOS ABS  --   --   --   --   --  0.04   MCV 89.4 89.8  --   --   --  86.2   CRP  --   --   --   --  2.53*  --    PROCALCITONIN  --   --   --   --  0.05  --    LACTATE  --   --  1.1  --   --   --          Lab 05/19/24  0750 05/17/24  1117 05/17/24  1112   SODIUM 137  --  139   POTASSIUM 5.1  --  3.7   CHLORIDE 98  --  99   CO2 26.0  --  31.0*   ANION GAP 13.0  --  9.0   BUN 23  --  8   CREATININE 0.84 0.50* 0.58   EGFR 78.2 105.5 101.8   GLUCOSE 184*  --  110*   CALCIUM 9.0  --  8.6         Lab 05/19/24  0750 05/17/24  1112   TOTAL PROTEIN 6.5 6.5   ALBUMIN 3.4* 3.3*   GLOBULIN 3.1 3.2   ALT (SGPT) 17 18   AST (SGOT) 11 15   BILIRUBIN 0.4 0.4   ALK PHOS 68 68         Lab 05/17/24  1112   PROBNP 559.5   HSTROP T 16*                 Brief Urine Lab Results  (Last result in the past 365 days)        Color   Clarity   Blood   Leuk Est   Nitrite   Protein   CREAT   Urine HCG        07/21/23 1020 Straw   Clear   Negative   Negative   Negative   Negative           07/21/23 1020              10                 Microbiology Results Abnormal       Procedure Component Value - Date/Time    Blood Culture - Blood, Hand, Left [[id number]]  (Normal) Collected: 05/17/24 1528    Lab Status: Preliminary result Specimen: Blood from Hand, Left Updated: 05/19/24 1700     Blood Culture No growth at 2 days    Narrative:      Less than seven (7) mL's of blood was collected.  Insufficient quantity may yield false negative results.    Blood Culture - Blood, Arm, Left [[id number]]  (Normal) Collected: 05/17/24 1528    Lab Status: Preliminary result Specimen: Blood from Arm, Left Updated: 05/19/24 1700     Blood Culture No growth at 2 days    Narrative:      Less than seven (7) mL's of blood was collected.  Insufficient quantity may yield false negative results.            No radiology results from the last 24 hrs    Results for orders placed during the hospital encounter of 12/07/23    Adult Transthoracic Echo Complete W/ Cont if Necessary Per Protocol    Interpretation Summary    Left ventricular systolic function is normal. Estimated left ventricular EF = 60%    Left ventricular diastolic function is consistent with (grade I) impaired relaxation.    The right ventricular cavity is borderline dilated.    Aortic sclerosis without significant aortic stenosis.  Aortic valve mean pressure gradient is 17 mmHg.    Trace mitral regurgitation.    Trace tricuspid regurgitation with normal RVSP.      Current medications:  Scheduled Meds:[Held by provider] bisoprolol, 2.5 mg, Oral, Daily  budesonide-formoterol, 2 puff, Inhalation, BID - RT   And  tiotropium bromide monohydrate, 2 puff, Inhalation, Daily - RT  doxycycline, 100 mg, Intravenous, Q12H  empagliflozin, 25 mg, Oral, Daily  heparin (porcine), 5,000 Units, Subcutaneous, Q8H  losartan, 100 mg, Oral, Q24H   And  hydroCHLOROthiazide, 12.5 mg, Oral, Q24H  insulin lispro, 2-7 Units, Subcutaneous, 4x Daily AC & at Bedtime  ipratropium-albuterol, 3 mL, Nebulization, Q6H While Awake  - RT  melatonin, 10 mg, Oral, Nightly  methylPREDNISolone sodium succinate, 60 mg, Intravenous, Q8H  montelukast, 10 mg, Oral, Nightly  pravastatin, 10 mg, Oral, Nightly  sodium chloride, 10 mL, Intravenous, Q12H      Continuous Infusions:   PRN Meds:.  acetaminophen    calcium carbonate    Calcium Replacement - Follow Nurse / BPA Driven Protocol    dextrose    dextrose    furosemide    glucagon (human recombinant)    Magnesium Standard Dose Replacement - Follow Nurse / BPA Driven Protocol    naphazoline-pheniramine    Phosphorus Replacement - Follow Nurse / BPA Driven Protocol    Potassium Replacement - Follow Nurse / BPA Driven Protocol    sodium chloride    sodium chloride    sodium chloride    traMADol    Assessment & Plan   Assessment & Plan     Active Hospital Problems    Diagnosis  POA    **Pneumonia [J18.9]  Yes      Resolved Hospital Problems   No resolved problems to display.        Brief Hospital Course to date:  Dana Rincon is a 63 y.o. female with past medical history significant for asthma, hypertension, hyperlipidemia, diabetes mellitus, history of breast cancer, sleep apnea.  Has had respiratory symptoms for at least couple of weeks now.  Was seen at urgent treatment center this past Saturday and was diagnosed with COVID and was started on steroids.  PCP continued spit steroids after 3 days.  Patient comes to the ER for continuation and worsening of respiratory symptoms.    This patient's problems and plans were partially entered by my partner and updated as appropriate by me 05/20/24.     Cough, shortness of breath  Asthma exacerbation  Flu A+  -Respiratory symptoms started about 2 weeks ago while patient was in [facility].  Evidently symptoms included fever and chills.  Patient was started on steroids after she was diagnosed with COVID last Saturday (currently patient.)  -Respiratory panel is negative for COVID here but is positive for influenza A  -CT scan which was done at the emergency room is  somewhat suspicious for pneumonia.    - continue doxycycline, neb treatment, steroids- stop IV and change to PO Prednisone  - titrated to room air while sitting- will attempt to ambulate      Diabetes mellitus 2  -Latest hemoglobin A1c which was done on 3/18/2024 was 7.9  -Currently will put patient on low-dose sliding scale with Humalog    Hypertension  - bradycardia improved  - continue holding Bisoprolol    Hyperlipidemia  History of breast cancer  Sleep apnea  -continue home meds    Expected Discharge Location and Transportation: home  Expected Discharge   Expected Discharge Date: 2024; Expected Discharge Time:      DVT prophylaxis:  Medical DVT prophylaxis orders are present.         AM-PAC 6 Clicks Score (PT): 18 (24)    CODE STATUS:   Code Status and Medical Interventions:   Ordered at: 24 1540     Code Status (Patient has no pulse and is not breathing):    CPR (Attempt to Resuscitate)     Medical Interventions (Patient has pulse or is breathing):    Full Support       Melissa Campos DO  24        Electronically signed by Melissa Campos DO at 24 1003       Consult Notes (last 72 hours)  Notes from 24 1226 through 24 1226   No notes of this type exist for this encounter.          Discharge Summary        Melissa Campos DO at 24 0951              [facility]  DISCHARGE SUMMARY    Patient Name: Dana Rincon  : 1960  MRN: [id number]    Date of Admission: 2024  8:44 AM  Date of Discharge:  2024  Primary Care Physician: Noreen Smith MD    Consults       No orders found from 2024 to 2024.            Hospital Course     Presenting Problem: shortness of air    Active Hospital Problems    Diagnosis  POA    Influenza A [J10.1]  Yes    Viral pneumonia [J12.9]  Yes    Acute respiratory failure with hypoxia [J96.01]  Yes      Resolved Hospital Problems    Diagnosis Date Resolved POA     **Pneumonia [J18.9] 05/21/2024 Yes          Hospital Course:  Dana Rincon is a 63 y.o. female with past medical history significant for asthma, hypertension, hyperlipidemia, diabetes mellitus, history of breast cancer, sleep apnea.  Has had respiratory symptoms for at least couple of weeks now.  Was seen at urgent treatment center this past Saturday and was diagnosed with COVID and was started on steroids.  PCP continued spit steroids after 3 days.  Patient comes to the ER for continuation and worsening of respiratory symptoms.     This patient's problems and plans were partially entered by my partner and updated as appropriate by me 05/21/24.     Cough, shortness of breath  Asthma exacerbation  Flu A+  -Respiratory symptoms started about 2 weeks ago while patient was in [place].  Evidently symptoms included fever and chills.  Patient was started on steroids after she was diagnosed with COVID last Saturday (currently patient.)  -Respiratory panel is negative for COVID here but is positive for influenza A  -CT scan which was done at the emergency room is somewhat suspicious for pneumonia.    - completed abx- no further abx needed  - completed IV steroids- discharge on short prednisone taper  - f/u with PCP within 1 week     Diabetes mellitus 2  -Latest hemoglobin A1c which was done on 3/18/2024 was 7.9  -Currently will put patient on low-dose sliding scale with Humalog     Hypertension  Bradycardia  - bradycardia improving somewhat  - continue holding Bisoprolol- f/u with PCP and Dr. Moreno      Hyperlipidemia  History of breast cancer  Sleep apnea  -continue home meds      Discharge Follow Up Recommendations for outpatient labs/diagnostics:  - continue holding Bisoprolol  - f/u with Dr. Moreno- first available  - f/u with PCP within 1 week  - 2L NC O2 arranged for home  - complete short prednisone taper as prescribed    Day of Discharge     HPI:   Patient feeling better, still intermittently requiring 2L NC to  keep O2 sats above 90%. Patient ready to go home.    Review of Systems  Gen- No fevers, chills  CV- No chest pain, palpitations  Resp- + cough, +dyspnea  GI- No N/V/D, abd pain    Vital Signs:   Temp:  [97.8 °F (36.6 °C)-98.8 °F (37.1 °C)] 98.6 °F (37 °C)  Heart Rate:  [46-76] 69  Resp:  [16-18] 18  BP: (142-179)/(60-99) 142/69  Flow (L/min):  [2] 2      Physical Exam:  Constitutional: No acute distress, awake, alert  HENT: NCAT, mucous membranes moist  Respiratory: decreased to auscultation bilaterally, respiratory effort normal on 2L NC  Cardiovascular: RRR, no murmurs, rubs, or gallops  Gastrointestinal: Positive bowel sounds, soft, nontender, nondistended  Musculoskeletal: No bilateral ankle edema  Psychiatric: Appropriate affect, cooperative  Neurologic: Oriented x 3, strength symmetric in all extremities, Cranial Nerves grossly intact to confrontation, speech clear  Skin: No rashes    Pertinent  and/or Most Recent Results     LAB RESULTS:      Lab 05/21/24  0530 05/20/24  0813 05/19/24  0645 05/17/24  1528 05/17/24  1117 05/17/24  1112 05/17/24  0913   WBC 9.95 10.71 13.41*  --   --   --  12.23*   HEMOGLOBIN 13.4 13.0 12.8  --   --   --  13.2   HEMOGLOBIN, POC  --   --   --   --  13.9  --   --    HEMATOCRIT 41.5 42.9 41.5  --   --   --  41.3   HEMATOCRIT POC  --   --   --   --  41  --   --    PLATELETS 271 288 291  --   --   --  360   NEUTROS ABS  --   --   --   --   --   --  9.41*   IMMATURE GRANS (ABS)  --   --   --   --   --   --  0.09*   LYMPHS ABS  --   --   --   --   --   --  1.77   MONOS ABS  --   --   --   --   --   --  0.90   EOS ABS  --   --   --   --   --   --  0.04   MCV 84.3 89.4 89.8  --   --   --  86.2   CRP  --   --   --   --   --  2.53*  --    PROCALCITONIN  --   --   --   --   --  0.05  --    LACTATE  --   --   --  1.1  --   --   --          Lab 05/21/24  0530 05/20/24  1110 05/19/24  0750 05/17/24  1117 05/17/24  1112   SODIUM 138 137 137  --  139   POTASSIUM 5.3* 4.7 5.1  --  3.7   CHLORIDE  100 95* 98  --  99   CO2 25.0 25.0 26.0  --  31.0*   ANION GAP 13.0 17.0* 13.0  --  9.0   BUN 25* 25* 23  --  8   CREATININE 0.76 0.93 0.84 0.50* 0.58   EGFR 88.2 69.2 78.2 105.5 101.8   GLUCOSE 169* 249* 184*  --  110*   CALCIUM 8.8 9.4 9.0  --  8.6         Lab 05/21/24  0530 05/20/24  1110 05/19/24  0750 05/17/24  1112   TOTAL PROTEIN 5.9* 7.1 6.5 6.5   ALBUMIN 3.3* 3.9 3.4* 3.3*   GLOBULIN 2.6 3.2 3.1 3.2   ALT (SGPT) 16 19 17 18   AST (SGOT) 14 11 11 15   BILIRUBIN 0.4 0.5 0.4 0.4   ALK PHOS 60 79 68 68         Lab 05/17/24  1112   PROBNP 559.5   HSTROP T 16*                 Brief Urine Lab Results  (Last result in the past 365 days)        Color   Clarity   Blood   Leuk Est   Nitrite   Protein   CREAT   Urine HCG        07/21/23 1020 Straw   Clear   Negative   Negative   Negative   Negative           07/21/23 1020             10               Microbiology Results (last 10 days)       Procedure Component Value - Date/Time    Blood Culture - Blood, Hand, Left [[id number]]  (Normal) Collected: 05/17/24 1528    Lab Status: Preliminary result Specimen: Blood from Hand, Left Updated: 05/20/24 1700     Blood Culture No growth at 3 days    Narrative:      Less than seven (7) mL's of blood was collected.  Insufficient quantity may yield false negative results.    Blood Culture - Blood, Arm, Left [[id number]]  (Normal) Collected: 05/17/24 1528    Lab Status: Preliminary result Specimen: Blood from Arm, Left Updated: 05/20/24 1700     Blood Culture No growth at 3 days    Narrative:      Less than seven (7) mL's of blood was collected.  Insufficient quantity may yield false negative results.    Respiratory Panel PCR w/COVID-19(SARS-CoV-2) TANNER/FRANCK/BALJINDER/PAD/COR/EFE In-House, NP Swab in UTM/VTM, 2 HR TAT - Swab, Nasopharynx [[id number]]  (Abnormal) Collected: 05/17/24 0957    Lab Status: Final result Specimen: Swab from Nasopharynx Updated: 05/17/24 1220     ADENOVIRUS, PCR Not Detected     Coronavirus 229E Not Detected      Coronavirus HKU1 Not Detected     Coronavirus NL63 Not Detected     Coronavirus OC43 Not Detected     COVID19 Not Detected     Human Metapneumovirus Not Detected     Human Rhinovirus/Enterovirus Not Detected     Influenza A H1 2009 PCR Detected     Influenza B PCR Not Detected     Parainfluenza Virus 1 Not Detected     Parainfluenza Virus 2 Not Detected     Parainfluenza Virus 3 Not Detected     Parainfluenza Virus 4 Not Detected     RSV, PCR Not Detected     Bordetella pertussis pcr Not Detected     Bordetella parapertussis PCR Not Detected     Chlamydophila pneumoniae PCR Not Detected     Mycoplasma pneumo by PCR Not Detected    Narrative:      In the setting of a positive respiratory panel with a viral infection PLUS a negative procalcitonin without other underlying concern for bacterial infection, consider observing off antibiotics or discontinuation of antibiotics and continue supportive care. If the respiratory panel is positive for atypical bacterial infection (Bordetella pertussis, Chlamydophila pneumoniae, or Mycoplasma pneumoniae), consider antibiotic de-escalation to target atypical bacterial infection.            CT Angiogram Chest    Result Date: 5/17/2024  CT ANGIOGRAM CHEST Date of Exam: 5/17/2024 12:53 PM EDT Indication: low sats, covid, pe protocol. Comparison: None available. Technique: CTA of the chest was performed after the uneventful intravenous administration of 75 cc Isovue-370. Reconstructed coronal and sagittal images were also obtained. In addition, a 3-D volume rendered image was created for interpretation. Automated exposure control and iterative reconstruction methods were used. Findings: PULMONARY VASCULATURE: Pulmonary arteries are widely patent without evidence of embolus.  Main pulmonary artery is normal in size. No evidence of right heart strain. MEDIASTINUM:Unremarkable. Aortic and heart size are normal. No aortic dissection identified. No mass nor pericardial effusion. CORONARY  ARTERIES: There is calcified atherosclerotic disease. LUNGS: There is bilateral lower lung airspace disease with bronchial wall thickening. Correlate for signs of bronchitis/pneumonia. No significant nodule nor interstitial changes. PLEURAL SPACE: No effusion, mass, nor pneumothorax. LYMPH NODES: There are no pathologically enlarged lymph nodes. UPPER ABDOMEN: Unremarkable OSSEOUS STRUCTURES: Appropriate for age with no acute process identified. There is a central chest 3.1 cm subcutaneous cyst.     Impression: 1.No evidence of pulmonary embolism. 2.Bilateral lower lung bronchitis and possible pneumonia. 3.There is a simple appearing 3.1 cm subcutaneous cyst within the anterior mid chest. Electronically Signed: Julito Horne MD  5/17/2024 1:12 PM EDT  Workstation ID: [id number]    XR Chest 1 View    Result Date: 5/17/2024  XR CHEST 1 VW Date of Exam: 5/17/2024 9:08 AM EDT Indication: SOA triage protocol Comparison: 5/11/2024 Findings: The lungs appear adequately aerated without consolidation or mass. No pleural effusion or pneumothorax is identified. Cardiac silhouette is prominent. Pulmonary vasculature is unremarkable. No acute or suspicious osseous lesion is identified.     Impression: 1.Cardiomegaly. No acute radiographic abnormality is identified. Electronically Signed: Zane Thompson MD  5/17/2024 9:33 AM EDT  Workstation ID: [id number]    XR Chest 2 View    Result Date: 5/11/2024  XR CHEST 2 VW Date of Exam: 5/11/2024 1:23 PM EDT Indication: wheezing, hypoxia, + covid, hx of asthma Comparison: Chest x-ray 4/12/2024 Findings: Stable cardiomegaly. The lungs are grossly clear. No pleural effusion or pneumothorax. No acute or suspicious bony findings.     Impression: No acute cardiopulmonary findings. Electronically Signed: Pilo Cantor MD  5/11/2024 1:38 PM EDT  Workstation ID: [id number]             Results for orders placed during the hospital encounter of 12/07/23    Adult Transthoracic Echo Complete W/ Cont if  Necessary Per Protocol    Interpretation Summary    Left ventricular systolic function is normal. Estimated left ventricular EF = 60%    Left ventricular diastolic function is consistent with (grade I) impaired relaxation.    The right ventricular cavity is borderline dilated.    Aortic sclerosis without significant aortic stenosis.  Aortic valve mean pressure gradient is 17 mmHg.    Trace mitral regurgitation.    Trace tricuspid regurgitation with normal RVSP.      Plan for Follow-up of Pending Labs/Results: none  Pending Labs       Order Current Status    Blood Culture - Blood, Arm, Left Preliminary result    Blood Culture - Blood, Hand, Left Preliminary result          Discharge Details        Discharge Medications        Changes to Medications        Instructions Start Date   predniSONE 10 MG tablet  Commonly known as: DELTASONE  What changed: See the new instructions.   Take 2 tablets by mouth Daily for 2 days, THEN 1 tablet Daily for 3 days.   Start Date: May 21, 2024            Continue These Medications        Instructions Start Date   albuterol sulfate  (90 Base) MCG/ACT inhaler  Commonly known as: PROVENTIL HFA;VENTOLIN HFA;PROAIR HFA   2 puffs, Inhalation, Every 4 Hours PRN      CALCIUM PO   1 tablet, Oral, Daily      clotrimazole-betamethasone 1-0.05 % cream  Commonly known as: Lotrisone   1 application , Topical, 2 Times Daily      Cyanocobalamin 2500 MCG sublingual tablet   2,500 mcg, Sublingual, Daily      empagliflozin 25 MG tablet tablet  Commonly known as: Jardiance   25 mg, Oral, Daily, For DMII, replaces Rybelsus      fexofenadine 30 MG tablet  Commonly known as: ALLEGRA   30 mg, Oral, As Needed      furosemide 20 MG tablet  Commonly known as: LASIX   20 mg, Oral, Daily PRN, swelling      glimepiride 4 MG tablet  Commonly known as: AMARYL   4 mg, Oral, Daily With Breakfast      Melatonin 10 MG capsule   1 capsule, Oral, As Needed      metFORMIN  MG 24 hr tablet  Commonly known as:  GLUCOPHAGE-XR   750 mg, Oral, Daily      montelukast 10 MG tablet  Commonly known as: SINGULAIR   10 mg, Oral, Daily      olmesartan-hydrochlorothiazide 40-12.5 MG per tablet  Commonly known as: BENICAR HCT   1 tablet, Oral, Daily      potassium chloride 10 MEQ CR tablet  Commonly known as: KLOR-CON M10   10 mEq, Oral, Daily PRN      pravastatin 10 MG tablet  Commonly known as: PRAVACHOL   10 mg, Oral, Daily      Semaglutide (1 MG/DOSE) 4 MG/3ML solution pen-injector  Commonly known as: OZEMPIC   1 mg, Subcutaneous, Weekly      Trelegy Ellipta 200-62.5-25 MCG/ACT inhaler  Generic drug: Fluticasone-Umeclidin-Vilant   1 puff, Inhalation, Daily - RT      VITAMIN D3 PO   1 capsule, Oral, Daily             Stop These Medications      benzonatate 200 MG capsule  Commonly known as: TESSALON     bisoprolol 5 MG tablet  Commonly known as: ZEBeta              Allergies   Allergen Reactions    Meperidine Nausea And Vomiting         Discharge Disposition:  Home or Self Care    Diet:  Hospital:  Diet Order   Procedures    Diet: Diabetic, Cardiac; Healthy Heart (2-3 Na+); Consistent Carbohydrate; Fluid Consistency: Thin (IDDSI 0)            Activity: as tolerated      Restrictions or Other Recommendations:  none       CODE STATUS:    Code Status and Medical Interventions:   Ordered at: 05/17/24 1540     Code Status (Patient has no pulse and is not breathing):    CPR (Attempt to Resuscitate)     Medical Interventions (Patient has pulse or is breathing):    Full Support       Future Appointments   Date Time Provider Department Center   7/8/2024  1:00 PM Waleska Uribe APRN [facility]   7/8/2024  1:30 PM [facility]   7/12/2024  8:00 AM [facility], P[facility]   7/12/2024  8:30 AM Feliberto Guerra MD [facility]   7/23/2024  9:45 AM Noreen Smith MD [facility]       Additional Instructions for the Follow-ups that You Need to Schedule       Discharge Follow-up with  PCP   As directed       Currently Documented PCP:    Noreen Smith MD    PCP Phone Number:    [phone]     Follow Up Details: f/u with PCP within 7 days- please ensure scheduled        Discharge Follow-up with Specified Provider: f/u with Dr. Moreno first available appointment   As directed      To: f/u with Dr. Moreno first available appointment                      Juan M Mazariegos DO  05/21/24      Time Spent on Discharge:  I spent  35  minutes on this discharge activity which included: face-to-face encounter with the patient, reviewing the data in the system, coordination of the care with the nursing staff as well as consultants, documentation, and entering orders.            Electronically signed by Juan M Mazariegos DO at 05/21/24 0957       Discharge Order (From admission, onward)       Start     Ordered    05/21/24 0947  Discharge patient  Once        Expected Discharge Date: 05/21/24   Discharge Disposition: Home or Self Care   Physician of Record for Attribution - Please select from Treatment Team: JUAN M MAZARIEGOS [[id number]]   Review needed by CMO to determine Physician of Record: No   Please choose which facility the patient is currently admitted if they are being discharged to another facility or unit.:  [facility]      Question Answer Comment   Physician of Record for Attribution - Please select from Treatment Team JUAN M MAZARIEGOS    Review needed by CMO to determine Physician of Record No    Please choose which facility the patient is currently admitted if they are being discharged to another facility or unit.  [facility]        05/21/24 0925

## 2024-05-22 NOTE — OUTREACH NOTE
Call Center TCM Note      Flowsheet Row Responses   [facility] patient discharged from? [place]   Does the patient have one of the following disease processes/diagnoses(primary or secondary)? Pneumonia   TCM attempt successful? No  [VR for Adam Rincon]   Unsuccessful attempts Attempt 1   Call Status Left message            Sharron Morales RN    5/22/2024, 14:01 EDT

## 2024-05-23 ENCOUNTER — TRANSITIONAL CARE MANAGEMENT TELEPHONE ENCOUNTER (OUTPATIENT)
Dept: CALL CENTER | Facility: HOSPITAL | Age: 64
End: 2024-05-23
Payer: COMMERCIAL

## 2024-05-23 NOTE — OUTREACH NOTE
Call Center TCM Note      Flowsheet Row Responses   [facility] patient discharged from? [place]   Does the patient have one of the following disease processes/diagnoses(primary or secondary)? Pneumonia   TCM attempt successful? Yes   Call start time 1512   Unsuccessful attempts Attempt 3   Call end time 1515   Discharge diagnosis Pneumonia   Meds reviewed with patient/caregiver? Yes   Is the patient having any side effects they believe may be caused by any medication additions or changes? No   Does the patient have all medications ordered at discharge? Yes   Is the patient taking all medications as directed (includes completed medication regime)? Yes   Medication comments weaning down on steroids   Comments Hospital f/u on 5/30/24@245pm   Does the patient have an appointment with their PCP within 7-14 days of discharge? Yes   Has home health visited the patient within 72 hours of discharge? N/A   Has all DME been delivered? Yes   DME comments Using O2 prn during the day and at HS   Pulse Ox monitoring None   Did the patient receive a copy of their discharge instructions? Yes   Nursing interventions Reviewed instructions with patient   What is the patient's perception of their health status since discharge? Improving  [Pt reports she is feeling better and as if she is bouncing back some.  Aware to monitor for any worsening resp type s/s.  Call brief as pt driving at time of call.]   Nursing Interventions Nurse provided patient education   Is the patient/caregiver able to teach back the hierarchy of who to call/visit for symptoms/problems? PCP, Specialist, Home health nurse, Urgent Care, ED, 911 Yes   Is the patient/caregiver able to teach back signs and symptoms of worsening condition: Fever/chills, Shortness of breath, Chest pain   TCM call completed? Yes   Call end time 1515            Sharron Morales RN    5/23/2024, 15:17 EDT

## 2024-05-24 ENCOUNTER — TELEPHONE (OUTPATIENT)
Dept: INTERNAL MEDICINE | Facility: CLINIC | Age: 64
End: 2024-05-24

## 2024-05-24 NOTE — TELEPHONE ENCOUNTER
Caller: Dana Rincon    Relationship: Self    Best call back number: [phone]     PATIENT GOT THE VOICE MESSAGE ABOUT HER APPOINTMENT ON WEDNESDAY AND SHE WILL BE THERE.

## 2024-05-29 ENCOUNTER — OFFICE VISIT (OUTPATIENT)
Dept: INTERNAL MEDICINE | Facility: CLINIC | Age: 64
End: 2024-05-29
Payer: COMMERCIAL

## 2024-05-29 ENCOUNTER — LAB (OUTPATIENT)
Dept: LAB | Facility: HOSPITAL | Age: 64
End: 2024-05-29
Payer: COMMERCIAL

## 2024-05-29 VITALS
HEIGHT: 59 IN | OXYGEN SATURATION: 96 % | DIASTOLIC BLOOD PRESSURE: 72 MMHG | TEMPERATURE: 97.6 F | WEIGHT: 242.8 LBS | SYSTOLIC BLOOD PRESSURE: 134 MMHG | HEART RATE: 70 BPM | BODY MASS INDEX: 48.95 KG/M2

## 2024-05-29 DIAGNOSIS — I10 ESSENTIAL HYPERTENSION: ICD-10-CM

## 2024-05-29 DIAGNOSIS — J12.9 VIRAL PNEUMONIA: ICD-10-CM

## 2024-05-29 DIAGNOSIS — B35.3 TINEA PEDIS OF BOTH FEET: ICD-10-CM

## 2024-05-29 DIAGNOSIS — E78.49 OTHER HYPERLIPIDEMIA: ICD-10-CM

## 2024-05-29 DIAGNOSIS — E11.65 UNCONTROLLED TYPE 2 DIABETES MELLITUS WITH HYPERGLYCEMIA: ICD-10-CM

## 2024-05-29 DIAGNOSIS — B37.0 THRUSH: ICD-10-CM

## 2024-05-29 DIAGNOSIS — J45.41 MODERATE PERSISTENT ASTHMA WITH ACUTE EXACERBATION: Primary | ICD-10-CM

## 2024-05-29 DIAGNOSIS — J30.89 NON-SEASONAL ALLERGIC RHINITIS DUE TO OTHER ALLERGIC TRIGGER: ICD-10-CM

## 2024-05-29 LAB
BASOPHILS # BLD AUTO: 0.03 10*3/MM3 (ref 0–0.2)
BASOPHILS NFR BLD AUTO: 0.3 % (ref 0–1.5)
CRP SERPL-MCNC: 1.52 MG/DL (ref 0–0.5)
DEPRECATED RDW RBC AUTO: 39.9 FL (ref 37–54)
EOSINOPHIL # BLD AUTO: 0.12 10*3/MM3 (ref 0–0.4)
EOSINOPHIL NFR BLD AUTO: 1.3 % (ref 0.3–6.2)
ERYTHROCYTE [DISTWIDTH] IN BLOOD BY AUTOMATED COUNT: 13.7 % (ref 12.3–15.4)
HCT VFR BLD AUTO: 43.8 % (ref 34–46.6)
HGB BLD-MCNC: 14.6 G/DL (ref 12–15.9)
IMM GRANULOCYTES # BLD AUTO: 0.04 10*3/MM3 (ref 0–0.05)
IMM GRANULOCYTES NFR BLD AUTO: 0.4 % (ref 0–0.5)
LYMPHOCYTES # BLD AUTO: 1.81 10*3/MM3 (ref 0.7–3.1)
LYMPHOCYTES NFR BLD AUTO: 19.4 % (ref 19.6–45.3)
MCH RBC QN AUTO: 27.3 PG (ref 26.6–33)
MCHC RBC AUTO-ENTMCNC: 33.3 G/DL (ref 31.5–35.7)
MCV RBC AUTO: 82 FL (ref 79–97)
MONOCYTES # BLD AUTO: 0.66 10*3/MM3 (ref 0.1–0.9)
MONOCYTES NFR BLD AUTO: 7.1 % (ref 5–12)
NEUTROPHILS NFR BLD AUTO: 6.65 10*3/MM3 (ref 1.7–7)
NEUTROPHILS NFR BLD AUTO: 71.5 % (ref 42.7–76)
NRBC BLD AUTO-RTO: 0 /100 WBC (ref 0–0.2)
PLATELET # BLD AUTO: 237 10*3/MM3 (ref 140–450)
PMV BLD AUTO: 10.2 FL (ref 6–12)
RBC # BLD AUTO: 5.34 10*6/MM3 (ref 3.77–5.28)
WBC NRBC COR # BLD AUTO: 9.31 10*3/MM3 (ref 3.4–10.8)

## 2024-05-29 PROCEDURE — 99495 TRANSJ CARE MGMT MOD F2F 14D: CPT | Performed by: INTERNAL MEDICINE

## 2024-05-29 PROCEDURE — 85025 COMPLETE CBC W/AUTO DIFF WBC: CPT

## 2024-05-29 PROCEDURE — 86140 C-REACTIVE PROTEIN: CPT

## 2024-05-29 RX ORDER — CLOTRIMAZOLE AND BETAMETHASONE DIPROPIONATE 10; .64 MG/G; MG/G
1 CREAM TOPICAL 2 TIMES DAILY
Qty: 45 G | Refills: 3 | Status: SHIPPED | OUTPATIENT
Start: 2024-05-29

## 2024-05-29 RX ORDER — FLUTICASONE FUROATE, UMECLIDINIUM BROMIDE AND VILANTEROL TRIFENATATE 100; 62.5; 25 UG/1; UG/1; UG/1
1 POWDER RESPIRATORY (INHALATION) DAILY
COMMUNITY
Start: 2024-05-21

## 2024-05-29 RX ORDER — PRAVASTATIN SODIUM 10 MG
10 TABLET ORAL DAILY
Qty: 90 TABLET | Refills: 3 | Status: SHIPPED | OUTPATIENT
Start: 2024-05-29

## 2024-05-29 RX ORDER — SEMAGLUTIDE 0.68 MG/ML
0.5 INJECTION, SOLUTION SUBCUTANEOUS
Qty: 3 ML | Refills: 0 | Status: SHIPPED | OUTPATIENT
Start: 2024-05-29 | End: 2024-06-20

## 2024-05-29 RX ORDER — METFORMIN HYDROCHLORIDE 750 MG/1
750 TABLET, EXTENDED RELEASE ORAL DAILY
Qty: 90 TABLET | Refills: 3 | Status: SHIPPED | OUTPATIENT
Start: 2024-05-29

## 2024-05-29 RX ORDER — VITAMIN B COMPLEX
1 TABLET ORAL DAILY
Qty: 90 EACH | Refills: 3 | Status: SHIPPED | OUTPATIENT
Start: 2024-05-29

## 2024-05-29 RX ORDER — OLMESARTAN MEDOXOMIL AND HYDROCHLOROTHIAZIDE 40/12.5 40; 12.5 MG/1; MG/1
1 TABLET ORAL DAILY
Qty: 90 TABLET | Refills: 3 | Status: SHIPPED | OUTPATIENT
Start: 2024-05-29

## 2024-05-29 RX ORDER — MONTELUKAST SODIUM 10 MG/1
10 TABLET ORAL DAILY
Qty: 90 TABLET | Refills: 3 | Status: SHIPPED | OUTPATIENT
Start: 2024-05-29

## 2024-05-29 NOTE — PROGRESS NOTES
Transitional Care Follow Up Visit  Subjective     Dana Rincon is a 63 y.o. female who presents for a transitional care management visit.    Within 48 business hours after discharge our office contacted her via telephone to coordinate her care and needs.      I reviewed and discussed the details of that call along with the discharge summary, hospital problems, inpatient lab results, inpatient diagnostic studies, and consultation reports with Dana.     Current outpatient and discharge medications have been reconciled for the patient.  Reviewed by: Noreen Smith MD          5/21/2024     4:13 PM   Date of TCM Phone Call   [facility]   Date of Admission 5/17/2024   Date of Discharge 5/21/2024   Discharge Disposition Home or Self Care     Risk for Readmission (LACE) Score: 12 (5/21/2024  6:00 AM)      History of Present Illness   Feels like she doesn't have all of her energy back, ears stopped up and head feels full. Croupy cough is gone.  Still has a tickle.  On trelegy and albuterol inhalers.  Finished prednisone.      Course During Hospital Stay:     Has had respiratory symptoms for at least couple of weeks now.  Was seen at urgent treatment center this past Saturday and was diagnosed with COVID and was started on steroids.  PCP continued spit steroids after 3 days.  Patient comes to the ER for continuation and worsening of respiratory symptoms..     This patient's problems and plans were partially entered by my partner and updated as appropriate by me 05/21/24.     Cough, shortness of breath  Asthma exacerbation  Flu A+  -Respiratory symptoms started about 2 weeks ago while patient was in [place].  Evidently symptoms included fever and chills.  Patient was started on steroids after she was diagnosed with COVID last Saturday (currently patient.)  -Respiratory panel is negative for COVID here but is positive for influenza A  -CT scan which was done at the emergency room is somewhat  "suspicious for pneumonia.    - completed abx- no further abx needed  - completed IV steroids- discharge on short prednisone taper  - f/u with PCP within 1 week     Diabetes mellitus 2  -Latest hemoglobin A1c which was done on 3/18/2024 was 7.9  -Currently will put patient on low-dose sliding scale with Humalog     Hypertension  Bradycardia  - bradycardia improving somewhat  - continue holding Bisoprolol- f/u with PCP and Dr. Moreno      Hyperlipidemia  History of breast cancer  Sleep apnea  -continue home meds        Discharge Follow Up Recommendations for outpatient labs/diagnostics:  - continue holding Bisoprolol  - f/u with Dr. Moreno- first available  - f/u with PCP within 1 week  - 2L [place] O2 arranged for home  - complete short prednisone taper as prescribed     The following portions of the patient's history were reviewed and updated as appropriate: allergies, current medications, past family history, past medical history, past social history, past surgical history, and problem list.    Review of Systems   Constitutional: Negative.  Negative for chills and fever.   HENT:  Positive for congestion, ear pain and sinus pressure. Negative for ear discharge and sore throat.    Respiratory:  Positive for cough. Negative for chest tightness and shortness of breath.    Cardiovascular:  Negative for chest pain, palpitations and leg swelling.   Gastrointestinal:  Negative for diarrhea, nausea and vomiting.   Musculoskeletal:  Negative for arthralgias, back pain and myalgias.   Neurological:  Negative for dizziness, syncope and headaches.   Psychiatric/Behavioral:  Negative for confusion and sleep disturbance.        Objective   /72 (BP Location: Left arm, Patient Position: Sitting, Cuff Size: Adult)   Pulse 70   Temp 97.6 °F (36.4 °C) (Temporal)   Ht 149.9 cm (59.02\")   Wt 110 kg (242 lb 12.8 oz)   SpO2 96%   BMI 49.01 kg/m²     Physical Exam  Constitutional:       General: She is not in acute distress.     " Appearance: Normal appearance.   HENT:      Head: Normocephalic and atraumatic.      Right Ear: Tympanic membrane and external ear normal.      Left Ear: Tympanic membrane and external ear normal.      Nose: Nose normal.      Mouth/Throat:      Mouth: Mucous membranes are moist.   Eyes:      General: No scleral icterus.  Neck:      Vascular: No carotid bruit.   Cardiovascular:      Rate and Rhythm: Normal rate and regular rhythm.      Pulses: Normal pulses.      Heart sounds: Normal heart sounds. No murmur heard.     No friction rub. No gallop.   Pulmonary:      Effort: Pulmonary effort is normal.      Breath sounds: Rhonchi and rales present.   Abdominal:      General: Bowel sounds are normal. There is no distension.      Palpations: Abdomen is soft.      Tenderness: There is no right CVA tenderness, left CVA tenderness, guarding or rebound.      Hernia: No hernia is present.   Musculoskeletal:         General: No tenderness. Normal range of motion.      Cervical back: Normal range of motion.      Right lower leg: No edema.      Left lower leg: No edema.   Lymphadenopathy:      Cervical: No cervical adenopathy.   Skin:     General: Skin is warm.      Findings: No rash.   Neurological:      General: No focal deficit present.      Mental Status: She is alert and oriented to person, place, and time. Mental status is at baseline.      Cranial Nerves: No cranial nerve deficit.      Sensory: No sensory deficit.      Coordination: Coordination normal.      Gait: Gait normal.      Deep Tendon Reflexes: Reflexes normal.   Psychiatric:         Mood and Affect: Mood normal.         Behavior: Behavior normal.               Current Outpatient Medications:   •  albuterol sulfate  (90 Base) MCG/ACT inhaler, Inhale 2 puffs Every 4 (Four) Hours As Needed for Wheezing., Disp: 18 g, Rfl: 5  •  CALCIUM PO, Take 1 tablet by mouth Daily., Disp: , Rfl:   •  Cholecalciferol (VITAMIN D3 PO), Take 1 capsule by mouth Daily., Disp: ,  Rfl:   •  clotrimazole-betamethasone (Lotrisone) 1-0.05 % cream, Apply 1 Application topically to the appropriate area as directed 2 (Two) Times a Day., Disp: 45 g, Rfl: 3  •  Cyanocobalamin 2500 MCG sublingual tablet, Place 2,500 mcg under the tongue Daily., Disp: 90 each, Rfl: 3  •  empagliflozin (Jardiance) 25 MG tablet tablet, Take 1 tablet by mouth Daily. For DMII, replaces Rybelsus, Disp: 90 tablet, Rfl: 3  •  fexofenadine (ALLEGRA) 30 MG tablet, Take 1 tablet by mouth As Needed., Disp: , Rfl:   •  Fluticasone-Umeclidin-Vilant (Trelegy Ellipta) 200-62.5-25 MCG/ACT inhaler, Inhale 1 puff Daily., Disp: 1 each, Rfl: 11  •  furosemide (LASIX) 20 MG tablet, Take 1 tablet by mouth Daily As Needed (edema). swelling, Disp: 90 tablet, Rfl: 1  •  glimepiride (AMARYL) 4 MG tablet, TAKE 1 TABLET BY MOUTH DAILY WITH BREAKFAST, Disp: 90 tablet, Rfl: 1  •  Melatonin 10 MG capsule, Take 1 capsule by mouth As Needed., Disp: , Rfl:   •  metFORMIN ER (GLUCOPHAGE-XR) 750 MG 24 hr tablet, Take 1 tablet by mouth Daily., Disp: 90 tablet, Rfl: 3  •  montelukast (SINGULAIR) 10 MG tablet, Take 1 tablet by mouth Daily., Disp: 90 tablet, Rfl: 3  •  olmesartan-hydrochlorothiazide (BENICAR HCT) 40-12.5 MG per tablet, Take 1 tablet by mouth Daily., Disp: 90 tablet, Rfl: 3  •  potassium chloride (KLOR-CON M10) 10 MEQ CR tablet, Take 1 tablet by mouth Daily As Needed (with lasix)., Disp: 90 tablet, Rfl: 1  •  pravastatin (PRAVACHOL) 10 MG tablet, Take 1 tablet by mouth Daily., Disp: 90 tablet, Rfl: 3  •  Semaglutide, 1 MG/DOSE, (OZEMPIC) 4 MG/3ML solution pen-injector, Inject 1 mg under the skin into the appropriate area as directed 1 (One) Time Per Week., Disp: 3 mL, Rfl: 5  •  Trelegy Ellipta 100-62.5-25 MCG/ACT inhaler, Inhale 1 puff Daily., Disp: , Rfl:   •  nystatin (MYCOSTATIN) 100,000 unit/mL suspension, Swish and swallow 5 mL 4 (Four) Times a Day., Disp: 100 mL, Rfl: 0  •  Semaglutide,0.25 or 0.5MG/DOS, (Ozempic, 0.25 or 0.5 MG/DOSE,)  "2 MG/3ML solution pen-injector, Inject 0.5 mg under the skin into the appropriate area as directed Every 7 (Seven) Days for 4 doses. Continue 4 weeks, Disp: 3 mL, Rfl: 0      /72 (BP Location: Left arm, Patient Position: Sitting, Cuff Size: Adult)   Pulse 70   Temp 97.6 °F (36.4 °C) (Temporal)   Ht 149.9 cm (59.02\")   Wt 110 kg (242 lb 12.8 oz)   SpO2 96%   BMI 49.01 kg/m²       Results for orders placed or performed during the hospital encounter of 05/17/24   Respiratory Panel PCR w/COVID-19(SARS-CoV-2) [facility] In-House, NP Swab in UTM/VTM, 2 HR TAT - Swab, Nasopharynx    Specimen: Nasopharynx; Swab   Result Value Ref Range    ADENOVIRUS, PCR Not Detected Not Detected    Coronavirus 229E Not Detected Not Detected    Coronavirus HKU1 Not Detected Not Detected    Coronavirus NL63 Not Detected Not Detected    Coronavirus OC43 Not Detected Not Detected    COVID19 Not Detected Not Detected - Ref. Range    Human Metapneumovirus Not Detected Not Detected    Human Rhinovirus/Enterovirus Not Detected Not Detected    Influenza A H1 2009 PCR Detected (A) Not Detected    Influenza B PCR Not Detected Not Detected    Parainfluenza Virus 1 Not Detected Not Detected    Parainfluenza Virus 2 Not Detected Not Detected    Parainfluenza Virus 3 Not Detected Not Detected    Parainfluenza Virus 4 Not Detected Not Detected    RSV, PCR Not Detected Not Detected    Bordetella pertussis pcr Not Detected Not Detected    Bordetella parapertussis PCR Not Detected Not Detected    Chlamydophila pneumoniae PCR Not Detected Not Detected    Mycoplasma pneumo by PCR Not Detected Not Detected   Blood Culture - Blood, Hand, Left    Specimen: Hand, Left; Blood   Result Value Ref Range    Blood Culture No growth at 5 days    Blood Culture - Blood, Arm, Left    Specimen: Arm, Left; Blood   Result Value Ref Range    Blood Culture No growth at 5 days    Comprehensive Metabolic Panel    Specimen: Blood   Result Value Ref Range    " Glucose 110 (H) 65 - 99 mg/dL    BUN 8 8 - 23 mg/dL    Creatinine 0.58 0.57 - 1.00 mg/dL    Sodium 139 136 - 145 mmol/L    Potassium 3.7 3.5 - 5.2 mmol/L    Chloride 99 98 - 107 mmol/L    CO2 31.0 (H) 22.0 - 29.0 mmol/L    Calcium 8.6 8.6 - 10.5 mg/dL    Total Protein 6.5 6.0 - 8.5 g/dL    Albumin 3.3 (L) 3.5 - 5.2 g/dL    ALT (SGPT) 18 1 - 33 U/L    AST (SGOT) 15 1 - 32 U/L    Alkaline Phosphatase 68 39 - 117 U/L    Total Bilirubin 0.4 0.0 - 1.2 mg/dL    Globulin 3.2 gm/dL    A/G Ratio 1.0 g/dL    BUN/Creatinine Ratio 13.8 7.0 - 25.0    Anion Gap 9.0 5.0 - 15.0 mmol/L    eGFR 101.8 >60.0 mL/min/1.73   BNP    Specimen: Blood   Result Value Ref Range    proBNP 559.5 0.0 - 900.0 pg/mL   Single High Sensitivity Troponin T    Specimen: Blood   Result Value Ref Range    HS Troponin T 16 (H) <14 ng/L   CBC Auto Differential    Specimen: Blood   Result Value Ref Range    WBC 12.23 (H) 3.40 - 10.80 10*3/mm3    RBC 4.79 3.77 - 5.28 10*6/mm3    Hemoglobin 13.2 12.0 - 15.9 g/dL    Hematocrit 41.3 34.0 - 46.6 %    MCV 86.2 79.0 - 97.0 fL    MCH 27.6 26.6 - 33.0 pg    MCHC 32.0 31.5 - 35.7 g/dL    RDW 13.5 12.3 - 15.4 %    RDW-SD 43.2 37.0 - 54.0 fl    MPV 8.7 6.0 - 12.0 fL    Platelets 360 140 - 450 10*3/mm3    Neutrophil % 76.9 (H) 42.7 - 76.0 %    Lymphocyte % 14.5 (L) 19.6 - 45.3 %    Monocyte % 7.4 5.0 - 12.0 %    Eosinophil % 0.3 0.3 - 6.2 %    Basophil % 0.2 0.0 - 1.5 %    Immature Grans % 0.7 (H) 0.0 - 0.5 %    Neutrophils, Absolute 9.41 (H) 1.70 - 7.00 10*3/mm3    Lymphocytes, Absolute 1.77 0.70 - 3.10 10*3/mm3    Monocytes, Absolute 0.90 0.10 - 0.90 10*3/mm3    Eosinophils, Absolute 0.04 0.00 - 0.40 10*3/mm3    Basophils, Absolute 0.02 0.00 - 0.20 10*3/mm3    Immature Grans, Absolute 0.09 (H) 0.00 - 0.05 10*3/mm3    nRBC 0.0 0.0 - 0.2 /100 WBC   Lactic Acid, Plasma    Specimen: Blood   Result Value Ref Range    Lactate 1.1 0.5 - 2.0 mmol/L   Procalcitonin    Specimen: Blood   Result Value Ref Range    Procalcitonin  0.05 0.00 - 0.25 ng/mL   C-reactive Protein    Specimen: Blood   Result Value Ref Range    C-Reactive Protein 2.53 (H) 0.00 - 0.50 mg/dL   CBC (No Diff)    Specimen: Blood   Result Value Ref Range    WBC 13.41 (H) 3.40 - 10.80 10*3/mm3    RBC 4.62 3.77 - 5.28 10*6/mm3    Hemoglobin 12.8 12.0 - 15.9 g/dL    Hematocrit 41.5 34.0 - 46.6 %    MCV 89.8 79.0 - 97.0 fL    MCH 27.7 26.6 - 33.0 pg    MCHC 30.8 (L) 31.5 - 35.7 g/dL    RDW 13.9 12.3 - 15.4 %    RDW-SD 45.7 37.0 - 54.0 fl    MPV 9.2 6.0 - 12.0 fL    Platelets 291 140 - 450 10*3/mm3   Comprehensive Metabolic Panel    Specimen: Blood   Result Value Ref Range    Glucose 184 (H) 65 - 99 mg/dL    BUN 23 8 - 23 mg/dL    Creatinine 0.84 0.57 - 1.00 mg/dL    Sodium 137 136 - 145 mmol/L    Potassium 5.1 3.5 - 5.2 mmol/L    Chloride 98 98 - 107 mmol/L    CO2 26.0 22.0 - 29.0 mmol/L    Calcium 9.0 8.6 - 10.5 mg/dL    Total Protein 6.5 6.0 - 8.5 g/dL    Albumin 3.4 (L) 3.5 - 5.2 g/dL    ALT (SGPT) 17 1 - 33 U/L    AST (SGOT) 11 1 - 32 U/L    Alkaline Phosphatase 68 39 - 117 U/L    Total Bilirubin 0.4 0.0 - 1.2 mg/dL    Globulin 3.1 gm/dL    A/G Ratio 1.1 g/dL    BUN/Creatinine Ratio 27.4 (H) 7.0 - 25.0    Anion Gap 13.0 5.0 - 15.0 mmol/L    eGFR 78.2 >60.0 mL/min/1.73   CBC (No Diff)    Specimen: Blood   Result Value Ref Range    WBC 10.71 3.40 - 10.80 10*3/mm3    RBC 4.80 3.77 - 5.28 10*6/mm3    Hemoglobin 13.0 12.0 - 15.9 g/dL    Hematocrit 42.9 34.0 - 46.6 %    MCV 89.4 79.0 - 97.0 fL    MCH 27.1 26.6 - 33.0 pg    MCHC 30.3 (L) 31.5 - 35.7 g/dL    RDW 13.9 12.3 - 15.4 %    RDW-SD 45.2 37.0 - 54.0 fl    MPV 9.6 6.0 - 12.0 fL    Platelets 288 140 - 450 10*3/mm3   Comprehensive Metabolic Panel    Specimen: Blood   Result Value Ref Range    Glucose 249 (H) 65 - 99 mg/dL    BUN 25 (H) 8 - 23 mg/dL    Creatinine 0.93 0.57 - 1.00 mg/dL    Sodium 137 136 - 145 mmol/L    Potassium 4.7 3.5 - 5.2 mmol/L    Chloride 95 (L) 98 - 107 mmol/L    CO2 25.0 22.0 - 29.0 mmol/L    Calcium  9.4 8.6 - 10.5 mg/dL    Total Protein 7.1 6.0 - 8.5 g/dL    Albumin 3.9 3.5 - 5.2 g/dL    ALT (SGPT) 19 1 - 33 U/L    AST (SGOT) 11 1 - 32 U/L    Alkaline Phosphatase 79 39 - 117 U/L    Total Bilirubin 0.5 0.0 - 1.2 mg/dL    Globulin 3.2 gm/dL    A/G Ratio 1.2 g/dL    BUN/Creatinine Ratio 26.9 (H) 7.0 - 25.0    Anion Gap 17.0 (H) 5.0 - 15.0 mmol/L    eGFR 69.2 >60.0 mL/min/1.73   CBC (No Diff)    Specimen: Blood   Result Value Ref Range    WBC 9.95 3.40 - 10.80 10*3/mm3    RBC 4.92 3.77 - 5.28 10*6/mm3    Hemoglobin 13.4 12.0 - 15.9 g/dL    Hematocrit 41.5 34.0 - 46.6 %    MCV 84.3 79.0 - 97.0 fL    MCH 27.2 26.6 - 33.0 pg    MCHC 32.3 31.5 - 35.7 g/dL    RDW 13.9 12.3 - 15.4 %    RDW-SD 42.6 37.0 - 54.0 fl    MPV 9.2 6.0 - 12.0 fL    Platelets 271 140 - 450 10*3/mm3   Comprehensive Metabolic Panel    Specimen: Blood   Result Value Ref Range    Glucose 169 (H) 65 - 99 mg/dL    BUN 25 (H) 8 - 23 mg/dL    Creatinine 0.76 0.57 - 1.00 mg/dL    Sodium 138 136 - 145 mmol/L    Potassium 5.3 (H) 3.5 - 5.2 mmol/L    Chloride 100 98 - 107 mmol/L    CO2 25.0 22.0 - 29.0 mmol/L    Calcium 8.8 8.6 - 10.5 mg/dL    Total Protein 5.9 (L) 6.0 - 8.5 g/dL    Albumin 3.3 (L) 3.5 - 5.2 g/dL    ALT (SGPT) 16 1 - 33 U/L    AST (SGOT) 14 1 - 32 U/L    Alkaline Phosphatase 60 39 - 117 U/L    Total Bilirubin 0.4 0.0 - 1.2 mg/dL    Globulin 2.6 gm/dL    A/G Ratio 1.3 g/dL    BUN/Creatinine Ratio 32.9 (H) 7.0 - 25.0    Anion Gap 13.0 5.0 - 15.0 mmol/L    eGFR 88.2 >60.0 mL/min/1.73   POC Chem 8    Specimen: Blood   Result Value Ref Range    Glucose 116 70 - 130 mg/dL    BUN 8 8 - 26 mg/dL    Creatinine 0.50 (L) 0.60 - 1.30 mg/dL    Sodium 137 (L) 138 - 146 mmol/L    POC Potassium 3.3 (L) 3.5 - 4.9 mmol/L    Chloride 96 (L) 98 - 109 mmol/L    Total CO2 28 24 - 29 mmol/L    Hemoglobin 13.9 12.0 - 17.0 g/dL    Hematocrit 41 38 - 51 %    Ionized Calcium 1.09 (L) 1.20 - 1.32 mmol/L    eGFR 105.5 >60.0 mL/min/1.73   POC Glucose Once    Specimen:  Blood   Result Value Ref Range    Glucose 167 (H) 70 - 130 mg/dL   POC Glucose Once    Specimen: Blood   Result Value Ref Range    Glucose 248 (H) 70 - 130 mg/dL   POC Glucose Once    Specimen: Blood   Result Value Ref Range    Glucose 234 (H) 70 - 130 mg/dL   POC Glucose Once    Specimen: Blood   Result Value Ref Range    Glucose 181 (H) 70 - 130 mg/dL   POC Glucose Once    Specimen: Blood   Result Value Ref Range    Glucose 201 (H) 70 - 130 mg/dL   POC Glucose Once    Specimen: Blood   Result Value Ref Range    Glucose 165 (H) 70 - 130 mg/dL   POC Glucose Once    Specimen: Blood   Result Value Ref Range    Glucose 207 (H) 70 - 130 mg/dL   POC Glucose Once    Specimen: Blood   Result Value Ref Range    Glucose 171 (H) 70 - 130 mg/dL   POC Glucose Once    Specimen: Blood   Result Value Ref Range    Glucose 197 (H) 70 - 130 mg/dL   POC Glucose Once    Specimen: Blood   Result Value Ref Range    Glucose 256 (H) 70 - 130 mg/dL   POC Glucose Once    Specimen: Blood   Result Value Ref Range    Glucose 259 (H) 70 - 130 mg/dL   POC Glucose Once    Specimen: Blood   Result Value Ref Range    Glucose 190 (H) 70 - 130 mg/dL   POC Glucose Once    Specimen: Blood   Result Value Ref Range    Glucose 233 (H) 70 - 130 mg/dL   POC Glucose Once    Specimen: Blood   Result Value Ref Range    Glucose 204 (H) 70 - 130 mg/dL   POC Glucose Once    Specimen: Blood   Result Value Ref Range    Glucose 239 (H) 70 - 130 mg/dL   POC Glucose Once    Specimen: Blood   Result Value Ref Range    Glucose 149 (H) 70 - 130 mg/dL   POC Glucose Once    Specimen: Blood   Result Value Ref Range    Glucose 153 (H) 70 - 130 mg/dL   ECG 12 Lead ED Triage Standing Order; SOA   Result Value Ref Range    QT Interval 582 ms    QTC Interval 595 ms   ECG 12 Lead Rhythm Change   Result Value Ref Range    QT Interval 562 ms    QTC Interval 446 ms   Green Top (Gel)   Result Value Ref Range    Extra Tube Hold for add-ons.    Lavender Top   Result Value Ref Range     Extra Tube hold for add-on    Gold Top - SST   Result Value Ref Range    Extra Tube Hold for add-ons.    Gray Top   Result Value Ref Range    Extra Tube Hold for add-ons.    Light Blue Top   Result Value Ref Range    Extra Tube Hold for add-ons.              Assessment & Plan   Diagnoses and all orders for this visit:    Moderate persistent asthma with acute exacerbation  -     Home Nebulizer  -     Home Nebulizer Accessories    Other hyperlipidemia  -     pravastatin (PRAVACHOL) 10 MG tablet; Take 1 tablet by mouth Daily.  -     Cyanocobalamin 2500 MCG sublingual tablet; Place 2,500 mcg under the tongue Daily.    Essential hypertension  -     olmesartan-hydrochlorothiazide (BENICAR HCT) 40-12.5 MG per tablet; Take 1 tablet by mouth Daily.    Non-seasonal allergic rhinitis due to other allergic trigger  -     montelukast (SINGULAIR) 10 MG tablet; Take 1 tablet by mouth Daily.    Uncontrolled type 2 diabetes mellitus with hyperglycemia  -     metFORMIN ER (GLUCOPHAGE-XR) 750 MG 24 hr tablet; Take 1 tablet by mouth Daily.  -     empagliflozin (Jardiance) 25 MG tablet tablet; Take 1 tablet by mouth Daily. For DMII, replaces Rybelsus  -     Semaglutide,0.25 or 0.5MG/DOS, (Ozempic, 0.25 or 0.5 MG/DOSE,) 2 MG/3ML solution pen-injector; Inject 0.5 mg under the skin into the appropriate area as directed Every 7 (Seven) Days for 4 doses. Continue 4 weeks    Tinea pedis of both feet  -     clotrimazole-betamethasone (Lotrisone) 1-0.05 % cream; Apply 1 Application topically to the appropriate area as directed 2 (Two) Times a Day.    Thrush  -     nystatin (MYCOSTATIN) 100,000 unit/mL suspension; Swish and swallow 5 mL 4 (Four) Times a Day.    Viral pneumonia  -     CBC & Differential; Future  -     C-reactive Protein; Future  -     XR Chest PA & Lateral    Other orders  -     Trelegy Ellipta 100-62.5-25 MCG/ACT inhaler; Inhale 1 puff Daily.      Add allegra , Monitor Pulse and restart bisoprolol at 1/2 QOD,.    Re: O2 @ HS ,  adv to gert CXR and if clear, may d/c O2, keep on hand x 2 weeks before calling [facility].      Current outpatient and discharge medications have been reconciled for the patient.  Reviewed by: Noreen Smith MD      Return for Next scheduled follow up.    Electronically signed by:    Noreen Smith MD

## 2024-06-03 ENCOUNTER — TELEPHONE (OUTPATIENT)
Dept: INTERNAL MEDICINE | Facility: CLINIC | Age: 64
End: 2024-06-03
Payer: COMMERCIAL

## 2024-06-03 NOTE — TELEPHONE ENCOUNTER
PATIENT IS CURRENTLY WAITING TO GET RADS TAKEN BUT THE CLINIC DOES NOT SEE ANY ORDERS THAT HAVE BEEN REQUESTED.   PATIENT WOULD LIKE A CALL BACK ASAP DUE TO WAITING AT THE CLINIC.       CALL BACK: [phone]

## 2024-06-03 NOTE — TELEPHONE ENCOUNTER
HUB to read: LM to return call, please give message below.    Let pt know message has been sent to Sarah

## 2024-06-03 NOTE — TELEPHONE ENCOUNTER
Patient verbalized understanding that order is now pout in and she should have xray done this week.

## 2024-06-03 NOTE — TELEPHONE ENCOUNTER
Please find out if she planned to get her chest xray today.  Very sorry for the confusion, but the order is in, she should be able to get it this week.  Again, very sorry for the trouble.    thanks

## 2024-06-03 NOTE — TELEPHONE ENCOUNTER
Name: Dana Rincon RAY    Relationship: Self    Best Callback Number: [phone]    HUB PROVIDED THE RELAY MESSAGE FROM THE OFFICE   PATIENT HAS FURTHER QUESTIONS AND WOULD LIKE A CALL BACK AT THE FOLLOWING PHONE NUMBER     ADDITIONAL INFORMATION:  PLEASE CALL PATIENT ONCE ORDERS ARE PLACED

## 2024-06-07 ENCOUNTER — HOSPITAL ENCOUNTER (OUTPATIENT)
Dept: GENERAL RADIOLOGY | Facility: HOSPITAL | Age: 64
Discharge: HOME | End: 2024-06-07
Admitting: INTERNAL MEDICINE
Payer: COMMERCIAL

## 2024-06-07 PROCEDURE — 71046 X-RAY EXAM CHEST 2 VIEWS: CPT

## 2024-06-10 ENCOUNTER — TELEPHONE (OUTPATIENT)
Dept: INTERNAL MEDICINE | Facility: CLINIC | Age: 64
End: 2024-06-10
Payer: COMMERCIAL

## 2024-06-10 NOTE — TELEPHONE ENCOUNTER
----- Message from Noreen Smith sent at 6/10/2024 10:56 AM EDT -----  Please make sure patient has seen the following mychart message:       Thank you for having your chest xray done. It does show improvement in your pneumonia. Hope you continue to feel better

## 2024-06-28 NOTE — PROGRESS NOTES
Subjective:     Encounter Date:07/01/2024    Primary Care Physician: Noreen Smith MD      Patient ID: Dana Rincon is a 63 y.o. female.    Chief Complaint:PVC (premature ventricular contraction), Edema, and Fatigue      PROBLEM LIST:  Dyspnea on exertion  12/2023 echo EF 60%.  Mean aortic valve gradient 17.  Trace MR.  1/24/2024 normal MPS.  Mild scattered coronary calcifications.  PVC  2023 Holter monitor showing 19% PVC burden  Hypertension  Dyslipidemia  Sleep apnea on CPAP  Obesity  COPD/asthma   Diabetes   Breast cancer 2015 or 2016  XRT right side  Lumpectomy and anastrazole  Surgeries:  Tonsillectomy and adenoidectomy  Lumpectomy  D&C  [place] teeth extraction      Allergies   Allergen Reactions    Meperidine Nausea And Vomiting         Current Outpatient Medications:     albuterol sulfate  (90 Base) MCG/ACT inhaler, Inhale 2 puffs Every 4 (Four) Hours As Needed for Wheezing., Disp: 18 g, Rfl: 5    bisoprolol (ZEBeta) 5 MG tablet, Take 0.5 tablets by mouth Every Other Day., Disp: , Rfl:     CALCIUM PO, Take 1 tablet by mouth Daily., Disp: , Rfl:     Cholecalciferol (VITAMIN D3 PO), Take 1 capsule by mouth Daily., Disp: , Rfl:     clotrimazole-betamethasone (Lotrisone) 1-0.05 % cream, Apply 1 Application topically to the appropriate area as directed 2 (Two) Times a Day., Disp: 45 g, Rfl: 3    Cyanocobalamin 2500 MCG sublingual tablet, Place 2,500 mcg under the tongue Daily., Disp: 90 each, Rfl: 3    empagliflozin (Jardiance) 25 MG tablet tablet, Take 1 tablet by mouth Daily. For DMII, replaces Rybelsus, Disp: 90 tablet, Rfl: 3    fexofenadine (ALLEGRA) 30 MG tablet, Take 1 tablet by mouth As Needed., Disp: , Rfl:     Fluticasone-Umeclidin-Vilant (Trelegy Ellipta) 200-62.5-25 MCG/ACT inhaler, Inhale 1 puff Daily., Disp: 1 each, Rfl: 11    furosemide (LASIX) 20 MG tablet, Take 1 tablet by mouth Daily As Needed (edema). swelling, Disp: 90 tablet, Rfl: 1    glimepiride (AMARYL) 4 MG tablet, TAKE 1  TABLET BY MOUTH DAILY WITH BREAKFAST, Disp: 90 tablet, Rfl: 1    Melatonin 10 MG capsule, Take 1 capsule by mouth As Needed., Disp: , Rfl:     metFORMIN ER (GLUCOPHAGE-XR) 750 MG 24 hr tablet, Take 1 tablet by mouth Daily., Disp: 90 tablet, Rfl: 3    montelukast (SINGULAIR) 10 MG tablet, Take 1 tablet by mouth Daily., Disp: 90 tablet, Rfl: 3    nystatin (MYCOSTATIN) 100,000 unit/mL suspension, Swish and swallow 5 mL 4 (Four) Times a Day., Disp: 100 mL, Rfl: 0    olmesartan-hydrochlorothiazide (BENICAR HCT) 40-12.5 MG per tablet, Take 1 tablet by mouth Daily., Disp: 90 tablet, Rfl: 3    potassium chloride (KLOR-CON M10) 10 MEQ CR tablet, Take 1 tablet by mouth Daily As Needed (with lasix)., Disp: 90 tablet, Rfl: 1    pravastatin (PRAVACHOL) 10 MG tablet, Take 1 tablet by mouth Daily., Disp: 90 tablet, Rfl: 3    Semaglutide, 1 MG/DOSE, (OZEMPIC) 4 MG/3ML solution pen-injector, Inject 1 mg under the skin into the appropriate area as directed 1 (One) Time Per Week., Disp: 3 mL, Rfl: 5    Trelegy Ellipta 100-62.5-25 MCG/ACT inhaler, Inhale 1 puff Daily., Disp: , Rfl:         History of Present Illness    Patient is a 63-year-old  female who is being seen today for hospital follow-up status post recent admission for influenza A and pneumonia.  During her hospitalization she was noted to have some significant bradycardia with heart rates dipping down into the 30s at times.  Her beta-blocker was subsequently discontinued.  In the interim patient has been gradually improving and increasing her activity.  She saw her primary care physician who placed her back on half tablet of bisoprolol every other day.  She has not had any adverse effects with this.  Was being treated for PVCs secondary to high burden.    The following portions of the patient's history were reviewed and updated as appropriate: allergies, current medications, past family history, past medical history, past social history, past surgical history and  "problem list.      Social History     Tobacco Use    Smoking status: Never     Passive exposure: Never    Smokeless tobacco: Never   Vaping Use    Vaping status: Never Used   Substance Use Topics    Alcohol use: Yes     Alcohol/week: 3.0 standard drinks of alcohol     Types: 3 Glasses of wine per week     Comment: 2-3/week for 20 years    Drug use: No         ROS       Objective:   /58 (BP Location: Left arm, Patient Position: Sitting)   Pulse 79   Ht 149.9 cm (59\")   Wt 113 kg (249 lb 6.4 oz)   SpO2 94%   BMI 50.37 kg/m²         Vitals reviewed.   Constitutional:       Appearance: Well-developed and not in distress.   Neck:      Vascular: No JVD.      Trachea: No tracheal deviation.   Pulmonary:      Effort: Pulmonary effort is normal.      Breath sounds: Normal breath sounds.   Cardiovascular:      Normal rate. Regular rhythm.      Murmurs: There is no murmur.   Edema:     Peripheral edema absent.   Musculoskeletal:         General: No deformity. Skin:     General: Skin is warm and dry.   Neurological:      Mental Status: Alert and oriented to person, place, and time.         Procedures          Assessment:   Assessment & Plan      Diagnoses and all orders for this visit:    1. Bradycardia, drug induced (Primary), noted in the setting of an acute illness.  Beta-blocker was discontinued.  Has been resumed on half a tablet every other day without any symptoms or significant bradycardia.    2. Frequent PVCs, stable.    3. Primary hypertension, controlled.  On Benicar/HCTZ.      Plan:  Patient is overall stable from cardiac standpoint.  Resume bisoprolol 2.5 mg daily and monitor heart rates.  Discussed with patient when to contact office.  Discussed with patient should she begin to have bradycardia on her pulse oximetry we would need to pursue Holter monitor to assess for actual bradycardia and not just frequent PVCs.  Continue other current cardiac medications.  Follow-up in 6 months time or sooner if " needed.       Lena CHIU             Dictated utilizing Dragon dictation

## 2024-07-01 ENCOUNTER — OFFICE VISIT (OUTPATIENT)
Dept: CARDIOLOGY | Facility: CLINIC | Age: 64
End: 2024-07-01
Payer: COMMERCIAL

## 2024-07-01 VITALS
WEIGHT: 249.4 LBS | DIASTOLIC BLOOD PRESSURE: 58 MMHG | HEIGHT: 59 IN | BODY MASS INDEX: 50.28 KG/M2 | SYSTOLIC BLOOD PRESSURE: 134 MMHG | HEART RATE: 79 BPM | OXYGEN SATURATION: 94 %

## 2024-07-01 DIAGNOSIS — I49.3 FREQUENT PVCS: ICD-10-CM

## 2024-07-01 DIAGNOSIS — R00.1 BRADYCARDIA, DRUG INDUCED: Primary | ICD-10-CM

## 2024-07-01 DIAGNOSIS — I10 PRIMARY HYPERTENSION: ICD-10-CM

## 2024-07-01 DIAGNOSIS — T50.905A BRADYCARDIA, DRUG INDUCED: Primary | ICD-10-CM

## 2024-07-01 PROCEDURE — 99214 OFFICE O/P EST MOD 30 MIN: CPT | Performed by: NURSE PRACTITIONER

## 2024-07-01 RX ORDER — BISOPROLOL FUMARATE 5 MG/1
0.5 TABLET, FILM COATED ORAL EVERY OTHER DAY
COMMUNITY
Start: 2024-06-12

## 2024-07-01 NOTE — LETTER
July 1, 2024       No Recipients    Patient: Dana Rincon   YOB: 1960   Date of Visit: 7/1/2024     Dear Noreen Smith MD:       Thank you for referring Dana Rincon to me for evaluation. Below are the relevant portions of my assessment and plan of care.    If you have questions, please do not hesitate to call me. I look forward to following Dana along with you.         Sincerely,        ARAM Trevino        CC:   No Recipients    Lena Alejandro APRN  07/01/24 1454  Sign when Signing Visit  Subjective:     Encounter Date:07/01/2024    Primary Care Physician: Noreen Smith MD      Patient ID: Dana Rincon is a 63 y.o. female.    Chief Complaint:PVC (premature ventricular contraction), Edema, and Fatigue      PROBLEM LIST:  Dyspnea on exertion  12/2023 echo EF 60%.  Mean aortic valve gradient 17.  Trace MR.  1/24/2024 normal MPS.  Mild scattered coronary calcifications.  PVC  2023 Holter monitor showing 19% PVC burden  Hypertension  Dyslipidemia  Sleep apnea on CPAP  Obesity  COPD/asthma   Diabetes   Breast cancer 2015 or 2016  XRT right side  Lumpectomy and anastrazole  Surgeries:  Tonsillectomy and adenoidectomy  Lumpectomy  D&C  [place] teeth extraction      Allergies   Allergen Reactions   • Meperidine Nausea And Vomiting         Current Outpatient Medications:   •  albuterol sulfate  (90 Base) MCG/ACT inhaler, Inhale 2 puffs Every 4 (Four) Hours As Needed for Wheezing., Disp: 18 g, Rfl: 5  •  bisoprolol (ZEBeta) 5 MG tablet, Take 0.5 tablets by mouth Every Other Day., Disp: , Rfl:   •  CALCIUM PO, Take 1 tablet by mouth Daily., Disp: , Rfl:   •  Cholecalciferol (VITAMIN D3 PO), Take 1 capsule by mouth Daily., Disp: , Rfl:   •  clotrimazole-betamethasone (Lotrisone) 1-0.05 % cream, Apply 1 Application topically to the appropriate area as directed 2 (Two) Times a Day., Disp: 45 g, Rfl: 3  •  Cyanocobalamin 2500 MCG sublingual tablet, Place 2,500 mcg under the  tongue Daily., Disp: 90 each, Rfl: 3  •  empagliflozin (Jardiance) 25 MG tablet tablet, Take 1 tablet by mouth Daily. For DMII, replaces Rybelsus, Disp: 90 tablet, Rfl: 3  •  fexofenadine (ALLEGRA) 30 MG tablet, Take 1 tablet by mouth As Needed., Disp: , Rfl:   •  Fluticasone-Umeclidin-Vilant (Trelegy Ellipta) 200-62.5-25 MCG/ACT inhaler, Inhale 1 puff Daily., Disp: 1 each, Rfl: 11  •  furosemide (LASIX) 20 MG tablet, Take 1 tablet by mouth Daily As Needed (edema). swelling, Disp: 90 tablet, Rfl: 1  •  glimepiride (AMARYL) 4 MG tablet, TAKE 1 TABLET BY MOUTH DAILY WITH BREAKFAST, Disp: 90 tablet, Rfl: 1  •  Melatonin 10 MG capsule, Take 1 capsule by mouth As Needed., Disp: , Rfl:   •  metFORMIN ER (GLUCOPHAGE-XR) 750 MG 24 hr tablet, Take 1 tablet by mouth Daily., Disp: 90 tablet, Rfl: 3  •  montelukast (SINGULAIR) 10 MG tablet, Take 1 tablet by mouth Daily., Disp: 90 tablet, Rfl: 3  •  nystatin (MYCOSTATIN) 100,000 unit/mL suspension, Swish and swallow 5 mL 4 (Four) Times a Day., Disp: 100 mL, Rfl: 0  •  olmesartan-hydrochlorothiazide (BENICAR HCT) 40-12.5 MG per tablet, Take 1 tablet by mouth Daily., Disp: 90 tablet, Rfl: 3  •  potassium chloride (KLOR-CON M10) 10 MEQ CR tablet, Take 1 tablet by mouth Daily As Needed (with lasix)., Disp: 90 tablet, Rfl: 1  •  pravastatin (PRAVACHOL) 10 MG tablet, Take 1 tablet by mouth Daily., Disp: 90 tablet, Rfl: 3  •  Semaglutide, 1 MG/DOSE, (OZEMPIC) 4 MG/3ML solution pen-injector, Inject 1 mg under the skin into the appropriate area as directed 1 (One) Time Per Week., Disp: 3 mL, Rfl: 5  •  Trelegy Ellipta 100-62.5-25 MCG/ACT inhaler, Inhale 1 puff Daily., Disp: , Rfl:         History of Present Illness    Patient is a 63-year-old  female who is being seen today for hospital follow-up status post recent admission for influenza A and pneumonia.  During her hospitalization she was noted to have some significant bradycardia with heart rates dipping down into the 30s at  "times.  Her beta-blocker was subsequently discontinued.  In the interim patient has been gradually improving and increasing her activity.  She saw her primary care physician who placed her back on half tablet of bisoprolol every other day.  She has not had any adverse effects with this.  Was being treated for PVCs secondary to high burden.    The following portions of the patient's history were reviewed and updated as appropriate: allergies, current medications, past family history, past medical history, past social history, past surgical history and problem list.      Social History     Tobacco Use   • Smoking status: Never     Passive exposure: Never   • Smokeless tobacco: Never   Vaping Use   • Vaping status: Never Used   Substance Use Topics   • Alcohol use: Yes     Alcohol/week: 3.0 standard drinks of alcohol     Types: 3 Glasses of wine per week     Comment: 2-3/week for 20 years   • Drug use: No         ROS       Objective:   /58 (BP Location: Left arm, Patient Position: Sitting)   Pulse 79   Ht 149.9 cm (59\")   Wt 113 kg (249 lb 6.4 oz)   SpO2 94%   BMI 50.37 kg/m²         Vitals reviewed.   Constitutional:       Appearance: Well-developed and not in distress.   Neck:      Vascular: No JVD.      Trachea: No tracheal deviation.   Pulmonary:      Effort: Pulmonary effort is normal.      Breath sounds: Normal breath sounds.   Cardiovascular:      Normal rate. Regular rhythm.      Murmurs: There is no murmur.   Edema:     Peripheral edema absent.   Musculoskeletal:         General: No deformity. Skin:     General: Skin is warm and dry.   Neurological:      Mental Status: Alert and oriented to person, place, and time.         Procedures          Assessment:   Assessment & Plan     Diagnoses and all orders for this visit:    1. Bradycardia, drug induced (Primary), noted in the setting of an acute illness.  Beta-blocker was discontinued.  Has been resumed on half a tablet every other day without any " symptoms or significant bradycardia.    2. Frequent PVCs, stable.    3. Primary hypertension, controlled.  On Benicar/HCTZ.      Plan:  Patient is overall stable from cardiac standpoint.  Resume bisoprolol 2.5 mg daily and monitor heart rates.  Discussed with patient when to contact office.  Discussed with patient should she begin to have bradycardia on her pulse oximetry we would need to pursue Holter monitor to assess for actual bradycardia and not just frequent PVCs.  Continue other current cardiac medications.  Follow-up in 6 months time or sooner if needed.       Lena CHIU             Dictated utilizing Dragon dictation

## 2024-07-08 ENCOUNTER — OFFICE VISIT (OUTPATIENT)
Age: 64
End: 2024-07-08
Payer: COMMERCIAL

## 2024-07-08 ENCOUNTER — HOSPITAL ENCOUNTER (OUTPATIENT)
Facility: HOSPITAL | Age: 64
Discharge: HOME | End: 2024-07-08
Payer: COMMERCIAL

## 2024-07-08 ENCOUNTER — HOSPITAL ENCOUNTER (OUTPATIENT)
Dept: ONCOLOGY | Facility: HOSPITAL | Age: 64
Discharge: HOME | End: 2024-07-08
Payer: COMMERCIAL

## 2024-07-08 VITALS
HEART RATE: 76 BPM | RESPIRATION RATE: 18 BRPM | BODY MASS INDEX: 51 KG/M2 | TEMPERATURE: 96.7 F | DIASTOLIC BLOOD PRESSURE: 51 MMHG | HEIGHT: 59 IN | WEIGHT: 253 LBS | SYSTOLIC BLOOD PRESSURE: 133 MMHG | OXYGEN SATURATION: 95 %

## 2024-07-08 DIAGNOSIS — C50.511 MALIGNANT NEOPLASM OF LOWER-OUTER QUADRANT OF RIGHT BREAST OF FEMALE, ESTROGEN RECEPTOR POSITIVE: Primary | ICD-10-CM

## 2024-07-08 DIAGNOSIS — Z13.820 SCREENING FOR OSTEOPOROSIS: ICD-10-CM

## 2024-07-08 DIAGNOSIS — Z78.0 MENOPAUSE: ICD-10-CM

## 2024-07-08 DIAGNOSIS — M81.0 OSTEOPOROSIS, UNSPECIFIED OSTEOPOROSIS TYPE, UNSPECIFIED PATHOLOGICAL FRACTURE PRESENCE: ICD-10-CM

## 2024-07-08 DIAGNOSIS — Z17.0 MALIGNANT NEOPLASM OF LOWER-OUTER QUADRANT OF RIGHT BREAST OF FEMALE, ESTROGEN RECEPTOR POSITIVE: Primary | ICD-10-CM

## 2024-07-08 LAB
ALBUMIN SERPL-MCNC: 3.8 G/DL (ref 3.5–5.2)
ALBUMIN/GLOB SERPL: 1.4 G/DL
ALP SERPL-CCNC: 82 U/L (ref 39–117)
ALT SERPL W P-5'-P-CCNC: 7 U/L (ref 1–33)
ANION GAP SERPL CALCULATED.3IONS-SCNC: 11 MMOL/L (ref 5–15)
AST SERPL-CCNC: 15 U/L (ref 1–32)
BASOPHILS # BLD AUTO: 0.04 10*3/MM3 (ref 0–0.2)
BASOPHILS NFR BLD AUTO: 0.5 % (ref 0–1.5)
BILIRUB SERPL-MCNC: 0.3 MG/DL (ref 0–1.2)
BUN SERPL-MCNC: 11 MG/DL (ref 8–23)
BUN/CREAT SERPL: 19 (ref 7–25)
CALCIUM SPEC-SCNC: 9.2 MG/DL (ref 8.6–10.5)
CHLORIDE SERPL-SCNC: 100 MMOL/L (ref 98–107)
CO2 SERPL-SCNC: 23 MMOL/L (ref 22–29)
CREAT SERPL-MCNC: 0.58 MG/DL (ref 0.57–1)
DEPRECATED RDW RBC AUTO: 46.9 FL (ref 37–54)
EGFRCR SERPLBLD CKD-EPI 2021: 101.8 ML/MIN/1.73
EOSINOPHIL # BLD AUTO: 0.18 10*3/MM3 (ref 0–0.4)
EOSINOPHIL NFR BLD AUTO: 2.1 % (ref 0.3–6.2)
ERYTHROCYTE [DISTWIDTH] IN BLOOD BY AUTOMATED COUNT: 14.6 % (ref 12.3–15.4)
GLOBULIN UR ELPH-MCNC: 2.7 GM/DL
GLUCOSE SERPL-MCNC: 150 MG/DL (ref 65–99)
HCT VFR BLD AUTO: 40.6 % (ref 34–46.6)
HGB BLD-MCNC: 13.2 G/DL (ref 12–15.9)
IMM GRANULOCYTES # BLD AUTO: 0.02 10*3/MM3 (ref 0–0.05)
IMM GRANULOCYTES NFR BLD AUTO: 0.2 % (ref 0–0.5)
LYMPHOCYTES # BLD AUTO: 1.7 10*3/MM3 (ref 0.7–3.1)
LYMPHOCYTES NFR BLD AUTO: 19.7 % (ref 19.6–45.3)
MAGNESIUM SERPL-MCNC: 1.9 MG/DL (ref 1.6–2.4)
MCH RBC QN AUTO: 28.3 PG (ref 26.6–33)
MCHC RBC AUTO-ENTMCNC: 32.5 G/DL (ref 31.5–35.7)
MCV RBC AUTO: 87.1 FL (ref 79–97)
MONOCYTES # BLD AUTO: 0.57 10*3/MM3 (ref 0.1–0.9)
MONOCYTES NFR BLD AUTO: 6.6 % (ref 5–12)
NEUTROPHILS NFR BLD AUTO: 6.14 10*3/MM3 (ref 1.7–7)
NEUTROPHILS NFR BLD AUTO: 70.9 % (ref 42.7–76)
PHOSPHATE SERPL-MCNC: 3.1 MG/DL (ref 2.5–4.5)
PLATELET # BLD AUTO: 234 10*3/MM3 (ref 140–450)
PMV BLD AUTO: 8.9 FL (ref 6–12)
POTASSIUM SERPL-SCNC: 4.2 MMOL/L (ref 3.5–5.2)
PROT SERPL-MCNC: 6.5 G/DL (ref 6–8.5)
RBC # BLD AUTO: 4.66 10*6/MM3 (ref 3.77–5.28)
SODIUM SERPL-SCNC: 134 MMOL/L (ref 136–145)
WBC NRBC COR # BLD AUTO: 8.65 10*3/MM3 (ref 3.4–10.8)

## 2024-07-08 PROCEDURE — 80053 COMPREHEN METABOLIC PANEL: CPT | Performed by: NURSE PRACTITIONER

## 2024-07-08 PROCEDURE — 25010000002 DENOSUMAB 60 MG/ML SOLUTION PREFILLED SYRINGE: Performed by: NURSE PRACTITIONER

## 2024-07-08 PROCEDURE — 85025 COMPLETE CBC W/AUTO DIFF WBC: CPT | Performed by: NURSE PRACTITIONER

## 2024-07-08 PROCEDURE — 84100 ASSAY OF PHOSPHORUS: CPT | Performed by: NURSE PRACTITIONER

## 2024-07-08 PROCEDURE — 36415 COLL VENOUS BLD VENIPUNCTURE: CPT

## 2024-07-08 PROCEDURE — 96372 THER/PROPH/DIAG INJ SC/IM: CPT

## 2024-07-08 PROCEDURE — 99214 OFFICE O/P EST MOD 30 MIN: CPT | Performed by: NURSE PRACTITIONER

## 2024-07-08 PROCEDURE — 83735 ASSAY OF MAGNESIUM: CPT | Performed by: NURSE PRACTITIONER

## 2024-07-08 RX ADMIN — DENOSUMAB 60 MG: 60 INJECTION SUBCUTANEOUS at 14:59

## 2024-07-08 NOTE — PROGRESS NOTES
DATE OF VISIT: 7/8/2024    REASON FOR VISIT: Followup for right breast cancer     PROBLEM LIST:  1. Stage I invasive ductal carcinoma of the right breast, [id number]:  A. Tumor at the 7 o'clock position, tumor size 1.4 cm, estrogen receptor and progesterone  receptor strongly positive, HER-2/greg negative zero by IHC. Low risk group Oncotype testing, score of 16, 10% risk of recurrence with hormone treatment alone.  B. Diagnosed on ultrasound guided biopsy 03/10/2016.   C. Status post lumpectomy with clear margins done by Dr. Sales 05/03/2016.   D. Started Arimidex 1 mg p.o. daily 06/03/2016.   E. Completed breast adjuvant radiation by Dr. Thompson.   2.  Osteoporosis:  A.  DEXA scan done June 2018 revealed T score -2.3  B. Starting Prolia 1/22/2020.   3. Right breast mammogram abnormality  4.  Obesity    HISTORY OF PRESENT ILLNESS: The patient is a very pleasant 63 y.o. female with past medical history significant for right breast cancer diagnosed March 2016.  Patient status post lumpectomy followed by adjuvant radiation hormone treatment. The patient is here today for scheduled follow-up visit.    SUBJECTIVE: The patient is here today by herself. She has been doing fairly well since her last visit. She was admitted in May due to COVID with subsequent pneumonia. She is feeling significantly better and is now only requiring oxygen use at night. She has no new breast lesions, bone pain, or unexplained weight loss. She is tolerating her Prolia without side effects.     Past History:  Medical History: has a past medical history of Allergic (Demerol), Asthma, Breast cancer (2016), Breast injury, Diabetes mellitus, Dry eyes, Emphysema/COPD, H/O colonoscopy (10/2015), HBP (high blood pressure), Hot flashes, radiation therapy (06/2016), Itchy eyes, Obesity, HANNA (obstructive sleep apnea), Sinus problem, and Wears glasses.   Surgical History: has a past surgical history that includes Tonsillectomy (1965); Dilation and  "curettage of uterus (2005); Other surgical history (2016); Breast lumpectomy (Right, 05/03/2016); Breast biopsy (Right, 09/08/2015); Breast biopsy (Right, 03/10/2016); Breast biopsy (Right, 03/2018); Adenoidectomy (1965); Tonsillectomy (1965); and Colonoscopy (2016).   Family History: family history includes Breast cancer in her paternal aunt; Breast cancer (age of onset: 30) in some other family members; Cardiomyopathy in her mother; Colon cancer in her mother and paternal uncle; Diabetes in her father; Heart disease (age of onset: 63) in her father; Heart failure in her father; Hypertension in her mother; No Known Problems in her sister.   Social History: reports that she has never smoked. She has never been exposed to tobacco smoke. She has never used smokeless tobacco. She reports current alcohol use of about 3.0 standard drinks of alcohol per week. She reports that she does not use drugs.    (Not in a hospital admission)     Allergies: Meperidine     Review of Systems   Constitutional:  Positive for fatigue.   Musculoskeletal:  Positive for arthralgias.         PHYSICAL EXAMINATION:   /51   Pulse 76   Temp 96.7 °F (35.9 °C)   Resp 18   Ht 149.9 cm (59.02\")   Wt 115 kg (253 lb)   SpO2 95%   BMI 51.07 kg/m²    Pain Score    07/08/24 1346   PainSc: 0-No pain      ECOG score: 0        ECOG Performance Status: 0 - Asymptomatic  General Appearance:  alert, cooperative, no apparent distress and appears stated age   Lungs:   Clear to auscultation bilaterally; respirations regular, even, and unlabored bilaterally   Heart:  Regular rate and rhythm, no murmurs appreciated   Abdomen:   Soft, non-tender, non-distended, and no organomegaly     No visits with results within 2 Week(s) from this visit.   Latest known visit with results is:   Lab on 05/29/2024   Component Date Value Ref Range Status    C-Reactive Protein 05/29/2024 1.52 (H)  0.00 - 0.50 mg/dL Final    WBC 05/29/2024 9.31  3.40 - 10.80 10*3/mm3 Final "    RBC 05/29/2024 5.34 (H)  3.77 - 5.28 10*6/mm3 Final    Hemoglobin 05/29/2024 14.6  12.0 - 15.9 g/dL Final    Hematocrit 05/29/2024 43.8  34.0 - 46.6 % Final    MCV 05/29/2024 82.0  79.0 - 97.0 fL Final    MCH 05/29/2024 27.3  26.6 - 33.0 pg Final    MCHC 05/29/2024 33.3  31.5 - 35.7 g/dL Final    RDW 05/29/2024 13.7  12.3 - 15.4 % Final    RDW-SD 05/29/2024 39.9  37.0 - 54.0 fl Final    MPV 05/29/2024 10.2  6.0 - 12.0 fL Final    Platelets 05/29/2024 237  140 - 450 10*3/mm3 Final    Neutrophil % 05/29/2024 71.5  42.7 - 76.0 % Final    Lymphocyte % 05/29/2024 19.4 (L)  19.6 - 45.3 % Final    Monocyte % 05/29/2024 7.1  5.0 - 12.0 % Final    Eosinophil % 05/29/2024 1.3  0.3 - 6.2 % Final    Basophil % 05/29/2024 0.3  0.0 - 1.5 % Final    Immature Grans % 05/29/2024 0.4  0.0 - 0.5 % Final    Neutrophils, Absolute 05/29/2024 6.65  1.70 - 7.00 10*3/mm3 Final    Lymphocytes, Absolute 05/29/2024 1.81  0.70 - 3.10 10*3/mm3 Final    Monocytes, Absolute 05/29/2024 0.66  0.10 - 0.90 10*3/mm3 Final    Eosinophils, Absolute 05/29/2024 0.12  0.00 - 0.40 10*3/mm3 Final    Basophils, Absolute 05/29/2024 0.03  0.00 - 0.20 10*3/mm3 Final    Immature Grans, Absolute 05/29/2024 0.04  0.00 - 0.05 10*3/mm3 Final    nRBC 05/29/2024 0.0  0.0 - 0.2 /100 WBC Final        No results found.      ASSESSMENT: The patient is a very pleasant 63 y.o. female  with right breast cancer      PLAN:    1.  Right breast cancer:  A.  I did go over her mammogram result from December 28, 2023 and reassured her it revealed benign changes.  She will need to have 1 year follow-up study which will be due January 2025, and ordered for prior to return.  B.  The patient has completed 5 years adjuvant course of hormone blockade.    2.  Osteoporosis:  A.  I will continue calcium and vitamin D daily.  B.  I will continue Prolia 60 minutes subcu every 6 months.  She will be treated today.  C.  She will need to have 2-year follow-up study that is due this summer, and  will be ordered for prior to return.     3.  Obesity:  A. The patient is going to restart Ozempic as prescribed by her PCP for both diabetes control and weight loss.     4. Recent hospitalization for viral pneumonia:  A. She is slowly weaning off nocturnal oxygen use.   B. She did test positive for COVID at that time.   C. She will continue use of Trelegy and albuterol inhalers.       FOLLOW UP: 6 months with Prolia.    Waleska Uribe, APRN  7/8/2024

## 2024-07-10 DIAGNOSIS — E11.65 UNCONTROLLED TYPE 2 DIABETES MELLITUS WITH HYPERGLYCEMIA: ICD-10-CM

## 2024-07-10 RX ORDER — GLIMEPIRIDE 4 MG/1
4 TABLET ORAL
Qty: 90 TABLET | Refills: 0 | Status: SHIPPED | OUTPATIENT
Start: 2024-07-10

## 2024-07-12 ENCOUNTER — OFFICE VISIT (OUTPATIENT)
Dept: PULMONOLOGY | Facility: CLINIC | Age: 64
End: 2024-07-12
Payer: COMMERCIAL

## 2024-07-12 VITALS
BODY MASS INDEX: 51.61 KG/M2 | WEIGHT: 256 LBS | TEMPERATURE: 97 F | HEART RATE: 77 BPM | OXYGEN SATURATION: 97 % | SYSTOLIC BLOOD PRESSURE: 130 MMHG | DIASTOLIC BLOOD PRESSURE: 76 MMHG | HEIGHT: 59 IN

## 2024-07-12 DIAGNOSIS — G47.33 OSA ON CPAP: Primary | ICD-10-CM

## 2024-07-12 DIAGNOSIS — J45.40 MODERATE PERSISTENT ASTHMA WITHOUT COMPLICATION: ICD-10-CM

## 2024-07-12 PROBLEM — J96.01 ACUTE RESPIRATORY FAILURE WITH HYPOXIA: Status: RESOLVED | Noted: 2024-05-21 | Resolved: 2024-07-12

## 2024-07-12 PROBLEM — J12.9 VIRAL PNEUMONIA: Status: RESOLVED | Noted: 2024-05-21 | Resolved: 2024-07-12

## 2024-07-12 PROBLEM — J10.1 INFLUENZA A: Status: RESOLVED | Noted: 2024-05-21 | Resolved: 2024-07-12

## 2024-07-12 PROCEDURE — 99214 OFFICE O/P EST MOD 30 MIN: CPT | Performed by: INTERNAL MEDICINE

## 2024-07-12 RX ORDER — FLUTICASONE FUROATE, UMECLIDINIUM BROMIDE AND VILANTEROL TRIFENATATE 100; 62.5; 25 UG/1; UG/1; UG/1
1 POWDER RESPIRATORY (INHALATION) DAILY
Qty: 60 EACH | Refills: 2 | OUTPATIENT
Start: 2024-07-12

## 2024-07-12 NOTE — PROGRESS NOTES
Subjective:     Chief Complaint:   Chief Complaint   Patient presents with    Shortness of Breath       HPI:    Dana Rincon is a 63 y.o. female here for follow-up of asthma and HANNA    I saw her in initial consultation in April 2024.  She had had a negative cardiac evaluation with the exception of grade 1 diastolic dysfunction and some mild enlargement of the right ventricle.  She had a history of longstanding obstructive sleep apnea and had been followed previously by Dr. Wynn but wanted to be followed here.  She has a history of asthma as a child and then had this resolved as an adult but had been on inhaled therapy more recently and was on Trelegy when she saw me.  Her symptoms seem to be worse when her allergies were acting up at certain times of the year.    I changed her Trelegy to the 200 strength and continued as needed albuterol and Singulair and recommended she increase her CPAP range and orders were sent for that as the 95th percentile pressure was very close to her limit on the download.  She was also using semaglutide to lose weight.    Since I last saw her her download is acceptable as far as compliance.  The pressures were not changed per my recommendations.  She was in [place] and developed pneumonia and had to come home early.  Her x-ray is improved proved in that regard.    Current medications are:   Current Outpatient Medications:     albuterol sulfate  (90 Base) MCG/ACT inhaler, Inhale 2 puffs Every 4 (Four) Hours As Needed for Wheezing., Disp: 18 g, Rfl: 5    bisoprolol (ZEBeta) 5 MG tablet, Take 0.5 tablets by mouth Every Other Day., Disp: , Rfl:     CALCIUM PO, Take 1 tablet by mouth Daily., Disp: , Rfl:     Cholecalciferol (VITAMIN D3 PO), Take 1 capsule by mouth Daily., Disp: , Rfl:     clotrimazole-betamethasone (Lotrisone) 1-0.05 % cream, Apply 1 Application topically to the appropriate area as directed 2 (Two) Times a Day., Disp: 45 g, Rfl: 3    Cyanocobalamin 2500 MCG  sublingual tablet, Place 2,500 mcg under the tongue Daily., Disp: 90 each, Rfl: 3    empagliflozin (Jardiance) 25 MG tablet tablet, Take 1 tablet by mouth Daily. For DMII, replaces Rybelsus, Disp: 90 tablet, Rfl: 3    fexofenadine (ALLEGRA) 30 MG tablet, Take 1 tablet by mouth As Needed., Disp: , Rfl:     Fluticasone-Umeclidin-Vilant (Trelegy Ellipta) 200-62.5-25 MCG/ACT inhaler, Inhale 1 puff Daily., Disp: 1 each, Rfl: 11    furosemide (LASIX) 20 MG tablet, Take 1 tablet by mouth Daily As Needed (edema). swelling, Disp: 90 tablet, Rfl: 1    glimepiride (AMARYL) 4 MG tablet, TAKE 1 TABLET BY MOUTH DAILY WITH BREAKFAST, Disp: 90 tablet, Rfl: 0    Melatonin 10 MG capsule, Take 1 capsule by mouth As Needed., Disp: , Rfl:     metFORMIN ER (GLUCOPHAGE-XR) 750 MG 24 hr tablet, Take 1 tablet by mouth Daily., Disp: 90 tablet, Rfl: 3    montelukast (SINGULAIR) 10 MG tablet, Take 1 tablet by mouth Daily., Disp: 90 tablet, Rfl: 3    nystatin (MYCOSTATIN) 100,000 unit/mL suspension, Swish and swallow 5 mL 4 (Four) Times a Day., Disp: 100 mL, Rfl: 0    olmesartan-hydrochlorothiazide (BENICAR HCT) 40-12.5 MG per tablet, Take 1 tablet by mouth Daily., Disp: 90 tablet, Rfl: 3    potassium chloride (KLOR-CON M10) 10 MEQ CR tablet, Take 1 tablet by mouth Daily As Needed (with lasix)., Disp: 90 tablet, Rfl: 1    pravastatin (PRAVACHOL) 10 MG tablet, Take 1 tablet by mouth Daily., Disp: 90 tablet, Rfl: 3    Semaglutide, 1 MG/DOSE, (OZEMPIC) 4 MG/3ML solution pen-injector, Inject 1 mg under the skin into the appropriate area as directed 1 (One) Time Per Week., Disp: 3 mL, Rfl: 5.      The patient's relevant past medical, surgical, family and social history were reviewed and updated in Epic as appropriate.     ROS:    Review of Systems  ROS as documented in patient questionnaire unless as noted otherwise    Objective:    Physical Exam  Vitals reviewed.   Constitutional:       Appearance: She is well-developed.   HENT:      Head:  Normocephalic and atraumatic.      Mouth/Throat:      Mouth: Mucous membranes are moist.      Pharynx: Oropharynx is clear.      Comments: Class IV airway  Neck:      Thyroid: No thyromegaly.   Cardiovascular:      Rate and Rhythm: Normal rate and regular rhythm.      Heart sounds: No murmur heard.     No friction rub. No gallop.   Pulmonary:      Effort: Pulmonary effort is normal. No respiratory distress.      Breath sounds: No wheezing or rales.   Musculoskeletal:      Cervical back: Neck supple.   Skin:     General: Skin is warm and dry.   Neurological:      Mental Status: She is alert and oriented to person, place, and time.   Psychiatric:         Behavior: Behavior normal.         Thought Content: Thought content normal.         Data:    CXR: Reviewed recent images showing improvement in infiltrates    PFT: Continued restrictive defect with no significant obstruction.  No significant change over priors    Assessment:    Problem List Items Addressed This Visit          Pulmonary Problems    Asthma    HANNA on CPAP - Primary    Relevant Orders    PAP Therapy         Obstructive sleep apnea: I still think her range on her auto CPAP should be increased to 10-20 cm H2O from 7-16 cm H2O.  Asthma: Continue Trelegy, Singulair, and albuterol as needed    Plan:    She is back on semaglutide for weight loss which will help her respiratory issues  No change in inhaled therapy  New order sent to her [employer] to change settings and she will follow-up on this  Routine follow-up at least yearly    Level of Risk Moderate due to: prescription drug management    Discussed in detail with the patient.  She will call prior to her follow up visit for any new problems.    Signed by  Feliberto Guerra MD

## 2024-07-23 ENCOUNTER — OFFICE VISIT (OUTPATIENT)
Dept: INTERNAL MEDICINE | Facility: CLINIC | Age: 64
End: 2024-07-23
Payer: COMMERCIAL

## 2024-07-23 ENCOUNTER — LAB (OUTPATIENT)
Dept: LAB | Facility: HOSPITAL | Age: 64
End: 2024-07-23
Payer: COMMERCIAL

## 2024-07-23 VITALS
WEIGHT: 246.7 LBS | OXYGEN SATURATION: 94 % | BODY MASS INDEX: 49.73 KG/M2 | HEIGHT: 59 IN | SYSTOLIC BLOOD PRESSURE: 122 MMHG | DIASTOLIC BLOOD PRESSURE: 60 MMHG | HEART RATE: 59 BPM | TEMPERATURE: 97.4 F

## 2024-07-23 DIAGNOSIS — J45.40 MODERATE PERSISTENT ASTHMA WITHOUT COMPLICATION: ICD-10-CM

## 2024-07-23 DIAGNOSIS — I10 ESSENTIAL HYPERTENSION: ICD-10-CM

## 2024-07-23 DIAGNOSIS — B35.3 TINEA PEDIS OF BOTH FEET: ICD-10-CM

## 2024-07-23 DIAGNOSIS — Z00.00 ROUTINE GENERAL MEDICAL EXAMINATION AT A HEALTH CARE FACILITY: ICD-10-CM

## 2024-07-23 DIAGNOSIS — E11.65 UNCONTROLLED TYPE 2 DIABETES MELLITUS WITH HYPERGLYCEMIA: ICD-10-CM

## 2024-07-23 DIAGNOSIS — E55.9 VITAMIN D DEFICIENCY: ICD-10-CM

## 2024-07-23 DIAGNOSIS — N39.0 URINARY TRACT INFECTION WITHOUT HEMATURIA, SITE UNSPECIFIED: ICD-10-CM

## 2024-07-23 DIAGNOSIS — Z23 NEED FOR VACCINATION: ICD-10-CM

## 2024-07-23 DIAGNOSIS — Z00.00 ROUTINE GENERAL MEDICAL EXAMINATION AT A HEALTH CARE FACILITY: Primary | ICD-10-CM

## 2024-07-23 LAB
BILIRUB BLD-MCNC: NEGATIVE MG/DL
CLARITY, POC: ABNORMAL
COLOR UR: ABNORMAL
EXPIRATION DATE: ABNORMAL
EXPIRATION DATE: ABNORMAL
EXPIRATION DATE: NORMAL
GLUCOSE UR STRIP-MCNC: ABNORMAL MG/DL
HBA1C MFR BLD: 7.9 % (ref 4.5–5.7)
KETONES UR QL: NEGATIVE
LEUKOCYTE EST, POC: ABNORMAL
Lab: ABNORMAL
Lab: ABNORMAL
Lab: NORMAL
NITRITE UR-MCNC: NEGATIVE MG/ML
PH UR: 6 [PH] (ref 5–8)
POC CREATININE URINE: 10
POC MICROALBUMIN URINE: 10
PROT UR STRIP-MCNC: NEGATIVE MG/DL
RBC # UR STRIP: NEGATIVE /UL
SP GR UR: 1.01 (ref 1–1.03)
UROBILINOGEN UR QL: NORMAL

## 2024-07-23 PROCEDURE — 81003 URINALYSIS AUTO W/O SCOPE: CPT | Performed by: INTERNAL MEDICINE

## 2024-07-23 PROCEDURE — 90471 IMMUNIZATION ADMIN: CPT | Performed by: INTERNAL MEDICINE

## 2024-07-23 PROCEDURE — 83036 HEMOGLOBIN GLYCOSYLATED A1C: CPT | Performed by: INTERNAL MEDICINE

## 2024-07-23 PROCEDURE — 87086 URINE CULTURE/COLONY COUNT: CPT | Performed by: INTERNAL MEDICINE

## 2024-07-23 PROCEDURE — 82044 UR ALBUMIN SEMIQUANTITATIVE: CPT | Performed by: INTERNAL MEDICINE

## 2024-07-23 PROCEDURE — 99396 PREV VISIT EST AGE 40-64: CPT | Performed by: INTERNAL MEDICINE

## 2024-07-23 PROCEDURE — 90715 TDAP VACCINE 7 YRS/> IM: CPT | Performed by: INTERNAL MEDICINE

## 2024-07-23 RX ORDER — GLIMEPIRIDE 4 MG/1
4 TABLET ORAL
Qty: 90 TABLET | Refills: 1 | Status: SHIPPED | OUTPATIENT
Start: 2024-07-23

## 2024-07-23 RX ORDER — POTASSIUM CHLORIDE 750 MG/1
10 TABLET, EXTENDED RELEASE ORAL DAILY PRN
Qty: 90 TABLET | Refills: 1 | Status: SHIPPED | OUTPATIENT
Start: 2024-07-23

## 2024-07-23 RX ORDER — CLOTRIMAZOLE AND BETAMETHASONE DIPROPIONATE 10; .64 MG/G; MG/G
1 CREAM TOPICAL 2 TIMES DAILY
Qty: 45 G | Refills: 3 | Status: SHIPPED | OUTPATIENT
Start: 2024-07-23

## 2024-07-23 RX ORDER — ALBUTEROL SULFATE 90 UG/1
2 AEROSOL, METERED RESPIRATORY (INHALATION) EVERY 4 HOURS PRN
Qty: 18 G | Refills: 5 | Status: SHIPPED | OUTPATIENT
Start: 2024-07-23

## 2024-07-23 NOTE — PROGRESS NOTES
Chief Complaint   Patient presents with    Annual Exam       History of Present Illness  HM, Adult Female: The patient is being seen for a health maintenance evaluation. The last health maintenance visit was 1 year(s) ago. Gynecology- Dr. Gordillo and team.  Social History: She is . Work status:retired from [employer].  She has never smoked. She reports never drinking alcohol. Denies any illicit drug use.  General Health: The patient's health is described as good. She has regular dental visits. She denies vision problems. She denies hearing loss. Immunizations status: up to date.   Lifestyle:. She consumes a diverse and healthy diet. She does not have any weight concerns. She exercises regularly. She denies tobacco. She denies alcohol use. She denies drug use.   Reproductive health:. She is postmenopausal.   Screening: Cancer screening reviewed and current.   Rt. Breast ca- followed by Dr. Driscoll.  Metabolic screening reviewed and current.   Risk screening reviewed and current.     HPI  S/p recent pneumonia,doing better.. Followed by Dr. Driscoll, q6 mos.Scheduled for that and dexa in December.  Getting q6mo prolia shots.    Review of Systems   Constitutional:  Positive for fatigue. Negative for chills.   HENT:  Negative for congestion, ear pain and sore throat.    Eyes:  Negative for pain, redness and visual disturbance.   Respiratory:  Negative for cough (all better) and shortness of breath.    Cardiovascular:  Negative for chest pain, palpitations and leg swelling.   Gastrointestinal:  Negative for abdominal pain, diarrhea and nausea.   Endocrine: Negative for cold intolerance and heat intolerance.   Genitourinary:  Negative for flank pain and urgency.   Musculoskeletal:  Negative for arthralgias and gait problem.   Skin:  Negative for pallor and rash.   Neurological:  Negative for dizziness, weakness and headaches.   Psychiatric/Behavioral:  Negative for dysphoric mood and sleep disturbance. The patient  is not nervous/anxious.        Patient Active Problem List   Diagnosis    Breast cancer of lower-outer quadrant of right female breast    Uncontrolled type 2 diabetes mellitus    Asthma    Hypertension    HANNA on CPAP    Vitamin D deficiency    Other hyperlipidemia    Malignant neoplasm of lower-outer quadrant of right breast of female, estrogen receptor positive    Osteopenia    Osteoporosis    B12 deficiency    PVC's (premature ventricular contractions)       Social History     Socioeconomic History    Marital status:    Tobacco Use    Smoking status: Never     Passive exposure: Never    Smokeless tobacco: Never   Vaping Use    Vaping status: Never Used   Substance and Sexual Activity    Alcohol use: Yes     Alcohol/week: 3.0 standard drinks of alcohol     Types: 3 Glasses of wine per week     Comment: 2-3/week for 20 years    Drug use: No    Sexual activity: Not Currently       Current Outpatient Medications on File Prior to Visit   Medication Sig Dispense Refill    bisoprolol (ZEBeta) 5 MG tablet Take 0.5 tablets by mouth Daily.      CALCIUM PO Take 1 tablet by mouth Daily.      Cholecalciferol (VITAMIN D3 PO) Take 1 capsule by mouth Daily.      Cyanocobalamin 2500 MCG sublingual tablet Place 2,500 mcg under the tongue Daily. 90 each 3    empagliflozin (Jardiance) 25 MG tablet tablet Take 1 tablet by mouth Daily. For DMII, replaces Rybelsus 90 tablet 3    fexofenadine (ALLEGRA) 30 MG tablet Take 1 tablet by mouth As Needed.      Fluticasone-Umeclidin-Vilant (Trelegy Ellipta) 200-62.5-25 MCG/ACT inhaler Inhale 1 puff Daily. 1 each 11    furosemide (LASIX) 20 MG tablet Take 1 tablet by mouth Daily As Needed (edema). swelling 90 tablet 1    Melatonin 10 MG capsule Take 1 capsule by mouth As Needed.      metFORMIN ER (GLUCOPHAGE-XR) 750 MG 24 hr tablet Take 1 tablet by mouth Daily. 90 tablet 3    montelukast (SINGULAIR) 10 MG tablet Take 1 tablet by mouth Daily. 90 tablet 3    olmesartan-hydrochlorothiazide  "(BENICAR HCT) 40-12.5 MG per tablet Take 1 tablet by mouth Daily. 90 tablet 3    pravastatin (PRAVACHOL) 10 MG tablet Take 1 tablet by mouth Daily. 90 tablet 3    nystatin (MYCOSTATIN) 100,000 unit/mL suspension Swish and swallow 5 mL 4 (Four) Times a Day. 100 mL 0     No current facility-administered medications on file prior to visit.       Allergies   Allergen Reactions    Meperidine Nausea And Vomiting       /60   Pulse 59   Temp 97.4 °F (36.3 °C)   Ht 149.9 cm (59.02\")   Wt 112 kg (246 lb 11.2 oz)   SpO2 94% Comment: ra  BMI 49.79 kg/m²            The following portions of the patient's history were reviewed and updated as appropriate: allergies, current medications, past family history, past medical history, past social history, past surgical history, and problem list.    Physical Exam  Constitutional:       General: She is not in acute distress.     Appearance: Normal appearance.   HENT:      Head: Normocephalic and atraumatic.      Right Ear: Tympanic membrane and external ear normal.      Left Ear: Tympanic membrane and external ear normal.      Nose: Nose normal.      Mouth/Throat:      Mouth: Mucous membranes are moist.   Eyes:      General: No scleral icterus.  Neck:      Vascular: No carotid bruit.   Cardiovascular:      Rate and Rhythm: Normal rate and regular rhythm.      Pulses: Normal pulses.           Dorsalis pedis pulses are 1+ on the right side and 1+ on the left side.      Heart sounds: Normal heart sounds. No murmur heard.     No friction rub. No gallop.   Pulmonary:      Effort: Pulmonary effort is normal.      Breath sounds: Normal breath sounds. No rhonchi or rales.   Abdominal:      General: Bowel sounds are normal. There is no distension.      Palpations: Abdomen is soft.      Tenderness: There is no right CVA tenderness, left CVA tenderness, guarding or rebound.      Hernia: No hernia is present.   Musculoskeletal:         General: No tenderness. Normal range of motion.      " Cervical back: Normal range of motion.      Right lower leg: No edema.      Left lower leg: No edema.      Right foot: No deformity.      Left foot: No deformity.   Feet:      Right foot:      Skin integrity: Skin integrity normal.      Left foot:      Skin integrity: Skin integrity normal.      Comments: Diabetic Foot Exam Performed and Monofilament Test Performed- wnl     Lymphadenopathy:      Cervical: No cervical adenopathy.   Skin:     General: Skin is warm.      Findings: No rash.   Neurological:      General: No focal deficit present.      Mental Status: She is alert and oriented to person, place, and time. Mental status is at baseline.      Cranial Nerves: No cranial nerve deficit.      Sensory: No sensory deficit.      Coordination: Coordination normal.      Gait: Gait normal.      Deep Tendon Reflexes: Reflexes normal.      Comments: Balance wnl   Psychiatric:         Mood and Affect: Mood normal.         Behavior: Behavior normal.                 Results for orders placed or performed in visit on 07/23/24   Urine Culture - Urine, Urine, Clean Catch    Specimen: Urine, Clean Catch   Result Value Ref Range    Urine Culture No growth    POCT urinalysis dipstick, automated    Specimen: Urine   Result Value Ref Range    Color Straw Yellow, Straw, Dark Yellow, Venus    Clarity, UA Hazy (A) Clear    Specific Gravity  1.010 1.005 - 1.030    pH, Urine 6.0 5.0 - 8.0    Leukocytes Small (1+) (A) Negative    Nitrite, UA Negative Negative    Protein, POC Negative Negative mg/dL    Glucose,  mg/dL (A) Negative mg/dL    Ketones, UA Negative Negative    Urobilinogen, UA Normal Normal, 0.2 E.U./dL    Bilirubin Negative Negative    Blood, UA Negative Negative    Lot Number [id number]     Expiration Date 11/8/25    POC Glycosylated Hemoglobin (Hb A1C)    Specimen: Blood   Result Value Ref Range    Hemoglobin A1C 7.9 (A) 4.5 - 5.7 %    Lot Number [id number]     Expiration Date 2/12/26    POCT microalbumin     Specimen: Urine   Result Value Ref Range    Microalbumin, Urine 10     Creatinine, Urine 10     Lot Number [id number]     Expiration Date 5/9/25        Diagnoses and all orders for this visit:    1. Routine general medical examination at a health care facility (Primary)  -     POCT urinalysis dipstick, automated  -     CBC (No Diff); Future  -     Comprehensive Metabolic Panel; Future  -     Lipid Panel; Future  -     TSH Rfx On Abnormal To Free T4; Future  -     Vitamin B12; Future    2. Uncontrolled type 2 diabetes mellitus with hyperglycemia  -     POC Glycosylated Hemoglobin (Hb A1C)  -     POCT microalbumin  -     glimepiride (AMARYL) 4 MG tablet; Take 1 tablet by mouth Daily With Breakfast.  Dispense: 90 tablet; Refill: 1  -     Semaglutide, 1 MG/DOSE, (OZEMPIC) 4 MG/3ML solution pen-injector; Inject 1 mg under the skin into the appropriate area as directed 1 (One) Time Per Week.  Dispense: 3 mL; Refill: 3    3. Tinea pedis of both feet  -     clotrimazole-betamethasone (Lotrisone) 1-0.05 % cream; Apply 1 Application topically to the appropriate area as directed 2 (Two) Times a Day.  Dispense: 45 g; Refill: 3    4. Essential hypertension  -     potassium chloride (KLOR-CON M10) 10 MEQ CR tablet; Take 1 tablet by mouth Daily As Needed (with lasix).  Dispense: 90 tablet; Refill: 1    5. Moderate persistent asthma without complication  -     albuterol sulfate  (90 Base) MCG/ACT inhaler; Inhale 2 puffs Every 4 (Four) Hours As Needed for Wheezing.  Dispense: 18 g; Refill: 5    6. Need for vaccination  -     Tdap Vaccine => 8yo IM (BOOSTRIX/ADACEL)    7. Urinary tract infection without hematuria, site unspecified  -     Urine Culture - Urine, Urine, Clean Catch; Future  -     Urine Culture - Urine, Urine, Clean Catch    8. Vitamin D deficiency  -     Vitamin D,25-Hydroxy; Future      S/p recent pneumonia. Adv to get her Prevnar 20 next week as she just had her tdap today , and rt arm is s/p  lumpectomy.        Health Maintenance   Topic Date Due    DXA SCAN  06/20/2024    DIABETIC EYE EXAM  09/10/2024 (Originally 11/21/2021)    COVID-19 Vaccine (4 - 2023-24 season) 10/12/2024 (Originally 9/1/2023)    INFLUENZA VACCINE  08/01/2024    PAP SMEAR  12/15/2024    MAMMOGRAM  12/28/2024    HEMOGLOBIN A1C  01/23/2025    LIPID PANEL  03/18/2025    ANNUAL PHYSICAL  07/23/2025    DIABETIC FOOT EXAM  07/23/2025    URINE MICROALBUMIN  07/23/2025    BMI FOLLOWUP  07/23/2025    Pneumococcal Vaccine 0-64 (3 of 3 - PPSV23 or PCV20) 09/26/2025    COLORECTAL CANCER SCREENING  03/01/2029    TDAP/TD VACCINES (3 - Td or Tdap) 07/23/2034    HEPATITIS C SCREENING  Completed    ZOSTER VACCINE  Completed       Discussion/Summary  Impression: health maintenance visit. Currently, she eats an adequate diet and has an adequate exercise regimen.   Cervical cancer screening:Pap smear is current.   Breast cancer screening: mammogram is current.   Colorectal cancer screening: colonoscopy is current.  Osteoporosis screening: Bone mineral density test is due at 65.   CT low dose screen - not indicated  Screening lab work includes hemoglobin, glucose, lipid profile, thyroid function testing, 25-hydroxyvitamin D and urinalysis.   Immunizations are needed, immunizations will be given as outlined in the orders   Advice and education were given regarding cardiovascular risk reduction, healthy dietary habits, Seatbelt and helmet use and self skin examination.     Return in about 3 months (around 10/23/2024) for Recheck and annual in 1 year.      Electronically signed by:    Noreen Smith MD

## 2024-07-25 LAB — BACTERIA SPEC AEROBE CULT: NO GROWTH

## 2024-08-02 ENCOUNTER — LAB (OUTPATIENT)
Dept: LAB | Facility: HOSPITAL | Age: 64
End: 2024-08-02
Payer: COMMERCIAL

## 2024-08-02 ENCOUNTER — CLINICAL SUPPORT (OUTPATIENT)
Dept: INTERNAL MEDICINE | Facility: CLINIC | Age: 64
End: 2024-08-02
Payer: COMMERCIAL

## 2024-08-02 DIAGNOSIS — Z00.00 ROUTINE GENERAL MEDICAL EXAMINATION AT A HEALTH CARE FACILITY: ICD-10-CM

## 2024-08-02 DIAGNOSIS — C50.511 MALIGNANT NEOPLASM OF LOWER-OUTER QUADRANT OF RIGHT BREAST OF FEMALE, ESTROGEN RECEPTOR POSITIVE: ICD-10-CM

## 2024-08-02 DIAGNOSIS — Z17.0 MALIGNANT NEOPLASM OF LOWER-OUTER QUADRANT OF RIGHT BREAST OF FEMALE, ESTROGEN RECEPTOR POSITIVE: ICD-10-CM

## 2024-08-02 DIAGNOSIS — E55.9 VITAMIN D DEFICIENCY: ICD-10-CM

## 2024-08-02 DIAGNOSIS — Z23 IMMUNIZATION DUE: Primary | ICD-10-CM

## 2024-08-02 DIAGNOSIS — M81.0 OSTEOPOROSIS, UNSPECIFIED OSTEOPOROSIS TYPE, UNSPECIFIED PATHOLOGICAL FRACTURE PRESENCE: ICD-10-CM

## 2024-08-02 LAB
25(OH)D3 SERPL-MCNC: 33.9 NG/ML (ref 30–100)
ALBUMIN SERPL-MCNC: 3.9 G/DL (ref 3.5–5.2)
ALBUMIN/GLOB SERPL: 1.3 G/DL
ALP SERPL-CCNC: 93 U/L (ref 39–117)
ALT SERPL W P-5'-P-CCNC: 9 U/L (ref 1–33)
ANION GAP SERPL CALCULATED.3IONS-SCNC: 13 MMOL/L (ref 5–15)
AST SERPL-CCNC: 13 U/L (ref 1–32)
BASOPHILS # BLD AUTO: 0.03 10*3/MM3 (ref 0–0.2)
BASOPHILS NFR BLD AUTO: 0.4 % (ref 0–1.5)
BILIRUB SERPL-MCNC: 0.3 MG/DL (ref 0–1.2)
BUN SERPL-MCNC: 14 MG/DL (ref 8–23)
BUN/CREAT SERPL: 18.4 (ref 7–25)
CALCIUM SPEC-SCNC: 8.9 MG/DL (ref 8.6–10.5)
CHLORIDE SERPL-SCNC: 94 MMOL/L (ref 98–107)
CHOLEST SERPL-MCNC: 137 MG/DL (ref 0–200)
CO2 SERPL-SCNC: 25 MMOL/L (ref 22–29)
CREAT SERPL-MCNC: 0.76 MG/DL (ref 0.57–1)
DEPRECATED RDW RBC AUTO: 45.1 FL (ref 37–54)
EGFRCR SERPLBLD CKD-EPI 2021: 88.2 ML/MIN/1.73
EOSINOPHIL # BLD AUTO: 0.14 10*3/MM3 (ref 0–0.4)
EOSINOPHIL NFR BLD AUTO: 2.1 % (ref 0.3–6.2)
ERYTHROCYTE [DISTWIDTH] IN BLOOD BY AUTOMATED COUNT: 14.1 % (ref 12.3–15.4)
GLOBULIN UR ELPH-MCNC: 2.9 GM/DL
GLUCOSE SERPL-MCNC: 116 MG/DL (ref 65–99)
HCT VFR BLD AUTO: 40.8 % (ref 34–46.6)
HDLC SERPL-MCNC: 41 MG/DL (ref 40–60)
HGB BLD-MCNC: 13 G/DL (ref 12–15.9)
IMM GRANULOCYTES # BLD AUTO: 0.01 10*3/MM3 (ref 0–0.05)
IMM GRANULOCYTES NFR BLD AUTO: 0.1 % (ref 0–0.5)
LDLC SERPL CALC-MCNC: 80 MG/DL (ref 0–100)
LDLC/HDLC SERPL: 1.93 {RATIO}
LYMPHOCYTES # BLD AUTO: 1.58 10*3/MM3 (ref 0.7–3.1)
LYMPHOCYTES NFR BLD AUTO: 23.7 % (ref 19.6–45.3)
MAGNESIUM SERPL-MCNC: 2 MG/DL (ref 1.6–2.4)
MCH RBC QN AUTO: 28.3 PG (ref 26.6–33)
MCHC RBC AUTO-ENTMCNC: 31.9 G/DL (ref 31.5–35.7)
MCV RBC AUTO: 88.7 FL (ref 79–97)
MONOCYTES # BLD AUTO: 0.59 10*3/MM3 (ref 0.1–0.9)
MONOCYTES NFR BLD AUTO: 8.8 % (ref 5–12)
NEUTROPHILS NFR BLD AUTO: 4.32 10*3/MM3 (ref 1.7–7)
NEUTROPHILS NFR BLD AUTO: 64.9 % (ref 42.7–76)
NRBC BLD AUTO-RTO: 0 /100 WBC (ref 0–0.2)
PHOSPHATE SERPL-MCNC: 3.8 MG/DL (ref 2.5–4.5)
PLATELET # BLD AUTO: 243 10*3/MM3 (ref 140–450)
PMV BLD AUTO: 9.2 FL (ref 6–12)
POTASSIUM SERPL-SCNC: 4 MMOL/L (ref 3.5–5.2)
PROT SERPL-MCNC: 6.8 G/DL (ref 6–8.5)
RBC # BLD AUTO: 4.6 10*6/MM3 (ref 3.77–5.28)
SODIUM SERPL-SCNC: 132 MMOL/L (ref 136–145)
TRIGL SERPL-MCNC: 85 MG/DL (ref 0–150)
TSH SERPL DL<=0.05 MIU/L-ACNC: 2.38 UIU/ML (ref 0.27–4.2)
VIT B12 BLD-MCNC: 164 PG/ML (ref 211–946)
VLDLC SERPL-MCNC: 16 MG/DL (ref 5–40)
WBC NRBC COR # BLD AUTO: 6.67 10*3/MM3 (ref 3.4–10.8)

## 2024-08-02 PROCEDURE — 90677 PCV20 VACCINE IM: CPT | Performed by: INTERNAL MEDICINE

## 2024-08-02 PROCEDURE — 80061 LIPID PANEL: CPT

## 2024-08-02 PROCEDURE — 82306 VITAMIN D 25 HYDROXY: CPT

## 2024-08-02 PROCEDURE — 82607 VITAMIN B-12: CPT

## 2024-08-02 PROCEDURE — 84100 ASSAY OF PHOSPHORUS: CPT

## 2024-08-02 PROCEDURE — 80050 GENERAL HEALTH PANEL: CPT

## 2024-08-02 PROCEDURE — 83735 ASSAY OF MAGNESIUM: CPT

## 2024-08-02 PROCEDURE — 90471 IMMUNIZATION ADMIN: CPT | Performed by: INTERNAL MEDICINE

## 2024-08-02 PROCEDURE — 36415 COLL VENOUS BLD VENIPUNCTURE: CPT

## 2024-09-10 DIAGNOSIS — R60.0 PEDAL EDEMA: ICD-10-CM

## 2024-09-10 RX ORDER — FUROSEMIDE 20 MG
20 TABLET ORAL DAILY PRN
Qty: 90 TABLET | Refills: 1 | Status: SHIPPED | OUTPATIENT
Start: 2024-09-10

## 2024-11-05 ENCOUNTER — TELEPHONE (OUTPATIENT)
Dept: CARDIOLOGY | Facility: CLINIC | Age: 64
End: 2024-11-05
Payer: COMMERCIAL

## 2024-11-05 ENCOUNTER — PRIOR AUTHORIZATION (OUTPATIENT)
Dept: INTERNAL MEDICINE | Facility: CLINIC | Age: 64
End: 2024-11-05

## 2024-11-05 ENCOUNTER — LAB (OUTPATIENT)
Dept: LAB | Facility: HOSPITAL | Age: 64
End: 2024-11-05
Payer: COMMERCIAL

## 2024-11-05 ENCOUNTER — OFFICE VISIT (OUTPATIENT)
Dept: INTERNAL MEDICINE | Facility: CLINIC | Age: 64
End: 2024-11-05
Payer: COMMERCIAL

## 2024-11-05 VITALS
TEMPERATURE: 96.9 F | HEART RATE: 40 BPM | WEIGHT: 243 LBS | HEIGHT: 59 IN | SYSTOLIC BLOOD PRESSURE: 122 MMHG | OXYGEN SATURATION: 95 % | BODY MASS INDEX: 48.99 KG/M2 | DIASTOLIC BLOOD PRESSURE: 60 MMHG

## 2024-11-05 DIAGNOSIS — E11.65 UNCONTROLLED TYPE 2 DIABETES MELLITUS WITH HYPERGLYCEMIA: Primary | ICD-10-CM

## 2024-11-05 DIAGNOSIS — R00.1 BRADYCARDIA: ICD-10-CM

## 2024-11-05 DIAGNOSIS — E87.1 HYPONATREMIA: ICD-10-CM

## 2024-11-05 LAB
EXPIRATION DATE: ABNORMAL
HBA1C MFR BLD: 7.2 % (ref 4.5–5.7)
Lab: ABNORMAL

## 2024-11-05 PROCEDURE — 93000 ELECTROCARDIOGRAM COMPLETE: CPT | Performed by: INTERNAL MEDICINE

## 2024-11-05 PROCEDURE — 83036 HEMOGLOBIN GLYCOSYLATED A1C: CPT | Performed by: INTERNAL MEDICINE

## 2024-11-05 PROCEDURE — 96372 THER/PROPH/DIAG INJ SC/IM: CPT | Performed by: INTERNAL MEDICINE

## 2024-11-05 PROCEDURE — 99214 OFFICE O/P EST MOD 30 MIN: CPT | Performed by: INTERNAL MEDICINE

## 2024-11-05 RX ADMIN — CYANOCOBALAMIN 1000 MCG: 1000 INJECTION, SOLUTION INTRAMUSCULAR; SUBCUTANEOUS at 12:30

## 2024-11-05 NOTE — PROGRESS NOTES
Diabetes, Hypertension, and Alopecia    Subjective   Dana Rincon is a 64 y.o. female is here today for follow-up.    Diabetes  Pertinent negatives for hypoglycemia include no confusion, dizziness or headaches. Pertinent negatives for diabetes include no chest pain.   Hypertension  Pertinent negatives include no chest pain, headaches, palpitations or shortness of breath.   Alopecia  Pertinent negatives include no arthralgias, chest pain, chills, coughing, fever, headaches, myalgias, nausea, sore throat or vomiting.     Reports pulse is usually in the 50s at home. Denies dizziness, or cp,no excessive fatigue or weakness.  Reports she feels a little funny after taking the bisoprolol on occasion.  Was started on it after she was found to have pretty frequent PVCs when she had an upper respiratory infection.  Also notes increased hair loss on the ozempic. And would chelsie to switch.  Here for a follow up on her DM2, and hyponatremia    Current Outpatient Medications:     albuterol sulfate  (90 Base) MCG/ACT inhaler, Inhale 2 puffs Every 4 (Four) Hours As Needed for Wheezing., Disp: 18 g, Rfl: 5    bisoprolol (ZEBeta) 5 MG tablet, Take 0.5 tablets by mouth Daily., Disp: , Rfl:     CALCIUM PO, Take 1 tablet by mouth Daily., Disp: , Rfl:     Cholecalciferol (VITAMIN D3 PO), Take 1 capsule by mouth Daily., Disp: , Rfl:     clotrimazole-betamethasone (Lotrisone) 1-0.05 % cream, Apply 1 Application topically to the appropriate area as directed 2 (Two) Times a Day., Disp: 45 g, Rfl: 3    empagliflozin (Jardiance) 25 MG tablet tablet, Take 1 tablet by mouth Daily. For DMII, replaces Rybelsus, Disp: 90 tablet, Rfl: 3    fexofenadine (ALLEGRA) 30 MG tablet, Take 1 tablet by mouth As Needed., Disp: , Rfl:     Fluticasone-Umeclidin-Vilant (Trelegy Ellipta) 200-62.5-25 MCG/ACT inhaler, Inhale 1 puff Daily., Disp: 1 each, Rfl: 11    furosemide (LASIX) 20 MG tablet, TAKE 1 TABLET BY MOUTH DAILY AS NEEDED FOR SWELLING, Disp:  90 tablet, Rfl: 1    glimepiride (AMARYL) 4 MG tablet, Take 1 tablet by mouth Daily With Breakfast., Disp: 90 tablet, Rfl: 1    Melatonin 10 MG capsule, Take 1 capsule by mouth As Needed., Disp: , Rfl:     metFORMIN ER (GLUCOPHAGE-XR) 750 MG 24 hr tablet, Take 1 tablet by mouth Daily., Disp: 90 tablet, Rfl: 3    montelukast (SINGULAIR) 10 MG tablet, Take 1 tablet by mouth Daily., Disp: 90 tablet, Rfl: 3    olmesartan-hydrochlorothiazide (BENICAR HCT) 40-12.5 MG per tablet, Take 1 tablet by mouth Daily., Disp: 90 tablet, Rfl: 3    potassium chloride (KLOR-CON M10) 10 MEQ CR tablet, Take 1 tablet by mouth Daily As Needed (with lasix)., Disp: 90 tablet, Rfl: 1    pravastatin (PRAVACHOL) 10 MG tablet, Take 1 tablet by mouth Daily., Disp: 90 tablet, Rfl: 3    Tirzepatide 2.5 MG/0.5ML solution auto-injector, Inject 2.5 mg under the skin into the appropriate area as directed 1 (One) Time Per Week for 4 doses. Then increase to the next higher dose., Disp: 2 mL, Rfl: 0    Tirzepatide 5 MG/0.5ML solution auto-injector, Inject 5 mg under the skin into the appropriate area as directed 1 (One) Time Per Week., Disp: 2 mL, Rfl: 0    Current Facility-Administered Medications:     cyanocobalamin injection 1,000 mcg, 1,000 mcg, Intramuscular, Q28 Days, Noreen Smith MD, 1,000 mcg at 11/05/24 1230      The following portions of the patient's history were reviewed and updated as appropriate: allergies, current medications, past family history, past medical history, past social history, past surgical history and problem list.    Review of Systems   Constitutional: Negative.  Negative for chills and fever.   HENT:  Negative for ear discharge, ear pain, sinus pressure and sore throat.    Respiratory:  Negative for cough, chest tightness and shortness of breath.    Cardiovascular:  Negative for chest pain, palpitations and leg swelling.   Gastrointestinal:  Negative for diarrhea, nausea and vomiting.   Musculoskeletal:  Negative for  "arthralgias, back pain and myalgias.        Hair loss   Neurological:  Negative for dizziness, syncope and headaches.   Psychiatric/Behavioral:  Negative for confusion and sleep disturbance.        Objective   /60   Pulse (!) 40   Temp 96.9 °F (36.1 °C)   Ht 149.9 cm (59\")   Wt 110 kg (243 lb)   SpO2 95% Comment: ra  BMI 49.08 kg/m²   Physical Exam  Vitals and nursing note reviewed.   Constitutional:       Appearance: She is well-developed.   HENT:      Head: Normocephalic and atraumatic.      Right Ear: External ear normal.      Left Ear: External ear normal.      Mouth/Throat:      Pharynx: No oropharyngeal exudate.   Eyes:      Conjunctiva/sclera: Conjunctivae normal.      Pupils: Pupils are equal, round, and reactive to light.   Neck:      Thyroid: No thyromegaly.   Cardiovascular:      Rate and Rhythm: Bradycardia present. Rhythm irregular.      Pulses: Normal pulses.      Heart sounds: Normal heart sounds. No murmur heard.     No friction rub. No gallop.   Pulmonary:      Effort: Pulmonary effort is normal.      Breath sounds: Normal breath sounds.   Abdominal:      General: Bowel sounds are normal. There is no distension.      Palpations: Abdomen is soft.      Tenderness: There is no abdominal tenderness.   Musculoskeletal:      Cervical back: Neck supple.   Skin:     General: Skin is warm and dry.   Neurological:      Mental Status: She is alert and oriented to person, place, and time.      Cranial Nerves: No cranial nerve deficit.   Psychiatric:         Judgment: Judgment normal.         ECG 12 Lead    Date/Time: 11/5/2024 1:11 PM  Performed by: Noreen Smith MD    Authorized by: Noreen Smith MD  Comparison: compared with previous ECG   Similar to previous ECG  Rhythm: sinus rhythm  Ectopy: multifocal PVCs  Ectopy comments: frequent PVCs  Rate: bradycardic  Conduction: conduction normal  Conduction: non-specific intraventricular conduction delay  ST Segments: ST segments normal  T " Waves: T waves normal  QRS axis: normal  Other: no other findings    Clinical impression: abnormal EKG                 Results for orders placed or performed in visit on 11/05/24   POC Glycosylated Hemoglobin (Hb A1C)    Collection Time: 11/05/24 12:29 PM    Specimen: Blood   Result Value Ref Range    Hemoglobin A1C 7.2 (A) 4.5 - 5.7 %    Lot Number [id number]     Expiration Date 5/27/26              Assessment & Plan   Diagnoses and all orders for this visit:    Uncontrolled type 2 diabetes mellitus with hyperglycemia  -     POC Glycosylated Hemoglobin (Hb A1C)  -     Tirzepatide 2.5 MG/0.5ML solution auto-injector; Inject 2.5 mg under the skin into the appropriate area as directed 1 (One) Time Per Week for 4 doses. Then increase to the next higher dose.  -     Tirzepatide 5 MG/0.5ML solution auto-injector; Inject 5 mg under the skin into the appropriate area as directed 1 (One) Time Per Week.    Bradycardia  -     Basic Metabolic Panel; Future  -     CBC (No Diff); Future  -     TSH Rfx On Abnormal To Free T4; Future    Hyponatremia  Comments:  on pedialyte prn. check labs    Stop ozempic, start mounjaro. If no hair loss on the 5 mg, will   I had her hydrate well and pulse on recheck was 32.  Was 30 manually.  EKG showed a heart rate of 65 with multiple PVCs greater than 6 mm 10-second strip.  Continue bisoprolol.  Check labs.  Advised if she has any dizziness or shortness of breath or fatigue to call cardiology ASAP.       Pulse check prior to Pt leaving was again 40/ min. Adv to cut back on the bisoprolol further to 2.5 mg QOD. Will discuss with cardiology to move her appointment up     Return in about 3 months (around 2/5/2025) for Recheck.    Electronically signed by:    Noreen Smith MD

## 2024-11-05 NOTE — TELEPHONE ENCOUNTER
Caller: Dana Rincon    Relationship to patient: Self    Best call back number: [phone]     Chief complaint: PVC'S & LOW PULSE RATE    Type of visit: F/U APPT    Requested date: NEXT AVAILABLE     If rescheduling, when is the original appointment: 1-6-25     Additional notes:PLEASE CONTACT PATIENT WITH A SUITABLE DATE.

## 2024-11-06 ENCOUNTER — TELEPHONE (OUTPATIENT)
Dept: CARDIOLOGY | Facility: CLINIC | Age: 64
End: 2024-11-06
Payer: COMMERCIAL

## 2024-11-06 ENCOUNTER — LAB (OUTPATIENT)
Dept: LAB | Facility: HOSPITAL | Age: 64
End: 2024-11-06
Payer: COMMERCIAL

## 2024-11-06 DIAGNOSIS — R00.1 BRADYCARDIA: ICD-10-CM

## 2024-11-06 LAB
ANION GAP SERPL CALCULATED.3IONS-SCNC: 15 MMOL/L (ref 5–15)
BUN SERPL-MCNC: 15 MG/DL (ref 8–23)
BUN/CREAT SERPL: 19.7 (ref 7–25)
CALCIUM SPEC-SCNC: 9.2 MG/DL (ref 8.6–10.5)
CHLORIDE SERPL-SCNC: 96 MMOL/L (ref 98–107)
CO2 SERPL-SCNC: 21 MMOL/L (ref 22–29)
CREAT SERPL-MCNC: 0.76 MG/DL (ref 0.57–1)
DEPRECATED RDW RBC AUTO: 38.4 FL (ref 37–54)
EGFRCR SERPLBLD CKD-EPI 2021: 87.6 ML/MIN/1.73
ERYTHROCYTE [DISTWIDTH] IN BLOOD BY AUTOMATED COUNT: 13.3 % (ref 12.3–15.4)
GLUCOSE SERPL-MCNC: 124 MG/DL (ref 65–99)
HCT VFR BLD AUTO: 43.3 % (ref 34–46.6)
HGB BLD-MCNC: 14.5 G/DL (ref 12–15.9)
MCH RBC QN AUTO: 27.2 PG (ref 26.6–33)
MCHC RBC AUTO-ENTMCNC: 33.5 G/DL (ref 31.5–35.7)
MCV RBC AUTO: 81.1 FL (ref 79–97)
PLATELET # BLD AUTO: 214 10*3/MM3 (ref 140–450)
PMV BLD AUTO: 10.3 FL (ref 6–12)
POTASSIUM SERPL-SCNC: 4.1 MMOL/L (ref 3.5–5.2)
RBC # BLD AUTO: 5.34 10*6/MM3 (ref 3.77–5.28)
SODIUM SERPL-SCNC: 132 MMOL/L (ref 136–145)
TSH SERPL DL<=0.05 MIU/L-ACNC: 2.13 UIU/ML (ref 0.27–4.2)
WBC NRBC COR # BLD AUTO: 8.57 10*3/MM3 (ref 3.4–10.8)

## 2024-11-06 PROCEDURE — 85027 COMPLETE CBC AUTOMATED: CPT

## 2024-11-06 PROCEDURE — 36415 COLL VENOUS BLD VENIPUNCTURE: CPT

## 2024-11-06 PROCEDURE — 84443 ASSAY THYROID STIM HORMONE: CPT

## 2024-11-06 PROCEDURE — 80048 BASIC METABOLIC PNL TOTAL CA: CPT

## 2024-11-06 NOTE — TELEPHONE ENCOUNTER
----- Message from Lena Alejandro sent at 11/5/2024  1:46 PM EST -----  Can you get this lady in to see me soon. Can be an off day if needed.  ----- Message -----  From: Noreen Smith MD  Sent: 11/5/2024   1:17 PM EST  To: ARAM Trevino,    This patient was seen today and was found to have a pulse of 40 at intake.  When I rechecked it in the middle of her visit ( palpation)it was down to 32.  EKG though had her at 65 with multiple PVCs.  I asked her to cut back on her bisoprolol from 2.5 Mg daily to 2.5 Mg every other day.  Did not want to stop it due to her PVCs.  I am getting blood work on her but was wondering if you could see her sooner than January?    Thank you,    Noreen

## 2024-11-11 ENCOUNTER — OFFICE VISIT (OUTPATIENT)
Dept: CARDIOLOGY | Facility: CLINIC | Age: 64
End: 2024-11-11
Payer: COMMERCIAL

## 2024-11-11 VITALS
DIASTOLIC BLOOD PRESSURE: 60 MMHG | WEIGHT: 250.4 LBS | BODY MASS INDEX: 50.48 KG/M2 | OXYGEN SATURATION: 97 % | SYSTOLIC BLOOD PRESSURE: 148 MMHG | HEIGHT: 59 IN | HEART RATE: 69 BPM

## 2024-11-11 DIAGNOSIS — R00.1 BRADYCARDIA, DRUG INDUCED: Primary | ICD-10-CM

## 2024-11-11 DIAGNOSIS — I49.3 FREQUENT PVCS: ICD-10-CM

## 2024-11-11 DIAGNOSIS — I10 ESSENTIAL HYPERTENSION: ICD-10-CM

## 2024-11-11 DIAGNOSIS — I49.3 PVC'S (PREMATURE VENTRICULAR CONTRACTIONS): Primary | ICD-10-CM

## 2024-11-11 DIAGNOSIS — E78.5 DYSLIPIDEMIA: ICD-10-CM

## 2024-11-11 DIAGNOSIS — T50.905A BRADYCARDIA, DRUG INDUCED: Primary | ICD-10-CM

## 2024-11-11 PROCEDURE — 99214 OFFICE O/P EST MOD 30 MIN: CPT | Performed by: INTERNAL MEDICINE

## 2024-11-11 PROCEDURE — 93000 ELECTROCARDIOGRAM COMPLETE: CPT | Performed by: INTERNAL MEDICINE

## 2024-11-11 RX ORDER — BISOPROLOL FUMARATE 5 MG/1
2.5 TABLET, FILM COATED ORAL DAILY
Qty: 30 TABLET | Refills: 5 | Status: SHIPPED | OUTPATIENT
Start: 2024-11-11

## 2024-11-11 NOTE — PROGRESS NOTES
[facility] Cardiology  Subjective:     Encounter Date: 11/11/2024      Patient ID: Dana Rincon is a 64 y.o. female.    Chief Complaint: PVC (premature ventricular contraction)      PROBLEM LIST:  Dyspnea on exertion  12/2023 echo EF 60%.  Mean aortic valve gradient 17.  Trace MRAshish  1/24/2024 normal MPS.  Mild scattered coronary calcifications.  PVC  2023 Holter monitor showing 19% PVC burden  Hypertension  Dyslipidemia  Sleep apnea on CPAP  Obesity  COPD/asthma   Diabetes   Breast cancer 2015 or 2016  XRT right side  Lumpectomy and anastrazole  Surgeries:  Tonsillectomy and adenoidectomy  Lumpectomy  D&C  [place] teeth extraction         History of Present Illness  Dana Rincon returns today for a follow up with a history of bradycardia and cardiac risk factors. Since last visit, patient has been doing well overall from a cardiovascular standpoint. She reports she is frequently fatigued and has low energy. Patient states she started on Ozempic this past spring but has since switched to Mounjaro as she was experiencing hair loss. She has noticed her heart rate has been lower since switching to Mounjaro. Patient has been started on Trelegy by her PCP due to BERG while walking in her neighborhood. She has frequent swelling in her legs. Patient's bisoprolol has been decreased to half tablet PRN and was advised to increase to daily. She sleeps with her CPAP machine nightly. Patient denies chest pain, shortness of breath, orthopnea, palpitations, dizziness, and syncope.     Allergies   Allergen Reactions    Meperidine Nausea And Vomiting         Current Outpatient Medications:     albuterol sulfate  (90 Base) MCG/ACT inhaler, Inhale 2 puffs Every 4 (Four) Hours As Needed for Wheezing., Disp: 18 g, Rfl: 5    bisoprolol (ZEBeta) 5 MG tablet, Take 0.5 tablets by mouth Daily., Disp: , Rfl:     CALCIUM PO, Take 1 tablet by mouth Daily., Disp: , Rfl:     Cholecalciferol (VITAMIN D3  PO), Take 1 capsule by mouth Daily., Disp: , Rfl:     clotrimazole-betamethasone (Lotrisone) 1-0.05 % cream, Apply 1 Application topically to the appropriate area as directed 2 (Two) Times a Day., Disp: 45 g, Rfl: 3    empagliflozin (Jardiance) 25 MG tablet tablet, Take 1 tablet by mouth Daily. For DMII, replaces Rybelsus, Disp: 90 tablet, Rfl: 3    fexofenadine (ALLEGRA) 30 MG tablet, Take 1 tablet by mouth As Needed., Disp: , Rfl:     Fluticasone-Umeclidin-Vilant (Trelegy Ellipta) 200-62.5-25 MCG/ACT inhaler, Inhale 1 puff Daily., Disp: 1 each, Rfl: 11    furosemide (LASIX) 20 MG tablet, TAKE 1 TABLET BY MOUTH DAILY AS NEEDED FOR SWELLING, Disp: 90 tablet, Rfl: 1    glimepiride (AMARYL) 4 MG tablet, Take 1 tablet by mouth Daily With Breakfast., Disp: 90 tablet, Rfl: 1    Melatonin 10 MG capsule, Take 1 capsule by mouth As Needed., Disp: , Rfl:     metFORMIN ER (GLUCOPHAGE-XR) 750 MG 24 hr tablet, Take 1 tablet by mouth Daily., Disp: 90 tablet, Rfl: 3    montelukast (SINGULAIR) 10 MG tablet, Take 1 tablet by mouth Daily., Disp: 90 tablet, Rfl: 3    olmesartan-hydrochlorothiazide (BENICAR HCT) 40-12.5 MG per tablet, Take 1 tablet by mouth Daily., Disp: 90 tablet, Rfl: 3    potassium chloride (KLOR-CON M10) 10 MEQ CR tablet, Take 1 tablet by mouth Daily As Needed (with lasix)., Disp: 90 tablet, Rfl: 1    pravastatin (PRAVACHOL) 10 MG tablet, Take 1 tablet by mouth Daily., Disp: 90 tablet, Rfl: 3    Tirzepatide 2.5 MG/0.5ML solution auto-injector, Inject 2.5 mg under the skin into the appropriate area as directed 1 (One) Time Per Week for 4 doses. Then increase to the next higher dose., Disp: 2 mL, Rfl: 0    Tirzepatide 5 MG/0.5ML solution auto-injector, Inject 5 mg under the skin into the appropriate area as directed 1 (One) Time Per Week., Disp: 2 mL, Rfl: 0    Current Facility-Administered Medications:     cyanocobalamin injection 1,000 mcg, 1,000 mcg, Intramuscular, Q28 Days, Noreen Smith MD, 1,000 mcg at  "11/05/24 1230    The following portions of the patient's history were reviewed and updated as appropriate: allergies, current medications, past family history, past medical history, past social history, past surgical history and problem list.    ROS       Objective:     Vitals:    11/11/24 1321   BP: 148/60   BP Location: Left arm   Patient Position: Sitting   Cuff Size: Adult   Pulse: 69   SpO2: 97%   Weight: 114 kg (250 lb 6.4 oz)   Height: 149.9 cm (59\")         Vitals reviewed.   Constitutional:       Appearance: Well-developed and not in distress.   Neck:      Thyroid: No thyromegaly.      Vascular: No carotid bruit or JVD.   Pulmonary:      Breath sounds: Normal breath sounds.   Cardiovascular:      Regular rhythm.      No gallop. No S3 and S4 gallop.   Pulses:     Intact distal pulses.      Carotid: 2+ bilaterally.     Radial: 2+ bilaterally.  Edema:     Peripheral edema absent.   Abdominal:      General: Bowel sounds are normal.      Palpations: Abdomen is soft. There is no abdominal mass.      Tenderness: There is no abdominal tenderness.   Musculoskeletal:         General: No deformity.      Extremities: No clubbing present.Skin:     General: Skin is warm and dry.      Findings: No rash.   Neurological:      Mental Status: Alert and oriented to person, place, and time.         Lab Review:  Lab Results   Component Value Date    GLUCOSE 124 (H) 11/06/2024    BUN 15 11/06/2024    CREATININE 0.76 11/06/2024    BCR 19.7 11/06/2024    K 4.1 11/06/2024    CO2 21.0 (L) 11/06/2024    CALCIUM 9.2 11/06/2024    ALBUMIN 3.9 08/02/2024    ALKPHOS 93 08/02/2024    AST 13 08/02/2024    ALT 9 08/02/2024     Lab Results   Component Value Date    CHOL 137 08/02/2024    TRIG 85 08/02/2024    HDL 41 08/02/2024    LDL 80 08/02/2024      Lab Results   Component Value Date    WBC 8.57 11/06/2024    RBC 5.34 (H) 11/06/2024    HGB 14.5 11/06/2024    HCT 43.3 11/06/2024    MCV 81.1 11/06/2024     11/06/2024     Lab Results "   Component Value Date    TSH 2.130 11/06/2024     Lab Results   Component Value Date    HGBA1C 7.2 (A) 11/05/2024          ECG 12 Lead    Date/Time: 11/11/2024 3:10 PM  Performed by: Dagoberto Moreno MD    Authorized by: Dagoberto Moreno MD  Rhythm: sinus rhythm  Ectopy: unifocal PVCs and bigeminy  Other findings: low voltage            Advance Care Planning              Assessment:   Diagnoses and all orders for this visit:    1. Bradycardia, drug induced (Primary)    2. Frequent PVCs    3. Essential hypertension    4. Dyslipidemia        Impression:  1. Bradycardia, via pulse, actually heart rate normal.  Has frequent PVCs.    2. Frequent PVCs.  Bigeminal pattern.  Chronic, but may be symptomatic given new symptoms.  Continue on bisoprolol 2.5 mg daily for rate control and hypertension.     3. Essential hypertension. Elevated today. Continue on Lasix 20 mg daily PRN for fluid retention.     4. Dyslipidemia. Continue on pravastatin 10 mg daily for hyperlipidemia.  Last LDL of 80.    Plan:  New symptoms, possibly PVC induced.  Echocardiogram  Increase bisoprolol back to daily.  48-hour Holter monitor ordered to assess c PVC burden.   If PVC burden significantly elevated has has been in the past, may benefit from PVC ablation.  Will refer to EP.  Revisit in 6 MO, or sooner as needed.          Scribed for Dagoberto Moreno MD by Kendy Charlton. 11/11/2024 13:53 EST    Please note that portions of this note may have been completed with a voice recognition program. Efforts were made to edit the dictations, but occasionally words are mistranscribed.

## 2024-11-13 ENCOUNTER — OFFICE VISIT (OUTPATIENT)
Dept: INTERNAL MEDICINE | Facility: CLINIC | Age: 64
End: 2024-11-13
Payer: COMMERCIAL

## 2024-11-13 VITALS
HEIGHT: 59 IN | OXYGEN SATURATION: 95 % | BODY MASS INDEX: 51.08 KG/M2 | DIASTOLIC BLOOD PRESSURE: 84 MMHG | SYSTOLIC BLOOD PRESSURE: 135 MMHG | WEIGHT: 253.4 LBS | TEMPERATURE: 101.3 F

## 2024-11-13 DIAGNOSIS — R05.9 COUGH IN ADULT: ICD-10-CM

## 2024-11-13 DIAGNOSIS — U07.1 COVID-19 VIRUS DETECTED: ICD-10-CM

## 2024-11-13 LAB
EXPIRATION DATE: ABNORMAL
EXPIRATION DATE: NORMAL
FLUAV AG UPPER RESP QL IA.RAPID: NOT DETECTED
FLUBV AG UPPER RESP QL IA.RAPID: NOT DETECTED
INTERNAL CONTROL: ABNORMAL
INTERNAL CONTROL: NORMAL
Lab: ABNORMAL
Lab: NORMAL
S PYO AG THROAT QL: NEGATIVE
SARS-COV-2 AG UPPER RESP QL IA.RAPID: DETECTED

## 2024-11-13 PROCEDURE — 99213 OFFICE O/P EST LOW 20 MIN: CPT | Performed by: INTERNAL MEDICINE

## 2024-11-13 PROCEDURE — 87428 SARSCOV & INF VIR A&B AG IA: CPT | Performed by: INTERNAL MEDICINE

## 2024-11-13 PROCEDURE — 87880 STREP A ASSAY W/OPTIC: CPT | Performed by: INTERNAL MEDICINE

## 2024-11-13 RX ORDER — BENZONATATE 100 MG/1
100 CAPSULE ORAL 3 TIMES DAILY PRN
Qty: 30 CAPSULE | Refills: 0 | Status: SHIPPED | OUTPATIENT
Start: 2024-11-13

## 2024-11-13 NOTE — PROGRESS NOTES
"Subjective   Dana Rincon is a 64 y.o. female.   Chief Complaint   Patient presents with    Sore Throat    Cough    Shortness of Breath    Headache    Fatigue       History of Present Illness   Fever, cough, chills, body aches, and sore throat x 24 hours. No vomitng or diarrhea. No CP or SOA>  The following portions of the patient's history were reviewed and updated as appropriate: allergies, current medications, past family history, past medical history, past social history, past surgical history, and problem list.    Review of Systems   Constitutional:  Positive for chills and fever. Negative for fatigue.   HENT:  Positive for sore throat.    Respiratory:  Positive for cough. Negative for shortness of breath.    Cardiovascular:  Negative for chest pain and leg swelling.   Gastrointestinal:  Negative for abdominal pain.   Psychiatric/Behavioral:  Negative for confusion.      /84   Temp (!) 101.3 °F (38.5 °C) (Temporal)   Ht 149 cm (58.66\")   Wt 115 kg (253 lb 6.4 oz)   SpO2 95%   BMI 51.77 kg/m²     Objective   Physical Exam  Vitals reviewed.   Pulmonary:      Effort: No respiratory distress.      Breath sounds: No wheezing.   Neurological:      Mental Status: She is alert and oriented to person, place, and time.   Psychiatric:         Behavior: Behavior normal.         Assessment & Plan   Diagnoses and all orders for this visit:    1. COVID-19 virus detected  -     Nirmatrelvir & Ritonavir, 300mg/100mg, (PAXLOVID); Take 3 tablets by mouth 2 (Two) Times a Day.  Dispense: 30 each; Refill: 0  Hold pravastatin while x med for 2 weeks.   Covid positive  If symptoms worsen to eD  2. Cough in adult  -     POCT rapid strep A  -     POCT SARS-CoV-2 Antigen SADIE + Flu  -     benzonatate (Tessalon Perles) 100 MG capsule; Take 1 capsule by mouth 3 (Three) Times a Day As Needed for Cough.  Dispense: 30 capsule; Refill: 0  Flu a and b negative. Strep negative. Covid positive               "

## 2024-11-14 ENCOUNTER — TELEPHONE (OUTPATIENT)
Dept: ONCOLOGY | Facility: CLINIC | Age: 64
End: 2024-11-14
Payer: COMMERCIAL

## 2024-11-14 NOTE — TELEPHONE ENCOUNTER
Caller: Dana Rincon    Relationship to patient: Self    Best call back number   Telephone Information:   Mobile [phone]     Chief complaint: SCANS WERE R/S TO MARCH 2025    Type of visit: F/U 1     Requested date: AFTER 3/14/2025 NEEDS TO BE AT THE [facility] LOCATION, CALL TO R/S     If rescheduling, when is the original appointment: 1/7/2025    Additional notes:

## 2024-12-02 ENCOUNTER — TELEPHONE (OUTPATIENT)
Dept: CARDIOLOGY | Facility: CLINIC | Age: 64
End: 2024-12-02

## 2024-12-02 ENCOUNTER — HOSPITAL ENCOUNTER (OUTPATIENT)
Dept: CARDIOLOGY | Facility: HOSPITAL | Age: 64
Discharge: HOME | End: 2024-12-02
Admitting: INTERNAL MEDICINE
Payer: COMMERCIAL

## 2024-12-02 VITALS — BODY MASS INDEX: 51.11 KG/M2 | HEIGHT: 59 IN | WEIGHT: 253.53 LBS

## 2024-12-02 DIAGNOSIS — I49.3 PVC'S (PREMATURE VENTRICULAR CONTRACTIONS): ICD-10-CM

## 2024-12-02 LAB
ASCENDING AORTA: 3.2 CM
BH CV ECHO MEAS - AO MAX PG: 16.3 MMHG
BH CV ECHO MEAS - AO MEAN PG: 9.5 MMHG
BH CV ECHO MEAS - AO ROOT DIAM: 2.8 CM
BH CV ECHO MEAS - AO V2 MAX: 202 CM/SEC
BH CV ECHO MEAS - AO V2 VTI: 52.4 CM
BH CV ECHO MEAS - AVA(I,D): 2.09 CM2
BH CV ECHO MEAS - EDV(CUBED): 110.6 ML
BH CV ECHO MEAS - EDV(MOD-SP2): 51.3 ML
BH CV ECHO MEAS - EDV(MOD-SP4): 55.6 ML
BH CV ECHO MEAS - EF(MOD-BP): 59.3 %
BH CV ECHO MEAS - EF(MOD-SP2): 62.2 %
BH CV ECHO MEAS - EF(MOD-SP4): 58.3 %
BH CV ECHO MEAS - ESV(CUBED): 29.8 ML
BH CV ECHO MEAS - ESV(MOD-SP2): 19.4 ML
BH CV ECHO MEAS - ESV(MOD-SP4): 23.2 ML
BH CV ECHO MEAS - FS: 35.4 %
BH CV ECHO MEAS - IVS/LVPW: 1 CM
BH CV ECHO MEAS - IVSD: 1.1 CM
BH CV ECHO MEAS - LA DIMENSION: 4.5 CM
BH CV ECHO MEAS - LAT PEAK E' VEL: 9.6 CM/SEC
BH CV ECHO MEAS - LV DIASTOLIC VOL/BSA (35-75): 27.3 CM2
BH CV ECHO MEAS - LV MASS(C)D: 194 GRAMS
BH CV ECHO MEAS - LV MAX PG: 12 MMHG
BH CV ECHO MEAS - LV MEAN PG: 5 MMHG
BH CV ECHO MEAS - LV SYSTOLIC VOL/BSA (12-30): 11.4 CM2
BH CV ECHO MEAS - LV V1 MAX: 173 CM/SEC
BH CV ECHO MEAS - LV V1 VTI: 38.6 CM
BH CV ECHO MEAS - LVIDD: 4.8 CM
BH CV ECHO MEAS - LVIDS: 3.1 CM
BH CV ECHO MEAS - LVOT AREA: 2.8 CM2
BH CV ECHO MEAS - LVOT DIAM: 1.9 CM
BH CV ECHO MEAS - LVPWD: 1.1 CM
BH CV ECHO MEAS - MED PEAK E' VEL: 7.8 CM/SEC
BH CV ECHO MEAS - MV DEC SLOPE: 671 CM/SEC2
BH CV ECHO MEAS - MV DEC TIME: 0.22 SEC
BH CV ECHO MEAS - MV E MAX VEL: 82.8 CM/SEC
BH CV ECHO MEAS - MV P1/2T: 57.6 MSEC
BH CV ECHO MEAS - MVA(P1/2T): 3.8 CM2
BH CV ECHO MEAS - PA ACC TIME: 0.15 SEC
BH CV ECHO MEAS - SV(LVOT): 109.4 ML
BH CV ECHO MEAS - SV(MOD-SP2): 31.9 ML
BH CV ECHO MEAS - SV(MOD-SP4): 32.4 ML
BH CV ECHO MEAS - SVI(LVOT): 53.7 ML/M2
BH CV ECHO MEAS - SVI(MOD-SP2): 15.7 ML/M2
BH CV ECHO MEAS - SVI(MOD-SP4): 15.9 ML/M2
BH CV ECHO MEAS - TAPSE (>1.6): 2.8 CM
BH CV ECHO MEAS - TR MAX PG: 24.8 MMHG
BH CV ECHO MEAS - TR MAX VEL: 249 CM/SEC
BH CV ECHO MEASUREMENTS AVERAGE E/E' RATIO: 9.52
BH CV VAS BP RIGHT ARM: NORMAL MMHG
BH CV XLRA - RV BASE: 2.8 CM
BH CV XLRA - RV MID: 2.4 CM
LEFT ATRIUM VOLUME INDEX: 26.7 ML/M2
LV EF 2D ECHO EST: 60 %

## 2024-12-02 PROCEDURE — 93306 TTE W/DOPPLER COMPLETE: CPT

## 2024-12-03 ENCOUNTER — TELEPHONE (OUTPATIENT)
Dept: CARDIOLOGY | Facility: CLINIC | Age: 64
End: 2024-12-03
Payer: COMMERCIAL

## 2024-12-03 NOTE — TELEPHONE ENCOUNTER
Spoke with patient advised of below.  She reports she is doing better, yesterday bp was really high, but advises she was stressed with traffic attempting to get to Echo appt.  It is normal today.  --- Message from Dagoberto Moreno sent at 12/3/2024  9:09 AM EST -----  Echo was normal.  PVC burden 5%, actually less the last time.  If symptoms are better on the higher dose bisoprolol we cannot do anything at this time.  If they are bothersome to her, we can either see her or have her see electrophysiology for possible antiarrhythmic medicine versus ablation.

## 2024-12-13 ENCOUNTER — CLINICAL SUPPORT (OUTPATIENT)
Dept: INTERNAL MEDICINE | Facility: CLINIC | Age: 64
End: 2024-12-13
Payer: COMMERCIAL

## 2024-12-13 DIAGNOSIS — Z23 NEED FOR INFLUENZA VACCINATION: Primary | ICD-10-CM

## 2024-12-13 PROCEDURE — 90656 IIV3 VACC NO PRSV 0.5 ML IM: CPT | Performed by: INTERNAL MEDICINE

## 2024-12-13 PROCEDURE — 90471 IMMUNIZATION ADMIN: CPT | Performed by: INTERNAL MEDICINE

## 2024-12-13 PROCEDURE — 96372 THER/PROPH/DIAG INJ SC/IM: CPT | Performed by: INTERNAL MEDICINE

## 2024-12-13 RX ADMIN — CYANOCOBALAMIN 1000 MCG: 1000 INJECTION, SOLUTION INTRAMUSCULAR; SUBCUTANEOUS at 13:20

## 2024-12-13 NOTE — PROGRESS NOTES
Immunization  Immunization performed in Right and Left Deltoids by Kristina Carrillo. Patient tolerated the procedure well without complications.  12/13/24   Kristina Carrillo

## 2025-02-05 ENCOUNTER — OFFICE VISIT (OUTPATIENT)
Dept: INTERNAL MEDICINE | Facility: CLINIC | Age: 65
End: 2025-02-05
Payer: COMMERCIAL

## 2025-02-05 VITALS
SYSTOLIC BLOOD PRESSURE: 148 MMHG | HEART RATE: 56 BPM | HEIGHT: 59 IN | DIASTOLIC BLOOD PRESSURE: 60 MMHG | BODY MASS INDEX: 49.31 KG/M2 | TEMPERATURE: 97.3 F | OXYGEN SATURATION: 96 % | WEIGHT: 244.6 LBS

## 2025-02-05 DIAGNOSIS — I10 ESSENTIAL HYPERTENSION: ICD-10-CM

## 2025-02-05 DIAGNOSIS — E78.49 OTHER HYPERLIPIDEMIA: ICD-10-CM

## 2025-02-05 DIAGNOSIS — E11.65 UNCONTROLLED TYPE 2 DIABETES MELLITUS WITH HYPERGLYCEMIA: Primary | ICD-10-CM

## 2025-02-05 DIAGNOSIS — F51.01 PRIMARY INSOMNIA: ICD-10-CM

## 2025-02-05 LAB
EXPIRATION DATE: ABNORMAL
HBA1C MFR BLD: 6.6 % (ref 4.5–5.7)
Lab: ABNORMAL

## 2025-02-05 PROCEDURE — 99214 OFFICE O/P EST MOD 30 MIN: CPT | Performed by: INTERNAL MEDICINE

## 2025-02-05 PROCEDURE — 83036 HEMOGLOBIN GLYCOSYLATED A1C: CPT | Performed by: INTERNAL MEDICINE

## 2025-02-05 PROCEDURE — 96372 THER/PROPH/DIAG INJ SC/IM: CPT | Performed by: INTERNAL MEDICINE

## 2025-02-05 RX ORDER — TRAZODONE HYDROCHLORIDE 50 MG/1
50-100 TABLET, FILM COATED ORAL NIGHTLY PRN
Qty: 60 TABLET | Refills: 5 | Status: SHIPPED | OUTPATIENT
Start: 2025-02-05

## 2025-02-05 RX ORDER — GLIMEPIRIDE 4 MG/1
4 TABLET ORAL
Qty: 90 TABLET | Refills: 1 | Status: CANCELLED | OUTPATIENT
Start: 2025-02-05

## 2025-02-05 RX ADMIN — CYANOCOBALAMIN 1000 MCG: 1000 INJECTION, SOLUTION INTRAMUSCULAR; SUBCUTANEOUS at 08:39

## 2025-02-05 NOTE — PROGRESS NOTES
Diabetes    Subjective   Dana Rincon is a 64 y.o. female is here today for follow-up.    Diabetes  Pertinent negatives for hypoglycemia include no confusion, dizziness or headaches. Pertinent negatives for diabetes include no chest pain.     Reports that she has been tolerating the mounjaro  Has had some hypoglycemic spells.  Sleep is an issue, and has spells where she can't sleep. Melatonin is not helping even at 10 mg.  Here for a f/u on her hlp, dm2 and htn.  Has an appointment with Dr. Driscoll, next month.  Off arimidex x 3-4 yrs.  Gets prolia shots through him.      Current Outpatient Medications:     albuterol sulfate  (90 Base) MCG/ACT inhaler, Inhale 2 puffs Every 4 (Four) Hours As Needed for Wheezing., Disp: 18 g, Rfl: 5    bisoprolol (ZEBeta) 5 MG tablet, Take 0.5 tablets by mouth Daily., Disp: 30 tablet, Rfl: 5    CALCIUM PO, Take 1 tablet by mouth Daily., Disp: , Rfl:     Cholecalciferol (VITAMIN D3 PO), Take 1 capsule by mouth Daily., Disp: , Rfl:     clotrimazole-betamethasone (Lotrisone) 1-0.05 % cream, Apply 1 Application topically to the appropriate area as directed 2 (Two) Times a Day., Disp: 45 g, Rfl: 3    empagliflozin (Jardiance) 25 MG tablet tablet, Take 1 tablet by mouth Daily. For DMII, replaces Rybelsus, Disp: 90 tablet, Rfl: 3    fexofenadine (ALLEGRA) 30 MG tablet, Take 1 tablet by mouth As Needed., Disp: , Rfl:     Fluticasone-Umeclidin-Vilant (Trelegy Ellipta) 200-62.5-25 MCG/ACT inhaler, Inhale 1 puff Daily., Disp: 1 each, Rfl: 11    furosemide (LASIX) 20 MG tablet, TAKE 1 TABLET BY MOUTH DAILY AS NEEDED FOR SWELLING, Disp: 90 tablet, Rfl: 1    Melatonin 10 MG capsule, Take 1 capsule by mouth As Needed., Disp: , Rfl:     metFORMIN ER (GLUCOPHAGE-XR) 750 MG 24 hr tablet, Take 1 tablet by mouth Daily., Disp: 90 tablet, Rfl: 3    montelukast (SINGULAIR) 10 MG tablet, Take 1 tablet by mouth Daily., Disp: 90 tablet, Rfl: 3    olmesartan-hydrochlorothiazide (BENICAR HCT) 40-12.5  "MG per tablet, Take 1 tablet by mouth Daily., Disp: 90 tablet, Rfl: 3    potassium chloride (KLOR-CON M10) 10 MEQ CR tablet, Take 1 tablet by mouth Daily As Needed (with lasix)., Disp: 90 tablet, Rfl: 1    pravastatin (PRAVACHOL) 10 MG tablet, Take 1 tablet by mouth Daily., Disp: 90 tablet, Rfl: 3    Tirzepatide 7.5 MG/0.5ML solution auto-injector, Inject 0.5 mL under the skin into the appropriate area as directed 1 (One) Time Per Week., Disp: 2 mL, Rfl: 5    traZODone (DESYREL) 50 MG tablet, Take 1-2 tablets by mouth At Night As Needed for Sleep., Disp: 60 tablet, Rfl: 5    Current Facility-Administered Medications:     cyanocobalamin injection 1,000 mcg, 1,000 mcg, Intramuscular, Q28 Days, Noreen Smith MD, 1,000 mcg at 02/05/25 0839      The following portions of the patient's history were reviewed and updated as appropriate: allergies, current medications, past family history, past medical history, past social history, past surgical history and problem list.    Review of Systems   Constitutional: Negative.  Negative for chills and fever.   HENT:  Negative for ear discharge, ear pain, sinus pressure and sore throat.    Respiratory:  Negative for cough, chest tightness and shortness of breath.    Cardiovascular:  Negative for chest pain, palpitations and leg swelling.   Gastrointestinal:  Negative for diarrhea, nausea and vomiting.   Musculoskeletal:  Negative for arthralgias, back pain and myalgias.   Neurological:  Negative for dizziness, syncope and headaches.   Psychiatric/Behavioral:  Negative for confusion and sleep disturbance.        Objective   /60   Pulse 56   Temp 97.3 °F (36.3 °C)   Ht 149 cm (58.66\")   Wt 111 kg (244 lb 9.6 oz)   SpO2 96% Comment: ra  BMI 49.98 kg/m²   Physical Exam  Vitals and nursing note reviewed.   Constitutional:       Appearance: She is well-developed.   HENT:      Head: Normocephalic and atraumatic.      Right Ear: External ear normal.      Left Ear: External " ear normal.      Mouth/Throat:      Pharynx: No oropharyngeal exudate.   Eyes:      Conjunctiva/sclera: Conjunctivae normal.      Pupils: Pupils are equal, round, and reactive to light.   Neck:      Thyroid: No thyromegaly.   Cardiovascular:      Rate and Rhythm: Normal rate and regular rhythm.      Pulses: Normal pulses.      Heart sounds: Normal heart sounds. No murmur heard.     No friction rub. No gallop.   Pulmonary:      Effort: Pulmonary effort is normal.      Breath sounds: Normal breath sounds.   Abdominal:      General: Bowel sounds are normal. There is no distension.      Palpations: Abdomen is soft.      Tenderness: There is no abdominal tenderness.   Musculoskeletal:      Cervical back: Neck supple.   Skin:     General: Skin is warm and dry.   Neurological:      Mental Status: She is alert and oriented to person, place, and time.      Cranial Nerves: No cranial nerve deficit.   Psychiatric:         Judgment: Judgment normal.                 Results for orders placed or performed in visit on 02/05/25   POC Glycosylated Hemoglobin (Hb A1C)    Collection Time: 02/05/25  8:40 AM    Specimen: Blood   Result Value Ref Range    Hemoglobin A1C 6.6 (A) 4.5 - 5.7 %    Lot Number [id number]     Expiration Date 10/18/26              Assessment & Plan   Diagnoses and all orders for this visit:    Uncontrolled type 2 diabetes mellitus with hyperglycemia  -     POC Glycosylated Hemoglobin (Hb A1C)  -     Tirzepatide 7.5 MG/0.5ML solution auto-injector; Inject 0.5 mL under the skin into the appropriate area as directed 1 (One) Time Per Week.    Primary insomnia  -     traZODone (DESYREL) 50 MG tablet; Take 1-2 tablets by mouth At Night As Needed for Sleep.    Other hyperlipidemia  -     Comprehensive Metabolic Panel; Future  -     Lipid Panel; Future    Essential hypertension      D/c amaryl due to hypoglycemia.  Increase mounjaro to compensate.       Monitor BP and if high, add amlodipine, she will send me a msg via  uli with her BP log.    No follow-ups on file.    Electronically signed by:    Noreen Smith MD

## 2025-02-26 LAB — NCCN CRITERIA FLAG: NORMAL

## 2025-03-10 ENCOUNTER — CLINICAL SUPPORT (OUTPATIENT)
Dept: INTERNAL MEDICINE | Facility: CLINIC | Age: 65
End: 2025-03-10
Payer: COMMERCIAL

## 2025-03-10 PROCEDURE — 96372 THER/PROPH/DIAG INJ SC/IM: CPT | Performed by: INTERNAL MEDICINE

## 2025-03-10 RX ADMIN — CYANOCOBALAMIN 1000 MCG: 1000 INJECTION, SOLUTION INTRAMUSCULAR; SUBCUTANEOUS at 14:31

## 2025-03-13 ENCOUNTER — HOSPITAL ENCOUNTER (OUTPATIENT)
Dept: MAMMOGRAPHY | Facility: HOSPITAL | Age: 65
Discharge: HOME | End: 2025-03-13
Admitting: INTERNAL MEDICINE
Payer: COMMERCIAL

## 2025-03-13 DIAGNOSIS — Z17.0 MALIGNANT NEOPLASM OF LOWER-OUTER QUADRANT OF RIGHT BREAST OF FEMALE, ESTROGEN RECEPTOR POSITIVE: ICD-10-CM

## 2025-03-13 DIAGNOSIS — C50.511 MALIGNANT NEOPLASM OF LOWER-OUTER QUADRANT OF RIGHT BREAST OF FEMALE, ESTROGEN RECEPTOR POSITIVE: ICD-10-CM

## 2025-03-13 PROCEDURE — 77063 BREAST TOMOSYNTHESIS BI: CPT

## 2025-03-13 PROCEDURE — 77067 SCR MAMMO BI INCL CAD: CPT

## 2025-03-14 ENCOUNTER — HOSPITAL ENCOUNTER (OUTPATIENT)
Dept: BONE DENSITY | Facility: HOSPITAL | Age: 65
Discharge: HOME | End: 2025-03-14
Admitting: NURSE PRACTITIONER
Payer: COMMERCIAL

## 2025-03-14 ENCOUNTER — TELEPHONE (OUTPATIENT)
Dept: ONCOLOGY | Facility: CLINIC | Age: 65
End: 2025-03-14
Payer: COMMERCIAL

## 2025-03-14 DIAGNOSIS — Z13.820 SCREENING FOR OSTEOPOROSIS: ICD-10-CM

## 2025-03-14 DIAGNOSIS — Z78.0 MENOPAUSE: ICD-10-CM

## 2025-03-14 PROCEDURE — 77080 DXA BONE DENSITY AXIAL: CPT

## 2025-03-14 NOTE — TELEPHONE ENCOUNTER
Caller: Sergey Arvin Margret    Relationship: Self    Best call back number: [phone]    What is the best time to reach you: ANY    Who are you requesting to speak with (clinical staff, provider,  specific staff member): CLINICAL     What was the call regarding: ARVIN HAS AN APPOINTMENT ON 3-18 WITH DR BLANCO     SHE IS ASKING IF SHE CAN GET AN APPOINTMENT EARLY FOR LABS  THAT MORNING  BECAUSE SHE HAS FASTING LABS FROM ANOTHER DOCTOR TO DO AS WELL    PLEASE PLACE LAB ORDER AND CALL WITH APPOINTMENT FOR LABS    PLEASE ADVISE

## 2025-03-14 NOTE — TELEPHONE ENCOUNTER
RN called patient back, let her know that her lab orders are in. She can stop by lab station in the morning to have those labs drawn since she needs to be fasting for lipid panel. Advised her to mention she needs that lab drawn as well for her PCP. She does not need an appointment. Patient v/u and appreciation.

## 2025-03-16 DIAGNOSIS — I10 ESSENTIAL HYPERTENSION: ICD-10-CM

## 2025-03-16 DIAGNOSIS — R60.0 PEDAL EDEMA: ICD-10-CM

## 2025-03-17 ENCOUNTER — LAB (OUTPATIENT)
Dept: LAB | Facility: HOSPITAL | Age: 65
End: 2025-03-17
Payer: COMMERCIAL

## 2025-03-17 DIAGNOSIS — E78.49 OTHER HYPERLIPIDEMIA: ICD-10-CM

## 2025-03-17 DIAGNOSIS — Z17.0 MALIGNANT NEOPLASM OF LOWER-OUTER QUADRANT OF RIGHT BREAST OF FEMALE, ESTROGEN RECEPTOR POSITIVE: ICD-10-CM

## 2025-03-17 DIAGNOSIS — M81.0 OSTEOPOROSIS, UNSPECIFIED OSTEOPOROSIS TYPE, UNSPECIFIED PATHOLOGICAL FRACTURE PRESENCE: ICD-10-CM

## 2025-03-17 DIAGNOSIS — C50.511 MALIGNANT NEOPLASM OF LOWER-OUTER QUADRANT OF RIGHT BREAST OF FEMALE, ESTROGEN RECEPTOR POSITIVE: ICD-10-CM

## 2025-03-17 LAB
ALBUMIN SERPL-MCNC: 4.1 G/DL (ref 3.5–5.2)
ALBUMIN SERPL-MCNC: 4.2 G/DL (ref 3.5–5.2)
ALBUMIN/GLOB SERPL: 1.4 G/DL
ALBUMIN/GLOB SERPL: 1.5 G/DL
ALP SERPL-CCNC: 100 U/L (ref 39–117)
ALP SERPL-CCNC: 93 U/L (ref 39–117)
ALT SERPL W P-5'-P-CCNC: 15 U/L (ref 1–33)
ALT SERPL W P-5'-P-CCNC: 16 U/L (ref 1–33)
ANION GAP SERPL CALCULATED.3IONS-SCNC: 16 MMOL/L (ref 5–15)
ANION GAP SERPL CALCULATED.3IONS-SCNC: 16 MMOL/L (ref 5–15)
AST SERPL-CCNC: 13 U/L (ref 1–32)
AST SERPL-CCNC: 14 U/L (ref 1–32)
BASOPHILS # BLD AUTO: 0.04 10*3/MM3 (ref 0–0.2)
BASOPHILS NFR BLD AUTO: 0.5 % (ref 0–1.5)
BILIRUB SERPL-MCNC: 0.5 MG/DL (ref 0–1.2)
BILIRUB SERPL-MCNC: 0.5 MG/DL (ref 0–1.2)
BUN SERPL-MCNC: 14 MG/DL (ref 8–23)
BUN SERPL-MCNC: 15 MG/DL (ref 8–23)
BUN/CREAT SERPL: 20.6 (ref 7–25)
BUN/CREAT SERPL: 25 (ref 7–25)
CALCIUM SPEC-SCNC: 9.5 MG/DL (ref 8.6–10.5)
CALCIUM SPEC-SCNC: 9.5 MG/DL (ref 8.6–10.5)
CHLORIDE SERPL-SCNC: 94 MMOL/L (ref 98–107)
CHLORIDE SERPL-SCNC: 95 MMOL/L (ref 98–107)
CHOLEST SERPL-MCNC: 154 MG/DL (ref 0–200)
CO2 SERPL-SCNC: 21 MMOL/L (ref 22–29)
CO2 SERPL-SCNC: 22 MMOL/L (ref 22–29)
CREAT SERPL-MCNC: 0.6 MG/DL (ref 0.57–1)
CREAT SERPL-MCNC: 0.68 MG/DL (ref 0.57–1)
DEPRECATED RDW RBC AUTO: 42 FL (ref 37–54)
EGFRCR SERPLBLD CKD-EPI 2021: 100.4 ML/MIN/1.73
EGFRCR SERPLBLD CKD-EPI 2021: 97.4 ML/MIN/1.73
EOSINOPHIL # BLD AUTO: 0.09 10*3/MM3 (ref 0–0.4)
EOSINOPHIL NFR BLD AUTO: 1.2 % (ref 0.3–6.2)
ERYTHROCYTE [DISTWIDTH] IN BLOOD BY AUTOMATED COUNT: 13.1 % (ref 12.3–15.4)
GLOBULIN UR ELPH-MCNC: 2.8 GM/DL
GLOBULIN UR ELPH-MCNC: 3 GM/DL
GLUCOSE SERPL-MCNC: 102 MG/DL (ref 65–99)
GLUCOSE SERPL-MCNC: 102 MG/DL (ref 65–99)
HCT VFR BLD AUTO: 42.6 % (ref 34–46.6)
HDLC SERPL-MCNC: 48 MG/DL (ref 40–60)
HGB BLD-MCNC: 14 G/DL (ref 12–15.9)
IMM GRANULOCYTES # BLD AUTO: 0.01 10*3/MM3 (ref 0–0.05)
IMM GRANULOCYTES NFR BLD AUTO: 0.1 % (ref 0–0.5)
LDLC SERPL CALC-MCNC: 86 MG/DL (ref 0–100)
LDLC/HDLC SERPL: 1.75 {RATIO}
LYMPHOCYTES # BLD AUTO: 1.67 10*3/MM3 (ref 0.7–3.1)
LYMPHOCYTES NFR BLD AUTO: 22.8 % (ref 19.6–45.3)
MAGNESIUM SERPL-MCNC: 1.9 MG/DL (ref 1.6–2.4)
MCH RBC QN AUTO: 28.5 PG (ref 26.6–33)
MCHC RBC AUTO-ENTMCNC: 32.9 G/DL (ref 31.5–35.7)
MCV RBC AUTO: 86.6 FL (ref 79–97)
MONOCYTES # BLD AUTO: 0.54 10*3/MM3 (ref 0.1–0.9)
MONOCYTES NFR BLD AUTO: 7.4 % (ref 5–12)
NEUTROPHILS NFR BLD AUTO: 4.97 10*3/MM3 (ref 1.7–7)
NEUTROPHILS NFR BLD AUTO: 68 % (ref 42.7–76)
PHOSPHATE SERPL-MCNC: 4.3 MG/DL (ref 2.5–4.5)
PLATELET # BLD AUTO: 223 10*3/MM3 (ref 140–450)
PMV BLD AUTO: 8.4 FL (ref 6–12)
POTASSIUM SERPL-SCNC: 4.1 MMOL/L (ref 3.5–5.2)
POTASSIUM SERPL-SCNC: 4.1 MMOL/L (ref 3.5–5.2)
PROT SERPL-MCNC: 7 G/DL (ref 6–8.5)
PROT SERPL-MCNC: 7.1 G/DL (ref 6–8.5)
RBC # BLD AUTO: 4.92 10*6/MM3 (ref 3.77–5.28)
SODIUM SERPL-SCNC: 132 MMOL/L (ref 136–145)
SODIUM SERPL-SCNC: 132 MMOL/L (ref 136–145)
TRIGL SERPL-MCNC: 109 MG/DL (ref 0–150)
VLDLC SERPL-MCNC: 20 MG/DL (ref 5–40)
WBC NRBC COR # BLD AUTO: 7.32 10*3/MM3 (ref 3.4–10.8)

## 2025-03-17 PROCEDURE — 84100 ASSAY OF PHOSPHORUS: CPT

## 2025-03-17 PROCEDURE — 80053 COMPREHEN METABOLIC PANEL: CPT

## 2025-03-17 PROCEDURE — 80061 LIPID PANEL: CPT

## 2025-03-17 PROCEDURE — 85025 COMPLETE CBC W/AUTO DIFF WBC: CPT

## 2025-03-17 PROCEDURE — 83735 ASSAY OF MAGNESIUM: CPT

## 2025-03-17 PROCEDURE — 36415 COLL VENOUS BLD VENIPUNCTURE: CPT

## 2025-03-17 RX ORDER — POTASSIUM CHLORIDE 750 MG/1
TABLET, EXTENDED RELEASE ORAL
Qty: 90 TABLET | Refills: 1 | Status: SHIPPED | OUTPATIENT
Start: 2025-03-17

## 2025-03-17 RX ORDER — FUROSEMIDE 20 MG/1
20 TABLET ORAL DAILY PRN
Qty: 90 TABLET | Refills: 1 | Status: SHIPPED | OUTPATIENT
Start: 2025-03-17

## 2025-03-18 ENCOUNTER — HOSPITAL ENCOUNTER (OUTPATIENT)
Dept: ONCOLOGY | Facility: HOSPITAL | Age: 65
Discharge: HOME | End: 2025-03-18
Payer: COMMERCIAL

## 2025-03-18 ENCOUNTER — OFFICE VISIT (OUTPATIENT)
Dept: ONCOLOGY | Facility: CLINIC | Age: 65
End: 2025-03-18
Payer: COMMERCIAL

## 2025-03-18 VITALS
DIASTOLIC BLOOD PRESSURE: 72 MMHG | HEIGHT: 59 IN | OXYGEN SATURATION: 96 % | HEART RATE: 62 BPM | SYSTOLIC BLOOD PRESSURE: 162 MMHG | RESPIRATION RATE: 16 BRPM | BODY MASS INDEX: 48.99 KG/M2 | TEMPERATURE: 97.9 F | WEIGHT: 243 LBS

## 2025-03-18 DIAGNOSIS — C50.511 MALIGNANT NEOPLASM OF LOWER-OUTER QUADRANT OF RIGHT BREAST OF FEMALE, ESTROGEN RECEPTOR POSITIVE: Primary | ICD-10-CM

## 2025-03-18 DIAGNOSIS — Z17.0 MALIGNANT NEOPLASM OF LOWER-OUTER QUADRANT OF RIGHT BREAST OF FEMALE, ESTROGEN RECEPTOR POSITIVE: Primary | ICD-10-CM

## 2025-03-18 DIAGNOSIS — M81.0 OSTEOPOROSIS, UNSPECIFIED OSTEOPOROSIS TYPE, UNSPECIFIED PATHOLOGICAL FRACTURE PRESENCE: ICD-10-CM

## 2025-03-18 PROCEDURE — 96372 THER/PROPH/DIAG INJ SC/IM: CPT

## 2025-03-18 PROCEDURE — 99214 OFFICE O/P EST MOD 30 MIN: CPT | Performed by: INTERNAL MEDICINE

## 2025-03-18 PROCEDURE — 25010000002 DENOSUMAB 60 MG/ML SOLUTION PREFILLED SYRINGE: Performed by: NURSE PRACTITIONER

## 2025-03-18 RX ADMIN — DENOSUMAB 60 MG: 60 INJECTION SUBCUTANEOUS at 15:53

## 2025-03-18 NOTE — PROGRESS NOTES
DATE OF VISIT: 3/18/2025    REASON FOR VISIT: Followup for right breast cancer     PROBLEM LIST:  1. Stage I invasive ductal carcinoma of the right breast, H2mH1C4:  A. Tumor at the 7 o'clock position, tumor size 1.4 cm, estrogen receptor and progesterone  receptor strongly positive, HER-2/greg negative zero by IHC. Low risk group Oncotype testing, score of 16, 10% risk of recurrence with hormone treatment alone.  B. Diagnosed on ultrasound guided biopsy 03/10/2016.   C. Status post lumpectomy with clear margins done by Dr. Sales 05/03/2016.   D. Started Arimidex 1 mg p.o. daily 06/03/2016.   E. Completed breast adjuvant radiation by Dr. Thompson.   2.  Osteoporosis:  A.  DEXA scan done June 2018 revealed T score -2.3  B. Starting Prolia 1/22/2020.   3. Right breast mammogram abnormality  4.  Obesity    HISTORY OF PRESENT ILLNESS: The patient is a very pleasant 64 y.o. female with past medical history significant for right breast cancer diagnosed March 2016.  Patient status post lumpectomy followed by adjuvant radiation hormone treatment. The patient is here today for scheduled follow-up visit.    SUBJECTIVE: Emiliano is here today by herself.  All in all she is doing well.  Denies any fever or chills.  She has been compliant with her calcium replacement.     Past History:  Medical History: has a past medical history of Allergic (Demerol), Asthma, BRCA1 negative, BRCA2 negative, Breast cancer (2016), Breast injury, Diabetes mellitus, Dry eyes, Emphysema/COPD, H/O colonoscopy (10/2015), HBP (high blood pressure), Hot flashes, radiation therapy (06/2016), Itchy eyes, Obesity, HANNA (obstructive sleep apnea), Sinus problem, and Wears glasses.   Surgical History: has a past surgical history that includes Tonsillectomy (1965); Dilation and curettage of uterus (2005); Other surgical history (2016); Breast lumpectomy (Right, 05/03/2016); Breast biopsy (Right, 09/08/2015); Breast biopsy (Right, 03/10/2016); Breast biopsy  "(Right, 03/2018); Adenoidectomy (1965); Tonsillectomy (1965); and Colonoscopy (2016).   Family History: family history includes Breast cancer in her paternal aunt; Breast cancer (age of onset: 30) in some other family members; Cardiomyopathy in her mother; Colon cancer in her mother and paternal uncle; Diabetes in her father; Heart disease (age of onset: 63) in her father; Heart failure in her father; Hypertension in her mother; No Known Problems in her sister.   Social History: reports that she has never smoked. She has never been exposed to tobacco smoke. She has never used smokeless tobacco. She reports current alcohol use of about 3.0 standard drinks of alcohol per week. She reports that she does not use drugs.    (Not in a hospital admission)     Allergies: Meperidine     Review of Systems   Constitutional:  Positive for fatigue.   Musculoskeletal:  Positive for arthralgias.         PHYSICAL EXAMINATION:   /72 Comment: LUE  Pulse 62   Temp 97.9 °F (36.6 °C) (Temporal)   Resp 16   Ht 149 cm (58.66\")   Wt 110 kg (243 lb)   SpO2 96% Comment: RA  BMI 49.65 kg/m²    Pain Score    03/18/25 1514   PainSc: 0-No pain        ECOG score: 0        ECOG Performance Status: 1 - Symptomatic but completely ambulatory  General Appearance:  alert, cooperative, no apparent distress and appears stated age   Lungs:   Clear to auscultation bilaterally; respirations regular, even, and unlabored bilaterally   Heart:  Regular rate and rhythm, no murmurs appreciated   Abdomen:   Soft, non-tender, non-distended, and no organomegaly     Lab on 03/17/2025   Component Date Value Ref Range Status    Glucose 03/17/2025 102 (H)  65 - 99 mg/dL Final    BUN 03/17/2025 15  8 - 23 mg/dL Final    Creatinine 03/17/2025 0.60  0.57 - 1.00 mg/dL Final    Sodium 03/17/2025 132 (L)  136 - 145 mmol/L Final    Potassium 03/17/2025 4.1  3.5 - 5.2 mmol/L Final    Chloride 03/17/2025 95 (L)  98 - 107 mmol/L Final    CO2 03/17/2025 21.0 (L)  22.0 " - 29.0 mmol/L Final    Calcium 03/17/2025 9.5  8.6 - 10.5 mg/dL Final    Total Protein 03/17/2025 7.0  6.0 - 8.5 g/dL Final    Albumin 03/17/2025 4.2  3.5 - 5.2 g/dL Final    ALT (SGPT) 03/17/2025 15  1 - 33 U/L Final    AST (SGOT) 03/17/2025 14  1 - 32 U/L Final    Alkaline Phosphatase 03/17/2025 100  39 - 117 U/L Final    Total Bilirubin 03/17/2025 0.5  0.0 - 1.2 mg/dL Final    Globulin 03/17/2025 2.8  gm/dL Final    Calculated Result    A/G Ratio 03/17/2025 1.5  g/dL Final    BUN/Creatinine Ratio 03/17/2025 25.0  7.0 - 25.0 Final    Anion Gap 03/17/2025 16.0 (H)  5.0 - 15.0 mmol/L Final    eGFR 03/17/2025 100.4  >60.0 mL/min/1.73 Final    Magnesium 03/17/2025 1.9  1.6 - 2.4 mg/dL Final    Phosphorus 03/17/2025 4.3  2.5 - 4.5 mg/dL Final    WBC 03/17/2025 7.32  3.40 - 10.80 10*3/mm3 Final    RBC 03/17/2025 4.92  3.77 - 5.28 10*6/mm3 Final    Hemoglobin 03/17/2025 14.0  12.0 - 15.9 g/dL Final    Hematocrit 03/17/2025 42.6  34.0 - 46.6 % Final    MCV 03/17/2025 86.6  79.0 - 97.0 fL Final    MCH 03/17/2025 28.5  26.6 - 33.0 pg Final    MCHC 03/17/2025 32.9  31.5 - 35.7 g/dL Final    RDW 03/17/2025 13.1  12.3 - 15.4 % Final    RDW-SD 03/17/2025 42.0  37.0 - 54.0 fl Final    MPV 03/17/2025 8.4  6.0 - 12.0 fL Final    Platelets 03/17/2025 223  140 - 450 10*3/mm3 Final    Neutrophil % 03/17/2025 68.0  42.7 - 76.0 % Final    Lymphocyte % 03/17/2025 22.8  19.6 - 45.3 % Final    Monocyte % 03/17/2025 7.4  5.0 - 12.0 % Final    Eosinophil % 03/17/2025 1.2  0.3 - 6.2 % Final    Basophil % 03/17/2025 0.5  0.0 - 1.5 % Final    Immature Grans % 03/17/2025 0.1  0.0 - 0.5 % Final    Neutrophils, Absolute 03/17/2025 4.97  1.70 - 7.00 10*3/mm3 Final    Lymphocytes, Absolute 03/17/2025 1.67  0.70 - 3.10 10*3/mm3 Final    Monocytes, Absolute 03/17/2025 0.54  0.10 - 0.90 10*3/mm3 Final    Eosinophils, Absolute 03/17/2025 0.09  0.00 - 0.40 10*3/mm3 Final    Basophils, Absolute 03/17/2025 0.04  0.00 - 0.20 10*3/mm3 Final    Immature  Grans, Absolute 03/17/2025 0.01  0.00 - 0.05 10*3/mm3 Final    Glucose 03/17/2025 102 (H)  65 - 99 mg/dL Final    BUN 03/17/2025 14  8 - 23 mg/dL Final    Creatinine 03/17/2025 0.68  0.57 - 1.00 mg/dL Final    Sodium 03/17/2025 132 (L)  136 - 145 mmol/L Final    Potassium 03/17/2025 4.1  3.5 - 5.2 mmol/L Final    Chloride 03/17/2025 94 (L)  98 - 107 mmol/L Final    CO2 03/17/2025 22.0  22.0 - 29.0 mmol/L Final    Calcium 03/17/2025 9.5  8.6 - 10.5 mg/dL Final    Total Protein 03/17/2025 7.1  6.0 - 8.5 g/dL Final    Albumin 03/17/2025 4.1  3.5 - 5.2 g/dL Final    ALT (SGPT) 03/17/2025 16  1 - 33 U/L Final    AST (SGOT) 03/17/2025 13  1 - 32 U/L Final    Alkaline Phosphatase 03/17/2025 93  39 - 117 U/L Final    Total Bilirubin 03/17/2025 0.5  0.0 - 1.2 mg/dL Final    Globulin 03/17/2025 3.0  gm/dL Final    Calculated Result    A/G Ratio 03/17/2025 1.4  g/dL Final    BUN/Creatinine Ratio 03/17/2025 20.6  7.0 - 25.0 Final    Anion Gap 03/17/2025 16.0 (H)  5.0 - 15.0 mmol/L Final    eGFR 03/17/2025 97.4  >60.0 mL/min/1.73 Final    Total Cholesterol 03/17/2025 154  0 - 200 mg/dL Final    Triglycerides 03/17/2025 109  0 - 150 mg/dL Final    HDL Cholesterol 03/17/2025 48  40 - 60 mg/dL Final    LDL Cholesterol  03/17/2025 86  0 - 100 mg/dL Final    VLDL Cholesterol 03/17/2025 20  5 - 40 mg/dL Final    LDL/HDL Ratio 03/17/2025 1.75   Final        Mammo Screening Digital Tomosynthesis Bilateral With CAD  Result Date: 3/15/2025  Narrative: DIGITAL SCREENING MAMMOGRAM WITH TOMOSYNTHESIS  HISTORY: Screening Mammography.  Low dose full field digital breast tomosynthesis imaging was performed with 2D and 3D acquisitions consisting of bilateral CC and MLO views. Examination is compared to prior examination dating back to 10/26/2018. Examination is read in conjunction with computer aided detection.  FINDINGS:   The breasts are heterogeneously dense, which may obscure small masses. No suspicious masses, microcalcifications or areas  "of architectural  distortion are identified. Changes consistent with right breast conservation therapy are present.      Impression: Benign screening mammogram.  RECOMMENDATION:  Continue annual screening mammography.  BI-RADS CATEGORY 2, BENIGN.   CAD was utilized.  The standard false-negative rate of mammography is between 10% and 25%. Complex patterns or increased breast density will markedly elevate the false-negative rate of mammography.    A letter, in lay terminology, with the results of this exam will be mailed to the patient.  3/15/2025 2:35 PM by Dr. Naima Shearer MD on Workstation: [id number]      DEXA Bone Density Axial  Result Date: 3/14/2025  Narrative: DUAL-ENERGY X-RAY ABSORPTIOMETRY (DXA) INDICATION: Postmenopausal, screening for osteoporosis, history of glucocorticoids, prior fracture, cancer, asthma or emphysema, new baseline COMPARISON: There are no equivalent studies available for comparison PROCEDURE: A DXA scan was performed using a Hologic densitometer. The lumbar spine L1-L4 was evaluated as well as bilateral total hip. The T-score compares the patient's bone mineral density with the peak bone mass of young normal patients.  According to criteria established by the World Health Organization, patients with T-scores  between 1.0 and 2.5 standard deviations BELOW the mean are osteopenic (low bone mass).  Patients with T-scores EQUAL TO OR GREATER than 2.5 standard deviations below the mean are osteoporotic. The Z-score compares the patient bone mineral density with age and sex matched peers.  According to the International Society for Clinical Densitometry's 2007 consensus conference:  In women prior to menopause and men less than age 50, Z-scores, not T-scores are preferred.  A Z-score of -2.0 or lower is defined as \"below the expected range for age\" and a Z-score above -2.0 is \"within the expected range for age.\"  The WHO diagnostic criteria may be applied in women in the menopausal " transition.  Osteoporosis cannot be diagnosed in men under age 50 on the basis of BMD alone. TECHNICAL QUALITY:  The study is of good technical quality. RESULTS: Lumbar Spine:  The BMD measured in the L1-L4 region is 1.015 g/cm2.  The average T-score is -0.3.  The Z-score is 1.4. Total Hip:  The BMD measured at the left total proximal femur is 1.028 g/cm2.  The T-score is 0.7.  The Z-score is 1.9. Femoral Neck:  The BMD measured at the left femoral neck is 0.520 g/cm2.  The T-score is -3.0.  The Z-score is -1.5. Total Hip:  The BMD measured at the right total proximal femur is 1.001 g/cm2.  The T-score is 0.5.  The Z-score is 1.7. Femoral neck: The BMD measured at the right femoral neck is 0.666 g/cm2. The T score is -1.6. The Z score is -0.2.     Impression: Osteoporosis of the left femoral neck. Osteopenia of the right femoral neck. The ten year fracture risk assessment was not calculated because some T-scores were at or below -2.5, and because the patient has been, or is currently treated for osteoporosis. All the treatment decisions require clinical judgment and consideration of individual patient factors, including patient preferences, co-morbidities, previous drug use, risk factors not captured in the FRAX model (frailty, falls, vitamin D deficiency, increased bone turnover, interval significant decline in bone density) and possible under or over estimation of fracture risk by FRAX. Approaches to reduce osteoporosis related fracture risk include optimizing calcium and vitamin D status, appropriate weight bearing exercises and fall-prevention measurements.  The National Osteoporosis Foundation recommends (http://www.nof.org/hcp/practice/practice-and-clinical-guidelines/clinicians-guide) that FDA-approved medical therapies be considered in postmenopausal women and men aged equal or greater than 50 years with :  a) hip or vertebral (clinical or morphometric) fracture; b) T-score of -2.5 or less at the spine or  hip; c) Ten-year fracture probability by FRAX of greater than 3% for hip fracture  of greater than 20% for major osteoporotic fracture.  Secondary causes of bone loss should be evaluated if clinically indicated since the etiology of low BMD cannot be determined by BMD measurement alone. FOLLOWUP: Consider repeating the study in 2-3 years to reassess the patient's status or sooner if there is some new clinical indication. INTERVAL CHANGE:   There were no equivalent studies available for comparison. Due to cross calibration requirements from different scanners, previous studies may not be available for statistical comparison at this time.   At this facility, the least significant change in the BMD at the left hip with 95% confidence is 0.642404 gm/cm2 at the hip and 0.511374 g/cm2 at the lumbar spine. Report dictated by: Lena Monae PA-c  I have personally reviewed this case and agree with the findings above: Electronically Signed: Douglas Snyder MD  3/14/2025 2:17 PM EDT  Workstation ID: [id number]        ASSESSMENT: The patient is a very pleasant 64 y.o. female  with right breast cancer      PLAN:    1.  Right breast cancer:  A.  I did go over the mammogram result with the patient from March 2025 that revealed benign changes.  She will have 1 year follow-up study which be due March 2026.    2.  Osteoporosis:  A.  I will continue calcium and vitamin D daily.  B.  I will continue Prolia 60 minutes subcu every 6 months.  She will be treated today.  C.  Discussed with patient bone density from March 2025 confirmed osteoporosis in the left femoral neck.  I will do 2-year follow-up study which be due March 2027.    3.  Obesity:  A. The patient is going to restart Ozempic as prescribed by her PCP for both diabetes control and weight loss.     FOLLOW UP: 6 months with Prolia.    Idalia Driscoll MD  3/18/2025

## 2025-05-02 NOTE — PROGRESS NOTES
Subjective:     Encounter Date:05/05/2025    Primary Care Physician: Noreen Smith MD      Patient ID: Dana Rincon is a 64 y.o. female.    Chief Complaint:Bradycardia, drug induced (Pt states the Bisoprolol makes her feel light headed and sluggish.)    PROBLEM LIST:  Dyspnea on exertion  12/2023 echo EF 60%.  Mean aortic valve gradient 17.  Trace MR.  1/24/2024 normal MPS.  Mild scattered coronary calcifications.  12/2/2024 echo EF 60%.  Borderline LVH.  Normal valves.  PVC  2023 Holter monitor showing 19% PVC burden  Hypertension  Dyslipidemia  Sleep apnea on CPAP  Obesity  COPD/asthma   Diabetes   Breast cancer 2015 or 2016  XRT right side  Lumpectomy and anastrazole  Surgeries:  Tonsillectomy and adenoidectomy  Lumpectomy  D&C  [place] teeth extraction        Allergies   Allergen Reactions    Meperidine Nausea And Vomiting         Current Outpatient Medications:     albuterol sulfate  (90 Base) MCG/ACT inhaler, Inhale 2 puffs Every 4 (Four) Hours As Needed for Wheezing., Disp: 18 g, Rfl: 5    bisoprolol (ZEBeta) 5 MG tablet, Take 0.5 tablets by mouth Daily., Disp: 30 tablet, Rfl: 5    CALCIUM PO, Take 1 tablet by mouth Daily., Disp: , Rfl:     Cholecalciferol (VITAMIN D3 PO), Take 1 capsule by mouth Daily., Disp: , Rfl:     clotrimazole-betamethasone (Lotrisone) 1-0.05 % cream, Apply 1 Application topically to the appropriate area as directed 2 (Two) Times a Day., Disp: 45 g, Rfl: 3    empagliflozin (Jardiance) 25 MG tablet tablet, Take 1 tablet by mouth Daily. For DMII, replaces Rybelsus, Disp: 90 tablet, Rfl: 3    fexofenadine (ALLEGRA) 30 MG tablet, Take 1 tablet by mouth As Needed., Disp: , Rfl:     Fluticasone-Umeclidin-Vilant (Trelegy Ellipta) 200-62.5-25 MCG/ACT inhaler, Inhale 1 puff Daily., Disp: 1 each, Rfl: 11    furosemide (LASIX) 20 MG tablet, TAKE 1 TABLET BY MOUTH DAILY AS NEEDED FOR SWELLING, Disp: 90 tablet, Rfl: 1    Melatonin 10 MG capsule, Take 1 capsule by mouth As Needed.,  Disp: , Rfl:     metFORMIN ER (GLUCOPHAGE-XR) 750 MG 24 hr tablet, Take 1 tablet by mouth Daily., Disp: 90 tablet, Rfl: 3    montelukast (SINGULAIR) 10 MG tablet, Take 1 tablet by mouth Daily., Disp: 90 tablet, Rfl: 3    olmesartan-hydrochlorothiazide (BENICAR HCT) 40-12.5 MG per tablet, Take 1 tablet by mouth Daily., Disp: 90 tablet, Rfl: 3    potassium chloride (KLOR-CON M10) 10 MEQ CR tablet, TAKE 1 TABLET BY MOUTH EVERY DAY AS NEEDED WITH LASIX, Disp: 90 tablet, Rfl: 1    pravastatin (PRAVACHOL) 10 MG tablet, Take 1 tablet by mouth Daily., Disp: 90 tablet, Rfl: 3    Tirzepatide 7.5 MG/0.5ML solution auto-injector, Inject 0.5 mL under the skin into the appropriate area as directed 1 (One) Time Per Week., Disp: 2 mL, Rfl: 5    traZODone (DESYREL) 50 MG tablet, Take 1-2 tablets by mouth At Night As Needed for Sleep., Disp: 60 tablet, Rfl: 5    Current Facility-Administered Medications:     cyanocobalamin injection 1,000 mcg, 1,000 mcg, Intramuscular, Q28 Days, Noreen Smith MD, 1,000 mcg at 03/10/25 1431        History of Present Illness    Patient is a 64-year-old  female who presents today for annual follow-up of PVCs and hypertension.  Since last being seen she notes overall doing relatively well from cardiac standpoint.  She notes that after she takes her beta-blocker in the morning she feels on it for some time.  She wonders if she needs to do something else with this medication.  Also notes that her blood pressure had been elevated.  However, she missed her Benicar/HCTZ yesterday morning and took it last evening.  Blood pressure today is normal.  She denies any chest pain, pressure.  Denies any increasing shortness of breath.  Traveled to [facility] back in October and did quite a bit of walking with no issues.  No reported syncope, presyncope.    The following portions of the patient's history were reviewed and updated as appropriate: allergies, current medications, past family history, past medical  "history, past social history, past surgical history and problem list.      Social History     Tobacco Use    Smoking status: Never     Passive exposure: Never    Smokeless tobacco: Never   Vaping Use    Vaping status: Never Used   Substance Use Topics    Alcohol use: Yes     Alcohol/week: 3.0 standard drinks of alcohol     Types: 3 Glasses of wine per week     Comment: 2-3/week for 20 years    Drug use: No         ROS       Objective:   /70 (BP Location: Left arm, Patient Position: Sitting, Cuff Size: Adult)   Pulse 61   Ht 149.9 cm (59\")   Wt 112 kg (246 lb 3.2 oz)   SpO2 97%   BMI 49.73 kg/m²         Vitals reviewed.   Constitutional:       Appearance: Well-developed and not in distress.   Neck:      Vascular: No JVD.      Trachea: No tracheal deviation.   Pulmonary:      Effort: Pulmonary effort is normal.      Breath sounds: Normal breath sounds.   Cardiovascular:      Normal rate. Regular rhythm.      Murmurs: There is no murmur.   Edema:     Peripheral edema absent.   Musculoskeletal:         General: No deformity. Skin:     General: Skin is warm and dry.   Neurological:      Mental Status: Alert and oriented to person, place, and time.         Procedures          Assessment:   Assessment & Plan      Diagnoses and all orders for this visit:    1. PVC's (premature ventricular contractions) (Primary), stable.  On beta-blocker.  However, feels that she is having side effects from this.    2. Essential hypertension, controlled today.      Plan:  Patient is overall stable from cardiac standpoint.  Discussed with patient would try moving her bisoprolol to nightly dosing.  Continue current cardiac medications.  Continue to monitor blood pressure.  Follow-up in 1 years time or sooner if needed.       Lena CHIU             Dictated utilizing Dragon dictation  "

## 2025-05-05 ENCOUNTER — OFFICE VISIT (OUTPATIENT)
Dept: CARDIOLOGY | Facility: CLINIC | Age: 65
End: 2025-05-05
Payer: COMMERCIAL

## 2025-05-05 VITALS
OXYGEN SATURATION: 97 % | SYSTOLIC BLOOD PRESSURE: 120 MMHG | HEIGHT: 59 IN | WEIGHT: 246.2 LBS | BODY MASS INDEX: 49.63 KG/M2 | DIASTOLIC BLOOD PRESSURE: 70 MMHG | HEART RATE: 61 BPM

## 2025-05-05 DIAGNOSIS — I49.3 PVC'S (PREMATURE VENTRICULAR CONTRACTIONS): Primary | ICD-10-CM

## 2025-05-05 DIAGNOSIS — I10 ESSENTIAL HYPERTENSION: ICD-10-CM

## 2025-05-05 PROCEDURE — 99213 OFFICE O/P EST LOW 20 MIN: CPT | Performed by: NURSE PRACTITIONER

## 2025-05-05 NOTE — LETTER
May 5, 2025     Noreen Smith MD  [address] [place] [id number]    Patient: Dana Rincon   YOB: 1960   Date of Visit: 5/5/2025     Dear Noreen Smith MD:       Thank you for referring Dana Rincon to me for evaluation. Below are the relevant portions of my assessment and plan of care.    If you have questions, please do not hesitate to call me. I look forward to following Dana along with you.         Sincerely,        ARAM Trevino        CC: No Recipients    Lena Alejandro APRN  05/05/25 1203  Sign when Signing Visit  Subjective:     Encounter Date:05/05/2025    Primary Care Physician: Noreen Smith MD      Patient ID: Dana Rincon is a 64 y.o. female.    Chief Complaint:Bradycardia, drug induced (Pt states the Bisoprolol makes her feel light headed and sluggish.)    PROBLEM LIST:  Dyspnea on exertion  12/2023 echo EF 60%.  Mean aortic valve gradient 17.  Trace MR.  1/24/2024 normal MPS.  Mild scattered coronary calcifications.  12/2/2024 echo EF 60%.  Borderline LVH.  Normal valves.  PVC  2023 Holter monitor showing 19% PVC burden  Hypertension  Dyslipidemia  Sleep apnea on CPAP  Obesity  COPD/asthma   Diabetes   Breast cancer 2015 or 2016  XRT right side  Lumpectomy and anastrazole  Surgeries:  Tonsillectomy and adenoidectomy  Lumpectomy  D&C  [place] teeth extraction        Allergies   Allergen Reactions   • Meperidine Nausea And Vomiting         Current Outpatient Medications:   •  albuterol sulfate  (90 Base) MCG/ACT inhaler, Inhale 2 puffs Every 4 (Four) Hours As Needed for Wheezing., Disp: 18 g, Rfl: 5  •  bisoprolol (ZEBeta) 5 MG tablet, Take 0.5 tablets by mouth Daily., Disp: 30 tablet, Rfl: 5  •  CALCIUM PO, Take 1 tablet by mouth Daily., Disp: , Rfl:   •  Cholecalciferol (VITAMIN D3 PO), Take 1 capsule by mouth Daily., Disp: , Rfl:   •  clotrimazole-betamethasone (Lotrisone) 1-0.05 % cream, Apply 1 Application topically to the appropriate area  as directed 2 (Two) Times a Day., Disp: 45 g, Rfl: 3  •  empagliflozin (Jardiance) 25 MG tablet tablet, Take 1 tablet by mouth Daily. For DMII, replaces Rybelsus, Disp: 90 tablet, Rfl: 3  •  fexofenadine (ALLEGRA) 30 MG tablet, Take 1 tablet by mouth As Needed., Disp: , Rfl:   •  Fluticasone-Umeclidin-Vilant (Trelegy Ellipta) 200-62.5-25 MCG/ACT inhaler, Inhale 1 puff Daily., Disp: 1 each, Rfl: 11  •  furosemide (LASIX) 20 MG tablet, TAKE 1 TABLET BY MOUTH DAILY AS NEEDED FOR SWELLING, Disp: 90 tablet, Rfl: 1  •  Melatonin 10 MG capsule, Take 1 capsule by mouth As Needed., Disp: , Rfl:   •  metFORMIN ER (GLUCOPHAGE-XR) 750 MG 24 hr tablet, Take 1 tablet by mouth Daily., Disp: 90 tablet, Rfl: 3  •  montelukast (SINGULAIR) 10 MG tablet, Take 1 tablet by mouth Daily., Disp: 90 tablet, Rfl: 3  •  olmesartan-hydrochlorothiazide (BENICAR HCT) 40-12.5 MG per tablet, Take 1 tablet by mouth Daily., Disp: 90 tablet, Rfl: 3  •  potassium chloride (KLOR-CON M10) 10 MEQ CR tablet, TAKE 1 TABLET BY MOUTH EVERY DAY AS NEEDED WITH LASIX, Disp: 90 tablet, Rfl: 1  •  pravastatin (PRAVACHOL) 10 MG tablet, Take 1 tablet by mouth Daily., Disp: 90 tablet, Rfl: 3  •  Tirzepatide 7.5 MG/0.5ML solution auto-injector, Inject 0.5 mL under the skin into the appropriate area as directed 1 (One) Time Per Week., Disp: 2 mL, Rfl: 5  •  traZODone (DESYREL) 50 MG tablet, Take 1-2 tablets by mouth At Night As Needed for Sleep., Disp: 60 tablet, Rfl: 5    Current Facility-Administered Medications:   •  cyanocobalamin injection 1,000 mcg, 1,000 mcg, Intramuscular, Q28 Days, Noreen Smith MD, 1,000 mcg at 03/10/25 1431        History of Present Illness    Patient is a 64-year-old  female who presents today for annual follow-up of PVCs and hypertension.  Since last being seen she notes overall doing relatively well from cardiac standpoint.  She notes that after she takes her beta-blocker in the morning she feels on it for some time.  She  "wonders if she needs to do something else with this medication.  Also notes that her blood pressure had been elevated.  However, she missed her Benicar/HCTZ yesterday morning and took it last evening.  Blood pressure today is normal.  She denies any chest pain, pressure.  Denies any increasing shortness of breath.  Traveled to [facility] back in October and did quite a bit of walking with no issues.  No reported syncope, presyncope.    The following portions of the patient's history were reviewed and updated as appropriate: allergies, current medications, past family history, past medical history, past social history, past surgical history and problem list.      Social History     Tobacco Use   • Smoking status: Never     Passive exposure: Never   • Smokeless tobacco: Never   Vaping Use   • Vaping status: Never Used   Substance Use Topics   • Alcohol use: Yes     Alcohol/week: 3.0 standard drinks of alcohol     Types: 3 Glasses of wine per week     Comment: 2-3/week for 20 years   • Drug use: No         ROS       Objective:   /70 (BP Location: Left arm, Patient Position: Sitting, Cuff Size: Adult)   Pulse 61   Ht 149.9 cm (59\")   Wt 112 kg (246 lb 3.2 oz)   SpO2 97%   BMI 49.73 kg/m²         Vitals reviewed.   Constitutional:       Appearance: Well-developed and not in distress.   Neck:      Vascular: No JVD.      Trachea: No tracheal deviation.   Pulmonary:      Effort: Pulmonary effort is normal.      Breath sounds: Normal breath sounds.   Cardiovascular:      Normal rate. Regular rhythm.      Murmurs: There is no murmur.   Edema:     Peripheral edema absent.   Musculoskeletal:         General: No deformity. Skin:     General: Skin is warm and dry.   Neurological:      Mental Status: Alert and oriented to person, place, and time.         Procedures          Assessment:   Assessment & Plan     Diagnoses and all orders for this visit:    1. PVC's (premature ventricular contractions) (Primary), stable.  On " beta-blocker.  However, feels that she is having side effects from this.    2. Essential hypertension, controlled today.      Plan:  Patient is overall stable from cardiac standpoint.  Discussed with patient would try moving her bisoprolol to nightly dosing.  Continue current cardiac medications.  Continue to monitor blood pressure.  Follow-up in 1 years time or sooner if needed.       Lena CHIU             Dictated utilizing Dragon dictation

## 2025-05-22 DIAGNOSIS — E11.65 UNCONTROLLED TYPE 2 DIABETES MELLITUS WITH HYPERGLYCEMIA: ICD-10-CM

## 2025-05-22 RX ORDER — METFORMIN HYDROCHLORIDE 750 MG/1
750 TABLET, EXTENDED RELEASE ORAL DAILY
Qty: 90 TABLET | Refills: 3 | Status: SHIPPED | OUTPATIENT
Start: 2025-05-22

## 2025-05-22 RX ORDER — EMPAGLIFLOZIN 25 MG/1
TABLET, FILM COATED ORAL
Qty: 90 TABLET | Refills: 3 | Status: SHIPPED | OUTPATIENT
Start: 2025-05-22

## 2025-05-22 NOTE — TELEPHONE ENCOUNTER
LOV  2/5/25  NOV  8/11/25    metFORMIN ER (GLUCOPHAGE-XR)   750 MG 24 hr   Last filled 5/29/24  90 w/ 3 refills    empagliflozin (Jardiance) 25 MG   Last filled 5/29/24  90 w/ 3 refills    Sent in   metFORMIN ER (GLUCOPHAGE-XR)   750 MG 24 hr   90 w/ 3 refills    empagliflozin (Jardiance) 25 MG   90 w/ 3 refills

## 2025-07-22 DIAGNOSIS — F51.01 PRIMARY INSOMNIA: ICD-10-CM

## 2025-07-22 RX ORDER — TRAZODONE HYDROCHLORIDE 50 MG/1
TABLET ORAL
Qty: 60 TABLET | Refills: 0 | Status: SHIPPED | OUTPATIENT
Start: 2025-07-22

## 2025-07-29 DIAGNOSIS — E78.49 OTHER HYPERLIPIDEMIA: ICD-10-CM

## 2025-07-29 RX ORDER — PRAVASTATIN SODIUM 10 MG
10 TABLET ORAL DAILY
Qty: 30 TABLET | Refills: 0 | Status: SHIPPED | OUTPATIENT
Start: 2025-07-29

## 2025-08-11 ENCOUNTER — LAB (OUTPATIENT)
Dept: LAB | Facility: HOSPITAL | Age: 65
End: 2025-08-11
Payer: COMMERCIAL

## 2025-08-11 ENCOUNTER — OFFICE VISIT (OUTPATIENT)
Dept: INTERNAL MEDICINE | Facility: CLINIC | Age: 65
End: 2025-08-11
Payer: COMMERCIAL

## 2025-08-11 VITALS
HEIGHT: 59 IN | HEART RATE: 58 BPM | DIASTOLIC BLOOD PRESSURE: 60 MMHG | BODY MASS INDEX: 49.51 KG/M2 | TEMPERATURE: 96 F | SYSTOLIC BLOOD PRESSURE: 128 MMHG | OXYGEN SATURATION: 97 % | WEIGHT: 245.6 LBS

## 2025-08-11 DIAGNOSIS — J45.40 MODERATE PERSISTENT ASTHMA WITHOUT COMPLICATION: ICD-10-CM

## 2025-08-11 DIAGNOSIS — Z00.00 ROUTINE GENERAL MEDICAL EXAMINATION AT A HEALTH CARE FACILITY: Primary | ICD-10-CM

## 2025-08-11 DIAGNOSIS — F51.01 PRIMARY INSOMNIA: ICD-10-CM

## 2025-08-11 DIAGNOSIS — E53.8 B12 DEFICIENCY: ICD-10-CM

## 2025-08-11 DIAGNOSIS — J30.89 NON-SEASONAL ALLERGIC RHINITIS DUE TO OTHER ALLERGIC TRIGGER: ICD-10-CM

## 2025-08-11 DIAGNOSIS — E11.65 UNCONTROLLED TYPE 2 DIABETES MELLITUS WITH HYPERGLYCEMIA: ICD-10-CM

## 2025-08-11 DIAGNOSIS — B35.3 TINEA PEDIS OF BOTH FEET: ICD-10-CM

## 2025-08-11 DIAGNOSIS — E78.49 OTHER HYPERLIPIDEMIA: ICD-10-CM

## 2025-08-11 DIAGNOSIS — Z00.00 ROUTINE GENERAL MEDICAL EXAMINATION AT A HEALTH CARE FACILITY: ICD-10-CM

## 2025-08-11 DIAGNOSIS — E55.9 VITAMIN D DEFICIENCY: ICD-10-CM

## 2025-08-11 DIAGNOSIS — I10 ESSENTIAL HYPERTENSION: ICD-10-CM

## 2025-08-11 LAB
25(OH)D3 SERPL-MCNC: 36.9 NG/ML (ref 30–100)
ALBUMIN SERPL-MCNC: 4.2 G/DL (ref 3.5–5.2)
ALBUMIN/GLOB SERPL: 1.4 G/DL
ALP SERPL-CCNC: 85 U/L (ref 39–117)
ALT SERPL W P-5'-P-CCNC: 10 U/L (ref 1–33)
ANION GAP SERPL CALCULATED.3IONS-SCNC: 15 MMOL/L (ref 5–15)
AST SERPL-CCNC: 14 U/L (ref 1–32)
BILIRUB BLD-MCNC: NEGATIVE MG/DL
BILIRUB SERPL-MCNC: 0.3 MG/DL (ref 0–1.2)
BUN SERPL-MCNC: 16 MG/DL (ref 8–23)
BUN/CREAT SERPL: 19 (ref 7–25)
CALCIUM SPEC-SCNC: 9.4 MG/DL (ref 8.6–10.5)
CHLORIDE SERPL-SCNC: 98 MMOL/L (ref 98–107)
CHOLEST SERPL-MCNC: 174 MG/DL (ref 0–200)
CLARITY, POC: CLEAR
CO2 SERPL-SCNC: 25 MMOL/L (ref 22–29)
COLOR UR: YELLOW
CREAT SERPL-MCNC: 0.84 MG/DL (ref 0.57–1)
DEPRECATED RDW RBC AUTO: 40.2 FL (ref 37–54)
EGFRCR SERPLBLD CKD-EPI 2021: 77.7 ML/MIN/1.73
ERYTHROCYTE [DISTWIDTH] IN BLOOD BY AUTOMATED COUNT: 13.1 % (ref 12.3–15.4)
EXPIRATION DATE: ABNORMAL
EXPIRATION DATE: ABNORMAL
EXPIRATION DATE: NORMAL
GLOBULIN UR ELPH-MCNC: 3.1 GM/DL
GLUCOSE SERPL-MCNC: 112 MG/DL (ref 65–99)
GLUCOSE UR STRIP-MCNC: ABNORMAL MG/DL
HBA1C MFR BLD: 6.7 % (ref 4.5–5.7)
HCT VFR BLD AUTO: 41.7 % (ref 34–46.6)
HDLC SERPL-MCNC: 42 MG/DL (ref 40–60)
HGB BLD-MCNC: 14.1 G/DL (ref 12–15.9)
KETONES UR QL: NEGATIVE
LDLC SERPL CALC-MCNC: 115 MG/DL (ref 0–100)
LDLC/HDLC SERPL: 2.7 {RATIO}
LEUKOCYTE EST, POC: NEGATIVE
Lab: ABNORMAL
Lab: ABNORMAL
Lab: NORMAL
MCH RBC QN AUTO: 28.7 PG (ref 26.6–33)
MCHC RBC AUTO-ENTMCNC: 33.8 G/DL (ref 31.5–35.7)
MCV RBC AUTO: 84.9 FL (ref 79–97)
NITRITE UR-MCNC: NEGATIVE MG/ML
PH UR: 6 [PH] (ref 5–8)
PLATELET # BLD AUTO: 245 10*3/MM3 (ref 140–450)
PMV BLD AUTO: 8.9 FL (ref 6–12)
POC ALBUMIN, URINE: 10 MG/L
POC CREATININE, URINE: 10 MG/DL
POC URINE ALB/CREA RATIO: <30
POTASSIUM SERPL-SCNC: 4.1 MMOL/L (ref 3.5–5.2)
PROT SERPL-MCNC: 7.3 G/DL (ref 6–8.5)
PROT UR STRIP-MCNC: NEGATIVE MG/DL
RBC # BLD AUTO: 4.91 10*6/MM3 (ref 3.77–5.28)
RBC # UR STRIP: NEGATIVE /UL
SODIUM SERPL-SCNC: 138 MMOL/L (ref 136–145)
SP GR UR: 1 (ref 1–1.03)
TRIGL SERPL-MCNC: 93 MG/DL (ref 0–150)
TSH SERPL DL<=0.05 MIU/L-ACNC: 2.47 UIU/ML (ref 0.27–4.2)
UROBILINOGEN UR QL: NORMAL
VIT B12 BLD-MCNC: 371 PG/ML (ref 211–946)
VLDLC SERPL-MCNC: 17 MG/DL (ref 5–40)
WBC NRBC COR # BLD AUTO: 6.55 10*3/MM3 (ref 3.4–10.8)

## 2025-08-11 PROCEDURE — 80050 GENERAL HEALTH PANEL: CPT

## 2025-08-11 PROCEDURE — 80061 LIPID PANEL: CPT

## 2025-08-11 PROCEDURE — 82306 VITAMIN D 25 HYDROXY: CPT

## 2025-08-11 PROCEDURE — 82607 VITAMIN B-12: CPT

## 2025-08-11 RX ORDER — TRAZODONE HYDROCHLORIDE 50 MG/1
50-100 TABLET ORAL NIGHTLY
Qty: 180 TABLET | Refills: 1 | Status: SHIPPED | OUTPATIENT
Start: 2025-08-11

## 2025-08-11 RX ORDER — PRAVASTATIN SODIUM 10 MG
10 TABLET ORAL DAILY
Qty: 90 TABLET | Refills: 1 | Status: SHIPPED | OUTPATIENT
Start: 2025-08-11

## 2025-08-11 RX ORDER — ALBUTEROL SULFATE 90 UG/1
2 INHALANT RESPIRATORY (INHALATION) EVERY 4 HOURS PRN
Qty: 18 G | Refills: 5 | Status: SHIPPED | OUTPATIENT
Start: 2025-08-11

## 2025-08-11 RX ORDER — NITROGLYCERIN 20 MG/G
0.5 OINTMENT TOPICAL
COMMUNITY
Start: 2025-07-16

## 2025-08-11 RX ORDER — NYSTATIN 100000 [USP'U]/G
POWDER TOPICAL 2 TIMES DAILY
Qty: 60 G | Refills: 5 | Status: SHIPPED | OUTPATIENT
Start: 2025-08-11

## 2025-08-11 RX ORDER — CLOTRIMAZOLE AND BETAMETHASONE DIPROPIONATE 10; .64 MG/G; MG/G
1 CREAM TOPICAL 2 TIMES DAILY
Qty: 45 G | Refills: 3 | Status: SHIPPED | OUTPATIENT
Start: 2025-08-11

## 2025-08-11 RX ORDER — MONTELUKAST SODIUM 10 MG/1
10 TABLET ORAL DAILY
Qty: 90 TABLET | Refills: 3 | Status: SHIPPED | OUTPATIENT
Start: 2025-08-11

## 2025-08-11 RX ORDER — OLMESARTAN MEDOXOMIL AND HYDROCHLOROTHIAZIDE 40/12.5 40; 12.5 MG/1; MG/1
1 TABLET ORAL DAILY
Qty: 90 TABLET | Refills: 3 | Status: SHIPPED | OUTPATIENT
Start: 2025-08-11